# Patient Record
Sex: FEMALE | Race: WHITE | NOT HISPANIC OR LATINO | Employment: OTHER | URBAN - METROPOLITAN AREA
[De-identification: names, ages, dates, MRNs, and addresses within clinical notes are randomized per-mention and may not be internally consistent; named-entity substitution may affect disease eponyms.]

---

## 2017-01-17 ENCOUNTER — TELEPHONE (OUTPATIENT)
Dept: MEDICAL GROUP | Age: 82
End: 2017-01-17

## 2017-01-17 ENCOUNTER — OFFICE VISIT (OUTPATIENT)
Dept: CARDIOLOGY | Facility: MEDICAL CENTER | Age: 82
End: 2017-01-17
Payer: COMMERCIAL

## 2017-01-17 VITALS
SYSTOLIC BLOOD PRESSURE: 108 MMHG | WEIGHT: 105 LBS | HEIGHT: 62 IN | DIASTOLIC BLOOD PRESSURE: 54 MMHG | HEART RATE: 74 BPM | BODY MASS INDEX: 19.32 KG/M2 | OXYGEN SATURATION: 99 %

## 2017-01-17 DIAGNOSIS — F17.200 TOBACCO DEPENDENCE: ICD-10-CM

## 2017-01-17 DIAGNOSIS — G47.34 NOCTURNAL HYPOXIA: ICD-10-CM

## 2017-01-17 DIAGNOSIS — E78.00 PURE HYPERCHOLESTEROLEMIA: ICD-10-CM

## 2017-01-17 DIAGNOSIS — I47.10 PSVT (PAROXYSMAL SUPRAVENTRICULAR TACHYCARDIA): ICD-10-CM

## 2017-01-17 DIAGNOSIS — I65.29 CAROTID ARTERY PLAQUE, UNSPECIFIED LATERALITY: ICD-10-CM

## 2017-01-17 DIAGNOSIS — E78.5 DYSLIPIDEMIA: ICD-10-CM

## 2017-01-17 PROCEDURE — 99214 OFFICE O/P EST MOD 30 MIN: CPT | Performed by: NURSE PRACTITIONER

## 2017-01-17 RX ORDER — NITROFURANTOIN MACROCRYSTALS 100 MG/1
CAPSULE ORAL
COMMUNITY
Start: 2016-12-27 | End: 2017-01-19

## 2017-01-17 RX ORDER — DILTIAZEM HYDROCHLORIDE 120 MG/1
120 CAPSULE, COATED, EXTENDED RELEASE ORAL
Qty: 90 CAP | Refills: 3 | Status: SHIPPED | OUTPATIENT
Start: 2017-01-17 | End: 2018-02-14 | Stop reason: SDUPTHER

## 2017-01-17 ASSESSMENT — ENCOUNTER SYMPTOMS
PALPITATIONS: 0
PND: 0
COUGH: 1
ABDOMINAL PAIN: 0
MYALGIAS: 0
CLAUDICATION: 0
FEVER: 0
DIZZINESS: 0
SHORTNESS OF BREATH: 0
ORTHOPNEA: 0
SPUTUM PRODUCTION: 1

## 2017-01-17 NOTE — MR AVS SNAPSHOT
"        Antonia Stover   2017 1:40 PM   Office Visit   MRN: [id number]    Department:  [facility]   Dept Phone:  [phone]    Description:  Female : 1935   Provider:  JOSH Arita           Reason for Visit     Follow-Up           Allergies as of 2017     Allergen Noted Reactions    Hydrocodone 10/18/2016   Hives    Iodine 2015   Anaphylaxis    RXN=>20 years ago    Nsaids 2013   Hives, Shortness of Breath    Penicillins 2015   Anaphylaxis    RXN=>20 years ago    Codeine 2010       Erythromycin 2010       Morphine 2010       Sulfa Drugs 2010         You were diagnosed with     Carotid artery plaque, unspecified laterality   [026800]       Dyslipidemia   [976340]       Pure hypercholesterolemia   [272.0.ICD-9-CM]       PSVT (paroxysmal supraventricular tachycardia) (CMS-HCC)   [499052]       Nocturnal hypoxia   [293740]       Tobacco dependence   [681931]         Vital Signs     Blood Pressure Pulse Height Weight Body Mass Index Oxygen Saturation    108/54 mmHg 74 1.575 m (5' 2\") 47.628 kg (105 lb) 19.20 kg/m2 99%    Smoking Status                   Current Every Day Smoker           Basic Information     Date Of Birth Sex Race Ethnicity Preferred Language    1935 Female White Non- English      Your appointments     2017  2:00 PM   Established Patient with Tamir Hanson PA-C   [facility] [address] [place] [address] [phone]           You will be receiving a confirmation call a few days before your appointment from our automated call confirmation system.            2017 11:15 AM   FOLLOW UP with Diogo Bernard M.D.   [facility] (--)    [address], [address]  [place] [address] [phone]              Problem List              ICD-10-CM Priority Class Noted - Resolved    Insomnia G47.00 Low  2011 - " Present    PSVT (paroxysmal supraventricular tachycardia) (CMS-HCC) I47.1 Medium  9/29/2011 - Present    Dyslipidemia E78.5 Medium  9/29/2011 - Present    Nocturnal hypoxia G47.34 Medium  8/7/2013 - Present    Hypothyroidism E03.9 Low  8/7/2013 - Present    Carotid artery plaque I65.29 Medium  3/19/2014 - Present    Osteoporosis M81.0 Low  7/31/2014 - Present    H/O knee surgery Z98.890 Low  8/28/2014 - Present    HLD (hyperlipidemia) E78.5 Medium  9/12/2014 - Present    Tobacco dependence F17.200 Low  10/18/2016 - Present    Chronic obstructive pulmonary disease (CMS-HCC) J44.9 Low  11/3/2016 - Present    Chronic pain of left knee M25.562, G89.29 Low  12/22/2016 - Present    Dysuria R30.0 Low  12/22/2016 - Present      Health Maintenance        Date Due Completion Dates    IMM DTaP/Tdap/Td Vaccine (1 - Tdap) 8/20/1956 ---    IMM ZOSTER VACCINE 8/20/1997 ---    MAMMOGRAM 3/14/2017 3/14/2016 (Done), 7/31/2014, 12/13/2010, 12/10/2010, 9/25/2009, 9/25/2009    Override on 3/14/2016: Done (performed at outside location)    BONE DENSITY 7/31/2019 7/31/2014    COLONOSCOPY 7/15/2024 7/15/2014 (Declined)    Override on 7/15/2014: Patient Declined (had it in past 'and won't do it again.')            Current Immunizations     13-VALENT PCV PREVNAR 11/11/2015    Influenza TIV (IM) 11/16/2012    Influenza Vaccine Adult HD 11/3/2016    Influenza Vaccine Pediatric 10/27/2011    Influenza Vaccine Quad Inj (Preserved) 11/11/2015    Pneumococcal polysaccharide vaccine (PPSV-23) 2/16/2005      Below and/or attached are the medications your provider expects you to take. Review all of your home medications and newly ordered medications with your provider and/or pharmacist. Follow medication instructions as directed by your provider and/or pharmacist. Please keep your medication list with you and share with your provider. Update the information when medications are discontinued, doses are changed, or new medications (including  over-the-counter products) are added; and carry medication information at all times in the event of emergency situations     Allergies:  HYDROCODONE - Hives     IODINE - Anaphylaxis     NSAIDS - Hives,Shortness of Breath     PENICILLINS - Anaphylaxis     CODEINE - (reactions not documented)     ERYTHROMYCIN - (reactions not documented)     MORPHINE - (reactions not documented)     SULFA DRUGS - (reactions not documented)               Medications  Valid as of: January 17, 2017 -  2:20 PM    Generic Name Brand Name Tablet Size Instructions for use    Acetaminophen (Tab CR) TYLENOL 650 MG Take 650 mg by mouth every 6 hours as needed.        Albuterol Sulfate (Aero Soln) PROAIR  (90 BASE) MCG/ACT Inhale 2 Puffs by mouth every 6 hours as needed for Shortness of Breath.        DiltiaZEM HCl Coated Beads (CAPSULE SR 24 HR) CARDIZEM  MG Take 1 Cap by mouth every day.        Docusate Sodium (Cap) COLACE 100 MG Take 100 mg by mouth every day.        Estradiol (Cream) ESTRACE 0.1 MG/GM Insert 1 g in vagina every day.        Levothyroxine Sodium (Tab) SYNTHROID 75 MCG TAKE 1 TABLET BY MOUTH BY MOUTH EVERY MORNING ON AN EMPTY STOMACH        Loratadine (Tab) CLARITIN 10 MG Take 10 mg by mouth every day.        Multiple Vitamins-Minerals (Tab) OCUVITE  Take 1 Tab by mouth every evening.        Nitrofurantoin Macrocrystal (Cap) MACRODANTIN 100 MG         Nitrofurantoin Monohyd Macro (Cap) MACROBID 100 MG Take 1 Cap by mouth 2 times a day.        Pravastatin Sodium (Tab) PRAVACHOL 40 MG Take 1 Tab by mouth every evening.        Probiotic Product   Take  by mouth every day.        Wheat Dextrin   Take 1 Dose by mouth 3 times a day.        .                 Medicines prescribed today were sent to:     [id number] - [place], [place] - [address] AT [address] [facility] [address]    [address] [address]    Phone: [phone] Fax: [phone]    Open 24 Hours?: No      Medication refill  instructions:       If your prescription bottle indicates you have medication refills left, it is not necessary to call your provider’s office. Please contact your pharmacy and they will refill your medication.    If your prescription bottle indicates you do not have any refills left, you may request refills at any time through one of the following ways: The online [facility] system (except Urgent Care), by calling your provider’s office, or by asking your pharmacy to contact your provider’s office with a refill request. Medication refills are processed only during regular business hours and may not be available until the next business day. Your provider may request additional information or to have a follow-up visit with you prior to refilling your medication.   *Please Note: Medication refills are assigned a new Rx number when refilled electronically. Your pharmacy may indicate that no refills were authorized even though a new prescription for the same medication is available at the pharmacy. Please request the medicine by name with the pharmacy before contacting your provider for a refill.        Your To Do List     Future Labs/Procedures Complete By Expires    CAROTID DUPLEX  As directed 7/20/2017         [facility] System Status: Patient Declined

## 2017-01-17 NOTE — PROGRESS NOTES
Subjective:   Antonia Stover is a 81 y.o. female who presents today for yearly follow up.    She is a patient of Dr. Bernard in our office Hx of PSVT, HLD, and COPD with nocturnal oxygen.    She does have occasional episodes of palpitations with her PSVT, but she is able to manage with vagal maneuvers and they are usually very short-lived.    She does have chronic shortness of breath, sputum production, and a cough with her COPD. She continues to smoke 6 cigarettes daily.    She has had no episodes of chest pain, dizziness/lightheadedness, orthopnea, or peripheral edema.    Past Medical History   Diagnosis Date   • PSVT (paroxysmal supraventricular tachycardia) (CMS-HCC) February 2013     Echocardiogram with normal LV size, LVEF 65-70%.   • Dyslipidemia     • Nocturnal hypoxia       Uses oxygen QHS.   • COPD    • Insomnia     • Dizziness     • Tobacco use    • Hypothyroidism    • Sickle cell disease (CMS-HCC)    • Carotid artery plaque 3/19/2014   • Fatigue 7/3/2014   • HLD (hyperlipidemia) 9/12/2014   • Anemia    • Arthritis      Past Surgical History   Procedure Laterality Date   • Hysterectomy, total abdominal     • Knee replacement, total  2005     Right   • Cataract extraction with iol       Bilateral   • Cataract extraction with iol       Bilateral   • Appendectomy     • Abdominal hysterectomy total       Family History   Problem Relation Age of Onset   • Heart Attack Brother 70     History   Smoking status   • Current Every Day Smoker -- 1.00 packs/day for 55 years   • Types: Cigarettes   Smokeless tobacco   • Never Used     Allergies   Allergen Reactions   • Hydrocodone Hives   • Iodine Anaphylaxis     RXN=>20 years ago   • Nsaids Hives and Shortness of Breath   • Penicillins Anaphylaxis     RXN=>20 years ago   • Codeine    • Erythromycin    • Morphine    • Sulfa Drugs      Outpatient Encounter Prescriptions as of 1/17/2017   Medication Sig Dispense Refill   • nitrofurantoin macro crystals (MACRODANTIN) 100  MG Cap      • diltiazem CD (CARTIA XT) 120 MG CAPSULE SR 24 HR Take 1 Cap by mouth every day. 90 Cap 3   • pravastatin (PRAVACHOL) 40 MG tablet Take 1 Tab by mouth every evening. 90 Tab 3   • acetaminophen (TYLENOL 8 HOUR ARTHRITIS PAIN) 650 MG CR tablet Take 650 mg by mouth every 6 hours as needed.     • nitrofurantoin monohydr macro (MACROBID) 100 MG Cap Take 1 Cap by mouth 2 times a day. 20 Cap 0   • PROAIR  (90 BASE) MCG/ACT Aero Soln inhalation aerosol Inhale 2 Puffs by mouth every 6 hours as needed for Shortness of Breath. 1 Inhaler 3   • levothyroxine (SYNTHROID) 75 MCG Tab TAKE 1 TABLET BY MOUTH BY MOUTH EVERY MORNING ON AN EMPTY STOMACH 90 Tab 3   • Probiotic Product (ALIGN PO) Take  by mouth every day.     • docusate sodium (COLACE) 100 MG Cap Take 100 mg by mouth every day.     • Wheat Dextrin (BENEFIBER DRINK MIX PO) Take 1 Dose by mouth 3 times a day.     • estradiol (ESTRACE) 0.1 MG/GM vaginal cream Insert 1 g in vagina every day. 42.5 g 3   • Multiple Vitamins-Minerals (OCUVITE) TABS Take 1 Tab by mouth every evening.     • [DISCONTINUED] CARTIA  MG CAPSULE SR 24 HR TAKE ONE CAPSULE BY MOUTH EVERY DAY 90 Cap 0   • [DISCONTINUED] cyclobenzaprine (FLEXERIL) 10 MG Tab Take 1 Tab by mouth 2 times a day as needed. (Patient not taking: Reported on 1/17/2017) 15 Tab 0   • loratadine (CLARITIN) 10 MG Tab Take 10 mg by mouth every day.       No facility-administered encounter medications on file as of 1/17/2017.     Review of Systems   Constitutional: Negative for fever and malaise/fatigue.   Respiratory: Positive for cough and sputum production. Negative for shortness of breath.         White sputum daily   Cardiovascular: Negative for chest pain, palpitations, orthopnea, claudication, leg swelling and PND.   Gastrointestinal: Negative for abdominal pain.   Musculoskeletal: Negative for myalgias.   Neurological: Negative for dizziness.   All other systems reviewed and are negative.        "Objective:   /54 mmHg  Pulse 74  Ht 1.575 m (5' 2\")  Wt 47.628 kg (105 lb)  BMI 19.20 kg/m2  SpO2 99%    Physical Exam   Constitutional: She is oriented to person, place, and time. She appears well-developed and well-nourished. No distress.   HENT:   Head: Normocephalic and atraumatic.   Eyes: EOM are normal.   Neck: Normal range of motion. No JVD present.   Cardiovascular: Normal rate, regular rhythm, normal heart sounds and intact distal pulses.    No murmur heard.  Pulmonary/Chest: Effort normal and breath sounds normal. No respiratory distress.   Abdominal: Soft. Bowel sounds are normal.   Musculoskeletal: Normal range of motion. She exhibits no edema.   Neurological: She is alert and oriented to person, place, and time.   Skin: Skin is warm and dry.   Psychiatric: She has a normal mood and affect.   Nursing note and vitals reviewed.    Lab Results   Component Value Date/Time    CHOLESTEROL, 11/09/2016 07:52 AM    LDL 85 11/09/2016 07:52 AM    HDL 77 11/09/2016 07:52 AM    TRIGLYCERIDES 67 11/09/2016 07:52 AM       Lab Results   Component Value Date/Time    SODIUM 140 11/09/2016 07:52 AM    POTASSIUM 4.1 11/09/2016 07:52 AM    CHLORIDE 107 11/09/2016 07:52 AM    CO2 26 11/09/2016 07:52 AM    GLUCOSE 83 11/09/2016 07:52 AM    BUN 9 11/09/2016 07:52 AM    CREATININE 0.98 11/09/2016 07:52 AM     Lab Results   Component Value Date/Time    ALKALINE PHOSPHATASE 70 11/09/2016 07:52 AM    AST(SGOT) 19 11/09/2016 07:52 AM    ALT(SGPT) 9 11/09/2016 07:52 AM    TOTAL BILIRUBIN 0.6 11/09/2016 07:52 AM      2014 CAROTID DUPLEX CONCLUSIONS   Mild bilateral internal carotid artery stenosis (<50%).    Flow within both subclavian arteries appears to be within normal limits.     Assessment:     1. Carotid artery plaque, unspecified laterality  CAROTID DUPLEX   2. Dyslipidemia  CAROTID DUPLEX   3. Pure hypercholesterolemia     4. PSVT (paroxysmal supraventricular tachycardia) (CMS-HCC)  diltiazem CD (CARTIA XT) " 120 MG CAPSULE SR 24 HR   5. Nocturnal hypoxia     6. Tobacco dependence       Medical Decision Making:  Today's Assessment / Status / Plan:     1. Carotid disease, mild. FU with repeat duplex this year. Continue statin, no baby ASA.    2. HLD, managed well with pravastatin. Continue. Labs per PCP.    3. PSVT, controlled well with cartia. Re-filled for one year.    4. COPD with tobacco abuse and nocturnal hypoxia, followed by pulmonary and PCP. Not ready for smoking cessation at this point, did recommend.    FU in clinic in 6 months with FK with repeat carotid and yearly labs with PCP.    Patient verbalizes understanding and agrees with the plan of care.     Collaborating MD: Emmanuel HERNANDEZ    Spent 45 minutes in face-to-face patient contact in which greater than 50% of the visit was spent in counseling/coordination of care of medication management, symptom management, re-assurance, discussion of carotid disease and order duplex, smoking cessation discussed, and labs per PCP.

## 2017-01-17 NOTE — Clinical Note
[facility]   [address]  [place] [address]  Phone: [phone]  Fax: [phone]              Antonia Stover  1935    Encounter Date: 1/17/2017    JOSH Arita          PROGRESS NOTE:  Subjective:   Antonia Stover is a 81 y.o. female who presents today for yearly follow up.    She is a patient of Dr. Bernard in our office Hx of PSVT, HLD, and COPD with nocturnal oxygen.    She does have occasional episodes of palpitations with her PSVT, but she is able to manage with vagal maneuvers and they are usually very short-lived.    She does have chronic shortness of breath, sputum production, and a cough with her COPD. She continues to smoke 6 cigarettes daily.    She has had no episodes of chest pain, dizziness/lightheadedness, orthopnea, or peripheral edema.    Past Medical History   Diagnosis Date   • PSVT (paroxysmal supraventricular tachycardia) (CMS-[facility]) February 2013     Echocardiogram with normal LV size, LVEF 65-70%.   • Dyslipidemia     • Nocturnal hypoxia       Uses oxygen QHS.   • COPD    • Insomnia     • Dizziness     • Tobacco use    • Hypothyroidism    • Sickle cell disease (CMS-[facility])    • Carotid artery plaque 3/19/2014   • Fatigue 7/3/2014   • HLD (hyperlipidemia) 9/12/2014   • Anemia    • Arthritis      Past Surgical History   Procedure Laterality Date   • Hysterectomy, total abdominal     • Knee replacement, total  2005     Right   • Cataract extraction with iol       Bilateral   • Cataract extraction with iol       Bilateral   • Appendectomy     • Abdominal hysterectomy total       Family History   Problem Relation Age of Onset   • Heart Attack Brother 70     History   Smoking status   • Current Every Day Smoker -- 1.00 packs/day for 55 years   • Types: Cigarettes   Smokeless tobacco   • Never Used     Allergies   Allergen Reactions   • Hydrocodone Hives   • Iodine Anaphylaxis     RXN=>20 years ago   • Nsaids Hives and  Shortness of Breath   • Penicillins Anaphylaxis     RXN=>20 years ago   • Codeine    • Erythromycin    • Morphine    • Sulfa Drugs      Outpatient Encounter Prescriptions as of 1/17/2017   Medication Sig Dispense Refill   • nitrofurantoin macro crystals (MACRODANTIN) 100 MG Cap      • diltiazem CD (CARTIA XT) 120 MG CAPSULE SR 24 HR Take 1 Cap by mouth every day. 90 Cap 3   • pravastatin (PRAVACHOL) 40 MG tablet Take 1 Tab by mouth every evening. 90 Tab 3   • acetaminophen (TYLENOL 8 HOUR ARTHRITIS PAIN) 650 MG CR tablet Take 650 mg by mouth every 6 hours as needed.     • nitrofurantoin monohydr macro (MACROBID) 100 MG Cap Take 1 Cap by mouth 2 times a day. 20 Cap 0   • PROAIR  (90 BASE) MCG/ACT Aero Soln inhalation aerosol Inhale 2 Puffs by mouth every 6 hours as needed for Shortness of Breath. 1 Inhaler 3   • levothyroxine (SYNTHROID) 75 MCG Tab TAKE 1 TABLET BY MOUTH BY MOUTH EVERY MORNING ON AN EMPTY STOMACH 90 Tab 3   • Probiotic Product (ALIGN PO) Take  by mouth every day.     • docusate sodium (COLACE) 100 MG Cap Take 100 mg by mouth every day.     • Wheat Dextrin (BENEFIBER DRINK MIX PO) Take 1 Dose by mouth 3 times a day.     • estradiol (ESTRACE) 0.1 MG/GM vaginal cream Insert 1 g in vagina every day. 42.5 g 3   • Multiple Vitamins-Minerals (OCUVITE) TABS Take 1 Tab by mouth every evening.     • [DISCONTINUED] CARTIA  MG CAPSULE SR 24 HR TAKE ONE CAPSULE BY MOUTH EVERY DAY 90 Cap 0   • [DISCONTINUED] cyclobenzaprine (FLEXERIL) 10 MG Tab Take 1 Tab by mouth 2 times a day as needed. (Patient not taking: Reported on 1/17/2017) 15 Tab 0   • loratadine (CLARITIN) 10 MG Tab Take 10 mg by mouth every day.       No facility-administered encounter medications on file as of 1/17/2017.     Review of Systems   Constitutional: Negative for fever and malaise/fatigue.   Respiratory: Positive for cough and sputum production. Negative for shortness of breath.         White sputum daily   Cardiovascular:  "Negative for chest pain, palpitations, orthopnea, claudication, leg swelling and PND.   Gastrointestinal: Negative for abdominal pain.   Musculoskeletal: Negative for myalgias.   Neurological: Negative for dizziness.   All other systems reviewed and are negative.       Objective:   /54 mmHg  Pulse 74  Ht 1.575 m (5' 2\")  Wt 47.628 kg (105 lb)  BMI 19.20 kg/m2  SpO2 99%    Physical Exam   Constitutional: She is oriented to person, place, and time. She appears well-developed and well-nourished. No distress.   HENT:   Head: Normocephalic and atraumatic.   Eyes: EOM are normal.   Neck: Normal range of motion. No JVD present.   Cardiovascular: Normal rate, regular rhythm, normal heart sounds and intact distal pulses.    No murmur heard.  Pulmonary/Chest: Effort normal and breath sounds normal. No respiratory distress.   Abdominal: Soft. Bowel sounds are normal.   Musculoskeletal: Normal range of motion. She exhibits no edema.   Neurological: She is alert and oriented to person, place, and time.   Skin: Skin is warm and dry.   Psychiatric: She has a normal mood and affect.   Nursing note and vitals reviewed.    Lab Results   Component Value Date/Time    CHOLESTEROL, 11/09/2016 07:52 AM    LDL 85 11/09/2016 07:52 AM    HDL 77 11/09/2016 07:52 AM    TRIGLYCERIDES 67 11/09/2016 07:52 AM       Lab Results   Component Value Date/Time    SODIUM 140 11/09/2016 07:52 AM    POTASSIUM 4.1 11/09/2016 07:52 AM    CHLORIDE 107 11/09/2016 07:52 AM    CO2 26 11/09/2016 07:52 AM    GLUCOSE 83 11/09/2016 07:52 AM    BUN 9 11/09/2016 07:52 AM    CREATININE 0.98 11/09/2016 07:52 AM     Lab Results   Component Value Date/Time    ALKALINE PHOSPHATASE 70 11/09/2016 07:52 AM    AST(SGOT) 19 11/09/2016 07:52 AM    ALT(SGPT) 9 11/09/2016 07:52 AM    TOTAL BILIRUBIN 0.6 11/09/2016 07:52 AM      2014 CAROTID DUPLEX CONCLUSIONS   Mild bilateral internal carotid artery stenosis (<50%).    Flow within both subclavian arteries appears " to be within normal limits.     Assessment:     1. Carotid artery plaque, unspecified laterality  CAROTID DUPLEX   2. Dyslipidemia  CAROTID DUPLEX   3. Pure hypercholesterolemia     4. PSVT (paroxysmal supraventricular tachycardia) (CMS-HCC)  diltiazem CD (CARTIA XT) 120 MG CAPSULE SR 24 HR   5. Nocturnal hypoxia     6. Tobacco dependence       Medical Decision Making:  Today's Assessment / Status / Plan:     1. Carotid disease, mild. FU with repeat duplex this year. Continue statin, no baby ASA.    2. HLD, managed well with pravastatin. Continue. Labs per PCP.    3. PSVT, controlled well with cartia. Re-filled for one year.    4. COPD with tobacco abuse and nocturnal hypoxia, followed by pulmonary and PCP. Not ready for smoking cessation at this point, did recommend.    FU in clinic in 6 months with FK with repeat carotid and yearly labs with PCP.    Patient verbalizes understanding and agrees with the plan of care.     Collaborating MD: Emmanuel HERNANDEZ    Spent 45 minutes in face-to-face patient contact in which greater than 50% of the visit was spent in counseling/coordination of care of medication management, symptom management, re-assurance, discussion of carotid disease and order duplex, smoking cessation discussed, and labs per PCP.          No Recipients

## 2017-01-17 NOTE — TELEPHONE ENCOUNTER
1. Caller Name: Antonia                                         Call Back Number: [phone] (home)         Patient approves a detailed voicemail message: no    Patient states that on her previous appointment with Tamir, he agreed to be her PCP.  Does patient need to schedule a NU2U appointment or are you okay with just switching PCP in her chart? Patient requesting a phone call from Tamir only. Please advise.

## 2017-01-19 ENCOUNTER — OFFICE VISIT (OUTPATIENT)
Dept: MEDICAL GROUP | Age: 82
End: 2017-01-19
Payer: COMMERCIAL

## 2017-01-19 VITALS
BODY MASS INDEX: 19.32 KG/M2 | HEIGHT: 62 IN | WEIGHT: 105 LBS | SYSTOLIC BLOOD PRESSURE: 118 MMHG | TEMPERATURE: 98.9 F | OXYGEN SATURATION: 94 % | DIASTOLIC BLOOD PRESSURE: 80 MMHG | HEART RATE: 97 BPM

## 2017-01-19 DIAGNOSIS — N39.0 RECURRENT URINARY TRACT INFECTION: ICD-10-CM

## 2017-01-19 PROCEDURE — 99214 OFFICE O/P EST MOD 30 MIN: CPT | Performed by: PHYSICIAN ASSISTANT

## 2017-01-19 RX ORDER — NITROFURANTOIN MACROCRYSTALS 50 MG/1
50 CAPSULE ORAL DAILY
Qty: 30 CAP | Refills: 5 | Status: SHIPPED | OUTPATIENT
Start: 2017-01-19 | End: 2017-08-22

## 2017-01-19 NOTE — ASSESSMENT & PLAN NOTE
This is a 81-year-old female complains of a chronic history of urinary tract infections. She states recently after an episode of explosive diarrhea she develop another urinary tract infection. Complains of burning urination. She had a prescription for Macrobid that she began. It is now day 5 and she feels much better. She does have increased urinary frequency but she believes that is secondary to her frequent water consumption. She wants to be placed on prophylactic antibiotics. Previously she was on Macrodantin 50 mg daily. This worked well in curbing any urinary tract infections. She believes that the urine tract infections stem from having vaginal atrophy. She will use Estrace vaginal cream irregularly. She has not seen a urologist in the past. She's had 3 urinalysis with cultures this year none of which grew a specific bacteria. 2 of them had microorganisms of 50,000 or greater.

## 2017-01-19 NOTE — MR AVS SNAPSHOT
"Antonia Stover   2017 2:00 PM   Office Visit   MRN: [id number]    Department:  [address] [place] [address]   Dept Phone:  [phone]    Description:  Female : 1935   Provider:  Tamir Hanson PA-C           Reason for Visit     Follow-Up UTI      Allergies as of 2017     Allergen Noted Reactions    Hydrocodone 10/18/2016   Hives    Iodine 2015   Anaphylaxis    RXN=>20 years ago    Nsaids 2013   Hives, Shortness of Breath    Penicillins 2015   Anaphylaxis    RXN=>20 years ago    Codeine 2010       Erythromycin 2010       Morphine 2010       Sulfa Drugs 2010         You were diagnosed with     Recurrent urinary tract infection   [285404]         Vital Signs     Blood Pressure Pulse Temperature Height Weight Body Mass Index    118/80 mmHg 97 37.2 °C (98.9 °F) 1.575 m (5' 2\") 47.628 kg (105 lb) 19.20 kg/m2    Oxygen Saturation Smoking Status                94% Current Every Day Smoker          Basic Information     Date Of Birth Sex Race Ethnicity Preferred Language    1935 Female White Non- English      Your appointments     Jul 10, 2017 12:00 PM   CAROTID DUPLEX with VASCULAR LAB [facility], VASCULAR EXAM ROOM [facility]   NON-INVASIVE LAB [facility])    [address]  [place] [address]   [phone]           No prep            2017 11:15 AM   FOLLOW UP with Diogo Bernard M.D.   [facility] (--)    [address]  [place] [id number] [phone]              Problem List              ICD-10-CM Priority Class Noted - Resolved    Insomnia G47.00 Low  2011 - Present    PSVT (paroxysmal supraventricular tachycardia) (CMS-HCC) I47.1 Medium  2011 - Present    Dyslipidemia E78.5 Medium  2011 - Present    Nocturnal hypoxia G47.34 Medium  2013 - Present    Hypothyroidism E03.9 Low  2013 - Present    Carotid artery plaque I65.29 Medium  3/19/2014 - Present   " Osteoporosis M81.0 Low  7/31/2014 - Present    H/O knee surgery Z98.890 Low  8/28/2014 - Present    HLD (hyperlipidemia) E78.5 Medium  9/12/2014 - Present    Tobacco dependence F17.200 Low  10/18/2016 - Present    Chronic obstructive pulmonary disease (CMS-HCC) J44.9 Low  11/3/2016 - Present    Chronic pain of left knee M25.562, G89.29 Low  12/22/2016 - Present    Dysuria R30.0 Low  12/22/2016 - Present    Recurrent urinary tract infection N39.0   1/19/2017 - Present      Health Maintenance        Date Due Completion Dates    IMM DTaP/Tdap/Td Vaccine (1 - Tdap) 8/20/1956 ---    IMM ZOSTER VACCINE 8/20/1997 ---    MAMMOGRAM 3/14/2017 3/14/2016 (Done), 7/31/2014, 12/13/2010, 12/10/2010, 9/25/2009, 9/25/2009    Override on 3/14/2016: Done (performed at outside location)    BONE DENSITY 7/31/2019 7/31/2014    COLONOSCOPY 7/15/2024 7/15/2014 (Declined)    Override on 7/15/2014: Patient Declined (had it in past 'and won't do it again.')            Current Immunizations     13-VALENT PCV PREVNAR 11/11/2015    Influenza TIV (IM) 11/16/2012    Influenza Vaccine Adult HD 11/3/2016    Influenza Vaccine Pediatric 10/27/2011    Influenza Vaccine Quad Inj (Preserved) 11/11/2015    Pneumococcal polysaccharide vaccine (PPSV-23) 2/16/2005      Below and/or attached are the medications your provider expects you to take. Review all of your home medications and newly ordered medications with your provider and/or pharmacist. Follow medication instructions as directed by your provider and/or pharmacist. Please keep your medication list with you and share with your provider. Update the information when medications are discontinued, doses are changed, or new medications (including over-the-counter products) are added; and carry medication information at all times in the event of emergency situations     Allergies:  HYDROCODONE - Hives     IODINE - Anaphylaxis     NSAIDS - Hives,Shortness of Breath     PENICILLINS - Anaphylaxis     CODEINE  - (reactions not documented)     ERYTHROMYCIN - (reactions not documented)     MORPHINE - (reactions not documented)     SULFA DRUGS - (reactions not documented)               Medications  Valid as of: January 19, 2017 -  2:36 PM    Generic Name Brand Name Tablet Size Instructions for use    Acetaminophen (Tab CR) TYLENOL 650 MG Take 650 mg by mouth every 6 hours as needed.        Albuterol Sulfate (Aero Soln) PROAIR  (90 BASE) MCG/ACT Inhale 2 Puffs by mouth every 6 hours as needed for Shortness of Breath.        DiltiaZEM HCl Coated Beads (CAPSULE SR 24 HR) CARDIZEM  MG Take 1 Cap by mouth every day.        Docusate Sodium (Cap) COLACE 100 MG Take 100 mg by mouth every day.        Estradiol (Cream) ESTRACE 0.1 MG/GM Insert 1 g in vagina every day.        Levothyroxine Sodium (Tab) SYNTHROID 75 MCG TAKE 1 TABLET BY MOUTH BY MOUTH EVERY MORNING ON AN EMPTY STOMACH        Loratadine (Tab) CLARITIN 10 MG Take 10 mg by mouth every day.        Multiple Vitamins-Minerals (Tab) OCUVITE  Take 1 Tab by mouth every evening.        Nitrofurantoin Macrocrystal (Cap) MACRODANTIN 100 MG         Nitrofurantoin Macrocrystal (Cap) MACRODANTIN 50 MG Take 1 Cap by mouth every day.        Nitrofurantoin Monohyd Macro (Cap) MACROBID 100 MG Take 1 Cap by mouth 2 times a day.        Pravastatin Sodium (Tab) PRAVACHOL 40 MG Take 1 Tab by mouth every evening.        Probiotic Product   Take  by mouth every day.        Wheat Dextrin   Take 1 Dose by mouth 3 times a day.        .                 Medicines prescribed today were sent to:     [facility] [address] [place] [address] [place] - [address] [place] [address] AT [facility] & [address] [address]    Phone: [phone] Fax: [phone]    Open 24 Hours?: No      Medication refill instructions:       If your prescription bottle indicates you have medication refills left, it is not necessary to call your provider’s office. Please contact your pharmacy and they  will refill your medication.    If your prescription bottle indicates you do not have any refills left, you may request refills at any time through one of the following ways: The online [employer] system (except Urgent Care), by calling your provider’s office, or by asking your pharmacy to contact your provider’s office with a refill request. Medication refills are processed only during regular business hours and may not be available until the next business day. Your provider may request additional information or to have a follow-up visit with you prior to refilling your medication.   *Please Note: Medication refills are assigned a new Rx number when refilled electronically. Your pharmacy may indicate that no refills were authorized even though a new prescription for the same medication is available at the pharmacy. Please request the medicine by name with the pharmacy before contacting your provider for a refill.           SessionMhart Status: Patient Declined

## 2017-01-19 NOTE — PROGRESS NOTES
Subjective:   Antonia Stover is a 81 y.o. female here today for recurrent UTIs.    Recurrent urinary tract infection  This is a 81-year-old female complains of a chronic history of urinary tract infections. She states recently after an episode of explosive diarrhea she develop another urinary tract infection. Complains of burning urination. She had a prescription for Macrobid that she began. It is now day 5 and she feels much better. She does have increased urinary frequency but she believes that is secondary to her frequent water consumption. She wants to be placed on prophylactic antibiotics. Previously she was on Macrodantin 50 mg daily. This worked well in curbing any urinary tract infections. She believes that the urine tract infections stem from having vaginal atrophy. She will use Estrace vaginal cream irregularly. She has not seen a urologist in the past. She's had 3 urinalysis with cultures this year none of which grew a specific bacteria. 2 of them had microorganisms of 50,000 or greater.       Current medicines (including changes today)  Current Outpatient Prescriptions   Medication Sig Dispense Refill   • nitrofurantoin (MACRODANTIN) 50 MG Cap Take 1 Cap by mouth every day. 30 Cap 5   • diltiazem CD (CARTIA XT) 120 MG CAPSULE SR 24 HR Take 1 Cap by mouth every day. 90 Cap 3   • pravastatin (PRAVACHOL) 40 MG tablet Take 1 Tab by mouth every evening. 90 Tab 3   • acetaminophen (TYLENOL 8 HOUR ARTHRITIS PAIN) 650 MG CR tablet Take 650 mg by mouth every 6 hours as needed.     • PROAIR  (90 BASE) MCG/ACT Aero Soln inhalation aerosol Inhale 2 Puffs by mouth every 6 hours as needed for Shortness of Breath. 1 Inhaler 3   • levothyroxine (SYNTHROID) 75 MCG Tab TAKE 1 TABLET BY MOUTH BY MOUTH EVERY MORNING ON AN EMPTY STOMACH 90 Tab 3   • loratadine (CLARITIN) 10 MG Tab Take 10 mg by mouth every day.     • Probiotic Product (ALIGN PO) Take  by mouth every day.     • docusate sodium (COLACE) 100 MG Cap Take  "100 mg by mouth every day.     • Wheat Dextrin (BENEFIBER DRINK MIX PO) Take 1 Dose by mouth 3 times a day.     • estradiol (ESTRACE) 0.1 MG/GM vaginal cream Insert 1 g in vagina every day. 42.5 g 3   • Multiple Vitamins-Minerals (OCUVITE) TABS Take 1 Tab by mouth every evening.       No current facility-administered medications for this visit.     She  has a past medical history of PSVT (paroxysmal supraventricular tachycardia) (CMS-HCC) (February 2013); Dyslipidemia ( ); Nocturnal hypoxia ( ); COPD; Insomnia ( ); Dizziness ( ); Tobacco use; Hypothyroidism; Sickle cell disease (CMS-HCC); Carotid artery plaque (3/19/2014); Fatigue (7/3/2014); HLD (hyperlipidemia) (9/12/2014); Anemia; and Arthritis.    ROS   No chest pain, no shortness of breath, no abdominal pain and all other systems were reviewed and are negative.       Objective:     Blood pressure 118/80, pulse 97, temperature 37.2 °C (98.9 °F), height 1.575 m (5' 2\"), weight 47.628 kg (105 lb), SpO2 94 %. Body mass index is 19.2 kg/(m^2).   Physical Exam:  Constitutional: Alert, no distress.  Skin: Warm, dry, good turgor, no rashes in visible areas.  Eye: Equal, round and reactive, conjunctiva clear, lids normal.  ENMT: Lips without lesions, good dentition, oropharynx clear.  Neck: Trachea midline, no masses.   Respiratory: Unlabored respiratory effort, lungs appear clear.  Cardiovascular: Normal S1, S2, no murmur, no edema.  Abdomen: Soft, non-tender, no masses.  Psych: Alert and oriented x3, normal affect and mood.    Urinalysis was not performed because she is currently on Macrobid.    Assessment and Plan:   The following treatment plan was discussed    1. Recurrent urinary tract infection  Repeated symptomatology secondary to acute cystitis. Could be secondary to vaginal atrophy. Offered referral to urology but patient declined. Previously treated with Macrodantin prophylactically so prescribed Macrodantin 50 mg daily. Advised if she has repeat symptoms " suggesting of a urinary tract infection I will then need to refer to urology for further evaluation.  - nitrofurantoin (MACRODANTIN) 50 MG Cap; Take 1 Cap by mouth every day.  Dispense: 30 Cap; Refill: 5      Followup: Return if symptoms worsen or fail to improve.    Please note that this dictation was created using voice recognition software. I have made every reasonable attempt to correct obvious errors, but I expect that there are errors of grammar and possibly content that I did not discover before finalizing the note.

## 2017-03-16 ENCOUNTER — OFFICE VISIT (OUTPATIENT)
Dept: MEDICAL GROUP | Age: 82
End: 2017-03-16
Payer: COMMERCIAL

## 2017-03-16 VITALS
OXYGEN SATURATION: 96 % | WEIGHT: 103.5 LBS | BODY MASS INDEX: 19.05 KG/M2 | RESPIRATION RATE: 16 BRPM | TEMPERATURE: 99 F | HEART RATE: 80 BPM | SYSTOLIC BLOOD PRESSURE: 118 MMHG | HEIGHT: 62 IN | DIASTOLIC BLOOD PRESSURE: 74 MMHG

## 2017-03-16 DIAGNOSIS — J44.1 COPD EXACERBATION (HCC): ICD-10-CM

## 2017-03-16 DIAGNOSIS — J30.1 SEASONAL ALLERGIC RHINITIS DUE TO POLLEN: ICD-10-CM

## 2017-03-16 PROCEDURE — 99214 OFFICE O/P EST MOD 30 MIN: CPT | Performed by: PHYSICIAN ASSISTANT

## 2017-03-16 RX ORDER — DOXYCYCLINE HYCLATE 100 MG
100 TABLET ORAL 2 TIMES DAILY
Qty: 20 TAB | Refills: 0 | Status: SHIPPED | OUTPATIENT
Start: 2017-03-16 | End: 2017-05-08

## 2017-03-16 RX ORDER — METHYLPREDNISOLONE 4 MG/1
TABLET ORAL
Qty: 21 TAB | Refills: 0 | Status: SHIPPED | OUTPATIENT
Start: 2017-03-16 | End: 2017-05-08

## 2017-03-16 NOTE — MR AVS SNAPSHOT
"Antonia Stover   3/16/2017 11:40 AM   Office Visit   MRN: [id number]    Department:  [address] [place] [address]   Dept Phone:  [phone]    Description:  Female : 1935   Provider:  Tamir Hanson PA-C           Reason for Visit     Allergic Rhinitis since Monday      Allergies as of 3/16/2017     Allergen Noted Reactions    Hydrocodone 10/18/2016   Hives    Iodine 2015   Anaphylaxis    RXN=>20 years ago    Nsaids 2013   Hives, Shortness of Breath    Penicillins 2015   Anaphylaxis    RXN=>20 years ago    Codeine 2010       Erythromycin 2010       Morphine 2010       Sulfa Drugs 2010         You were diagnosed with     COPD exacerbation (CMS-HCC)   [921953]       Seasonal allergic rhinitis due to pollen   [5699060]         Vital Signs     Blood Pressure Pulse Temperature Respirations Height Weight    118/74 mmHg 80 37.2 °C (99 °F) 16 1.575 m (5' 2.01\") 46.947 kg (103 lb 8 oz)    Body Mass Index Oxygen Saturation Breastfeeding? Smoking Status          18.93 kg/m2 96% No Current Every Day Smoker        Basic Information     Date Of Birth Sex Race Ethnicity Preferred Language    1935 Female White Non- English      Your appointments     Jul 10, 2017 12:00 PM   CAROTID DUPLEX with VASCULAR LAB [facility], VASCULAR EXAM ROOM [facility]   NON-INVASIVE LAB [facility])    [address]  [place] [address]   [phone]           No prep            2017 11:15 AM   FOLLOW UP with Diogo Bernard M.D.   [facility] (--)    [address]  [place] [id number] [phone]              Problem List              ICD-10-CM Priority Class Noted - Resolved    Insomnia G47.00 Low  2011 - Present    PSVT (paroxysmal supraventricular tachycardia) (CMS-HCC) I47.1 Medium  2011 - Present    Dyslipidemia E78.5 Medium  2011 - Present    Nocturnal hypoxia G47.34 Medium  2013 - Present   " Hypothyroidism E03.9 Low  8/7/2013 - Present    Carotid artery plaque I65.29 Medium  3/19/2014 - Present    Osteoporosis M81.0 Low  7/31/2014 - Present    H/O knee surgery Z98.890 Low  8/28/2014 - Present    HLD (hyperlipidemia) E78.5 Medium  9/12/2014 - Present    Tobacco dependence F17.200 Low  10/18/2016 - Present    Chronic obstructive pulmonary disease (CMS-HCC) J44.9 Low  11/3/2016 - Present    Chronic pain of left knee M25.562, G89.29 Low  12/22/2016 - Present    Dysuria R30.0 Low  12/22/2016 - Present    Recurrent urinary tract infection N39.0   1/19/2017 - Present    COPD exacerbation (CMS-HCC) J44.1   3/16/2017 - Present    Seasonal allergic rhinitis due to pollen J30.1   3/16/2017 - Present      Health Maintenance        Date Due Completion Dates    IMM DTaP/Tdap/Td Vaccine (1 - Tdap) 8/20/1956 ---    IMM ZOSTER VACCINE 8/20/1997 ---    BONE DENSITY 7/31/2019 7/31/2014    COLONOSCOPY 7/15/2024 7/15/2014 (Declined)    Override on 7/15/2014: Patient Declined (had it in past 'and won't do it again.')            Current Immunizations     13-VALENT PCV PREVNAR 11/11/2015    Influenza TIV (IM) 11/16/2012    Influenza Vaccine Adult HD 11/3/2016    Influenza Vaccine Pediatric 10/27/2011    Influenza Vaccine Quad Inj (Preserved) 11/11/2015    Pneumococcal polysaccharide vaccine (PPSV-23) 2/16/2005      Below and/or attached are the medications your provider expects you to take. Review all of your home medications and newly ordered medications with your provider and/or pharmacist. Follow medication instructions as directed by your provider and/or pharmacist. Please keep your medication list with you and share with your provider. Update the information when medications are discontinued, doses are changed, or new medications (including over-the-counter products) are added; and carry medication information at all times in the event of emergency situations     Allergies:  HYDROCODONE - Hives     IODINE - Anaphylaxis      NSAIDS - Hives,Shortness of Breath     PENICILLINS - Anaphylaxis     CODEINE - (reactions not documented)     ERYTHROMYCIN - (reactions not documented)     MORPHINE - (reactions not documented)     SULFA DRUGS - (reactions not documented)               Medications  Valid as of: March 16, 2017 - 12:01 PM    Generic Name Brand Name Tablet Size Instructions for use    Acetaminophen (Tab CR) TYLENOL 650 MG Take 650 mg by mouth every 6 hours as needed.        Albuterol Sulfate (Aero Soln) PROAIR  (90 BASE) MCG/ACT Inhale 2 Puffs by mouth every 6 hours as needed for Shortness of Breath.        DiltiaZEM HCl Coated Beads (CAPSULE SR 24 HR) CARDIZEM  MG Take 1 Cap by mouth every day.        Docusate Sodium (Cap) COLACE 100 MG Take 100 mg by mouth every day.        Doxycycline Hyclate (Tab) VIBRAMYCIN 100 MG Take 1 Tab by mouth 2 times a day.        Estradiol (Cream) ESTRACE 0.1 MG/GM Insert 1 g in vagina every day.        Levothyroxine Sodium (Tab) SYNTHROID 75 MCG TAKE 1 TABLET BY MOUTH BY MOUTH EVERY MORNING ON AN EMPTY STOMACH        Loratadine (Tab) CLARITIN 10 MG Take 10 mg by mouth every day.        MethylPREDNISolone (Tablet Therapy Pack) MEDROL DOSEPAK 4 MG As directed on the packaging label.        Multiple Vitamins-Minerals (Tab) OCUVITE  Take 1 Tab by mouth every evening.        Nitrofurantoin Macrocrystal (Cap) MACRODANTIN 50 MG Take 1 Cap by mouth every day.        Pravastatin Sodium (Tab) PRAVACHOL 40 MG Take 1 Tab by mouth every evening.        Probiotic Product   Take  by mouth every day.        Wheat Dextrin   Take 1 Dose by mouth 3 times a day.        .                 Medicines prescribed today were sent to:     [id number] - [place] - [address] [place] [address] AT [facility] & [address] [address]    Phone: [phone] Fax: [phone]    Open 24 Hours?: No      Medication refill instructions:       If your prescription bottle indicates you have  medication refills left, it is not necessary to call your provider’s office. Please contact your pharmacy and they will refill your medication.    If your prescription bottle indicates you do not have any refills left, you may request refills at any time through one of the following ways: The online Smart Checkout system (except Urgent Care), by calling your provider’s office, or by asking your pharmacy to contact your provider’s office with a refill request. Medication refills are processed only during regular business hours and may not be available until the next business day. Your provider may request additional information or to have a follow-up visit with you prior to refilling your medication.   *Please Note: Medication refills are assigned a new Rx number when refilled electronically. Your pharmacy may indicate that no refills were authorized even though a new prescription for the same medication is available at the pharmacy. Please request the medicine by name with the pharmacy before contacting your provider for a refill.           MyChart Status: Patient Declined        Quit Tobacco Information     Do you want to quit using tobacco?    Quitting tobacco decreases risks of cancer, heart and lung disease, increases life expectancy, improves sense of taste and smell, and increases spending money, among other benefits.    If you are thinking about quitting, we can help.  • [facility] Quit Tobacco Program: 300.974.6226  o Program occurs weekly for four weeks and includes pharmacist consultation on products to support quitting smoking or chewing tobacco. A provider referral is needed for pharmacist consultation.  • Tobacco Users Help Hotline: 1-646-QUIT-NOW (426-0540) or https://[place].quitlogix.org/  o Free, confidential telephone and online coaching for [place] residents. Sessions are designed on a schedule that is convenient for you. Eligible clients receive free nicotine replacement therapy.  • Nationally:  www.smokefree.gov  o Information and professional assistance to support both immediate and long-term needs as you become, and remain, a non-smoker. Smokefree.gov allows you to choose the help that best fits your needs.

## 2017-03-17 NOTE — PROGRESS NOTES
Subjective:   Antonia Stover is a 81 y.o. female here today for allergy symptoms for 4 days.    Seasonal allergic rhinitis due to pollen  This is a 81-year-old female complains of symptoms developing on Monday of sneezing with associated nasal drainage and slight cough. Denies any fever or shortness of breath or chest pain. She believes her symptoms are secondary to allergies. He takes Claritin daily. States there is a fruit tree in front yard that is blossoming. She believes that her symptoms are secondary to the pollen.    COPD exacerbation (CMS-[facility])  Chronic history of COPD. Takes no medications for her symptoms. Is down to smoking 5 cigarettes a day. She denies any significant symptoms such as chest pain or shortness of breath. Has been coughing. She is concerned about her COPD getting worse because of her allergy symptoms.       Current medicines (including changes today)  Current Outpatient Prescriptions   Medication Sig Dispense Refill   • doxycycline (VIBRAMYCIN) 100 MG Tab Take 1 Tab by mouth 2 times a day. 20 Tab 0   • MethylPREDNISolone (MEDROL DOSEPAK) 4 MG Tablet Therapy Pack As directed on the packaging label. 21 Tab 0   • nitrofurantoin (MACRODANTIN) 50 MG Cap Take 1 Cap by mouth every day. 30 Cap 5   • diltiazem CD (CARTIA XT) 120 MG CAPSULE SR 24 HR Take 1 Cap by mouth every day. 90 Cap 3   • pravastatin (PRAVACHOL) 40 MG tablet Take 1 Tab by mouth every evening. 90 Tab 3   • acetaminophen (TYLENOL 8 HOUR ARTHRITIS PAIN) 650 MG CR tablet Take 650 mg by mouth every 6 hours as needed.     • PROAIR  (90 BASE) MCG/ACT Aero Soln inhalation aerosol Inhale 2 Puffs by mouth every 6 hours as needed for Shortness of Breath. 1 Inhaler 3   • levothyroxine (SYNTHROID) 75 MCG Tab TAKE 1 TABLET BY MOUTH BY MOUTH EVERY MORNING ON AN EMPTY STOMACH 90 Tab 3   • loratadine (CLARITIN) 10 MG Tab Take 10 mg by mouth every day.     • Probiotic Product (ALIGN PO) Take  by mouth every day.     • docusate sodium  "(COLACE) 100 MG Cap Take 100 mg by mouth every day.     • Wheat Dextrin (BENEFIBER DRINK MIX PO) Take 1 Dose by mouth 3 times a day.     • estradiol (ESTRACE) 0.1 MG/GM vaginal cream Insert 1 g in vagina every day. 42.5 g 3   • Multiple Vitamins-Minerals (OCUVITE) TABS Take 1 Tab by mouth every evening.       No current facility-administered medications for this visit.     She  has a past medical history of PSVT (paroxysmal supraventricular tachycardia) (CMS-HCC) (February 2013); Dyslipidemia ( ); Nocturnal hypoxia ( ); COPD; Insomnia ( ); Dizziness ( ); Tobacco use; Hypothyroidism; Sickle cell disease (CMS-HCC); Carotid artery plaque (3/19/2014); Fatigue (7/3/2014); HLD (hyperlipidemia) (9/12/2014); Anemia; and Arthritis.    ROS   No chest pain, no shortness of breath, no abdominal pain and all other systems were reviewed and are negative.       Objective:     Blood pressure 118/74, pulse 80, temperature 37.2 °C (99 °F), resp. rate 16, height 1.575 m (5' 2.01\"), weight 46.947 kg (103 lb 8 oz), SpO2 96 %, not currently breastfeeding. Body mass index is 18.93 kg/(m^2).   Physical Exam:  Constitutional: Alert, no distress.  Skin: Warm, dry, good turgor, no rashes in visible areas.  Eye: Equal, round and reactive, conjunctiva clear, lids normal.  ENMT: Lips without lesions, good dentition, oropharynx clear.  Neck: Trachea midline, no masses.   Lymph: No cervical or supraclavicular lymphadenopathy  Respiratory: Unlabored respiratory effort, lungs clear to auscultation, no wheezes, no ronchi.  Cardiovascular: Normal S1, S2, no murmur, no edema.  Abdomen: Soft, non-tender, no masses.  Psych: Alert and oriented x3, normal affect and mood.    During her physical exam she coughed up mucus that was yellow. Plus she sneezed a few times during the office visit.        Assessment and Plan:   The following treatment plan was discussed    1. Seasonal allergic rhinitis due to pollen  Chronic condition with recent exacerbation. May " continue Claritin. I do believe her symptoms may be secondary to a viral process. Advised to push fluids. She may use as well medication that she has at home such as Flonase. Advised her to wash her hands as well as her face which comes into the house. Push water.    2. COPD exacerbation (CMS-[facility])  New-onset condition. Prescribed Medrol Dosepak as well as doxycycline. Follow up at ED with any worsening symptoms such as chest pain or shortness of breath.- doxycycline (VIBRAMYCIN) 100 MG Tab; Take 1 Tab by mouth 2 times a day.  Dispense: 20 Tab; Refill: 0  - MethylPREDNISolone (MEDROL DOSEPAK) 4 MG Tablet Therapy Pack; As directed on the packaging label.  Dispense: 21 Tab; Refill: 0      Followup: Return if symptoms worsen or fail to improve.    Please note that this dictation was created using voice recognition software. I have made every reasonable attempt to correct obvious errors, but I expect that there are errors of grammar and possibly content that I did not discover before finalizing the note.

## 2017-03-17 NOTE — ASSESSMENT & PLAN NOTE
This is a 81-year-old female complains of symptoms developing on Monday of sneezing with associated nasal drainage and slight cough. Denies any fever or shortness of breath or chest pain. She believes her symptoms are secondary to allergies. He takes Claritin daily. States there is a fruit tree in front yard that is blossoming. She believes that her symptoms are secondary to the pollen.

## 2017-03-17 NOTE — ASSESSMENT & PLAN NOTE
Chronic history of COPD. Takes no medications for her symptoms. Is down to smoking 5 cigarettes a day. She denies any significant symptoms such as chest pain or shortness of breath. Has been coughing. She is concerned about her COPD getting worse because of her allergy symptoms.

## 2017-03-20 ENCOUNTER — TELEPHONE (OUTPATIENT)
Dept: MEDICAL GROUP | Facility: MEDICAL CENTER | Age: 82
End: 2017-03-20

## 2017-03-20 NOTE — TELEPHONE ENCOUNTER
1. Caller Name: Pt                                         Call Back Number: [phone] (home)       Patient approves a detailed voicemail message: no    Patient called wanting to update the doctor on a few conditions. Patient states the provider can call when he is available.

## 2017-03-20 NOTE — TELEPHONE ENCOUNTER
Spoke to Pat. She is doing much better. Started medications the other day. Felt initially great the first day after taking medication. No worsening symptoms.

## 2017-03-24 ENCOUNTER — TELEPHONE (OUTPATIENT)
Dept: MEDICAL GROUP | Facility: MEDICAL CENTER | Age: 82
End: 2017-03-24

## 2017-05-08 ENCOUNTER — OFFICE VISIT (OUTPATIENT)
Dept: MEDICAL GROUP | Facility: MEDICAL CENTER | Age: 82
End: 2017-05-08
Payer: COMMERCIAL

## 2017-05-08 VITALS
TEMPERATURE: 99 F | HEIGHT: 62 IN | SYSTOLIC BLOOD PRESSURE: 112 MMHG | DIASTOLIC BLOOD PRESSURE: 76 MMHG | RESPIRATION RATE: 16 BRPM | BODY MASS INDEX: 19.32 KG/M2 | OXYGEN SATURATION: 96 % | HEART RATE: 72 BPM | WEIGHT: 105 LBS
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
## 2017-05-08 DIAGNOSIS — J44.9 CHRONIC OBSTRUCTIVE PULMONARY DISEASE, UNSPECIFIED COPD TYPE (HCC): ICD-10-CM

## 2017-05-08 DIAGNOSIS — R07.89 CHEST PAIN, MUSCULOSKELETAL: ICD-10-CM

## 2017-05-08 DIAGNOSIS — H35.30 AMD (AGE-RELATED MACULAR DEGENERATION), BILATERAL: ICD-10-CM

## 2017-05-08 PROBLEM — J44.1 COPD EXACERBATION (HCC): Status: RESOLVED | Noted: 2017-03-16 | Resolved: 2017-05-08

## 2017-05-08 PROCEDURE — 99214 OFFICE O/P EST MOD 30 MIN: CPT | Performed by: PHYSICIAN ASSISTANT

## 2017-05-08 ASSESSMENT — PAIN SCALES - GENERAL: PAINLEVEL: 7=MODERATE-SEVERE PAIN

## 2017-05-08 NOTE — ASSESSMENT & PLAN NOTE
She complains of chest pain. Pain is around the midsternal area. Pain is worse when coughing. Currently she is been coughing up mucus but that is part of her normal routine with her COPD. She denies any swelling of her rib cage or any bruising. She's been taking Tylenol arthritic medication. Also concerned about possibly having pneumonia given the chest pain in the coughing.

## 2017-05-08 NOTE — ASSESSMENT & PLAN NOTE
This is a 81-year-old female accompanied by her significant other, Seun. 9 days ago she was at home when she tripped over her oxygen cannula and landed on a carpet. She fell face first and fell on her face and her chest. She thought initially that she might and bleeding from the head but there was no blood. She denies any loss of consciousness.

## 2017-05-08 NOTE — ASSESSMENT & PLAN NOTE
Complains of chest pain status post fall but denies any shortness of breath because of her COPD. She states it's difficult to cough because of the chest pain. She's been finding more mucus in her throat and has has had a difficult time expectorating. She denies any fevers.

## 2017-05-08 NOTE — ASSESSMENT & PLAN NOTE
She also mentioned today she needs to see her ophthalmologist because her AMD. Appears her eyesight is getting worse. She is concerned that eventually she will not be able to do her crossword puzzles.

## 2017-05-08 NOTE — PROGRESS NOTES
Subjective:   Antonia Stover is a 81 y.o. female here today for fall 9 days ago resulting in chest pain.    Fall  This is a 81-year-old female accompanied by her significant other, Seun. 9 days ago she was at home when she tripped over her oxygen cannula and landed on a carpet. She fell face first and fell on her face and her chest. She thought initially that she might and bleeding from the head but there was no blood. She denies any loss of consciousness.    Chest pain, musculoskeletal  She complains of chest pain. Pain is around the midsternal area. Pain is worse when coughing. Currently she is been coughing up mucus but that is part of her normal routine with her COPD. She denies any swelling of her rib cage or any bruising. She's been taking Tylenol arthritic medication. Also concerned about possibly having pneumonia given the chest pain in the coughing.    Chronic obstructive pulmonary disease (CMS-[facility])  Complains of chest pain status post fall but denies any shortness of breath because of her COPD. She states it's difficult to cough because of the chest pain. She's been finding more mucus in her throat and has has had a difficult time expectorating. She denies any fevers.    AMD (age-related macular degeneration), bilateral  She also mentioned today she needs to see her ophthalmologist because her AMD. Appears her eyesight is getting worse. She is concerned that eventually she will not be able to do her crossword puzzles.         Current medicines (including changes today)  Current Outpatient Prescriptions   Medication Sig Dispense Refill   • nitrofurantoin (MACRODANTIN) 50 MG Cap Take 1 Cap by mouth every day. 30 Cap 5   • diltiazem CD (CARTIA XT) 120 MG CAPSULE SR 24 HR Take 1 Cap by mouth every day. 90 Cap 3   • pravastatin (PRAVACHOL) 40 MG tablet Take 1 Tab by mouth every evening. 90 Tab 3   • acetaminophen (TYLENOL 8 HOUR ARTHRITIS PAIN) 650 MG CR tablet Take 650 mg by mouth every 6 hours as needed.    "  • PROAIR  (90 BASE) MCG/ACT Aero Soln inhalation aerosol Inhale 2 Puffs by mouth every 6 hours as needed for Shortness of Breath. 1 Inhaler 3   • levothyroxine (SYNTHROID) 75 MCG Tab TAKE 1 TABLET BY MOUTH BY MOUTH EVERY MORNING ON AN EMPTY STOMACH 90 Tab 3   • loratadine (CLARITIN) 10 MG Tab Take 10 mg by mouth every day.     • Probiotic Product (ALIGN PO) Take  by mouth every day.     • docusate sodium (COLACE) 100 MG Cap Take 100 mg by mouth every day.     • Wheat Dextrin (BENEFIBER DRINK MIX PO) Take 1 Dose by mouth 3 times a day.     • estradiol (ESTRACE) 0.1 MG/GM vaginal cream Insert 1 g in vagina every day. 42.5 g 3   • Multiple Vitamins-Minerals (OCUVITE) TABS Take 1 Tab by mouth every evening.       No current facility-administered medications for this visit.     She  has a past medical history of PSVT (paroxysmal supraventricular tachycardia) (CMS-HCC) (February 2013); Dyslipidemia ( ); Nocturnal hypoxia ( ); COPD; Insomnia ( ); Dizziness ( ); Tobacco use; Hypothyroidism; Sickle cell disease (CMS-HCC); Carotid artery plaque (3/19/2014); Fatigue (7/3/2014); HLD (hyperlipidemia) (9/12/2014); Anemia; and Arthritis.    ROS   No shortness of breath, no abdominal pain and all other systems were reviewed and are negative.       Objective:     Blood pressure 112/76, pulse 72, temperature 37.2 °C (99 °F), resp. rate 16, height 1.575 m (5' 2.01\"), weight 47.628 kg (105 lb), SpO2 96 %. Body mass index is 19.2 kg/(m^2).   Physical Exam:  Constitutional: Alert, no distress.  Skin: Warm, dry, good turgor, no rashes in visible areas.  Eye: Equal, round and reactive, conjunctiva clear, lids normal.  ENMT: Lips without lesions, good dentition, oropharynx clear.  Neck: Trachea midline, no masses.   Lymph: No cervical or supraclavicular lymphadenopathy  Respiratory: Unlabored respiratory effort, lungs clear to auscultation, no wheezes, no ronchi.  Cardiovascular: Normal S1, S2, no murmur, no " edema.  Musculoskeletal: Chest wall without any ecchymosis. There is significant tenderness to palpation of her midsternal area.  Psych: Alert and oriented x3, normal affect and mood.        Assessment and Plan:   The following treatment plan was discussed    1. Fall, initial encounter  Stable. Advised to pay extra attention to obstacles or items in the home where she may fall.    2. Chest pain, musculoskeletal  Status post fall. May continue Tylenol as directed. May apply warm and cold compresses alternating. Expect chest pain to resolve in time.    3. AMD (age-related macular degeneration), bilateral  Chronic condition. Follow-up with ophthalmology.    4. Chronic obstructive pulmonary disease, unspecified COPD type (CMS-HCC)  Chronic condition is stable. No current exacerbation. Advised push warm fluids and clear throat as needed. Continue oxygen or other medications as needed for her COPD. Follow-up if any worsening symptoms such as fevers or shortness of breath.      Followup: No Follow-up on file.    Please note that this dictation was created using voice recognition software. I have made every reasonable attempt to correct obvious errors, but I expect that there are errors of grammar and possibly content that I did not discover before finalizing the note.

## 2017-06-13 DIAGNOSIS — N95.2 ATROPHIC VULVOVAGINITIS: ICD-10-CM

## 2017-06-13 RX ORDER — ESTRADIOL 0.1 MG/G
1 CREAM VAGINAL DAILY
Qty: 42.5 G | Refills: 3 | Status: SHIPPED | OUTPATIENT
Start: 2017-06-13 | End: 2018-09-19 | Stop reason: SDUPTHER

## 2017-06-13 NOTE — TELEPHONE ENCOUNTER
Was the patient seen in the last year in this department? Yes     Does patient have an active prescription for medications requested? No     Received Request Via: Patient     Last Visit: 5/8/17    Last Labs: 11/9/16

## 2017-06-29 DIAGNOSIS — E07.9 THYROID DISORDER: ICD-10-CM

## 2017-06-29 RX ORDER — LEVOTHYROXINE SODIUM 0.07 MG/1
75 TABLET ORAL
Qty: 90 TAB | Refills: 0 | Status: SHIPPED | OUTPATIENT
Start: 2017-06-29 | End: 2017-09-25 | Stop reason: SDUPTHER

## 2017-07-10 ENCOUNTER — HOSPITAL ENCOUNTER (OUTPATIENT)
Dept: RADIOLOGY | Facility: MEDICAL CENTER | Age: 82
End: 2017-07-10
Attending: NURSE PRACTITIONER
Payer: COMMERCIAL

## 2017-07-10 DIAGNOSIS — I65.29 CAROTID ARTERY PLAQUE, UNSPECIFIED LATERALITY: ICD-10-CM

## 2017-07-10 DIAGNOSIS — E78.5 DYSLIPIDEMIA: ICD-10-CM

## 2017-07-10 PROCEDURE — 93880 EXTRACRANIAL BILAT STUDY: CPT | Mod: 26 | Performed by: INTERNAL MEDICINE

## 2017-07-10 PROCEDURE — 93880 EXTRACRANIAL BILAT STUDY: CPT

## 2017-07-13 ENCOUNTER — TELEPHONE (OUTPATIENT)
Dept: CARDIOLOGY | Facility: MEDICAL CENTER | Age: 82
End: 2017-07-13

## 2017-07-13 NOTE — TELEPHONE ENCOUNTER
----- Message from JOSH Arita sent at 7/13/2017  3:08 PM PDT -----  Mild carotid stenosis. Follow every 5 years unless symptomatic. SC

## 2017-07-18 ENCOUNTER — OFFICE VISIT (OUTPATIENT)
Dept: CARDIOLOGY | Facility: MEDICAL CENTER | Age: 82
End: 2017-07-18
Payer: COMMERCIAL

## 2017-07-18 VITALS
HEART RATE: 78 BPM | DIASTOLIC BLOOD PRESSURE: 60 MMHG | HEIGHT: 62 IN | OXYGEN SATURATION: 93 % | BODY MASS INDEX: 19.32 KG/M2 | WEIGHT: 105 LBS | SYSTOLIC BLOOD PRESSURE: 112 MMHG

## 2017-07-18 DIAGNOSIS — E78.5 DYSLIPIDEMIA: ICD-10-CM

## 2017-07-18 DIAGNOSIS — I65.23 ATHEROSCLEROSIS OF BOTH CAROTID ARTERIES: ICD-10-CM

## 2017-07-18 DIAGNOSIS — I47.10 PSVT (PAROXYSMAL SUPRAVENTRICULAR TACHYCARDIA): ICD-10-CM

## 2017-07-18 PROCEDURE — 99214 OFFICE O/P EST MOD 30 MIN: CPT | Performed by: INTERNAL MEDICINE

## 2017-07-18 NOTE — PROGRESS NOTES
Subjective:   Antonia Stover is a 81 y.o. female who presents today for followup of her PSVT, history of near syncope, hyperlipidemia and carotid plaque.     She's had no anginal type chest discomfort. She has dyspnea on exertion at about 2 blocks. She has had no PND or orthopnea but is on oxygen therapy at night. She continues to have rare episodes of palpitations lasting up to about 10 minutes. These are associated with low blood pressure. Her last episode was last week. It was associated with a heart rate of about 150. She did have associated lightheadedness.          Past Medical History   Diagnosis Date   • PSVT (paroxysmal supraventricular tachycardia) (CMS-HCC) February 2013     Echocardiogram with normal LV size, LVEF 65-70%.   • Dyslipidemia     • Nocturnal hypoxia       Uses oxygen QHS.   • COPD    • Insomnia     • Dizziness     • Tobacco use    • Hypothyroidism    • Sickle cell disease (CMS-HCC)    • Carotid artery plaque 3/19/2014   • Fatigue 7/3/2014   • HLD (hyperlipidemia) 9/12/2014   • Anemia    • Arthritis      Past Surgical History   Procedure Laterality Date   • Hysterectomy, total abdominal     • Knee replacement, total  2005     Right   • Cataract extraction with iol       Bilateral   • Cataract extraction with iol       Bilateral   • Appendectomy     • Abdominal hysterectomy total       Family History   Problem Relation Age of Onset   • Heart Attack Brother 70     History   Smoking status   • Current Every Day Smoker -- 0.25 packs/day for 55 years   • Types: Cigarettes   Smokeless tobacco   • Never Used     Allergies   Allergen Reactions   • Hydrocodone Hives   • Iodine Anaphylaxis     RXN=>20 years ago   • Nsaids Hives and Shortness of Breath   • Penicillins Anaphylaxis     RXN=>20 years ago   • Codeine    • Erythromycin    • Morphine    • Sulfa Drugs      Outpatient Encounter Prescriptions as of 7/18/2017   Medication Sig Dispense Refill   • levothyroxine (SYNTHROID) 75 MCG Tab Take 1 Tab by  "mouth Every morning on an empty stomach. 90 Tab 0   • estradiol (ESTRACE) 0.1 MG/GM vaginal cream Insert 1 g in vagina every day. 42.5 g 3   • diltiazem CD (CARTIA XT) 120 MG CAPSULE SR 24 HR Take 1 Cap by mouth every day. 90 Cap 3   • pravastatin (PRAVACHOL) 40 MG tablet Take 1 Tab by mouth every evening. 90 Tab 3   • acetaminophen (TYLENOL 8 HOUR ARTHRITIS PAIN) 650 MG CR tablet Take 650 mg by mouth every 6 hours as needed.     • PROAIR  (90 BASE) MCG/ACT Aero Soln inhalation aerosol Inhale 2 Puffs by mouth every 6 hours as needed for Shortness of Breath. 1 Inhaler 3   • Probiotic Product (ALIGN PO) Take  by mouth every day.     • docusate sodium (COLACE) 100 MG Cap Take 100 mg by mouth every day.     • Wheat Dextrin (BENEFIBER DRINK MIX PO) Take 1 Dose by mouth 3 times a day.     • Multiple Vitamins-Minerals (OCUVITE) TABS Take 1 Tab by mouth every evening.     • nitrofurantoin (MACRODANTIN) 50 MG Cap Take 1 Cap by mouth every day. (Patient not taking: Reported on 7/18/2017) 30 Cap 5   • loratadine (CLARITIN) 10 MG Tab Take 10 mg by mouth every day.       No facility-administered encounter medications on file as of 7/18/2017.     ROS       Objective:   /60 mmHg  Pulse 78  Ht 1.575 m (5' 2\")  Wt 47.628 kg (105 lb)  BMI 19.20 kg/m2  SpO2 93%    Physical Exam   Neck: No JVD present.   Cardiovascular: Normal rate and regular rhythm.  Exam reveals no gallop.    No murmur heard.  Pulmonary/Chest: Effort normal. She has no rales. She exhibits no tenderness.   Abdominal: Soft. There is no tenderness.   Musculoskeletal: She exhibits no edema.       Lab Results   Component Value Date/Time    CHOLESTEROL, 11/09/2016 07:52 AM    LDL 85 11/09/2016 07:52 AM    HDL 77 11/09/2016 07:52 AM    TRIGLYCERIDES 67 11/09/2016 07:52 AM       Lab Results   Component Value Date/Time    SODIUM 140 11/09/2016 07:52 AM    POTASSIUM 4.1 11/09/2016 07:52 AM    CHLORIDE 107 11/09/2016 07:52 AM    CO2 26 11/09/2016 07:52 " AM    GLUCOSE 83 11/09/2016 07:52 AM    BUN 9 11/09/2016 07:52 AM    CREATININE 0.98 11/09/2016 07:52 AM     Lab Results   Component Value Date/Time    ALKALINE PHOSPHATASE 70 11/09/2016 07:52 AM    AST(SGOT) 19 11/09/2016 07:52 AM    ALT(SGPT) 9 11/09/2016 07:52 AM    TOTAL BILIRUBIN 0.6 11/09/2016 07:52 AM      Lab Results   Component Value Date/Time    B NATRIURETIC PEPTIDE 143* 09/01/2015 01:05 PM          Assessment:     1. PSVT (paroxysmal supraventricular tachycardia) (CMS-[facility])     2. Dyslipidemia     3. Atherosclerosis of both carotid arteries         Medical Decision Making:  Today's Assessment / Status / Plan:     Ms. Stover is having difficulty with recurrent PSVT. Given her heart rate during the last episode, it could be atrial flutter. Though her symptoms are rare, they are associated with significant lightheadedness and hypotension. I feel she might be a candidate for an ablation or at least alternative antiarrhythmic therapy. After prolonged discussions is agreeable to see EP. She will otherwise follow-up in a few months. She's been intolerant of statins other than pravastatin.

## 2017-07-18 NOTE — MR AVS SNAPSHOT
"        Antonia Stover   2017 11:15 AM   Office Visit   MRN: [id number]    Department:  [facility]   Dept Phone:  [phone]    Description:  Female : 1935   Provider:  Diogo Bernard M.D.           Reason for Visit     Follow-Up           Allergies as of 2017     Allergen Noted Reactions    Hydrocodone 10/18/2016   Hives    Iodine 2015   Anaphylaxis    RXN=>20 years ago    Nsaids 2013   Hives, Shortness of Breath    Penicillins 2015   Anaphylaxis    RXN=>20 years ago    Codeine 2010       Erythromycin 2010       Morphine 2010       Sulfa Drugs 2010         You were diagnosed with     PSVT (paroxysmal supraventricular tachycardia) (CMS-HCC)   [229065]       Dyslipidemia   [806860]       Atherosclerosis of both carotid arteries   [568816]         Vital Signs     Blood Pressure Pulse Height Weight Body Mass Index Oxygen Saturation    112/60 mmHg 78 1.575 m (5' 2\") 47.628 kg (105 lb) 19.20 kg/m2 93%    Smoking Status                   Current Every Day Smoker           Basic Information     Date Of Birth Sex Race Ethnicity Preferred Language    1935 Female White Non- English      Problem List              ICD-10-CM Priority Class Noted - Resolved    Insomnia G47.00 Low  2011 - Present    PSVT (paroxysmal supraventricular tachycardia) (CMS-HCC) I47.1 Medium  2011 - Present    Dyslipidemia E78.5 Medium  2011 - Present    Nocturnal hypoxia G47.34 Medium  2013 - Present    Hypothyroidism E03.9 Low  2013 - Present    Atherosclerosis of both carotid arteries I65.23 Medium  3/19/2014 - Present    Osteoporosis M81.0 Low  2014 - Present    H/O knee surgery Z98.890 Low  2014 - Present    Tobacco dependence F17.200 Low  10/18/2016 - Present    Chronic obstructive pulmonary disease (CMS-HCC) J44.9 Low  11/3/2016 - Present    Chronic pain of left knee M25.562, G89.29 Low  2016 - Present    Dysuria R30.0 Low "  12/22/2016 - Present    Recurrent urinary tract infection N39.0   1/19/2017 - Present    Seasonal allergic rhinitis due to pollen J30.1   3/16/2017 - Present    Fall W19.XXXA   5/8/2017 - Present    Chest pain, musculoskeletal R07.89   5/8/2017 - Present    AMD (age-related macular degeneration), bilateral H35.30   5/8/2017 - Present      Health Maintenance        Date Due Completion Dates    IMM DTaP/Tdap/Td Vaccine (1 - Tdap) 8/20/1956 ---    IMM ZOSTER VACCINE 8/20/1997 ---    IMM INFLUENZA (1) 9/1/2017 11/3/2016, 11/11/2015, 11/16/2012, 10/27/2011    BONE DENSITY 7/31/2019 7/31/2014    COLONOSCOPY 7/15/2024 7/15/2014 (Declined)    Override on 7/15/2014: Patient Declined (had it in past 'and won't do it again.')            Current Immunizations     13-VALENT PCV PREVNAR 11/11/2015    Influenza TIV (IM) 11/16/2012    Influenza Vaccine Adult HD 11/3/2016    Influenza Vaccine Pediatric 10/27/2011    Influenza Vaccine Quad Inj (Preserved) 11/11/2015    Pneumococcal polysaccharide vaccine (PPSV-23) 2/16/2005      Below and/or attached are the medications your provider expects you to take. Review all of your home medications and newly ordered medications with your provider and/or pharmacist. Follow medication instructions as directed by your provider and/or pharmacist. Please keep your medication list with you and share with your provider. Update the information when medications are discontinued, doses are changed, or new medications (including over-the-counter products) are added; and carry medication information at all times in the event of emergency situations     Allergies:  HYDROCODONE - Hives     IODINE - Anaphylaxis     NSAIDS - Hives,Shortness of Breath     PENICILLINS - Anaphylaxis     CODEINE - (reactions not documented)     ERYTHROMYCIN - (reactions not documented)     MORPHINE - (reactions not documented)     SULFA DRUGS - (reactions not documented)               Medications  Valid as of: July 18, 2017 -  12:12 PM    Generic Name Brand Name Tablet Size Instructions for use    Acetaminophen (Tab CR) TYLENOL 650 MG Take 650 mg by mouth every 6 hours as needed.        Albuterol Sulfate (Aero Soln) PROAIR  (90 BASE) MCG/ACT Inhale 2 Puffs by mouth every 6 hours as needed for Shortness of Breath.        DilTIAZem HCl Coated Beads (CAPSULE SR 24 HR) CARDIZEM  MG Take 1 Cap by mouth every day.        Docusate Sodium (Cap) COLACE 100 MG Take 100 mg by mouth every day.        Estradiol (Cream) ESTRACE 0.1 MG/GM Insert 1 g in vagina every day.        Levothyroxine Sodium (Tab) SYNTHROID 75 MCG Take 1 Tab by mouth Every morning on an empty stomach.        Loratadine (Tab) CLARITIN 10 MG Take 10 mg by mouth every day.        Multiple Vitamins-Minerals (Tab) OCUVITE  Take 1 Tab by mouth every evening.        Nitrofurantoin Macrocrystal (Cap) MACRODANTIN 50 MG Take 1 Cap by mouth every day.        Pravastatin Sodium (Tab) PRAVACHOL 40 MG Take 1 Tab by mouth every evening.        Probiotic Product   Take  by mouth every day.        Wheat Dextrin   Take 1 Dose by mouth 3 times a day.        .                 Medicines prescribed today were sent to:     [facility] [address] [place] [address] & [address] [address]    Phone: [phone] Fax: [phone]    Open 24 Hours?: No      Medication refill instructions:       If your prescription bottle indicates you have medication refills left, it is not necessary to call your provider’s office. Please contact your pharmacy and they will refill your medication.    If your prescription bottle indicates you do not have any refills left, you may request refills at any time through one of the following ways: The online [employer] system (except Urgent Care), by calling your provider’s office, or by asking your pharmacy to contact your provider’s office with a refill request. Medication refills are processed only during  regular business hours and may not be available until the next business day. Your provider may request additional information or to have a follow-up visit with you prior to refilling your medication.   *Please Note: Medication refills are assigned a new Rx number when refilled electronically. Your pharmacy may indicate that no refills were authorized even though a new prescription for the same medication is available at the pharmacy. Please request the medicine by name with the pharmacy before contacting your provider for a refill.        Referral     A referral request has been sent to our patient care coordination department. Please allow 3-5 business days for us to process this request and contact you either by phone or mail. If you do not hear from us by the 5th business day, please call us at [phone].           MyChart Status: Patient Declined        Quit Tobacco Information     Do you want to quit using tobacco?    Quitting tobacco decreases risks of cancer, heart and lung disease, increases life expectancy, improves sense of taste and smell, and increases spending money, among other benefits.    If you are thinking about quitting, we can help.  • [facility] Quit Tobacco Program: [phone]  o Program occurs weekly for four weeks and includes pharmacist consultation on products to support quitting smoking or chewing tobacco. A provider referral is needed for pharmacist consultation.  • Tobacco Users Help Hotline: 3-733-QUIT-NOW (593-8547) or https://[place].quitlogix.org/  o Free, confidential telephone and online coaching for [place] residents. Sessions are designed on a schedule that is convenient for you. Eligible clients receive free nicotine replacement therapy.  • Nationally: www.smokefree.gov  o Information and professional assistance to support both immediate and long-term needs as you become, and remain, a non-smoker. Smokefree.gov allows you to choose the help that best fits your needs.

## 2017-07-18 NOTE — Clinical Note
[facility]   [address]  [place] [address]  Phone: [phone]  Fax: [phone]              Antonia Stover  1935    Encounter Date: 7/18/2017    Diogo Bernard M.D.          PROGRESS NOTE:  Subjective:   Antonia Stover is a 81 y.o. female who presents today for followup of her PSVT, history of near syncope, hyperlipidemia and carotid plaque.     She's had no anginal type chest discomfort. She has dyspnea on exertion at about 2 blocks. She has had no PND or orthopnea but is on oxygen therapy at night. She continues to have rare episodes of palpitations lasting up to about 10 minutes. These are associated with low blood pressure. Her last episode was last week. It was associated with a heart rate of about 150. She did have associated lightheadedness.          Past Medical History   Diagnosis Date   • PSVT (paroxysmal supraventricular tachycardia) (CMS-HCC) February 2013     Echocardiogram with normal LV size, LVEF 65-70%.   • Dyslipidemia     • Nocturnal hypoxia       Uses oxygen QHS.   • COPD    • Insomnia     • Dizziness     • Tobacco use    • Hypothyroidism    • Sickle cell disease (CMS-[facility])    • Carotid artery plaque 3/19/2014   • Fatigue 7/3/2014   • HLD (hyperlipidemia) 9/12/2014   • Anemia    • Arthritis      Past Surgical History   Procedure Laterality Date   • Hysterectomy, total abdominal     • Knee replacement, total  2005     Right   • Cataract extraction with iol       Bilateral   • Cataract extraction with iol       Bilateral   • Appendectomy     • Abdominal hysterectomy total       Family History   Problem Relation Age of Onset   • Heart Attack Brother 70     History   Smoking status   • Current Every Day Smoker -- 0.25 packs/day for 55 years   • Types: Cigarettes   Smokeless tobacco   • Never Used     Allergies   Allergen Reactions   • Hydrocodone Hives   • Iodine Anaphylaxis     RXN=>20 years ago   • Nsaids Hives and  "Shortness of Breath   • Penicillins Anaphylaxis     RXN=>20 years ago   • Codeine    • Erythromycin    • Morphine    • Sulfa Drugs      Outpatient Encounter Prescriptions as of 7/18/2017   Medication Sig Dispense Refill   • levothyroxine (SYNTHROID) 75 MCG Tab Take 1 Tab by mouth Every morning on an empty stomach. 90 Tab 0   • estradiol (ESTRACE) 0.1 MG/GM vaginal cream Insert 1 g in vagina every day. 42.5 g 3   • diltiazem CD (CARTIA XT) 120 MG CAPSULE SR 24 HR Take 1 Cap by mouth every day. 90 Cap 3   • pravastatin (PRAVACHOL) 40 MG tablet Take 1 Tab by mouth every evening. 90 Tab 3   • acetaminophen (TYLENOL 8 HOUR ARTHRITIS PAIN) 650 MG CR tablet Take 650 mg by mouth every 6 hours as needed.     • PROAIR  (90 BASE) MCG/ACT Aero Soln inhalation aerosol Inhale 2 Puffs by mouth every 6 hours as needed for Shortness of Breath. 1 Inhaler 3   • Probiotic Product (ALIGN PO) Take  by mouth every day.     • docusate sodium (COLACE) 100 MG Cap Take 100 mg by mouth every day.     • Wheat Dextrin (BENEFIBER DRINK MIX PO) Take 1 Dose by mouth 3 times a day.     • Multiple Vitamins-Minerals (OCUVITE) TABS Take 1 Tab by mouth every evening.     • nitrofurantoin (MACRODANTIN) 50 MG Cap Take 1 Cap by mouth every day. (Patient not taking: Reported on 7/18/2017) 30 Cap 5   • loratadine (CLARITIN) 10 MG Tab Take 10 mg by mouth every day.       No facility-administered encounter medications on file as of 7/18/2017.     ROS       Objective:   /60 mmHg  Pulse 78  Ht 1.575 m (5' 2\")  Wt 47.628 kg (105 lb)  BMI 19.20 kg/m2  SpO2 93%    Physical Exam   Neck: No JVD present.   Cardiovascular: Normal rate and regular rhythm.  Exam reveals no gallop.    No murmur heard.  Pulmonary/Chest: Effort normal. She has no rales. She exhibits no tenderness.   Abdominal: Soft. There is no tenderness.   Musculoskeletal: She exhibits no edema.       Lab Results   Component Value Date/Time    CHOLESTEROL, 11/09/2016 07:52 AM    " LDL 85 11/09/2016 07:52 AM    HDL 77 11/09/2016 07:52 AM    TRIGLYCERIDES 67 11/09/2016 07:52 AM       Lab Results   Component Value Date/Time    SODIUM 140 11/09/2016 07:52 AM    POTASSIUM 4.1 11/09/2016 07:52 AM    CHLORIDE 107 11/09/2016 07:52 AM    CO2 26 11/09/2016 07:52 AM    GLUCOSE 83 11/09/2016 07:52 AM    BUN 9 11/09/2016 07:52 AM    CREATININE 0.98 11/09/2016 07:52 AM     Lab Results   Component Value Date/Time    ALKALINE PHOSPHATASE 70 11/09/2016 07:52 AM    AST(SGOT) 19 11/09/2016 07:52 AM    ALT(SGPT) 9 11/09/2016 07:52 AM    TOTAL BILIRUBIN 0.6 11/09/2016 07:52 AM      Lab Results   Component Value Date/Time    B NATRIURETIC PEPTIDE 143* 09/01/2015 01:05 PM          Assessment:     1. PSVT (paroxysmal supraventricular tachycardia) (CMS-HCC)     2. Dyslipidemia     3. Atherosclerosis of both carotid arteries         Medical Decision Making:  Today's Assessment / Status / Plan:     Ms. Stover is having difficulty with recurrent PSVT. Given her heart rate during the last episode, it could be atrial flutter. Though her symptoms are rare, they are associated with significant lightheadedness and hypotension. I feel she might be a candidate for an ablation or at least alternative antiarrhythmic therapy. After prolonged discussions is agreeable to see EP. She will otherwise follow-up in a few months. She's been intolerant of statins other than pravastatin.       Tamir Hanson, PA-C  [address]  [address]  [place] [address]  VIA In Basket

## 2017-08-22 ENCOUNTER — OFFICE VISIT (OUTPATIENT)
Dept: MEDICAL GROUP | Facility: MEDICAL CENTER | Age: 82
End: 2017-08-22
Payer: COMMERCIAL

## 2017-08-22 VITALS
HEIGHT: 62 IN | BODY MASS INDEX: 19.23 KG/M2 | SYSTOLIC BLOOD PRESSURE: 110 MMHG | WEIGHT: 104.5 LBS | DIASTOLIC BLOOD PRESSURE: 62 MMHG | HEART RATE: 73 BPM | OXYGEN SATURATION: 94 % | RESPIRATION RATE: 16 BRPM | TEMPERATURE: 98.5 F

## 2017-08-22 DIAGNOSIS — I47.10 PSVT (PAROXYSMAL SUPRAVENTRICULAR TACHYCARDIA): ICD-10-CM

## 2017-08-22 DIAGNOSIS — D22.9 ATYPICAL NEVUS: ICD-10-CM

## 2017-08-22 DIAGNOSIS — R13.10 DYSPHAGIA, UNSPECIFIED TYPE: ICD-10-CM

## 2017-08-22 PROBLEM — R07.89 CHEST PAIN, MUSCULOSKELETAL: Status: RESOLVED | Noted: 2017-05-08 | Resolved: 2017-08-22

## 2017-08-22 PROCEDURE — 99214 OFFICE O/P EST MOD 30 MIN: CPT | Performed by: PHYSICIAN ASSISTANT

## 2017-08-22 ASSESSMENT — PATIENT HEALTH QUESTIONNAIRE - PHQ9: CLINICAL INTERPRETATION OF PHQ2 SCORE: 0

## 2017-08-22 ASSESSMENT — PAIN SCALES - GENERAL: PAINLEVEL: NO PAIN

## 2017-08-22 NOTE — MR AVS SNAPSHOT
"        Antonia Stover   2017 1:00 PM   Office Visit   MRN: [id number]    Department:  [place] [address]   Dept Phone:  [phone]    Description:  Female : 1935   Provider:  Tamir Hanson PA-C           Reason for Visit     Other 2 lesions on L leg x 1 month, R arm       Allergies as of 2017     Allergen Noted Reactions    Hydrocodone 10/18/2016   Hives    Iodine 2015   Anaphylaxis    RXN=>20 years ago    Nsaids 2013   Hives, Shortness of Breath    Penicillins 2015   Anaphylaxis    RXN=>20 years ago    Codeine 2010       Erythromycin 2010       Morphine 2010       Sulfa Drugs 2010         You were diagnosed with     PSVT (paroxysmal supraventricular tachycardia) (CMS-[facility])   [682186]       Atypical nevus   [231327]       Dysphagia, unspecified type   [8449771]         Vital Signs     Blood Pressure Pulse Temperature Respirations Height Weight    110/62 mmHg 73 36.9 °C (98.5 °F) 16 1.575 m (5' 2.01\") 47.4 kg (104 lb 8 oz)    Body Mass Index Oxygen Saturation Smoking Status             19.11 kg/m2 94% Current Every Day Smoker         Basic Information     Date Of Birth Sex Race Ethnicity Preferred Language    1935 Female White Non- English      Your appointments     Aug 29, 2017  1:40 PM   PREVIOUS PATIENT with Celestino Langston M.D.   [facility] ([facility])    [address], [address]  [place] [id number] [phone]            Oct 24, 2017  1:30 PM   FOLLOW UP with Dioog Bernard M.D.   [facility] (--)    [address], [address]  [place] [id number] [phone]              Problem List              ICD-10-CM Priority Class Noted - Resolved    Insomnia G47.00 Low  2011 - Present    PSVT (paroxysmal supraventricular tachycardia) (CMS-HCC) I47.1 Medium  2011 - Present    Dyslipidemia E78.5 Medium  2011 - Present    Nocturnal hypoxia G47.34 " Medium  8/7/2013 - Present    Hypothyroidism E03.9 Low  8/7/2013 - Present    Atherosclerosis of both carotid arteries I65.23 Medium  3/19/2014 - Present    Osteoporosis M81.0 Low  7/31/2014 - Present    H/O knee surgery Z98.890 Low  8/28/2014 - Present    Tobacco dependence F17.200 Low  10/18/2016 - Present    Chronic obstructive pulmonary disease (CMS-[facility]) J44.9 Low  11/3/2016 - Present    Chronic pain of left knee M25.562, G89.29 Low  12/22/2016 - Present    Dysuria R30.0 Low  12/22/2016 - Present    Recurrent urinary tract infection N39.0   1/19/2017 - Present    Seasonal allergic rhinitis due to pollen J30.1   3/16/2017 - Present    Fall W19.XXXA   5/8/2017 - Present    AMD (age-related macular degeneration), bilateral H35.30   5/8/2017 - Present    Atypical nevus D22.9   8/22/2017 - Present    Dysphagia R13.10   8/22/2017 - Present      Health Maintenance        Date Due Completion Dates    IMM DTaP/Tdap/Td Vaccine (1 - Tdap) 8/20/1956 ---    IMM ZOSTER VACCINE 8/20/1997 ---    IMM INFLUENZA (1) 9/1/2017 11/3/2016, 11/11/2015, 11/16/2012, 10/27/2011    BONE DENSITY 7/31/2019 7/31/2014    COLONOSCOPY 7/15/2024 7/15/2014 (Declined)    Override on 7/15/2014: Patient Declined (had it in past 'and won't do it again.')            Current Immunizations     13-VALENT PCV PREVNAR 11/11/2015    Influenza TIV (IM) 11/16/2012    Influenza Vaccine Adult HD 11/3/2016    Influenza Vaccine Pediatric 10/27/2011    Influenza Vaccine Quad Inj (Preserved) 11/11/2015    Pneumococcal polysaccharide vaccine (PPSV-23) 2/16/2005      Below and/or attached are the medications your provider expects you to take. Review all of your home medications and newly ordered medications with your provider and/or pharmacist. Follow medication instructions as directed by your provider and/or pharmacist. Please keep your medication list with you and share with your provider. Update the information when medications are discontinued, doses are changed,  or new medications (including over-the-counter products) are added; and carry medication information at all times in the event of emergency situations     Allergies:  HYDROCODONE - Hives     IODINE - Anaphylaxis     NSAIDS - Hives,Shortness of Breath     PENICILLINS - Anaphylaxis     CODEINE - (reactions not documented)     ERYTHROMYCIN - (reactions not documented)     MORPHINE - (reactions not documented)     SULFA DRUGS - (reactions not documented)               Medications  Valid as of: August 22, 2017 -  2:06 PM    Generic Name Brand Name Tablet Size Instructions for use    Acetaminophen (Tab CR) TYLENOL 650 MG Take 650 mg by mouth every 6 hours as needed.        Albuterol Sulfate (Aero Soln) PROAIR  (90 Base) MCG/ACT Inhale 2 Puffs by mouth every 6 hours as needed for Shortness of Breath.        DilTIAZem HCl Coated Beads (CAPSULE SR 24 HR) CARDIZEM  MG Take 1 Cap by mouth every day.        Estradiol (Cream) ESTRACE 0.1 MG/GM Insert 1 g in vagina every day.        Levothyroxine Sodium (Tab) SYNTHROID 75 MCG Take 1 Tab by mouth Every morning on an empty stomach.        Loratadine (Tab) CLARITIN 10 MG Take 10 mg by mouth every day.        Multiple Vitamins-Minerals (Tab) OCUVITE  Take 1 Tab by mouth every evening.        Pravastatin Sodium (Tab) PRAVACHOL 40 MG Take 1 Tab by mouth every evening.        Probiotic Product   Take  by mouth every day.        Wheat Dextrin   Take 1 Dose by mouth 3 times a day.        .                 Medicines prescribed today were sent to:     [facility] [address] [place], [place] [address] [facility] [address]    Phone: [phone] Fax: [phone]    Open 24 Hours?: No      Medication refill instructions:       If your prescription bottle indicates you have medication refills left, it is not necessary to call your provider’s office. Please contact your pharmacy and they will refill your medication.    If your  prescription bottle indicates you do not have any refills left, you may request refills at any time through one of the following ways: The online [employer] system (except Urgent Care), by calling your provider’s office, or by asking your pharmacy to contact your provider’s office with a refill request. Medication refills are processed only during regular business hours and may not be available until the next business day. Your provider may request additional information or to have a follow-up visit with you prior to refilling your medication.   *Please Note: Medication refills are assigned a new Rx number when refilled electronically. Your pharmacy may indicate that no refills were authorized even though a new prescription for the same medication is available at the pharmacy. Please request the medicine by name with the pharmacy before contacting your provider for a refill.           MyChart Status: Patient Declined        Quit Tobacco Information     Do you want to quit using tobacco?    Quitting tobacco decreases risks of cancer, heart and lung disease, increases life expectancy, improves sense of taste and smell, and increases spending money, among other benefits.    If you are thinking about quitting, we can help.  • [facility] Quit Tobacco Program: 939.514.4017  o Program occurs weekly for four weeks and includes pharmacist consultation on products to support quitting smoking or chewing tobacco. A provider referral is needed for pharmacist consultation.  • Tobacco Users Help Hotline: 2-448-QUIT-NOW (499-3796) or https://[place].quitlogix.org/  o Free, confidential telephone and online coaching for [place] residents. Sessions are designed on a schedule that is convenient for you. Eligible clients receive free nicotine replacement therapy.  • Nationally: www.smokefree.gov  o Information and professional assistance to support both immediate and long-term needs as you become, and remain, a non-smoker. Smokefree.gov allows  you to choose the help that best fits your needs.

## 2017-08-22 NOTE — PROGRESS NOTES
Subjective:   Antonia Stover is a 82 y.o. female here today for follow-up on cardiology appointment and to discuss 2 skin lesions.    PSVT (paroxysmal supraventricular tachycardia) (CMS-HCC)  This is an 82-year-old female who is here today to discuss 2 lesions on her body but also to discuss her history of PSVT. She recently saw her cardiologist. She has rare symptoms. Her symptoms may last up to 5 minutes. They are improving over time. She's been trying to reduce her stresses. She recently got . She states she is very happy. Her cardiologist decide to refer to Dr. Langston, an EP specialist. She is debating whether or not to see Dr. Langston because she is not certain she wants to take more medication or have an EP. She would rather trust her melissa and if the irregularities of her heart end her life then so be it. She does deny any current chest pain or shortness of breath.    Atypical nevus  Complains of a lesion on her right arm that has been there for greater than a year that looks to have changed recently. Also she complains of a lesion on her left thigh for the past month that was initially erythematous and larger and has shrunk over time and is now scabbing over. Denies any pain. No bleeding.    Dysphagia  Also complains of a chronic history of difficulty swallowing solids. Over time she has had to chew up her food better and be more conscientious about what she eats. She denies any difficulty swallowing fluids. Denies any heartburn or reflux. No abdominal pain. Denies any specific trigger foods.       Current medicines (including changes today)  Current Outpatient Prescriptions   Medication Sig Dispense Refill   • levothyroxine (SYNTHROID) 75 MCG Tab Take 1 Tab by mouth Every morning on an empty stomach. 90 Tab 0   • estradiol (ESTRACE) 0.1 MG/GM vaginal cream Insert 1 g in vagina every day. 42.5 g 3   • diltiazem CD (CARTIA XT) 120 MG CAPSULE SR 24 HR Take 1 Cap by mouth every day. 90 Cap 3   •  "pravastatin (PRAVACHOL) 40 MG tablet Take 1 Tab by mouth every evening. 90 Tab 3   • Probiotic Product (ALIGN PO) Take  by mouth every day.     • Wheat Dextrin (BENEFIBER DRINK MIX PO) Take 1 Dose by mouth 3 times a day.     • Multiple Vitamins-Minerals (OCUVITE) TABS Take 1 Tab by mouth every evening.     • acetaminophen (TYLENOL 8 HOUR ARTHRITIS PAIN) 650 MG CR tablet Take 650 mg by mouth every 6 hours as needed.     • PROAIR  (90 BASE) MCG/ACT Aero Soln inhalation aerosol Inhale 2 Puffs by mouth every 6 hours as needed for Shortness of Breath. 1 Inhaler 3   • loratadine (CLARITIN) 10 MG Tab Take 10 mg by mouth every day.       No current facility-administered medications for this visit.     She  has a past medical history of PSVT (paroxysmal supraventricular tachycardia) (CMS-HCC) (February 2013); Dyslipidemia ( ); Nocturnal hypoxia ( ); COPD; Insomnia ( ); Dizziness ( ); Tobacco use; Hypothyroidism; Sickle cell disease (CMS-HCC); Carotid artery plaque (3/19/2014); Fatigue (7/3/2014); HLD (hyperlipidemia) (9/12/2014); Anemia; and Arthritis.    ROS   No chest pain, no shortness of breath, no abdominal pain and all other systems were reviewed and are negative.       Objective:     Blood pressure 110/62, pulse 73, temperature 36.9 °C (98.5 °F), resp. rate 16, height 1.575 m (5' 2.01\"), weight 47.4 kg (104 lb 8 oz), SpO2 94 %. Body mass index is 19.11 kg/(m^2).   Physical Exam:  Constitutional: Alert, no distress.  Skin: Warm, dry, good turgor, no rashes in visible areas. She has a raised irregularly circumference brownish lesion that appears on the right arm. On her left thigh there is a raised dry scab lesion.  Eye: Equal, round and reactive, conjunctiva clear, lids normal.  ENMT: Lips without lesions, good dentition, oropharynx clear.  Neck: Trachea midline, no masses.   Lymph: No cervical or supraclavicular lymphadenopathy  Respiratory: Unlabored respiratory effort, lungs clear to auscultation, no wheezes, " no ronchi.  Cardiovascular: Normal S1, S2, no murmur, no edema.  Abdomen: Soft, non-tender, no masses.  Psych: Alert and oriented x3, normal affect and mood.        Assessment and Plan:   The following treatment plan was discussed    1. PSVT (paroxysmal supraventricular tachycardia) (CMS-HCC)  Chronic condition. Suggested following up with Dr. Langston for a second opinion.    2. Atypical nevus  Chronic lesions noted. Neither which appear suspicious.    3. Dysphagia, unspecified type  New condition which is chronic. Patient refused any further evaluation. I agreed. Continue to chew up foods well. Follow up with any worsening symptoms.      Followup: Return if symptoms worsen or fail to improve.    Please note that this dictation was created using voice recognition software. I have made every reasonable attempt to correct obvious errors, but I expect that there are errors of grammar and possibly content that I did not discover before finalizing the note.

## 2017-08-22 NOTE — ASSESSMENT & PLAN NOTE
Also complains of a chronic history of difficulty swallowing solids. Over time she has had to chew up her food better and be more conscientious about what she eats. She denies any difficulty swallowing fluids. Denies any heartburn or reflux. No abdominal pain. Denies any specific trigger foods.

## 2017-08-22 NOTE — ASSESSMENT & PLAN NOTE
Complains of a lesion on her right arm that has been there for greater than a year that looks to have changed recently. Also she complains of a lesion on her left thigh for the past month that was initially erythematous and larger and has shrunk over time and is now scabbing over. Denies any pain. No bleeding.

## 2017-08-22 NOTE — ASSESSMENT & PLAN NOTE
This is an 82-year-old female who is here today to discuss 2 lesions on her body but also to discuss her history of PSVT. She recently saw her cardiologist. She has rare symptoms. Her symptoms may last up to 5 minutes. They are improving over time. She's been trying to reduce her stresses. She recently got . She states she is very happy. Her cardiologist decide to refer to Dr. Langston, an EP specialist. She is debating whether or not to see Dr. Langston because she is not certain she wants to take more medication or have an EP. She would rather trust her melissa and if the irregularities of her heart end her life then so be it. She does deny any current chest pain or shortness of breath.

## 2017-08-29 ENCOUNTER — OFFICE VISIT (OUTPATIENT)
Dept: CARDIOLOGY | Facility: MEDICAL CENTER | Age: 82
End: 2017-08-29
Payer: COMMERCIAL

## 2017-08-29 VITALS
BODY MASS INDEX: 19.14 KG/M2 | OXYGEN SATURATION: 94 % | WEIGHT: 104 LBS | DIASTOLIC BLOOD PRESSURE: 60 MMHG | HEART RATE: 76 BPM | SYSTOLIC BLOOD PRESSURE: 110 MMHG | HEIGHT: 62 IN

## 2017-08-29 DIAGNOSIS — I47.10 PSVT (PAROXYSMAL SUPRAVENTRICULAR TACHYCARDIA): ICD-10-CM

## 2017-08-29 LAB — EKG IMPRESSION: NORMAL

## 2017-08-29 PROCEDURE — 99205 OFFICE O/P NEW HI 60 MIN: CPT | Mod: 25 | Performed by: INTERNAL MEDICINE

## 2017-08-29 PROCEDURE — 93000 ELECTROCARDIOGRAM COMPLETE: CPT | Performed by: INTERNAL MEDICINE

## 2017-08-29 ASSESSMENT — ENCOUNTER SYMPTOMS
CONSTIPATION: 1
PALPITATIONS: 1
DIARRHEA: 1

## 2017-08-29 NOTE — PROGRESS NOTES
Subjective:   Antonia Stover is a 82 y.o. female who presents today being seen in consultation today at the request of Dr. Bernard for evaluation of svt.    She has had svt for over 30 years.  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    She has been on verapamil and digoxin. Been on dilt for a long time now.  Metoprolol was not good (way back when) and she thinks it did not work and had swelling.      Now she is not having a sustained arrhythmia for over a year.  She gets a minute to 5 minutes.  These are only 3-4 over the last year     No strips in records but has had adenosine in the field in the past.      She has many other age related issues.  Lots of ibs.  Small bowel obstruction (past hysterectomy)  Osteoporosis and arthritis.      Past Medical History:   Diagnosis Date   • HLD (hyperlipidemia) 9/12/2014   • Fatigue 7/3/2014   • Carotid artery plaque 3/19/2014   • PSVT (paroxysmal supraventricular tachycardia) (CMS-HCC) February 2013    Echocardiogram with normal LV size, LVEF 65-70%.   • Anemia    • Arthritis    • COPD    • Dizziness     • Dyslipidemia     • Hypothyroidism    • Insomnia     • Nocturnal hypoxia      Uses oxygen QHS.   • Sickle cell disease (CMS-HCC)    • Tobacco use      Past Surgical History:   Procedure Laterality Date   • KNEE REPLACEMENT, TOTAL  2005    Right   • ABDOMINAL HYSTERECTOMY TOTAL     • APPENDECTOMY     • CATARACT EXTRACTION WITH IOL      Bilateral   • CATARACT EXTRACTION WITH IOL      Bilateral   • HYSTERECTOMY, TOTAL ABDOMINAL       Family History   Problem Relation Age of Onset   • Heart Attack Brother 70     History   Smoking Status   • Current Every Day Smoker   • Packs/day: 0.25   • Years: 55.00   • Types: Cigarettes   Smokeless Tobacco   • Never Used     Allergies   Allergen Reactions   • Hydrocodone Hives   • Iodine Anaphylaxis     RXN=>20 years ago   • Nsaids Hives and Shortness  "of Breath   • Penicillins Anaphylaxis     RXN=>20 years ago   • Codeine    • Erythromycin    • Morphine    • Sulfa Drugs      Outpatient Encounter Prescriptions as of 8/29/2017   Medication Sig Dispense Refill   • levothyroxine (SYNTHROID) 75 MCG Tab Take 1 Tab by mouth Every morning on an empty stomach. 90 Tab 0   • estradiol (ESTRACE) 0.1 MG/GM vaginal cream Insert 1 g in vagina every day. 42.5 g 3   • diltiazem CD (CARTIA XT) 120 MG CAPSULE SR 24 HR Take 1 Cap by mouth every day. 90 Cap 3   • pravastatin (PRAVACHOL) 40 MG tablet Take 1 Tab by mouth every evening. 90 Tab 3   • Probiotic Product (ALIGN PO) Take  by mouth every day.     • Wheat Dextrin (BENEFIBER DRINK MIX PO) Take 1 Dose by mouth 3 times a day.     • [DISCONTINUED] nitrofurantoin (MACRODANTIN) 50 MG Cap Take 1 Cap by mouth every day. (Patient not taking: Reported on 7/18/2017) 30 Cap 5   • acetaminophen (TYLENOL 8 HOUR ARTHRITIS PAIN) 650 MG CR tablet Take 650 mg by mouth every 6 hours as needed.     • PROAIR  (90 BASE) MCG/ACT Aero Soln inhalation aerosol Inhale 2 Puffs by mouth every 6 hours as needed for Shortness of Breath. 1 Inhaler 3   • loratadine (CLARITIN) 10 MG Tab Take 10 mg by mouth every day.     • [DISCONTINUED] docusate sodium (COLACE) 100 MG Cap Take 100 mg by mouth every day.     • Multiple Vitamins-Minerals (OCUVITE) TABS Take 1 Tab by mouth every evening.       No facility-administered encounter medications on file as of 8/29/2017.      Review of Systems   Cardiovascular: Positive for palpitations.   Gastrointestinal: Positive for constipation and diarrhea.   Musculoskeletal: Positive for joint pain.   Endo/Heme/Allergies: Positive for environmental allergies.   All other systems reviewed and are negative.       Objective:   /60   Pulse 76   Ht 1.575 m (5' 2.01\")   Wt 47.2 kg (104 lb)   SpO2 94%   BMI 19.02 kg/m²     Physical Exam   Constitutional: She is oriented to person, place, and time. She appears " well-developed and well-nourished. No distress.   HENT:   Head: Normocephalic and atraumatic.   Right Ear: External ear normal.   Left Ear: External ear normal.   Mouth/Throat: Oropharynx is clear and moist.   Eyes: Conjunctivae and EOM are normal. Right eye exhibits no discharge. Left eye exhibits no discharge. No scleral icterus.   Neck: Normal range of motion. Neck supple. No JVD present. No tracheal deviation present. No thyromegaly present.   Cardiovascular: Normal rate, regular rhythm, normal heart sounds and intact distal pulses.  Exam reveals no gallop and no friction rub.    No murmur heard.  Pulmonary/Chest: Effort normal and breath sounds normal. No stridor. No respiratory distress. She has no wheezes. She has no rales.   Abdominal: Soft. She exhibits no distension.   Musculoskeletal: She exhibits no edema or tenderness.   Neurological: She is alert and oriented to person, place, and time. No cranial nerve deficit. Coordination normal.   Skin: Skin is warm and dry. No rash noted. She is not diaphoretic. No erythema. No pallor.   Psychiatric: She has a normal mood and affect. Her behavior is normal. Judgment and thought content normal.   Vitals reviewed.      Assessment:     1. PSVT (paroxysmal supraventricular tachycardia) (CMS-[facility])  EKG   reviewed all ecg and holter and no recording of svt  Echo with normal ef      Medical Decision Making:  Today's Assessment / Status / Plan:   She is not highly symptomatic and feels well controlled.  If bp limits ability to control the svt could consider change to digoxin. She will try to capture 12 lead of an episodes.  Would consider ablation if future bothersome episodes.

## 2017-08-29 NOTE — PROGRESS NOTES
Subjective:   Antonia Stover is a 82 y.o. female who presents today     Past Medical History:   Diagnosis Date   • HLD (hyperlipidemia) 9/12/2014   • Fatigue 7/3/2014   • Carotid artery plaque 3/19/2014   • PSVT (paroxysmal supraventricular tachycardia) (CMS-HCC) February 2013    Echocardiogram with normal LV size, LVEF 65-70%.   • Anemia    • Arthritis    • COPD    • Dizziness     • Dyslipidemia     • Hypothyroidism    • Insomnia     • Nocturnal hypoxia      Uses oxygen QHS.   • Sickle cell disease (CMS-HCC)    • Tobacco use      Past Surgical History:   Procedure Laterality Date   • KNEE REPLACEMENT, TOTAL  2005    Right   • ABDOMINAL HYSTERECTOMY TOTAL     • APPENDECTOMY     • CATARACT EXTRACTION WITH IOL      Bilateral   • CATARACT EXTRACTION WITH IOL      Bilateral   • HYSTERECTOMY, TOTAL ABDOMINAL       Family History   Problem Relation Age of Onset   • Heart Attack Brother 70     History   Smoking Status   • Current Every Day Smoker   • Packs/day: 0.25   • Years: 55.00   • Types: Cigarettes   Smokeless Tobacco   • Never Used     Allergies   Allergen Reactions   • Hydrocodone Hives   • Iodine Anaphylaxis     RXN=>20 years ago   • Nsaids Hives and Shortness of Breath   • Penicillins Anaphylaxis     RXN=>20 years ago   • Codeine    • Erythromycin    • Morphine    • Sulfa Drugs      Outpatient Encounter Prescriptions as of 8/29/2017   Medication Sig Dispense Refill   • levothyroxine (SYNTHROID) 75 MCG Tab Take 1 Tab by mouth Every morning on an empty stomach. 90 Tab 0   • estradiol (ESTRACE) 0.1 MG/GM vaginal cream Insert 1 g in vagina every day. 42.5 g 3   • diltiazem CD (CARTIA XT) 120 MG CAPSULE SR 24 HR Take 1 Cap by mouth every day. 90 Cap 3   • pravastatin (PRAVACHOL) 40 MG tablet Take 1 Tab by mouth every evening. 90 Tab 3   • Probiotic Product (ALIGN PO) Take  by mouth every day.     • Wheat Dextrin (BENEFIBER DRINK MIX PO) Take 1 Dose by mouth 3 times a day.     • [DISCONTINUED] nitrofurantoin  "(MACRODANTIN) 50 MG Cap Take 1 Cap by mouth every day. (Patient not taking: Reported on 7/18/2017) 30 Cap 5   • acetaminophen (TYLENOL 8 HOUR ARTHRITIS PAIN) 650 MG CR tablet Take 650 mg by mouth every 6 hours as needed.     • PROAIR  (90 BASE) MCG/ACT Aero Soln inhalation aerosol Inhale 2 Puffs by mouth every 6 hours as needed for Shortness of Breath. 1 Inhaler 3   • loratadine (CLARITIN) 10 MG Tab Take 10 mg by mouth every day.     • [DISCONTINUED] docusate sodium (COLACE) 100 MG Cap Take 100 mg by mouth every day.     • Multiple Vitamins-Minerals (OCUVITE) TABS Take 1 Tab by mouth every evening.       No facility-administered encounter medications on file as of 8/29/2017.      ROS     Objective:   /60   Pulse 76   Ht 1.575 m (5' 2.01\")   Wt 47.2 kg (104 lb)   SpO2 94%   BMI 19.02 kg/m²     Physical Exam    Assessment:     1. PSVT (paroxysmal supraventricular tachycardia) (CMS-[facility])  EKG       Medical Decision Making:  Today's Assessment / Status / Plan:     "

## 2017-09-25 DIAGNOSIS — E07.9 THYROID DISORDER: ICD-10-CM

## 2017-09-25 RX ORDER — LEVOTHYROXINE SODIUM 0.07 MG/1
TABLET ORAL
Qty: 90 TAB | Refills: 1 | Status: SHIPPED | OUTPATIENT
Start: 2017-09-25 | End: 2018-03-28 | Stop reason: SDUPTHER

## 2017-09-25 NOTE — TELEPHONE ENCOUNTER
Was the patient seen in the last year in this department? Yes     Does patient have an active prescription for medications requested? No     Received Request Via: Pharmacy     Last Visit: 8/22/17    Last Labs: 11/9/16

## 2017-10-17 ENCOUNTER — APPOINTMENT (OUTPATIENT)
Dept: MEDICAL GROUP | Facility: MEDICAL CENTER | Age: 82
End: 2017-10-17
Payer: COMMERCIAL

## 2017-10-24 ENCOUNTER — OFFICE VISIT (OUTPATIENT)
Dept: CARDIOLOGY | Facility: MEDICAL CENTER | Age: 82
End: 2017-10-24
Payer: COMMERCIAL

## 2017-10-24 VITALS
HEIGHT: 62 IN | DIASTOLIC BLOOD PRESSURE: 64 MMHG | BODY MASS INDEX: 18.95 KG/M2 | HEART RATE: 82 BPM | WEIGHT: 103 LBS | OXYGEN SATURATION: 95 % | SYSTOLIC BLOOD PRESSURE: 118 MMHG

## 2017-10-24 DIAGNOSIS — I65.23 ATHEROSCLEROSIS OF BOTH CAROTID ARTERIES: ICD-10-CM

## 2017-10-24 DIAGNOSIS — E78.5 DYSLIPIDEMIA: ICD-10-CM

## 2017-10-24 DIAGNOSIS — I47.10 PSVT (PAROXYSMAL SUPRAVENTRICULAR TACHYCARDIA): ICD-10-CM

## 2017-10-24 PROCEDURE — 99214 OFFICE O/P EST MOD 30 MIN: CPT | Performed by: INTERNAL MEDICINE

## 2017-10-24 NOTE — LETTER
[facility]   [address], [address]  [place] [address]  Phone: [phone]  Fax: [phone]              Antonia Stover  1935    Encounter Date: 10/24/2017    Diogo Bernard M.D.          PROGRESS NOTE:  Subjective:   Antonia Stoevr is a 82 y.o. female who presents today for followup of her PSVT, history of near syncope, hyperlipidemia and carotid plaque.     She did see EP because of her PSVT. However, since is fairly rare and not overly symptomatic, it was felt that medical therapy was indicated as opposed to ablation. She does have episodes every 2-3 months or so. They last up to about 10 minutes. On occasion, she is lightheaded with these episodes.    She has dyspnea on exertion at about 3 blocks. She denies any PND or orthopnea, but, is on oxygen therapy at night. She's had no edema or chest discomfort.        Past Medical History:   Diagnosis Date   • Anemia    • Arthritis    • Carotid artery plaque 3/19/2014   • COPD    • Dizziness     • Dyslipidemia     • Fatigue 7/3/2014   • HLD (hyperlipidemia) 9/12/2014   • Hypothyroidism    • Insomnia     • Nocturnal hypoxia      Uses oxygen QHS.   • PSVT (paroxysmal supraventricular tachycardia) (CMS-[facility]) February 2013    Echocardiogram with normal LV size, LVEF 65-70%.   • Sickle cell disease (CMS-[facility])    • Tobacco use      Past Surgical History:   Procedure Laterality Date   • ABDOMINAL HYSTERECTOMY TOTAL     • APPENDECTOMY     • CATARACT EXTRACTION WITH IOL      Bilateral   • CATARACT EXTRACTION WITH IOL      Bilateral   • HYSTERECTOMY, TOTAL ABDOMINAL     • KNEE REPLACEMENT, TOTAL  2005    Right     Family History   Problem Relation Age of Onset   • Heart Attack Brother 70     History   Smoking Status   • Current Every Day Smoker   • Packs/day: 0.25   • Years: 55.00   • Types: Cigarettes   Smokeless Tobacco   • Never Used     Allergies   Allergen Reactions   • Hydrocodone Hives   • Iodine Anaphylaxis  "    RXN=>20 years ago   • Nsaids Hives and Shortness of Breath   • Penicillins Anaphylaxis     RXN=>20 years ago   • Codeine    • Erythromycin    • Morphine    • Sulfa Drugs      Outpatient Encounter Prescriptions as of 10/24/2017   Medication Sig Dispense Refill   • levothyroxine (SYNTHROID) 75 MCG Tab TAKE 1 TABLET BY MOUTH EVERY MORNING ON AN EMPTY STOMACH 90 Tab 1   • estradiol (ESTRACE) 0.1 MG/GM vaginal cream Insert 1 g in vagina every day. 42.5 g 3   • diltiazem CD (CARTIA XT) 120 MG CAPSULE SR 24 HR Take 1 Cap by mouth every day. 90 Cap 3   • pravastatin (PRAVACHOL) 40 MG tablet Take 1 Tab by mouth every evening. 90 Tab 3   • acetaminophen (TYLENOL 8 HOUR ARTHRITIS PAIN) 650 MG CR tablet Take 650 mg by mouth every 6 hours as needed.     • PROAIR  (90 BASE) MCG/ACT Aero Soln inhalation aerosol Inhale 2 Puffs by mouth every 6 hours as needed for Shortness of Breath. 1 Inhaler 3   • Probiotic Product (ALIGN PO) Take  by mouth every day.     • Wheat Dextrin (BENEFIBER DRINK MIX PO) Take 1 Dose by mouth 3 times a day.     • Multiple Vitamins-Minerals (OCUVITE) TABS Take 1 Tab by mouth every evening.     • loratadine (CLARITIN) 10 MG Tab Take 10 mg by mouth every day.       No facility-administered encounter medications on file as of 10/24/2017.      ROS       Objective:   /64   Pulse 82   Ht 1.575 m (5' 2\")   Wt 46.7 kg (103 lb)   SpO2 95%   BMI 18.84 kg/m²      Physical Exam   Neck: No JVD present.   Cardiovascular: Normal rate and regular rhythm.  Exam reveals no gallop.    No murmur heard.  Pulmonary/Chest: Effort normal. She has no rales. She exhibits no tenderness.   Abdominal: Soft. There is no tenderness.   Musculoskeletal: She exhibits no edema.       Lab Results   Component Value Date/Time    CHOLSTRLTOT 175 11/09/2016 07:52 AM    LDL 85 11/09/2016 07:52 AM    HDL 77 11/09/2016 07:52 AM    TRIGLYCERIDE 67 11/09/2016 07:52 AM       Lab Results   Component Value Date/Time    SODIUM 140 " 11/09/2016 07:52 AM    POTASSIUM 4.1 11/09/2016 07:52 AM    CHLORIDE 107 11/09/2016 07:52 AM    CO2 26 11/09/2016 07:52 AM    GLUCOSE 83 11/09/2016 07:52 AM    BUN 9 11/09/2016 07:52 AM    CREATININE 0.98 11/09/2016 07:52 AM    CREATININE 1.3 01/19/2009 09:40 AM     Lab Results   Component Value Date/Time    ALKPHOSPHAT 70 11/09/2016 07:52 AM    ASTSGOT 19 11/09/2016 07:52 AM    ALTSGPT 9 11/09/2016 07:52 AM    TBILIRUBIN 0.6 11/09/2016 07:52 AM      Lab Results   Component Value Date/Time    BNPBTYPENAT 143 (H) 09/01/2015 01:05 PM          Assessment:     1. Atherosclerosis of both carotid arteries     2. Dyslipidemia     3. PSVT (paroxysmal supraventricular tachycardia) (CMS-[facility])         Medical Decision Making:  Today's Assessment / Status / Plan:     Ms. Stover is reasonably stable. She does have episodes of PSVT but these are not very frequent or sustained. She is well versed in Valsalva maneuver and carotid sinus massage. Her lipid status is under fair though not optimal control. She's been intolerant of statins except for pravastatin. She will follow-up in about 6 months with repeat lab work.      Tamir Hanson, P.A.-C.  [address]  [address]  [place] [address]  VIA In Basket

## 2017-10-24 NOTE — PROGRESS NOTES
Subjective:   Antonia Stover is a 82 y.o. female who presents today for followup of her PSVT, history of near syncope, hyperlipidemia and carotid plaque.     She did see EP because of her PSVT. However, since is fairly rare and not overly symptomatic, it was felt that medical therapy was indicated as opposed to ablation. She does have episodes every 2-3 months or so. They last up to about 10 minutes. On occasion, she is lightheaded with these episodes.    She has dyspnea on exertion at about 3 blocks. She denies any PND or orthopnea, but, is on oxygen therapy at night. She's had no edema or chest discomfort.        Past Medical History:   Diagnosis Date   • Anemia    • Arthritis    • Carotid artery plaque 3/19/2014   • COPD    • Dizziness     • Dyslipidemia     • Fatigue 7/3/2014   • HLD (hyperlipidemia) 9/12/2014   • Hypothyroidism    • Insomnia     • Nocturnal hypoxia      Uses oxygen QHS.   • PSVT (paroxysmal supraventricular tachycardia) (CMS-HCC) February 2013    Echocardiogram with normal LV size, LVEF 65-70%.   • Sickle cell disease (CMS-HCC)    • Tobacco use      Past Surgical History:   Procedure Laterality Date   • ABDOMINAL HYSTERECTOMY TOTAL     • APPENDECTOMY     • CATARACT EXTRACTION WITH IOL      Bilateral   • CATARACT EXTRACTION WITH IOL      Bilateral   • HYSTERECTOMY, TOTAL ABDOMINAL     • KNEE REPLACEMENT, TOTAL  2005    Right     Family History   Problem Relation Age of Onset   • Heart Attack Brother 70     History   Smoking Status   • Current Every Day Smoker   • Packs/day: 0.25   • Years: 55.00   • Types: Cigarettes   Smokeless Tobacco   • Never Used     Allergies   Allergen Reactions   • Hydrocodone Hives   • Iodine Anaphylaxis     RXN=>20 years ago   • Nsaids Hives and Shortness of Breath   • Penicillins Anaphylaxis     RXN=>20 years ago   • Codeine    • Erythromycin    • Morphine    • Sulfa Drugs      Outpatient Encounter Prescriptions as of 10/24/2017   Medication Sig Dispense Refill   •  "levothyroxine (SYNTHROID) 75 MCG Tab TAKE 1 TABLET BY MOUTH EVERY MORNING ON AN EMPTY STOMACH 90 Tab 1   • estradiol (ESTRACE) 0.1 MG/GM vaginal cream Insert 1 g in vagina every day. 42.5 g 3   • diltiazem CD (CARTIA XT) 120 MG CAPSULE SR 24 HR Take 1 Cap by mouth every day. 90 Cap 3   • pravastatin (PRAVACHOL) 40 MG tablet Take 1 Tab by mouth every evening. 90 Tab 3   • acetaminophen (TYLENOL 8 HOUR ARTHRITIS PAIN) 650 MG CR tablet Take 650 mg by mouth every 6 hours as needed.     • PROAIR  (90 BASE) MCG/ACT Aero Soln inhalation aerosol Inhale 2 Puffs by mouth every 6 hours as needed for Shortness of Breath. 1 Inhaler 3   • Probiotic Product (ALIGN PO) Take  by mouth every day.     • Wheat Dextrin (BENEFIBER DRINK MIX PO) Take 1 Dose by mouth 3 times a day.     • Multiple Vitamins-Minerals (OCUVITE) TABS Take 1 Tab by mouth every evening.     • loratadine (CLARITIN) 10 MG Tab Take 10 mg by mouth every day.       No facility-administered encounter medications on file as of 10/24/2017.      ROS       Objective:   /64   Pulse 82   Ht 1.575 m (5' 2\")   Wt 46.7 kg (103 lb)   SpO2 95%   BMI 18.84 kg/m²     Physical Exam   Neck: No JVD present.   Cardiovascular: Normal rate and regular rhythm.  Exam reveals no gallop.    No murmur heard.  Pulmonary/Chest: Effort normal. She has no rales. She exhibits no tenderness.   Abdominal: Soft. There is no tenderness.   Musculoskeletal: She exhibits no edema.       Lab Results   Component Value Date/Time    CHOLSTRLTOT 175 11/09/2016 07:52 AM    LDL 85 11/09/2016 07:52 AM    HDL 77 11/09/2016 07:52 AM    TRIGLYCERIDE 67 11/09/2016 07:52 AM       Lab Results   Component Value Date/Time    SODIUM 140 11/09/2016 07:52 AM    POTASSIUM 4.1 11/09/2016 07:52 AM    CHLORIDE 107 11/09/2016 07:52 AM    CO2 26 11/09/2016 07:52 AM    GLUCOSE 83 11/09/2016 07:52 AM    BUN 9 11/09/2016 07:52 AM    CREATININE 0.98 11/09/2016 07:52 AM    CREATININE 1.3 01/19/2009 09:40 AM     Lab " Results   Component Value Date/Time    ALKPHOSPHAT 70 11/09/2016 07:52 AM    ASTSGOT 19 11/09/2016 07:52 AM    ALTSGPT 9 11/09/2016 07:52 AM    TBILIRUBIN 0.6 11/09/2016 07:52 AM      Lab Results   Component Value Date/Time    BNPBTYPENAT 143 (H) 09/01/2015 01:05 PM          Assessment:     1. Atherosclerosis of both carotid arteries     2. Dyslipidemia     3. PSVT (paroxysmal supraventricular tachycardia) (CMS-[facility])         Medical Decision Making:  Today's Assessment / Status / Plan:     Ms. Stover is reasonably stable. She does have episodes of PSVT but these are not very frequent or sustained. She is well versed in Valsalva maneuver and carotid sinus massage. Her lipid status is under fair though not optimal control. She's been intolerant of statins except for pravastatin. She will follow-up in about 6 months with repeat lab work.

## 2017-10-26 ENCOUNTER — OFFICE VISIT (OUTPATIENT)
Dept: MEDICAL GROUP | Facility: MEDICAL CENTER | Age: 82
End: 2017-10-26
Payer: COMMERCIAL

## 2017-10-26 VITALS
DIASTOLIC BLOOD PRESSURE: 68 MMHG | HEART RATE: 76 BPM | SYSTOLIC BLOOD PRESSURE: 112 MMHG | BODY MASS INDEX: 19.25 KG/M2 | HEIGHT: 62 IN | TEMPERATURE: 97.8 F | RESPIRATION RATE: 16 BRPM | WEIGHT: 104.6 LBS | OXYGEN SATURATION: 95 %

## 2017-10-26 DIAGNOSIS — F43.9 STRESS: ICD-10-CM

## 2017-10-26 DIAGNOSIS — Z23 NEED FOR VACCINATION: ICD-10-CM

## 2017-10-26 PROCEDURE — 99214 OFFICE O/P EST MOD 30 MIN: CPT | Mod: 25 | Performed by: PHYSICIAN ASSISTANT

## 2017-10-26 PROCEDURE — 90471 IMMUNIZATION ADMIN: CPT | Performed by: PHYSICIAN ASSISTANT

## 2017-10-26 PROCEDURE — 90662 IIV NO PRSV INCREASED AG IM: CPT | Performed by: PHYSICIAN ASSISTANT

## 2017-10-26 NOTE — PROGRESS NOTES
Subjective:   Antonia Stover is a 82 y.o. female here today for recent stressors.    Stress  This is an 82-year-old female who is here today to discuss recent stresses. She has a history of her daughter lying to her. Recently she found out that she was  after being with the same man for 2 years. This man gave her HPV. That also has 2 daughters in the past. Daughter states that she is making the decision to  him because it is making her more financially stable. She states that nighttime she will lay there with her mind racing. She has tried to do things to alleviate distress. She denies any homicidal or suicidal ideations. Denies any significant depression. Has had a couple panic attacks over the past year. Is not interested in seeing any therapist.       Current medicines (including changes today)  Current Outpatient Prescriptions   Medication Sig Dispense Refill   • levothyroxine (SYNTHROID) 75 MCG Tab TAKE 1 TABLET BY MOUTH EVERY MORNING ON AN EMPTY STOMACH 90 Tab 1   • estradiol (ESTRACE) 0.1 MG/GM vaginal cream Insert 1 g in vagina every day. 42.5 g 3   • diltiazem CD (CARTIA XT) 120 MG CAPSULE SR 24 HR Take 1 Cap by mouth every day. 90 Cap 3   • pravastatin (PRAVACHOL) 40 MG tablet Take 1 Tab by mouth every evening. 90 Tab 3   • acetaminophen (TYLENOL 8 HOUR ARTHRITIS PAIN) 650 MG CR tablet Take 650 mg by mouth every 6 hours as needed.     • PROAIR  (90 BASE) MCG/ACT Aero Soln inhalation aerosol Inhale 2 Puffs by mouth every 6 hours as needed for Shortness of Breath. 1 Inhaler 3   • Probiotic Product (ALIGN PO) Take  by mouth every day.     • Wheat Dextrin (BENEFIBER DRINK MIX PO) Take 1 Dose by mouth 3 times a day.     • Multiple Vitamins-Minerals (OCUVITE) TABS Take 1 Tab by mouth every evening.       No current facility-administered medications for this visit.      She  has a past medical history of Anemia; Arthritis; Carotid artery plaque (3/19/2014); COPD; Dizziness ( );  "Dyslipidemia ( ); Fatigue (7/3/2014); HLD (hyperlipidemia) (9/12/2014); Hypothyroidism; Insomnia ( ); Nocturnal hypoxia ( ); PSVT (paroxysmal supraventricular tachycardia) (CMS-HCC) (February 2013); Sickle cell disease (CMS-HCC); and Tobacco use.    ROS   No chest pain, no shortness of breath, no abdominal pain and all other systems were reviewed and are negative.       Objective:     Blood pressure 112/68, pulse 76, temperature 36.6 °C (97.8 °F), resp. rate 16, height 1.575 m (5' 2\"), weight 47.4 kg (104 lb 9.6 oz), SpO2 95 %. Body mass index is 19.13 kg/m².   Physical Exam:  Constitutional: Alert, no distress.  Skin: Warm, dry, good turgor, no rashes in visible areas.  Eye: Equal, round and reactive, conjunctiva clear, lids normal.  ENMT: Lips without lesions, good dentition, oropharynx clear.  Neck: Trachea midline, no masses.   Lymph: No cervical or supraclavicular lymphadenopathy  Respiratory: Unlabored respiratory effort, lungs appear clear, no wheezes.  Cardiovascular: Regular rate and rhythm.   Psych: Alert and oriented x3, normal affect and mood.        Assessment and Plan:   The following treatment plan was discussed    1. Stress  Acute, new onset condition. Discuss in length about her issues. Offered referral to behavioral health but she declined. Advised to contact me with any concerns or questions. Discussed with meditation techniques prior to bedtime. Follow up as needed.    2. Need for vaccination  Administered without complaints.  - INFLUENZA VACCINE, HIGH DOSE (65+ ONLY)    Total 25 minutes face-to-face time spent with patient, with greater than 50% of the total time discussing patient's issues and symptoms as listed above in assessment and plan, as well as managing coordination of care for future evaluation and treatment.    Followup: Return if symptoms worsen or fail to improve.    Please note that this dictation was created using voice recognition software. I have made every reasonable attempt to " correct obvious errors, but I expect that there are errors of grammar and possibly content that I did not discover before finalizing the note.

## 2017-11-17 ENCOUNTER — HOSPITAL ENCOUNTER (EMERGENCY)
Facility: MEDICAL CENTER | Age: 82
End: 2017-11-17
Attending: EMERGENCY MEDICINE
Payer: COMMERCIAL

## 2017-11-17 VITALS
DIASTOLIC BLOOD PRESSURE: 56 MMHG | WEIGHT: 104 LBS | RESPIRATION RATE: 14 BRPM | HEART RATE: 67 BPM | SYSTOLIC BLOOD PRESSURE: 148 MMHG | TEMPERATURE: 98.2 F | HEIGHT: 62 IN | OXYGEN SATURATION: 93 % | BODY MASS INDEX: 19.14 KG/M2

## 2017-11-17 DIAGNOSIS — R42 DIZZINESS: ICD-10-CM

## 2017-11-17 LAB
ALBUMIN SERPL BCP-MCNC: 4.1 G/DL (ref 3.2–4.9)
ALBUMIN/GLOB SERPL: 1.5 G/DL
ALP SERPL-CCNC: 74 U/L (ref 30–99)
ALT SERPL-CCNC: 7 U/L (ref 2–50)
ANION GAP SERPL CALC-SCNC: 6 MMOL/L (ref 0–11.9)
AST SERPL-CCNC: 17 U/L (ref 12–45)
BASOPHILS # BLD AUTO: 0.3 % (ref 0–1.8)
BASOPHILS # BLD: 0.03 K/UL (ref 0–0.12)
BILIRUB SERPL-MCNC: 0.5 MG/DL (ref 0.1–1.5)
BUN SERPL-MCNC: 11 MG/DL (ref 8–22)
CALCIUM SERPL-MCNC: 9.5 MG/DL (ref 8.5–10.5)
CHLORIDE SERPL-SCNC: 107 MMOL/L (ref 96–112)
CO2 SERPL-SCNC: 24 MMOL/L (ref 20–33)
CREAT SERPL-MCNC: 1.05 MG/DL (ref 0.5–1.4)
EOSINOPHIL # BLD AUTO: 0.09 K/UL (ref 0–0.51)
EOSINOPHIL NFR BLD: 0.9 % (ref 0–6.9)
ERYTHROCYTE [DISTWIDTH] IN BLOOD BY AUTOMATED COUNT: 45.2 FL (ref 35.9–50)
GFR SERPL CREATININE-BSD FRML MDRD: 50 ML/MIN/1.73 M 2
GLOBULIN SER CALC-MCNC: 2.8 G/DL (ref 1.9–3.5)
GLUCOSE SERPL-MCNC: 95 MG/DL (ref 65–99)
HCT VFR BLD AUTO: 35.6 % (ref 37–47)
HGB BLD-MCNC: 12.1 G/DL (ref 12–16)
IMM GRANULOCYTES # BLD AUTO: 0.03 K/UL (ref 0–0.11)
IMM GRANULOCYTES NFR BLD AUTO: 0.3 % (ref 0–0.9)
LYMPHOCYTES # BLD AUTO: 3.9 K/UL (ref 1–4.8)
LYMPHOCYTES NFR BLD: 40.2 % (ref 22–41)
MAGNESIUM SERPL-MCNC: 1.9 MG/DL (ref 1.5–2.5)
MCH RBC QN AUTO: 31.9 PG (ref 27–33)
MCHC RBC AUTO-ENTMCNC: 34 G/DL (ref 33.6–35)
MCV RBC AUTO: 93.9 FL (ref 81.4–97.8)
MONOCYTES # BLD AUTO: 0.86 K/UL (ref 0–0.85)
MONOCYTES NFR BLD AUTO: 8.9 % (ref 0–13.4)
NEUTROPHILS # BLD AUTO: 4.79 K/UL (ref 2–7.15)
NEUTROPHILS NFR BLD: 49.4 % (ref 44–72)
NRBC # BLD AUTO: 0 K/UL
NRBC BLD AUTO-RTO: 0 /100 WBC
PLATELET # BLD AUTO: 188 K/UL (ref 164–446)
PMV BLD AUTO: 11.2 FL (ref 9–12.9)
POTASSIUM SERPL-SCNC: 3.7 MMOL/L (ref 3.6–5.5)
PROT SERPL-MCNC: 6.9 G/DL (ref 6–8.2)
RBC # BLD AUTO: 3.79 M/UL (ref 4.2–5.4)
SODIUM SERPL-SCNC: 137 MMOL/L (ref 135–145)
TROPONIN I SERPL-MCNC: <0.01 NG/ML (ref 0–0.04)
WBC # BLD AUTO: 9.7 K/UL (ref 4.8–10.8)

## 2017-11-17 PROCEDURE — 85025 COMPLETE CBC W/AUTO DIFF WBC: CPT

## 2017-11-17 PROCEDURE — 80053 COMPREHEN METABOLIC PANEL: CPT

## 2017-11-17 PROCEDURE — 99284 EMERGENCY DEPT VISIT MOD MDM: CPT

## 2017-11-17 PROCEDURE — 93005 ELECTROCARDIOGRAM TRACING: CPT | Performed by: EMERGENCY MEDICINE

## 2017-11-17 PROCEDURE — 36415 COLL VENOUS BLD VENIPUNCTURE: CPT

## 2017-11-17 PROCEDURE — 84484 ASSAY OF TROPONIN QUANT: CPT

## 2017-11-17 PROCEDURE — 83735 ASSAY OF MAGNESIUM: CPT

## 2017-11-17 ASSESSMENT — LIFESTYLE VARIABLES: DO YOU DRINK ALCOHOL: NO

## 2017-11-18 ENCOUNTER — PATIENT OUTREACH (OUTPATIENT)
Dept: HEALTH INFORMATION MANAGEMENT | Facility: OTHER | Age: 82
End: 2017-11-18

## 2017-11-18 NOTE — ED NOTES
Discharge instructions given to patient, a verbal understanding of all instructions was stated. IV removed, cathlon intact, site without s/s of infection. Pt preferred to be wheelchaired out by . VSS, all belongings accounted for.

## 2017-11-18 NOTE — ED NOTES
"Antonia Stover  82 y.o.  Chief Complaint   Patient presents with   • Dizziness     Pt. had a sudden onset of dizziness and weakness tonight. Pt. is negative for stroke symptoms. Pt. has a 20G IV in her right forearm.     /56   Pulse 88   Temp 36.8 °C (98.2 °F)   Resp 16   Ht 1.575 m (5' 2\")   Wt 47.2 kg (104 lb)   SpO2 94%   BMI 19.02 kg/m²     "

## 2017-11-18 NOTE — ED NOTES
Orthostatic blood pressures completed. Pt. Provided water for comfort. Pt. Ambulated to the bathroom with steady gait.

## 2017-11-18 NOTE — ED PROVIDER NOTES
"ED Provider Note    Scribed for Guille Gutierrez M.D. by Vinh Howe. 11/17/2017, 10:01 PM.    Primary care provider: Tamir Hanson P.A.-C.  Means of arrival: ambulance  History obtained from: Patient  History limited by: none    CHIEF COMPLAINT  Chief Complaint   Patient presents with   • Dizziness     Pt. had a sudden onset of dizziness and weakness tonight. Pt. is negative for stroke symptoms. Pt. has a 20G IV in her right forearm.       HPI  Antonia Stover is a 82 y.o. Female with a history of orthostatic hypotension who presents to the Emergency Department for evaluation of sudden onset of dizziness/lightheadedness beginning at 8:00PM that has been intermittent since onset without any associated symptoms. Per patient, [place] of [place]. She states she felt normal all day today. After returning home for dinner, patient went to sit on a couch and watch TV. While watching TV, she had a sudden onset of dizziness. The patient states she then tried to walk to the bathroom, during which she \"felt like I was going to pass out\". Patient adds she almost feel off the toilet while at the bathroom. The patient reports she checked her blood pressure prior to going to bed, and measured a systolic pressure of 140, which was high compared to her normal 105 systolic. Patient reports after laying in bed for a while, her dizziness did not resolve and worsened to the point where she could not raise her head from her bed. She does not note any exacerbating or alleviating factors. The patient states she is not experiencing pain beyond her chronic neck pain and arthritic pain. She denies chest pain, shortness of breath, bloody stools, fevers, nausea, vomiting, diarrhea.    REVIEW OF SYSTEMS  See HPI,  Negative for chest pain, shortness of breath, bloody stools, fevers, nausea, vomiting, diarrhea. Remainder of ROS negative.     C.      PAST MEDICAL HISTORY   has a past medical history of Anemia; " "Arthritis; Carotid artery plaque (3/19/2014); COPD; Dizziness ( ); Dyslipidemia ( ); Fatigue (7/3/2014); HLD (hyperlipidemia) (9/12/2014); Hypothyroidism; Insomnia ( ); Nocturnal hypoxia ( ); PSVT (paroxysmal supraventricular tachycardia) (CMS-HCC) (February 2013); Sickle cell disease (CMS-HCC); and Tobacco use.    SURGICAL HISTORY   has a past surgical history that includes hysterectomy, total abdominal; knee replacement, total (2005); cataract extraction with iol; cataract extraction with iol; appendectomy; and abdominal hysterectomy total.    SOCIAL HISTORY  Social History   Substance Use Topics   • Smoking status: Current Every Day Smoker     Packs/day: 0.25     Years: 55.00     Types: Cigarettes   • Smokeless tobacco: Never Used   • Alcohol use No      History   Drug Use No       FAMILY HISTORY  Family History   Problem Relation Age of Onset   • Heart Attack Brother 70       CURRENT MEDICATIONS  Reviewed.  See Encounter Summary.     ALLERGIES  Allergies   Allergen Reactions   • Hydrocodone Hives   • Iodine Anaphylaxis     RXN=>20 years ago   • Nsaids Hives and Shortness of Breath   • Penicillins Anaphylaxis     RXN=>20 years ago   • Codeine    • Erythromycin    • Morphine    • Sulfa Drugs        PHYSICAL EXAM  VITAL SIGNS: /56   Pulse 88   Temp 36.8 °C (98.2 °F)   Resp 16   Ht 1.575 m (5' 2\")   Wt 47.2 kg (104 lb)   SpO2 94%   BMI 19.02 kg/m²   Constitutional: Awake, alert, sitting up in bed. Well appearing. In no apparent distress.  HENT: Normocephalic, Bilateral external ears normal. Nose normal.   Eyes: Conjunctiva normal, non-icteric, EOMI.    Thorax & Lungs: Easy unlabored respirations, Clear to ascultation bilaterally.  Cardiovascular: Regular rate, Regular rhythm, No murmurs, rubs or gallops.  Abdomen:  Soft, nontender, no masses   Skin: Visualized skin is  Dry, No erythema, No rash.   Extremities:   No cyanosis, clubbing or edema.  Neurologic: Alert, Grossly non-focal. CN II-XII intact. " Finger to nose test normal. Heel to shin and rapid alternating movents executed normally. Normal speech.   Psychiatric: Normal affect, Normal mood      DIAGNOSTIC STUDIES / PROCEDURES     EKG  Interpreted by me    Rhythm:  Normal sinus rhythm   Rate: 70  Axis: normal  Ectopy: none  Conduction: normal  ST Segments: no acute change  T Waves: no acute change  Q Waves: none  Clinical Impression: Normal EKG without acute changes from prior      COURSE & MEDICAL DECISION MAKING  Pertinent Labs & Imaging studies reviewed. (See chart for details)    Differential diagnoses include but are not limited to: anemia, arrhythmia, orthostatic hypotension    10:00 PM - Independently reviewed patient's medical records.      10:02 PM - Patient seen and examined at bedside. Ordered CBC with differential, CMP, Troponin, Magnesium, EKG to evaluate her symptoms. I discussed the treatment plan as above with the patient. She understood and verbalized agreement.     11:06 PM - I reevaluated the patient at bedside. She is resting comfortably. I updated the patient on her lab and EKG results. Patient is comfortable with discharge at this time. The patient was given return precautions and welcomed to return to the ED with new or worsening symptoms. She understood and verbalized agreement.    Decision Making:  This is a 82 y.o. year old female who presents with a prolonged episode of dizziness, not vertigo that resolved prior to arrival. The patient did take her blood pressure during the event and found it to be normotensive.  She is neurologically intact, she does not describe being off balance or vertigo, I am not concerned about a cerebellar CVA. EKG is unremarkable, she does not have any QT prolongation, there are no dropped beats. She did not have any chest pain or shortness of breath. It does not appear to be a cardiac event. Troponin is undetectable. I do not feel that this represents acute coronary syndrome, therefore did not feel that  she requires a repeat troponin.    Remainder of laboratory evaluation is unremarkable, the patient does not have acute kidney injury, she does not have worsening anemia, she does not have any findings concerning for sepsis.    Overall I do not have an explanation for the patient's lightheadedness. She feels well and is back to baseline. She is requesting to be discharged. I think that is reasonable. She is informed to return for any chest pain, shortness of breath, near syncope or any other concern.      DISPOSITION:  Patient will be discharged home in stable condition.    FOLLOW UP:  VALERIO Moura-ERIK.  [address]  [address]  [facility] [address]  [phone]              FINAL IMPRESSION  1. Dizziness          Vinh TOLLIVER (Scribe), am scribing for, and in the presence of, Guille Gutierrez M.D..    Electronically signed by: Vinh Howe (Scribe), 11/17/2017    Guille TOLLIVER M.D. personally performed the services described in this documentation, as scribed by Vinh Howe in my presence, and it is both accurate and complete.    The note accurately reflects work and decisions made by me.  Guille Gutierrez  11/18/2017  12:21 AM

## 2017-11-18 NOTE — ED NOTES
Pt. Updated on the POC for labs to be resulted. Pt. States no increasing dizziness at this time. Call light within reach. Will continue to monitor.

## 2017-11-18 NOTE — DISCHARGE INSTRUCTIONS
Dizziness  Dizziness is a common problem. It is a feeling of unsteadiness or light-headedness. You may feel like you are about to faint. Dizziness can lead to injury if you stumble or fall. Anyone can become dizzy, but dizziness is more common in older adults. This condition can be caused by a number of things, including medicines, dehydration, or illness.  HOME CARE INSTRUCTIONS  Taking these steps may help with your condition:  Eating and Drinking  · Drink enough fluid to keep your urine clear or pale yellow. This helps to keep you from becoming dehydrated. Try to drink more clear fluids, such as water.  · Do not drink alcohol.  · Limit your caffeine intake if directed by your health care provider.  · Limit your salt intake if directed by your health care provider.  Activity  · Avoid making quick movements.  ¨ Rise slowly from chairs and steady yourself until you feel okay.  ¨ In the morning, first sit up on the side of the bed. When you feel okay, stand slowly while you hold onto something until you know that your balance is fine.  · Move your legs often if you need to  one place for a long time. Tighten and relax your muscles in your legs while you are standing.  · Do not drive or operate heavy machinery if you feel dizzy.  · Avoid bending down if you feel dizzy. Place items in your home so that they are easy for you to reach without leaning over.  Lifestyle  · Do not use any tobacco products, including cigarettes, chewing tobacco, or electronic cigarettes. If you need help quitting, ask your health care provider.  · Try to reduce your stress level, such as with yoga or meditation. Talk with your health care provider if you need help.  General Instructions  · Watch your dizziness for any changes.  · Take medicines only as directed by your health care provider. Talk with your health care provider if you think that your dizziness is caused by a medicine that you are taking.  · Tell a friend or a family  member that you are feeling dizzy. If he or she notices any changes in your behavior, have this person call your health care provider.  · Keep all follow-up visits as directed by your health care provider. This is important.  SEEK MEDICAL CARE IF:  · Your dizziness does not go away.  · Your dizziness or light-headedness gets worse.  · You feel nauseous.  · You have reduced hearing.  · You have new symptoms.  · You are unsteady on your feet or you feel like the room is spinning.  SEEK IMMEDIATE MEDICAL CARE IF:  · You vomit or have diarrhea and are unable to eat or drink anything.  · You have problems talking, walking, swallowing, or using your arms, hands, or legs.  · You feel generally weak.  · You are not thinking clearly or you have trouble forming sentences. It may take a friend or family member to notice this.  · You have chest pain, abdominal pain, shortness of breath, or sweating.  · Your vision changes.  · You notice any bleeding.  · You have a headache.  · You have neck pain or a stiff neck.  · You have a fever.     This information is not intended to replace advice given to you by your health care provider. Make sure you discuss any questions you have with your health care provider.     Document Released: 06/13/2002 Document Revised: 05/03/2016 Document Reviewed: 12/14/2015  [employer] Interactive Patient Education ©2016 [employer]

## 2017-11-27 ENCOUNTER — APPOINTMENT (OUTPATIENT)
Dept: MEDICAL GROUP | Facility: MEDICAL CENTER | Age: 82
End: 2017-11-27
Payer: COMMERCIAL

## 2017-11-30 ENCOUNTER — OFFICE VISIT (OUTPATIENT)
Dept: MEDICAL GROUP | Facility: MEDICAL CENTER | Age: 82
End: 2017-11-30
Payer: COMMERCIAL

## 2017-11-30 VITALS
OXYGEN SATURATION: 93 % | HEART RATE: 83 BPM | SYSTOLIC BLOOD PRESSURE: 112 MMHG | BODY MASS INDEX: 18.77 KG/M2 | RESPIRATION RATE: 16 BRPM | HEIGHT: 62 IN | TEMPERATURE: 98.3 F | DIASTOLIC BLOOD PRESSURE: 62 MMHG | WEIGHT: 102 LBS

## 2017-11-30 DIAGNOSIS — E03.9 HYPOTHYROIDISM, UNSPECIFIED TYPE: ICD-10-CM

## 2017-11-30 DIAGNOSIS — H61.21 IMPACTED CERUMEN OF RIGHT EAR: ICD-10-CM

## 2017-11-30 DIAGNOSIS — I47.10 PSVT (PAROXYSMAL SUPRAVENTRICULAR TACHYCARDIA): ICD-10-CM

## 2017-11-30 DIAGNOSIS — A08.4 VIRAL GASTROENTERITIS: ICD-10-CM

## 2017-11-30 DIAGNOSIS — T50.Z95A ADVERSE EFFECT OF VACCINE, INITIAL ENCOUNTER: ICD-10-CM

## 2017-11-30 DIAGNOSIS — I10 ESSENTIAL HYPERTENSION: ICD-10-CM
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
## 2017-11-30 PROCEDURE — 99214 OFFICE O/P EST MOD 30 MIN: CPT | Performed by: PHYSICIAN ASSISTANT

## 2017-11-30 NOTE — ASSESSMENT & PLAN NOTE
Also recently complains of some pain surrounding the right ear on the lower aspect. Also radiates around the jaw. She believes she has a infected salivary gland. Denies any fevers. 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

## 2017-11-30 NOTE — ASSESSMENT & PLAN NOTE
Then on 17 November she had a spell of dizziness. EMS was called. They advised her to be seen at the ED. During her ED visit she had labs drawn that showed the following:       Ref. Range 11/17/2017 22:06   WBC Latest Ref Range: 4.8 - 10.8 K/uL 9.7   RBC Latest Ref Range: 4.20 - 5.40 M/uL 3.79 (L)   Hemoglobin Latest Ref Range: 12.0 - 16.0 g/dL 12.1   Hematocrit Latest Ref Range: 37.0 - 47.0 % 35.6 (L)   MCV Latest Ref Range: 81.4 - 97.8 fL 93.9   MCH Latest Ref Range: 27.0 - 33.0 pg 31.9   MCHC Latest Ref Range: 33.6 - 35.0 g/dL 34.0   RDW Latest Ref Range: 35.9 - 50.0 fL 45.2   Platelet Count Latest Ref Range: 164 - 446 K/uL 188   MPV Latest Ref Range: 9.0 - 12.9 fL 11.2   Neutrophils-Polys Latest Ref Range: 44.00 - 72.00 % 49.40   Neutrophils (Absolute) Latest Ref Range: 2.00 - 7.15 K/uL 4.79   Lymphocytes Latest Ref Range: 22.00 - 41.00 % 40.20   Lymphs (Absolute) Latest Ref Range: 1.00 - 4.80 K/uL 3.90   Monocytes Latest Ref Range: 0.00 - 13.40 % 8.90   Monos (Absolute) Latest Ref Range: 0.00 - 0.85 K/uL 0.86 (H)   Eosinophils Latest Ref Range: 0.00 - 6.90 % 0.90   Eos (Absolute) Latest Ref Range: 0.00 - 0.51 K/uL 0.09   Basophils Latest Ref Range: 0.00 - 1.80 % 0.30   Baso (Absolute) Latest Ref Range: 0.00 - 0.12 K/uL 0.03   Immature Granulocytes Latest Ref Range: 0.00 - 0.90 % 0.30   Immature Granulocytes (abs) Latest Ref Range: 0.00 - 0.11 K/uL 0.03   Nucleated RBC Latest Units: /100 WBC 0.00   NRBC (Absolute) Latest Units: K/uL 0.00   Sodium Latest Ref Range: 135 - 145 mmol/L 137   Potassium Latest Ref Range: 3.6 - 5.5 mmol/L 3.7   Chloride Latest Ref Range: 96 - 112 mmol/L 107   Co2 Latest Ref Range: 20 - 33 mmol/L 24   Anion Gap Latest Ref Range: 0.0 - 11.9  6.0   Glucose Latest Ref Range: 65 - 99 mg/dL 95   Bun Latest Ref Range: 8 - 22 mg/dL 11   Creatinine Latest Ref Range: 0.50 - 1.40 mg/dL 1.05   GFR If  Latest Ref Range: >60 mL/min/1.73 m 2 >60   GFR If Non  Latest Ref  Range: >60 mL/min/1.73 m 2 50 (A)   Calcium Latest Ref Range: 8.5 - 10.5 mg/dL 9.5   AST(SGOT) Latest Ref Range: 12 - 45 U/L 17   ALT(SGPT) Latest Ref Range: 2 - 50 U/L 7   Alkaline Phosphatase Latest Ref Range: 30 - 99 U/L 74   Total Bilirubin Latest Ref Range: 0.1 - 1.5 mg/dL 0.5   Albumin Latest Ref Range: 3.2 - 4.9 g/dL 4.1   Total Protein Latest Ref Range: 6.0 - 8.2 g/dL 6.9   Globulin Latest Ref Range: 1.9 - 3.5 g/dL 2.8   A-G Ratio Latest Units: g/dL 1.5   Magnesium Latest Ref Range: 1.5 - 2.5 mg/dL 1.9   Troponin I Latest Ref Range: 0.00 - 0.04 ng/mL <0.01       She states that the time of blood pressure was elevated. During her visit at the ED symptoms normalized. She is currently taking diltiazem daily. Denies any chest pain or shortness of breath.

## 2017-11-30 NOTE — ASSESSMENT & PLAN NOTE
This is an 82-year-old female who states for the past month it has not been too well for her physically. She had a vaccine or influenza high dose on 26 October. Afterwards she developed some redness and swelling of the left arm. Associated itching. The symptoms lasted for about a week. All has resolved.

## 2017-11-30 NOTE — ASSESSMENT & PLAN NOTE
She also states that 4 days ago she had 3 hours of PSVT. She did not go back to the ED because she had just been there and it developed some ecchymosis of her arm from the blood draw. She states eventually she cardioverted when she got up from bed to go use the restroom.

## 2017-11-30 NOTE — PROGRESS NOTES
Subjective:   Antonia Stover is a 82 y.o. female here today for follow-up on ED visit for dizziness and elevated blood pressure on 17 November.    Essential hypertension  Then on 17 November she had a spell of dizziness. EMS was called. They advised her to be seen at the ED. During her ED visit she had labs drawn that showed the following:       Ref. Range 11/17/2017 22:06   WBC Latest Ref Range: 4.8 - 10.8 K/uL 9.7   RBC Latest Ref Range: 4.20 - 5.40 M/uL 3.79 (L)   Hemoglobin Latest Ref Range: 12.0 - 16.0 g/dL 12.1   Hematocrit Latest Ref Range: 37.0 - 47.0 % 35.6 (L)   MCV Latest Ref Range: 81.4 - 97.8 fL 93.9   MCH Latest Ref Range: 27.0 - 33.0 pg 31.9   MCHC Latest Ref Range: 33.6 - 35.0 g/dL 34.0   RDW Latest Ref Range: 35.9 - 50.0 fL 45.2   Platelet Count Latest Ref Range: 164 - 446 K/uL 188   MPV Latest Ref Range: 9.0 - 12.9 fL 11.2   Neutrophils-Polys Latest Ref Range: 44.00 - 72.00 % 49.40   Neutrophils (Absolute) Latest Ref Range: 2.00 - 7.15 K/uL 4.79   Lymphocytes Latest Ref Range: 22.00 - 41.00 % 40.20   Lymphs (Absolute) Latest Ref Range: 1.00 - 4.80 K/uL 3.90   Monocytes Latest Ref Range: 0.00 - 13.40 % 8.90   Monos (Absolute) Latest Ref Range: 0.00 - 0.85 K/uL 0.86 (H)   Eosinophils Latest Ref Range: 0.00 - 6.90 % 0.90   Eos (Absolute) Latest Ref Range: 0.00 - 0.51 K/uL 0.09   Basophils Latest Ref Range: 0.00 - 1.80 % 0.30   Baso (Absolute) Latest Ref Range: 0.00 - 0.12 K/uL 0.03   Immature Granulocytes Latest Ref Range: 0.00 - 0.90 % 0.30   Immature Granulocytes (abs) Latest Ref Range: 0.00 - 0.11 K/uL 0.03   Nucleated RBC Latest Units: /100 WBC 0.00   NRBC (Absolute) Latest Units: K/uL 0.00   Sodium Latest Ref Range: 135 - 145 mmol/L 137   Potassium Latest Ref Range: 3.6 - 5.5 mmol/L 3.7   Chloride Latest Ref Range: 96 - 112 mmol/L 107   Co2 Latest Ref Range: 20 - 33 mmol/L 24   Anion Gap Latest Ref Range: 0.0 - 11.9  6.0   Glucose Latest Ref Range: 65 - 99 mg/dL 95   Bun Latest Ref Range:  8 - 22 mg/dL 11   Creatinine Latest Ref Range: 0.50 - 1.40 mg/dL 1.05   GFR If  Latest Ref Range: >60 mL/min/1.73 m 2 >60   GFR If Non  Latest Ref Range: >60 mL/min/1.73 m 2 50 (A)   Calcium Latest Ref Range: 8.5 - 10.5 mg/dL 9.5   AST(SGOT) Latest Ref Range: 12 - 45 U/L 17   ALT(SGPT) Latest Ref Range: 2 - 50 U/L 7   Alkaline Phosphatase Latest Ref Range: 30 - 99 U/L 74   Total Bilirubin Latest Ref Range: 0.1 - 1.5 mg/dL 0.5   Albumin Latest Ref Range: 3.2 - 4.9 g/dL 4.1   Total Protein Latest Ref Range: 6.0 - 8.2 g/dL 6.9   Globulin Latest Ref Range: 1.9 - 3.5 g/dL 2.8   A-G Ratio Latest Units: g/dL 1.5   Magnesium Latest Ref Range: 1.5 - 2.5 mg/dL 1.9   Troponin I Latest Ref Range: 0.00 - 0.04 ng/mL <0.01       She states that the time of blood pressure was elevated. During her visit at the ED symptoms normalized. She is currently taking diltiazem daily. Denies any chest pain or shortness of breath.    Vaccination reaction  This is an 82-year-old female who states for the past month it has not been too well for her physically. She had a vaccine or influenza high dose on 26 October. Afterwards she developed some redness and swelling of the left arm. Associated itching. The symptoms lasted for about a week. All has resolved.    Viral gastroenteritis  Then after eating some [place] food she developed nausea vomiting diarrhea. Those symptoms lasted for a week. Those have resolved as well. She denies any fevers. No abdominal pain. No continued diarrhea.    Impacted cerumen of right ear  Also recently complains of some pain surrounding the right ear on the lower aspect. Also radiates around the jaw. She believes she has a infected salivary gland. Denies any fevers. 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    PSVT (paroxysmal supraventricular tachycardia) (CMS-HCC)  She also states that 4 days ago she had 3 hours of PSVT. She did not go back to the ED because she had just been  "there and it developed some ecchymosis of her arm from the blood draw. She states eventually she cardioverted when she got up from bed to go use the restroom.       Current medicines (including changes today)  Current Outpatient Prescriptions   Medication Sig Dispense Refill   • levothyroxine (SYNTHROID) 75 MCG Tab TAKE 1 TABLET BY MOUTH EVERY MORNING ON AN EMPTY STOMACH 90 Tab 1   • estradiol (ESTRACE) 0.1 MG/GM vaginal cream Insert 1 g in vagina every day. 42.5 g 3   • diltiazem CD (CARTIA XT) 120 MG CAPSULE SR 24 HR Take 1 Cap by mouth every day. 90 Cap 3   • pravastatin (PRAVACHOL) 40 MG tablet Take 1 Tab by mouth every evening. 90 Tab 3   • acetaminophen (TYLENOL 8 HOUR ARTHRITIS PAIN) 650 MG CR tablet Take 650 mg by mouth every 6 hours as needed.     • PROAIR  (90 BASE) MCG/ACT Aero Soln inhalation aerosol Inhale 2 Puffs by mouth every 6 hours as needed for Shortness of Breath. 1 Inhaler 3   • Probiotic Product (ALIGN PO) Take  by mouth every day.     • Wheat Dextrin (BENEFIBER DRINK MIX PO) Take 1 Dose by mouth 3 times a day.     • Multiple Vitamins-Minerals (OCUVITE) TABS Take 1 Tab by mouth every evening.       No current facility-administered medications for this visit.      She  has a past medical history of Anemia; Arthritis; Carotid artery plaque (3/19/2014); COPD; Dizziness ( ); Dyslipidemia ( ); Fatigue (7/3/2014); HLD (hyperlipidemia) (9/12/2014); Hypothyroidism; Insomnia ( ); Nocturnal hypoxia ( ); PSVT (paroxysmal supraventricular tachycardia) (CMS-[facility]) (February 2013); Sickle cell disease (CMS-HCC); and Tobacco use.    ROS   No chest pain, no shortness of breath, no abdominal pain and all other systems were reviewed and are negative.       Objective:     Blood pressure 112/62, pulse 83, temperature 36.8 °C (98.3 °F), resp. rate 16, height 1.575 m (5' 2\"), weight 46.3 kg (102 lb), SpO2 93 %. Body mass index is 18.66 kg/m².   Physical Exam:  Constitutional: Alert, no distress.  Skin: Warm, " dry, good turgor, no rashes in visible areas.  Eye: Equal, round and reactive, conjunctiva clear, lids normal.  ENMT: Lips without lesions, good dentition, oropharynx clear.  Neck: Trachea midline, no masses.   Lymph: No cervical or supraclavicular lymphadenopathy  Respiratory: Unlabored respiratory effort, lungs clear to auscultation, no wheezes, no ronchi.  Cardiovascular: Normal S1, S2, no murmur, no edema.  Abdomen: Soft, non-tender, no masses.  Psych: Alert and oriented x3, normal affect and mood.        Assessment and Plan:   The following treatment plan was discussed    1. Adverse effect of vaccine, initial encounter  Status post influenza vaccination. Resolved.    2. Viral gastroenteritis  Status post [place] food ingestion. Resolved.    3. Essential hypertension  New-onset condition. Currently blood pressure stable.    4. PSVT (paroxysmal supraventricular tachycardia) (CMS-HCC)  Chronic condition. Stable. Advised follow-up with cardiology if PSVT continues. Continue diltiazem daily.    5. Impacted cerumen of right ear  Acute, new-onset condition. Likely cause for the pain around the jaw. Suggested using Debrox and then follow-up for lavage.    6. Hypothyroidism, unspecified type  Chronic condition. Status unknown. Ordered thyroid panel.  - TSH+FREE T4      Followup: Return if symptoms worsen or fail to improve.    Please note that this dictation was created using voice recognition software. I have made every reasonable attempt to correct obvious errors, but I expect that there are errors of grammar and possibly content that I did not discover before finalizing the note.

## 2017-11-30 NOTE — ASSESSMENT & PLAN NOTE
Then after eating some [place] food she developed nausea vomiting diarrhea. Those symptoms lasted for a week. Those have resolved as well. She denies any fevers. No abdominal pain. No continued diarrhea.

## 2017-11-30 NOTE — ASSESSMENT & PLAN NOTE
Carotid    FINDINGS   Right carotid-    Very mild plaque of the carotid bifurcation. Doppler velocities are normal.    Left carotid-   Plaque of the bifurcation extending into the internal carotid. Velocities    are consistent with < 50% stenosis of the internal carotid artery.

## 2017-12-01 ENCOUNTER — HOSPITAL ENCOUNTER (OUTPATIENT)
Dept: LAB | Facility: MEDICAL CENTER | Age: 82
End: 2017-12-01
Attending: PHYSICIAN ASSISTANT
Payer: COMMERCIAL

## 2017-12-01 LAB
T4 FREE SERPL-MCNC: 1.46 NG/DL (ref 0.53–1.43)
TSH SERPL DL<=0.005 MIU/L-ACNC: 0.41 UIU/ML (ref 0.3–3.7)

## 2017-12-01 PROCEDURE — 36415 COLL VENOUS BLD VENIPUNCTURE: CPT

## 2017-12-01 PROCEDURE — 84439 ASSAY OF FREE THYROXINE: CPT

## 2017-12-01 PROCEDURE — 84443 ASSAY THYROID STIM HORMONE: CPT

## 2017-12-04 ENCOUNTER — TELEPHONE (OUTPATIENT)
Dept: MEDICAL GROUP | Facility: MEDICAL CENTER | Age: 82
End: 2017-12-04

## 2017-12-04 NOTE — TELEPHONE ENCOUNTER
----- Message from Tamir Hanson P.A.-C. sent at 12/3/2017  3:44 PM PST -----  Please contact Pat.  Thyroid test were good.  Free T-4 which shows levels of levothyroxine in the body was 3 tenths elevated.  Otherwise I can't see how taking the medication would cause any heart issues at such normal ranges.  May need to have to repeat these if heart and dizziness issues continue in 3-4 weeks.  Thank you.    Tamir

## 2017-12-04 NOTE — TELEPHONE ENCOUNTER
Phone Number Called: [phone] (home)     Message: Notified patient of normal results and patient stated understanding.    Left Message for patient to call back: no

## 2017-12-05 LAB — EKG IMPRESSION: NORMAL

## 2017-12-14 ENCOUNTER — OFFICE VISIT (OUTPATIENT)
Dept: MEDICAL GROUP | Facility: MEDICAL CENTER | Age: 82
End: 2017-12-14
Payer: COMMERCIAL

## 2017-12-14 VITALS
BODY MASS INDEX: 18.99 KG/M2 | TEMPERATURE: 98.5 F | HEIGHT: 62 IN | HEART RATE: 95 BPM | WEIGHT: 103.2 LBS | RESPIRATION RATE: 16 BRPM | DIASTOLIC BLOOD PRESSURE: 72 MMHG | SYSTOLIC BLOOD PRESSURE: 112 MMHG | OXYGEN SATURATION: 94 %

## 2017-12-14 DIAGNOSIS — H61.21 IMPACTED CERUMEN OF RIGHT EAR: ICD-10-CM

## 2017-12-14 PROCEDURE — 99213 OFFICE O/P EST LOW 20 MIN: CPT | Performed by: PHYSICIAN ASSISTANT

## 2017-12-14 NOTE — ASSESSMENT & PLAN NOTE
This is a 82-year-old female coming in by her . She has had issues with decreased hearing and feeling like she is in the echo chamber. Symptoms have been there for over a week. She's been using Debrox recently and is expecting a lavage today. Symptoms are located in the right ear. She's never had a lavage previously. Does use Q-tips at times. Denies any significant pain.

## 2017-12-14 NOTE — PROGRESS NOTES
Subjective:   Antonia Stover is a 82 y.o. female here today for ear lavage on the right side.    Impacted cerumen of right ear  This is a 82-year-old female coming in by her . She has had issues with decreased hearing and feeling like she is in the echo chamber. Symptoms have been there for over a week. She's been using Debrox recently and is expecting a lavage today. Symptoms are located in the right ear. She's never had a lavage previously. Does use Q-tips at times. Denies any significant pain.       Current medicines (including changes today)  Current Outpatient Prescriptions   Medication Sig Dispense Refill   • levothyroxine (SYNTHROID) 75 MCG Tab TAKE 1 TABLET BY MOUTH EVERY MORNING ON AN EMPTY STOMACH 90 Tab 1   • estradiol (ESTRACE) 0.1 MG/GM vaginal cream Insert 1 g in vagina every day. 42.5 g 3   • diltiazem CD (CARTIA XT) 120 MG CAPSULE SR 24 HR Take 1 Cap by mouth every day. 90 Cap 3   • pravastatin (PRAVACHOL) 40 MG tablet Take 1 Tab by mouth every evening. 90 Tab 3   • acetaminophen (TYLENOL 8 HOUR ARTHRITIS PAIN) 650 MG CR tablet Take 650 mg by mouth every 6 hours as needed.     • PROAIR  (90 BASE) MCG/ACT Aero Soln inhalation aerosol Inhale 2 Puffs by mouth every 6 hours as needed for Shortness of Breath. 1 Inhaler 3   • Probiotic Product (ALIGN PO) Take  by mouth every day.     • Wheat Dextrin (BENEFIBER DRINK MIX PO) Take 1 Dose by mouth 3 times a day.     • Multiple Vitamins-Minerals (OCUVITE) TABS Take 1 Tab by mouth every evening.       No current facility-administered medications for this visit.      She  has a past medical history of Anemia; Arthritis; Carotid artery plaque (3/19/2014); COPD; Dizziness ( ); Dyslipidemia ( ); Fatigue (7/3/2014); HLD (hyperlipidemia) (9/12/2014); Hypothyroidism; Insomnia ( ); Nocturnal hypoxia ( ); PSVT (paroxysmal supraventricular tachycardia) (CMS-HCC) (February 2013); Sickle cell disease (CMS-HCC); and Tobacco use.    ROS   No chest pain,  "no shortness of breath, no abdominal pain and all other systems were reviewed and are negative.       Objective:     Blood pressure 112/72, pulse 95, temperature 36.9 °C (98.5 °F), resp. rate 16, height 1.575 m (5' 2\"), weight 46.8 kg (103 lb 3.2 oz), SpO2 94 %. Body mass index is 18.88 kg/m².   Physical Exam:  Constitutional: Alert, no distress.  Skin: Warm, dry, good turgor, no rashes in visible areas.  Eye: Equal, round and reactive, conjunctiva clear, lids normal.  ENMT: Lips without lesions, good dentition, oropharynx clear.Left TM with cerumen impaction that was removed with a lavage. I did have to use a curette to remove the wax. Tympanic membrane looked clear. There is some wax on the right but she did not Debrox so it was not performed. She's asymptomatic on that side.  Neck: Trachea midline, no masses.   Lymph: No cervical or supraclavicular lymphadenopathy  Respiratory: Unlabored respiratory effort, lungs appear clear, no wheezes.  Cardiovascular: Normal S1, S2, no murmur, no edema.  Psych: Alert and oriented x3, normal affect and mood.        Assessment and Plan:   The following treatment plan was discussed    1. Impacted cerumen of right ear  Acute, new onset condition. Resolved with lavage. Discussed with using no Q-tips. Follow-up as needed.      Followup: No Follow-up on file.    Please note that this dictation was created using voice recognition software. I have made every reasonable attempt to correct obvious errors, but I expect that there are errors of grammar and possibly content that I did not discover before finalizing the note.           "

## 2017-12-27 DIAGNOSIS — E78.5 DYSLIPIDEMIA: ICD-10-CM

## 2017-12-27 RX ORDER — PRAVASTATIN SODIUM 40 MG
40 TABLET ORAL EVERY EVENING
Qty: 90 TAB | Refills: 3 | OUTPATIENT
Start: 2017-12-27 | End: 2018-12-17 | Stop reason: SDUPTHER

## 2018-01-05 ENCOUNTER — OFFICE VISIT (OUTPATIENT)
Dept: MEDICAL GROUP | Facility: MEDICAL CENTER | Age: 83
End: 2018-01-05
Payer: COMMERCIAL

## 2018-01-05 VITALS
DIASTOLIC BLOOD PRESSURE: 70 MMHG | OXYGEN SATURATION: 94 % | HEIGHT: 62 IN | HEART RATE: 90 BPM | WEIGHT: 102.2 LBS | SYSTOLIC BLOOD PRESSURE: 111 MMHG | BODY MASS INDEX: 18.81 KG/M2 | TEMPERATURE: 97.8 F | RESPIRATION RATE: 16 BRPM

## 2018-01-05 DIAGNOSIS — M54.2 ACUTE NECK PAIN: ICD-10-CM

## 2018-01-05 PROBLEM — A08.4 VIRAL GASTROENTERITIS: Status: RESOLVED | Noted: 2017-11-30 | Resolved: 2018-01-05

## 2018-01-05 PROCEDURE — 99214 OFFICE O/P EST MOD 30 MIN: CPT | Performed by: PHYSICIAN ASSISTANT

## 2018-01-05 RX ORDER — CYCLOBENZAPRINE HCL 10 MG
10 TABLET ORAL 2 TIMES DAILY PRN
Qty: 40 TAB | Refills: 0 | Status: SHIPPED | OUTPATIENT
Start: 2018-01-05 | End: 2019-01-24

## 2018-01-05 NOTE — ASSESSMENT & PLAN NOTE
This is an 82-year-old female accompanied by her . She complains of a 3 day history of neck pain. Woke up on Tuesday morning with neck pain. The night prior she was using multiple pillows to help with her indigestion. She woke up several times in the last time she woke up in the morning she had developed the pain. She claims of pain when moving her neck. Feels like jolts of pain at times. Left side is worse than the right. Pain also radiates up into the head. She did take a cyclobenzaprine tablet but it had . She is unable to take nonsteroidals but did take Tylenol. Symptoms are really not improving with the medications. She also has a warm compress and she is been using. She denies any trauma. Denies any nausea or vomiting. No chest pain.

## 2018-01-05 NOTE — PROGRESS NOTES
Subjective:   Antonia Stover is a 82 y.o. female here today for neck pain for 3 days.    Acute neck pain  This is an 82-year-old female accompanied by her . She complains of a 3 day history of neck pain. Woke up on Tuesday morning with neck pain. The night prior she was using multiple pillows to help with her indigestion. She woke up several times in the last time she woke up in the morning she had developed the pain. She claims of pain when moving her neck. Feels like jolts of pain at times. Left side is worse than the right. Pain also radiates up into the head. She did take a cyclobenzaprine tablet but it had . She is unable to take nonsteroidals but did take Tylenol. Symptoms are really not improving with the medications. She also has a warm compress and she is been using. She denies any trauma. Denies any nausea or vomiting. No chest pain.       Current medicines (including changes today)  Current Outpatient Prescriptions   Medication Sig Dispense Refill   • cyclobenzaprine (FLEXERIL) 10 MG Tab Take 1 Tab by mouth 2 times a day as needed. 40 Tab 0   • pravastatin (PRAVACHOL) 40 MG tablet Take 1 Tab by mouth every evening. 90 Tab 3   • levothyroxine (SYNTHROID) 75 MCG Tab TAKE 1 TABLET BY MOUTH EVERY MORNING ON AN EMPTY STOMACH 90 Tab 1   • estradiol (ESTRACE) 0.1 MG/GM vaginal cream Insert 1 g in vagina every day. 42.5 g 3   • diltiazem CD (CARTIA XT) 120 MG CAPSULE SR 24 HR Take 1 Cap by mouth every day. 90 Cap 3   • acetaminophen (TYLENOL 8 HOUR ARTHRITIS PAIN) 650 MG CR tablet Take 650 mg by mouth every 6 hours as needed.     • PROAIR  (90 BASE) MCG/ACT Aero Soln inhalation aerosol Inhale 2 Puffs by mouth every 6 hours as needed for Shortness of Breath. 1 Inhaler 3   • Probiotic Product (ALIGN PO) Take  by mouth every day.     • Wheat Dextrin (BENEFIBER DRINK MIX PO) Take 1 Dose by mouth 3 times a day.     • Multiple Vitamins-Minerals (OCUVITE) TABS Take 1 Tab by mouth every  "evening.       No current facility-administered medications for this visit.      She  has a past medical history of Anemia; Arthritis; Carotid artery plaque (3/19/2014); COPD; Dizziness ( ); Dyslipidemia ( ); Fatigue (7/3/2014); HLD (hyperlipidemia) (9/12/2014); Hypothyroidism; Insomnia ( ); Nocturnal hypoxia ( ); PSVT (paroxysmal supraventricular tachycardia) (CMS-HCC) (February 2013); Sickle cell disease (CMS-HCC); and Tobacco use.    ROS   No chest pain, no shortness of breath, no abdominal pain and all other systems were reviewed and are negative.    Past medical history, social history and family history were updated and reviewed.     Objective:     Blood pressure 111/70, pulse 90, temperature 36.6 °C (97.8 °F), resp. rate 16, height 1.575 m (5' 2\"), weight 46.4 kg (102 lb 3.2 oz), SpO2 94 %. Body mass index is 18.69 kg/m².   Physical Exam:  Constitutional: Alert, no distress.  Skin: Warm, dry, good turgor, no rashes in visible areas.  Eye: Equal, round and reactive, conjunctiva clear, lids normal.  ENMT: Lips without lesions, good dentition, oropharynx clear.  Neck: Trachea midline, no masses.   Lymph: No cervical or supraclavicular lymphadenopathy  Respiratory: Unlabored respiratory effort, lungs clear to auscultation, no wheezes, no ronchi.  Cardiovascular:Regular rate and rhythm.  Musculoskeletal: Neck range of motion is significantly diminished. She has significant enlargement of the superior region of the trapezius muscle on the left. There is generalized tenderness of both sides of the trapezius. Upper extremity muscle strength is 5 out of 5.  Neuro: CN II through XII intact.   Psych: Alert and oriented x3, normal affect and mood.        Assessment and Plan:   The following treatment plan was discussed    1. Acute neck pain  Acute, new onset condition. Appears to have a stiff neck. Suggested to keep active and try to stretch the neck muscles. May try a light tissue massage. Advised may continue Tylenol " and also provide updated prescription of cyclobenzaprine. Advised take half of the 10 mg tablet as needed. As drowsiness. If symptoms are not improving in 2 weeks she is to contact me for referral to see physical therapy. She did decline today. Expect symptoms to take time. Inactivity will not improve her status.  - cyclobenzaprine (FLEXERIL) 10 MG Tab; Take 1 Tab by mouth 2 times a day as needed.  Dispense: 40 Tab; Refill: 0      Followup: Return if symptoms worsen or fail to improve.    Please note that this dictation was created using voice recognition software. I have made every reasonable attempt to correct obvious errors, but I expect that there are errors of grammar and possibly content that I did not discover before finalizing the note.

## 2018-01-25 ENCOUNTER — TELEPHONE (OUTPATIENT)
Dept: CARDIOLOGY | Facility: MEDICAL CENTER | Age: 83
End: 2018-01-25

## 2018-01-25 NOTE — TELEPHONE ENCOUNTER
patient needs current prescription for oxygen   Received: Yesterday   Message Contents   BRIDGETTE Wen/Autumn       Patient is switching from [facility] to [facility], and they are requesting a new prescription for her oxygen concentrator. [facility] fax#[phone]. She can be reached at [phone].      Returned patient call. After a lengthy descriptive discussion attempting to find out how this change occurred. Pt states she is electing to change secondary to billing issues with [facility]. Pt states she has confirmed insurance coverage for [facility] and wants order sent there. Pt reassured we would send that order referral for her. Pt appreciative.    [facility]erred referral form and supportive documentation faxed with confirmation receipt received.

## 2018-02-14 DIAGNOSIS — I47.10 PSVT (PAROXYSMAL SUPRAVENTRICULAR TACHYCARDIA): ICD-10-CM

## 2018-02-15 RX ORDER — DILTIAZEM HYDROCHLORIDE 120 MG/1
CAPSULE, EXTENDED RELEASE ORAL
Qty: 90 CAP | Refills: 3 | Status: SHIPPED | OUTPATIENT
Start: 2018-02-15 | End: 2019-02-11 | Stop reason: SDUPTHER

## 2018-02-28 ENCOUNTER — HOSPITAL ENCOUNTER (OUTPATIENT)
Dept: LAB | Facility: MEDICAL CENTER | Age: 83
End: 2018-02-28
Attending: INTERNAL MEDICINE
Payer: COMMERCIAL

## 2018-02-28 DIAGNOSIS — I65.23 ATHEROSCLEROSIS OF BOTH CAROTID ARTERIES: ICD-10-CM

## 2018-02-28 DIAGNOSIS — E78.5 DYSLIPIDEMIA: ICD-10-CM

## 2018-02-28 DIAGNOSIS — I47.10 PSVT (PAROXYSMAL SUPRAVENTRICULAR TACHYCARDIA): ICD-10-CM

## 2018-02-28 LAB
ALBUMIN SERPL BCP-MCNC: 4.1 G/DL (ref 3.2–4.9)
ALBUMIN/GLOB SERPL: 1.5 G/DL
ALP SERPL-CCNC: 67 U/L (ref 30–99)
ALT SERPL-CCNC: 9 U/L (ref 2–50)
ANION GAP SERPL CALC-SCNC: 7 MMOL/L (ref 0–11.9)
AST SERPL-CCNC: 17 U/L (ref 12–45)
BILIRUB SERPL-MCNC: 0.8 MG/DL (ref 0.1–1.5)
BUN SERPL-MCNC: 11 MG/DL (ref 8–22)
CALCIUM SERPL-MCNC: 9.3 MG/DL (ref 8.5–10.5)
CHLORIDE SERPL-SCNC: 105 MMOL/L (ref 96–112)
CHOLEST SERPL-MCNC: 163 MG/DL (ref 100–199)
CO2 SERPL-SCNC: 26 MMOL/L (ref 20–33)
CREAT SERPL-MCNC: 1.03 MG/DL (ref 0.5–1.4)
GLOBULIN SER CALC-MCNC: 2.7 G/DL (ref 1.9–3.5)
GLUCOSE SERPL-MCNC: 88 MG/DL (ref 65–99)
HDLC SERPL-MCNC: 73 MG/DL
LDLC SERPL CALC-MCNC: 73 MG/DL
POTASSIUM SERPL-SCNC: 4 MMOL/L (ref 3.6–5.5)
PROT SERPL-MCNC: 6.8 G/DL (ref 6–8.2)
SODIUM SERPL-SCNC: 138 MMOL/L (ref 135–145)
TRIGL SERPL-MCNC: 84 MG/DL (ref 0–149)

## 2018-02-28 PROCEDURE — 36415 COLL VENOUS BLD VENIPUNCTURE: CPT

## 2018-02-28 PROCEDURE — 80061 LIPID PANEL: CPT

## 2018-02-28 PROCEDURE — 80053 COMPREHEN METABOLIC PANEL: CPT

## 2018-03-09 ENCOUNTER — OFFICE VISIT (OUTPATIENT)
Dept: MEDICAL GROUP | Facility: MEDICAL CENTER | Age: 83
End: 2018-03-09
Payer: COMMERCIAL

## 2018-03-09 VITALS
TEMPERATURE: 97.8 F | HEIGHT: 62 IN | SYSTOLIC BLOOD PRESSURE: 108 MMHG | RESPIRATION RATE: 16 BRPM | BODY MASS INDEX: 18.81 KG/M2 | WEIGHT: 102.2 LBS | OXYGEN SATURATION: 96 % | HEART RATE: 80 BPM | DIASTOLIC BLOOD PRESSURE: 64 MMHG

## 2018-03-09 DIAGNOSIS — J06.9 ACUTE URI: ICD-10-CM

## 2018-03-09 PROCEDURE — 99214 OFFICE O/P EST MOD 30 MIN: CPT | Performed by: PHYSICIAN ASSISTANT

## 2018-03-09 NOTE — PROGRESS NOTES
Subjective:   Antonia Stover is a 82 y.o. female here today for sore throat intermittently for 2 weeks.    Acute URI  This is an 82-year-old female accompanied by her , Seun. She is here today to discuss approximate 2 weeks of a choking sensation and dryness with sandpaper sensation in her throat. She denies any known sick contacts. States that she feels sick. She doesn't know why she feels this way. In the past she usually doesn't get colds. Denies any fevers. No shortness of breath or chest pain. Is not taking any medications for her symptoms.       Current medicines (including changes today)  Current Outpatient Prescriptions   Medication Sig Dispense Refill   • CARTIA  MG CAPSULE SR 24 HR TAKE 1 CAPSULE BY MOUTH EVERY DAY 90 Cap 3   • cyclobenzaprine (FLEXERIL) 10 MG Tab Take 1 Tab by mouth 2 times a day as needed. 40 Tab 0   • pravastatin (PRAVACHOL) 40 MG tablet Take 1 Tab by mouth every evening. 90 Tab 3   • levothyroxine (SYNTHROID) 75 MCG Tab TAKE 1 TABLET BY MOUTH EVERY MORNING ON AN EMPTY STOMACH 90 Tab 1   • estradiol (ESTRACE) 0.1 MG/GM vaginal cream Insert 1 g in vagina every day. 42.5 g 3   • acetaminophen (TYLENOL 8 HOUR ARTHRITIS PAIN) 650 MG CR tablet Take 650 mg by mouth every 6 hours as needed.     • PROAIR  (90 BASE) MCG/ACT Aero Soln inhalation aerosol Inhale 2 Puffs by mouth every 6 hours as needed for Shortness of Breath. 1 Inhaler 3   • Probiotic Product (ALIGN PO) Take  by mouth every day.     • Wheat Dextrin (BENEFIBER DRINK MIX PO) Take 1 Dose by mouth 3 times a day.     • Multiple Vitamins-Minerals (OCUVITE) TABS Take 1 Tab by mouth every evening.       No current facility-administered medications for this visit.      She  has a past medical history of Anemia; Arthritis; Carotid artery plaque (3/19/2014); COPD; Dizziness ( ); Dyslipidemia ( ); Fatigue (7/3/2014); HLD (hyperlipidemia) (9/12/2014); Hypothyroidism; Insomnia ( ); Nocturnal hypoxia ( ); PSVT  "(paroxysmal supraventricular tachycardia) (CMS-HCC) (February 2013); Sickle cell disease (CMS-HCC); and Tobacco use.    Social History and Family History were reviewed and updated.    ROS   No chest pain, no shortness of breath, no abdominal pain and all other systems were reviewed and are negative.       Objective:     Blood pressure 108/64, pulse 80, temperature 36.6 °C (97.8 °F), resp. rate 16, height 1.575 m (5' 2\"), weight 46.4 kg (102 lb 3.2 oz), SpO2 96 %. Body mass index is 18.69 kg/m².   Physical Exam:  Constitutional: Alert, no distress.  Skin: Warm, dry, good turgor, no rashes in visible areas.  Eye: Equal, round and reactive, conjunctiva clear, lids normal.  ENMT: Lips without lesions, good dentition, oropharynx clear.  Neck: Trachea midline, no masses.   Lymph: No cervical or supraclavicular lymphadenopathy  Respiratory: Unlabored respiratory effort, lungs clear to auscultation, no wheezes, no ronchi.  Cardiovascular: Normal S1, S2, no murmur, no edema.  Abdomen: Soft, non-tender, no masses.  Psych: Alert and oriented x3, normal affect and mood.        Assessment and Plan:   The following treatment plan was discussed    1. Acute URI  Acute, new onset condition. Discussed with physical exam of her oropharynx looking good. Advised that today she is feeling better so we do expect her to feel better in the days to come. Watch for fevers. Advised no medication such as cold medications or other products as she appears to be doing well today. Discussed in length viruses that can affect the throat. Advised to contact me with any worsening concerns or may return to see me.    Total 25 minutes face-to-face time spent with patient, with greater than 50% of the total time discussing patient's issues and symptoms as listed above in assessment and plan, as well as managing coordination of care for future evaluation and treatment.      Followup: Return if symptoms worsen or fail to improve.    Please note that this " dictation was created using voice recognition software. I have made every reasonable attempt to correct obvious errors, but I expect that there are errors of grammar and possibly content that I did not discover before finalizing the note.

## 2018-03-09 NOTE — ASSESSMENT & PLAN NOTE
This is an 82-year-old female accompanied by her , Seun. She is here today to discuss approximate 2 weeks of a choking sensation and dryness with sandpaper sensation in her throat. She denies any known sick contacts. States that she feels sick. She doesn't know why she feels this way. In the past she usually doesn't get colds. Denies any fevers. No shortness of breath or chest pain. Is not taking any medications for her symptoms.

## 2018-03-28 DIAGNOSIS — E07.9 THYROID DISORDER: ICD-10-CM

## 2018-03-28 RX ORDER — LEVOTHYROXINE SODIUM 0.07 MG/1
TABLET ORAL
Qty: 90 TAB | Refills: 0 | Status: SHIPPED | OUTPATIENT
Start: 2018-03-28 | End: 2018-06-25 | Stop reason: SDUPTHER

## 2018-04-09 ENCOUNTER — TELEPHONE (OUTPATIENT)
Dept: CARDIOLOGY | Facility: MEDICAL CENTER | Age: 83
End: 2018-04-09

## 2018-04-09 NOTE — TELEPHONE ENCOUNTER
patient calling to explain why she keeps cancelling appts   Received: Today   Message Contents   BRIDGETTE Wen/Autumn       Patient said she's cancelled the last few appts, and she feels bad. She said she's old, and has a hard time getting to her appts because she has a lot of bad days when she wakes up. She doesn't want  To to think she is ignoring him. She can be reached at [phone]      MD notified

## 2018-04-10 ENCOUNTER — TELEPHONE (OUTPATIENT)
Dept: CARDIOLOGY | Facility: MEDICAL CENTER | Age: 83
End: 2018-04-10

## 2018-04-10 NOTE — TELEPHONE ENCOUNTER
patient calling back to speak to you about missed appts   Received: Today   Message Contents   BRIDGETTE Wen/Autumn       Patient called back asking to speak to you about her missed appts. She wants to know what her options are for future appt. She would like a call back at [phone].      Returned call. Pt spouse answers and states she had an appt and he just returned from dropping her off and he will notify her that I called.

## 2018-05-29 ENCOUNTER — OFFICE VISIT (OUTPATIENT)
Dept: CARDIOLOGY | Facility: MEDICAL CENTER | Age: 83
End: 2018-05-29
Payer: COMMERCIAL

## 2018-05-29 VITALS
OXYGEN SATURATION: 94 % | WEIGHT: 103 LBS | SYSTOLIC BLOOD PRESSURE: 116 MMHG | HEART RATE: 86 BPM | HEIGHT: 62 IN | BODY MASS INDEX: 18.95 KG/M2 | DIASTOLIC BLOOD PRESSURE: 58 MMHG

## 2018-05-29 DIAGNOSIS — E78.5 DYSLIPIDEMIA: ICD-10-CM

## 2018-05-29 DIAGNOSIS — I10 ESSENTIAL HYPERTENSION: ICD-10-CM

## 2018-05-29 DIAGNOSIS — I65.23 ATHEROSCLEROSIS OF BOTH CAROTID ARTERIES: ICD-10-CM

## 2018-05-29 DIAGNOSIS — I47.10 PSVT (PAROXYSMAL SUPRAVENTRICULAR TACHYCARDIA): ICD-10-CM

## 2018-05-29 PROCEDURE — 99214 OFFICE O/P EST MOD 30 MIN: CPT | Performed by: INTERNAL MEDICINE

## 2018-05-29 RX ORDER — CLOPIDOGREL BISULFATE 75 MG/1
75 TABLET ORAL DAILY
Qty: 30 TAB | Refills: 11 | Status: SHIPPED | OUTPATIENT
Start: 2018-05-29 | End: 2019-05-11 | Stop reason: SDUPTHER

## 2018-05-29 NOTE — PROGRESS NOTES
"Chief Complaint   Patient presents with   • Paroxysmal Supraventricular Tachycardia (PSVT)       Subjective:   Antonia Stover is a 82 y.o. female who presents today for followup of her PSVT, history of near syncope, hyperlipidemia and carotid plaque.    Since her last visit she is only had one brief episode of PSVT.  She has had no anginal type chest discomfort.  She has dyspnea on exertion at somewhere between half a block and a block.  She does continue to smoke.  She has had no PND, orthopnea or edema.  She has had no actual lightheadedness.  However, last week she had an episode where her head was \"jumbled\".  She had some expressive aphasia for about 15 minutes.  She also felt coldness in her throat when she took a deep breath.  She took her blood pressure and it was 108/44.  She had no focal weakness.    Past Medical History:   Diagnosis Date   • Anemia    • Arthritis    • Carotid artery plaque 3/19/2014   • COPD    • Dizziness     • Dyslipidemia     • Fatigue 7/3/2014   • HLD (hyperlipidemia) 9/12/2014   • Hypothyroidism    • Insomnia     • Nocturnal hypoxia      Uses oxygen QHS.   • PSVT (paroxysmal supraventricular tachycardia) (HCC) February 2013    Echocardiogram with normal LV size, LVEF 65-70%.   • Sickle cell disease (HCC)    • Tobacco use      Past Surgical History:   Procedure Laterality Date   • KNEE REPLACEMENT, TOTAL  2005    Right   • ABDOMINAL HYSTERECTOMY TOTAL     • APPENDECTOMY     • CATARACT EXTRACTION WITH IOL      Bilateral   • CATARACT EXTRACTION WITH IOL      Bilateral   • HYSTERECTOMY, TOTAL ABDOMINAL       Family History   Problem Relation Age of Onset   • Heart Attack Brother 70     Social History     Social History   • Marital status:      Spouse name: N/A   • Number of children: N/A   • Years of education: N/A     Occupational History   • Not on file.     Social History Main Topics   • Smoking status: Current Every Day Smoker     Packs/day: 0.25     Years: 55.00     " "Types: Cigarettes   • Smokeless tobacco: Never Used   • Alcohol use No   • Drug use: No   • Sexual activity: Not Currently     Other Topics Concern   • Not on file     Social History Narrative   • No narrative on file     Allergies   Allergen Reactions   • Hydrocodone Hives   • Iodine Anaphylaxis     RXN=>20 years ago   • Nsaids Hives and Shortness of Breath   • Penicillins Anaphylaxis     RXN=>20 years ago   • Codeine    • Erythromycin    • Morphine    • Sulfa Drugs      Outpatient Encounter Prescriptions as of 5/29/2018   Medication Sig Dispense Refill   • levothyroxine (SYNTHROID) 75 MCG Tab TAKE 1 TABLET BY MOUTH EVERY MORNING ON AN EMPTY STOMACH 90 Tab 0   • CARTIA  MG CAPSULE SR 24 HR TAKE 1 CAPSULE BY MOUTH EVERY DAY 90 Cap 3   • cyclobenzaprine (FLEXERIL) 10 MG Tab Take 1 Tab by mouth 2 times a day as needed. 40 Tab 0   • pravastatin (PRAVACHOL) 40 MG tablet Take 1 Tab by mouth every evening. 90 Tab 3   • estradiol (ESTRACE) 0.1 MG/GM vaginal cream Insert 1 g in vagina every day. 42.5 g 3   • acetaminophen (TYLENOL 8 HOUR ARTHRITIS PAIN) 650 MG CR tablet Take 650 mg by mouth every 6 hours as needed.     • Probiotic Product (ALIGN PO) Take  by mouth every day.     • Wheat Dextrin (BENEFIBER DRINK MIX PO) Take 1 Dose by mouth 3 times a day.     • Multiple Vitamins-Minerals (OCUVITE) TABS Take 1 Tab by mouth every evening.     • PROAIR  (90 BASE) MCG/ACT Aero Soln inhalation aerosol Inhale 2 Puffs by mouth every 6 hours as needed for Shortness of Breath. 1 Inhaler 3     No facility-administered encounter medications on file as of 5/29/2018.      ROS     Objective:   /58   Pulse 86   Ht 1.575 m (5' 2\")   Wt 46.7 kg (103 lb)   SpO2 94%   BMI 18.84 kg/m²     Physical Exam   Constitutional: She appears well-developed and well-nourished.   Neck: No JVD present.   Cardiovascular: Normal rate and regular rhythm.    No murmur heard.  Pulmonary/Chest: Effort normal and breath sounds normal. She " has no rales.   Abdominal: Soft. There is no tenderness.   Musculoskeletal: She exhibits no edema.         Lab Results   Component Value Date/Time    CHOLSTRLTOT 163 02/28/2018 07:23 AM    LDL 73 02/28/2018 07:23 AM    HDL 73 02/28/2018 07:23 AM    TRIGLYCERIDE 84 02/28/2018 07:23 AM       Lab Results   Component Value Date/Time    SODIUM 138 02/28/2018 07:23 AM    POTASSIUM 4.0 02/28/2018 07:23 AM    CHLORIDE 105 02/28/2018 07:23 AM    CO2 26 02/28/2018 07:23 AM    GLUCOSE 88 02/28/2018 07:23 AM    BUN 11 02/28/2018 07:23 AM    CREATININE 1.03 02/28/2018 07:23 AM    CREATININE 1.3 01/19/2009 09:40 AM     Lab Results   Component Value Date/Time    ALKPHOSPHAT 67 02/28/2018 07:23 AM    ASTSGOT 17 02/28/2018 07:23 AM    ALTSGPT 9 02/28/2018 07:23 AM    TBILIRUBIN 0.8 02/28/2018 07:23 AM      Lab Results   Component Value Date/Time    BNPBTYPENAT 143 (H) 09/01/2015 01:05 PM              Assessment:     1. Atherosclerosis of both carotid arteries     2. Dyslipidemia     3. PSVT (paroxysmal supraventricular tachycardia) (HCC)     4. Essential hypertension         Medical Decision Making:  Today's Assessment / Status / Plan:     Ms. Stover may have had a TIA.  At this time, we will place her on clopidogrel 75 mg daily.  We will repeat a carotid ultrasound.  She has had difficulty with statin therapy and ezetimibe.  Going up on the pravastatin will probably not reduce her LDL significantly.  At this time, we will continue her on her current statin dosage.  Pravastatin was really the only statin that she tolerated.  She will follow-up in a couple of months.

## 2018-05-29 NOTE — LETTER
"     [facility]   [address]  [place] [address]  Phone: [phone]  Fax: [phone]              Antonia Stover  1935    Encounter Date: 5/29/2018    Diogo Bernard M.D.          PROGRESS NOTE:  Chief Complaint   Patient presents with   • Paroxysmal Supraventricular Tachycardia (PSVT)       Subjective:   Antonia Stover is a 82 y.o. female who presents today for followup of her PSVT, history of near syncope, hyperlipidemia and carotid plaque.    Since her last visit she is only had one brief episode of PSVT.  She has had no anginal type chest discomfort.  She has dyspnea on exertion at somewhere between half a block and a block.  She does continue to smoke.  She has had no PND, orthopnea or edema.  She has had no actual lightheadedness.  However, last week she had an episode where her head was \"jumbled\".  She had some expressive aphasia for about 15 minutes.  She also felt coldness in her throat when she took a deep breath.  She took her blood pressure and it was 108/44.  She had no focal weakness.    Past Medical History:   Diagnosis Date   • Anemia    • Arthritis    • Carotid artery plaque 3/19/2014   • COPD    • Dizziness     • Dyslipidemia     • Fatigue 7/3/2014   • HLD (hyperlipidemia) 9/12/2014   • Hypothyroidism    • Insomnia     • Nocturnal hypoxia      Uses oxygen QHS.   • PSVT (paroxysmal supraventricular tachycardia) ([facility]) February 2013    Echocardiogram with normal LV size, LVEF 65-70%.   • Sickle cell disease (HCC)    • Tobacco use      Past Surgical History:   Procedure Laterality Date   • KNEE REPLACEMENT, TOTAL  2005    Right   • ABDOMINAL HYSTERECTOMY TOTAL     • APPENDECTOMY     • CATARACT EXTRACTION WITH IOL      Bilateral   • CATARACT EXTRACTION WITH IOL      Bilateral   • HYSTERECTOMY, TOTAL ABDOMINAL       Family History   Problem Relation Age of Onset   • Heart Attack Brother 70     Social History     Social " "History   • Marital status:      Spouse name: N/A   • Number of children: N/A   • Years of education: N/A     Occupational History   • Not on file.     Social History Main Topics   • Smoking status: Current Every Day Smoker     Packs/day: 0.25     Years: 55.00     Types: Cigarettes   • Smokeless tobacco: Never Used   • Alcohol use No   • Drug use: No   • Sexual activity: Not Currently     Other Topics Concern   • Not on file     Social History Narrative   • No narrative on file     Allergies   Allergen Reactions   • Hydrocodone Hives   • Iodine Anaphylaxis     RXN=>20 years ago   • Nsaids Hives and Shortness of Breath   • Penicillins Anaphylaxis     RXN=>20 years ago   • Codeine    • Erythromycin    • Morphine    • Sulfa Drugs      Outpatient Encounter Prescriptions as of 5/29/2018   Medication Sig Dispense Refill   • levothyroxine (SYNTHROID) 75 MCG Tab TAKE 1 TABLET BY MOUTH EVERY MORNING ON AN EMPTY STOMACH 90 Tab 0   • CARTIA  MG CAPSULE SR 24 HR TAKE 1 CAPSULE BY MOUTH EVERY DAY 90 Cap 3   • cyclobenzaprine (FLEXERIL) 10 MG Tab Take 1 Tab by mouth 2 times a day as needed. 40 Tab 0   • pravastatin (PRAVACHOL) 40 MG tablet Take 1 Tab by mouth every evening. 90 Tab 3   • estradiol (ESTRACE) 0.1 MG/GM vaginal cream Insert 1 g in vagina every day. 42.5 g 3   • acetaminophen (TYLENOL 8 HOUR ARTHRITIS PAIN) 650 MG CR tablet Take 650 mg by mouth every 6 hours as needed.     • Probiotic Product (ALIGN PO) Take  by mouth every day.     • Wheat Dextrin (BENEFIBER DRINK MIX PO) Take 1 Dose by mouth 3 times a day.     • Multiple Vitamins-Minerals (OCUVITE) TABS Take 1 Tab by mouth every evening.     • PROAIR  (90 BASE) MCG/ACT Aero Soln inhalation aerosol Inhale 2 Puffs by mouth every 6 hours as needed for Shortness of Breath. 1 Inhaler 3     No facility-administered encounter medications on file as of 5/29/2018.      ROS     Objective:   /58   Pulse 86   Ht 1.575 m (5' 2\")   Wt 46.7 kg (103 lb) "   SpO2 94%   BMI 18.84 kg/m²      Physical Exam   Constitutional: She appears well-developed and well-nourished.   Neck: No JVD present.   Cardiovascular: Normal rate and regular rhythm.    No murmur heard.  Pulmonary/Chest: Effort normal and breath sounds normal. She has no rales.   Abdominal: Soft. There is no tenderness.   Musculoskeletal: She exhibits no edema.         Lab Results   Component Value Date/Time    CHOLSTRLTOT 163 02/28/2018 07:23 AM    LDL 73 02/28/2018 07:23 AM    HDL 73 02/28/2018 07:23 AM    TRIGLYCERIDE 84 02/28/2018 07:23 AM       Lab Results   Component Value Date/Time    SODIUM 138 02/28/2018 07:23 AM    POTASSIUM 4.0 02/28/2018 07:23 AM    CHLORIDE 105 02/28/2018 07:23 AM    CO2 26 02/28/2018 07:23 AM    GLUCOSE 88 02/28/2018 07:23 AM    BUN 11 02/28/2018 07:23 AM    CREATININE 1.03 02/28/2018 07:23 AM    CREATININE 1.3 01/19/2009 09:40 AM     Lab Results   Component Value Date/Time    ALKPHOSPHAT 67 02/28/2018 07:23 AM    ASTSGOT 17 02/28/2018 07:23 AM    ALTSGPT 9 02/28/2018 07:23 AM    TBILIRUBIN 0.8 02/28/2018 07:23 AM      Lab Results   Component Value Date/Time    BNPBTYPENAT 143 (H) 09/01/2015 01:05 PM              Assessment:     1. Atherosclerosis of both carotid arteries     2. Dyslipidemia     3. PSVT (paroxysmal supraventricular tachycardia) (HCC)     4. Essential hypertension         Medical Decision Making:  Today's Assessment / Status / Plan:     Ms. Stover may have had a TIA.  At this time, we will place her on clopidogrel 75 mg daily.  We will repeat a carotid ultrasound.  She has had difficulty with statin therapy and ezetimibe.  Going up on the pravastatin will probably not reduce her LDL significantly.  At this time, we will continue her on her current statin dosage.  Pravastatin was really the only statin that she tolerated.  She will follow-up in a couple of months.      Tamir Hanson, P.A.-C.  [address]  [address]  [place] [address]  VIA In Basket

## 2018-05-30 ENCOUNTER — TELEPHONE (OUTPATIENT)
Dept: CARDIOLOGY | Facility: MEDICAL CENTER | Age: 83
End: 2018-05-30

## 2018-05-30 NOTE — TELEPHONE ENCOUNTER
clarification on blood pressure instructions   Received: Today   Message Contents   BRIDGETTE Wen/Autumn       Patient is calling to go over blood pressure instructions Dr. Bernard gave her yesterday at her appt. She doesn't remember when/which days of the week to take it. She can be reached at [phone].      Returned patient call. Patient states she was told to take her BP and record it but she couldn't remember the how often or when. Pt advised to take it 3 x per week in the AM after drinking a full glass of water with her AM meds and sitting quietly for 5 mins. Pt states understanding. Pt understands importance of maintaining log so that MD can see trend, which is most important. Pt appreciates call back. Her other phone rings and call ends quickly.

## 2018-06-01 ENCOUNTER — TELEPHONE (OUTPATIENT)
Dept: MEDICAL GROUP | Facility: MEDICAL CENTER | Age: 83
End: 2018-06-01

## 2018-06-01 NOTE — TELEPHONE ENCOUNTER
1. Caller Name: Antonia Stover                                         Call Back Number: [phone]      Patient approves a detailed voicemail message: yes    Pt called and LM reporting she was seen by Cardiology and wanted to give an update. Returned Pt's call and left message with Spouse that I was returning her call as well as will send message to Tamir that Pt was seen with Cardiology.

## 2018-06-25 DIAGNOSIS — E07.9 THYROID DISORDER: ICD-10-CM

## 2018-06-25 RX ORDER — LEVOTHYROXINE SODIUM 0.07 MG/1
TABLET ORAL
Qty: 90 TAB | Refills: 0 | Status: SHIPPED | OUTPATIENT
Start: 2018-06-25 | End: 2018-09-24 | Stop reason: SDUPTHER

## 2018-07-03 ENCOUNTER — TELEPHONE (OUTPATIENT)
Dept: MEDICAL GROUP | Facility: MEDICAL CENTER | Age: 83
End: 2018-07-03

## 2018-07-03 DIAGNOSIS — J44.9 CHRONIC OBSTRUCTIVE PULMONARY DISEASE, UNSPECIFIED COPD TYPE (HCC): ICD-10-CM

## 2018-07-03 NOTE — TELEPHONE ENCOUNTER
Was the patient seen in the last year in this department? Yes     Does patient have an active prescription for medications requested? No     Received Request Via: Patient     PROAIR  (90 BASE) MCG/ACT Aero Soln inhalation aerosol

## 2018-07-03 NOTE — TELEPHONE ENCOUNTER
1. Caller Name: Antonia Stover                                           Call Back Number: [phone] (home)         Patient approves a detailed voicemail message: N\A    Left message for pt that her rX refill has been processed.

## 2018-07-16 ENCOUNTER — OFFICE VISIT (OUTPATIENT)
Dept: MEDICAL GROUP | Facility: MEDICAL CENTER | Age: 83
End: 2018-07-16
Payer: COMMERCIAL

## 2018-07-16 VITALS
DIASTOLIC BLOOD PRESSURE: 50 MMHG | HEIGHT: 62 IN | WEIGHT: 102 LBS | OXYGEN SATURATION: 96 % | TEMPERATURE: 98.7 F | BODY MASS INDEX: 18.77 KG/M2 | SYSTOLIC BLOOD PRESSURE: 90 MMHG | HEART RATE: 85 BPM

## 2018-07-16 DIAGNOSIS — R94.4 DECREASED GFR: ICD-10-CM

## 2018-07-16 DIAGNOSIS — I47.10 PSVT (PAROXYSMAL SUPRAVENTRICULAR TACHYCARDIA): ICD-10-CM

## 2018-07-16 DIAGNOSIS — F43.9 STRESS: ICD-10-CM

## 2018-07-16 DIAGNOSIS — R53.83 LOW ENERGY: ICD-10-CM

## 2018-07-16 DIAGNOSIS — G89.29 CHRONIC NECK PAIN: ICD-10-CM

## 2018-07-16 DIAGNOSIS — M54.2 CHRONIC NECK PAIN: ICD-10-CM

## 2018-07-16 DIAGNOSIS — E03.9 HYPOTHYROIDISM, UNSPECIFIED TYPE: ICD-10-CM

## 2018-07-16 DIAGNOSIS — G47.34 NOCTURNAL HYPOXIA: ICD-10-CM

## 2018-07-16 DIAGNOSIS — R53.83 FATIGUE, UNSPECIFIED TYPE: ICD-10-CM

## 2018-07-16 DIAGNOSIS — I95.9 HYPOTENSION, UNSPECIFIED HYPOTENSION TYPE: ICD-10-CM

## 2018-07-16 PROBLEM — J06.9 ACUTE URI: Status: RESOLVED | Noted: 2018-03-09 | Resolved: 2018-07-16

## 2018-07-16 PROCEDURE — 99214 OFFICE O/P EST MOD 30 MIN: CPT | Performed by: PHYSICIAN ASSISTANT

## 2018-07-16 NOTE — PROGRESS NOTES
Subjective:   Antonia Stover is a 82 y.o. female here today for low energy possible fatigue as well as stress.    Low energy  This is an 82-year-old female accompanied by her , Seun. She is here today to discuss having some issues with fatigue and dizziness. She also has joint pain. She denies any syncopal episodes. Blood pressure has been well controlled. She states that she is looking into purchasing a scooter. States she knows is not covered by her insurance. Her osteoporosis makes it difficult for her to function. She has to sit now to full cholelithiasis as well as to cook. She is unable to shop. She does not wish to be placed on medications such as narcotics.    Stress  She still has a significant amount of stress related to her daughter. She does not want to be placed on medication. In the past we talked about CBT. Today when questioning her  he agrees it might be beneficial for her to see a counselor. She denies any homicidal or suicidal ideations.    PSVT (paroxysmal supraventricular tachycardia) ([facility])  She was recently seen by cardiology. This placed on Plavix for possible TIA. Ultrasound of her carotids is to be repeated in the next coming months. She is concerned because Plavix is causing bruising.    Hypothyroidism  Last time labs were checked for thyroid value her free T4 was elevated. TSH within normal limits. She's taking Synthroid 75 µg daily.    Chronic neck pain  Continues to complain of neck pain. Chronic condition. Has severe spondylolysis.    Decreased GFR  GFR recently a 51. Creatinine value within normal limits.    Hypotension  Blood pressure today is low. Typically her blood pressure is in the 110s over 70s. She is taking Cartia 120 mg in the evening.       Current medicines (including changes today)  Current Outpatient Prescriptions   Medication Sig Dispense Refill   • PROAIR  (90 Base) MCG/ACT Aero Soln inhalation aerosol Inhale 2 Puffs by mouth every 6  "hours as needed for Shortness of Breath. 1 Inhaler 3   • levothyroxine (SYNTHROID) 75 MCG Tab TAKE 1 TABLET BY MOUTH EVERY MORNING ON AN EMPTY STOMACH 90 Tab 0   • clopidogrel (PLAVIX) 75 MG Tab Take 1 Tab by mouth every day. 30 Tab 11   • CARTIA  MG CAPSULE SR 24 HR TAKE 1 CAPSULE BY MOUTH EVERY DAY 90 Cap 3   • cyclobenzaprine (FLEXERIL) 10 MG Tab Take 1 Tab by mouth 2 times a day as needed. 40 Tab 0   • pravastatin (PRAVACHOL) 40 MG tablet Take 1 Tab by mouth every evening. 90 Tab 3   • estradiol (ESTRACE) 0.1 MG/GM vaginal cream Insert 1 g in vagina every day. 42.5 g 3   • acetaminophen (TYLENOL 8 HOUR ARTHRITIS PAIN) 650 MG CR tablet Take 650 mg by mouth every 6 hours as needed.     • Probiotic Product (ALIGN PO) Take  by mouth every day.     • Wheat Dextrin (BENEFIBER DRINK MIX PO) Take 1 Dose by mouth 3 times a day.     • Multiple Vitamins-Minerals (OCUVITE) TABS Take 1 Tab by mouth every evening.       No current facility-administered medications for this visit.      She  has a past medical history of Anemia; Arthritis; Carotid artery plaque (3/19/2014); COPD; Dizziness ( ); Dyslipidemia ( ); Fatigue (7/3/2014); HLD (hyperlipidemia) (9/12/2014); Hypothyroidism; Insomnia ( ); Nocturnal hypoxia ( ); PSVT (paroxysmal supraventricular tachycardia) (HCC) (February 2013); Sickle cell disease (HCC); and Tobacco use.    Social History and Family History were reviewed and updated.    ROS   No chest pain, no shortness of breath, no abdominal pain and all other systems were reviewed and are negative.       Objective:     Blood pressure (!) 90/50, pulse 85, temperature 37.1 °C (98.7 °F), height 1.575 m (5' 2\"), weight 46.3 kg (102 lb), SpO2 96 %. Body mass index is 18.66 kg/m².   Physical Exam:  Constitutional: Alert, no distress.  Skin: Warm, dry, good turgor, no rashes in visible areas.  Eye: Equal, round and reactive, conjunctiva clear, lids normal.  ENMT: Lips without lesions, good dentition, oropharynx " clear.  Neck: Trachea midline, no masses.   Lymph: No cervical or supraclavicular lymphadenopathy  Respiratory: Unlabored respiratory effort, lungs clear to auscultation, no wheezes, no ronchi.  Cardiovascular: Normal S1, S2, no murmur, no edema.  Abdomen: Soft, non-tender, no masses.  Psych: Alert and oriented x3, normal affect and mood.        Assessment and Plan:   The following treatment plan was discussed    1. Low energy  New-onset condition. Check TSH value as well as lesser sedimentation rate and CRP. Referred to behavioral health.  - TSH WITH REFLEX TO FT4; Future  - WESTERGREN SED RATE; Future  - CRP QUANTITIVE (NON-CARDIAC); Future  - REFERRAL TO BEHAVIORAL HEALTH    2. Decreased GFR  Acute, new-onset condition. Repeat CMP.  - COMP METABOLIC PANEL; Future  - TSH WITH REFLEX TO FT4; Future    3. Fatigue, unspecified type  New-onset condition. Check labs. Referred to behavioral health given her chronic history of stress.  - TSH WITH REFLEX TO FT4; Future  - REFERRAL TO BEHAVIORAL HEALTH    4. Stress  Chronic condition. Referred for CBT.  - REFERRAL TO BEHAVIORAL HEALTH    5. Nocturnal hypoxia  Chronic condition. Continue oxygen use.    6. Hypotension, unspecified hypotension type  Acute, new-onset condition. Follow up in one week to recheck her BP. If still low will contact her cardiologist. If she develops any syncope must go to the emergency room.    7. PSVT (paroxysmal supraventricular tachycardia) (HCC)  Chronic condition. Stable. Continue Cartia as directed. Follow with cardiology.    8. Hypothyroidism, unspecified type  Chronic condition. Status unknown. Labs ordered. Continue medication.    9. Chronic neck pain  Chronic conditions. Secondary to spondylitis. Stable.    Patient was seen for 25 minutes face to face of which > 50% of appointment time was spent on counseling and coordination of care regarding the above.    Followup: Return if symptoms worsen or fail to improve, for one week for MA  visit.    Please note that this dictation was created using voice recognition software. I have made every reasonable attempt to correct obvious errors, but I expect that there are errors of grammar and possibly content that I did not discover before finalizing the note.

## 2018-07-16 NOTE — ASSESSMENT & PLAN NOTE
She was recently seen by cardiology. This placed on Plavix for possible TIA. Ultrasound of her carotids is to be repeated in the next coming months. She is concerned because Plavix is causing bruising.

## 2018-07-16 NOTE — ASSESSMENT & PLAN NOTE
Last time labs were checked for thyroid value her free T4 was elevated. TSH within normal limits. She's taking Synthroid 75 µg daily.

## 2018-07-16 NOTE — ASSESSMENT & PLAN NOTE
Blood pressure today is low. Typically her blood pressure is in the 110s over 70s. She is taking Cartia 120 mg in the evening.

## 2018-07-16 NOTE — ASSESSMENT & PLAN NOTE
She still has a significant amount of stress related to her daughter. She does not want to be placed on medication. In the past we talked about CBT. Today when questioning her  he agrees it might be beneficial for her to see a counselor. She denies any homicidal or suicidal ideations.

## 2018-07-19 ENCOUNTER — HOSPITAL ENCOUNTER (OUTPATIENT)
Dept: LAB | Facility: MEDICAL CENTER | Age: 83
End: 2018-07-19
Attending: PHYSICIAN ASSISTANT
Payer: COMMERCIAL

## 2018-07-19 DIAGNOSIS — R53.83 FATIGUE, UNSPECIFIED TYPE: ICD-10-CM

## 2018-07-19 DIAGNOSIS — R53.83 LOW ENERGY: ICD-10-CM

## 2018-07-19 DIAGNOSIS — R94.4 DECREASED GFR: ICD-10-CM

## 2018-07-19 LAB
ALBUMIN SERPL BCP-MCNC: 4.2 G/DL (ref 3.2–4.9)
ALBUMIN/GLOB SERPL: 1.5 G/DL
ALP SERPL-CCNC: 73 U/L (ref 30–99)
ALT SERPL-CCNC: 11 U/L (ref 2–50)
ANION GAP SERPL CALC-SCNC: 9 MMOL/L (ref 0–11.9)
AST SERPL-CCNC: 19 U/L (ref 12–45)
BILIRUB SERPL-MCNC: 0.5 MG/DL (ref 0.1–1.5)
BUN SERPL-MCNC: 9 MG/DL (ref 8–22)
CALCIUM SERPL-MCNC: 10 MG/DL (ref 8.5–10.5)
CHLORIDE SERPL-SCNC: 100 MMOL/L (ref 96–112)
CO2 SERPL-SCNC: 28 MMOL/L (ref 20–33)
CREAT SERPL-MCNC: 0.98 MG/DL (ref 0.5–1.4)
CRP SERPL HS-MCNC: 0.11 MG/DL (ref 0–0.75)
ERYTHROCYTE [SEDIMENTATION RATE] IN BLOOD BY WESTERGREN METHOD: 15 MM/HOUR (ref 0–30)
GLOBULIN SER CALC-MCNC: 2.8 G/DL (ref 1.9–3.5)
GLUCOSE SERPL-MCNC: 69 MG/DL (ref 65–99)
POTASSIUM SERPL-SCNC: 4.1 MMOL/L (ref 3.6–5.5)
PROT SERPL-MCNC: 7 G/DL (ref 6–8.2)
SODIUM SERPL-SCNC: 137 MMOL/L (ref 135–145)
TSH SERPL DL<=0.005 MIU/L-ACNC: 0.44 UIU/ML (ref 0.38–5.33)

## 2018-07-19 PROCEDURE — 86140 C-REACTIVE PROTEIN: CPT

## 2018-07-19 PROCEDURE — 36415 COLL VENOUS BLD VENIPUNCTURE: CPT

## 2018-07-19 PROCEDURE — 80053 COMPREHEN METABOLIC PANEL: CPT

## 2018-07-19 PROCEDURE — 85652 RBC SED RATE AUTOMATED: CPT

## 2018-07-19 PROCEDURE — 84443 ASSAY THYROID STIM HORMONE: CPT

## 2018-07-24 ENCOUNTER — TELEPHONE (OUTPATIENT)
Dept: MEDICAL GROUP | Facility: MEDICAL CENTER | Age: 83
End: 2018-07-24

## 2018-07-24 NOTE — TELEPHONE ENCOUNTER
Phone Number Called: [phone] (home)     Message: Called pt to notify her of lab results. Pt will call back in a couple mins.    Left Message for patient to call back: yes

## 2018-07-24 NOTE — TELEPHONE ENCOUNTER
----- Message from Tamir Hanson P.A.-C. sent at 7/23/2018  5:58 PM PDT -----  Please contact Pat.  Labs are good.  All is stable.  Kidney function is improved.  Thank you.    Tamir

## 2018-08-06 ENCOUNTER — HOSPITAL ENCOUNTER (OUTPATIENT)
Dept: RADIOLOGY | Facility: MEDICAL CENTER | Age: 83
End: 2018-08-06
Attending: INTERNAL MEDICINE
Payer: COMMERCIAL

## 2018-08-06 DIAGNOSIS — I65.23 ATHEROSCLEROSIS OF BOTH CAROTID ARTERIES: ICD-10-CM

## 2018-08-06 PROCEDURE — 93880 EXTRACRANIAL BILAT STUDY: CPT | Mod: 26 | Performed by: SURGERY

## 2018-08-06 PROCEDURE — 93880 EXTRACRANIAL BILAT STUDY: CPT

## 2018-08-07 ENCOUNTER — OFFICE VISIT (OUTPATIENT)
Dept: BEHAVIORAL HEALTH | Facility: PHYSICIAN GROUP | Age: 83
End: 2018-08-07
Payer: COMMERCIAL

## 2018-08-07 DIAGNOSIS — F43.23 SITUATIONAL MIXED ANXIETY AND DEPRESSIVE DISORDER: ICD-10-CM

## 2018-08-07 DIAGNOSIS — Z63.9 RELATIONSHIP PROBLEMS: ICD-10-CM

## 2018-08-07 PROCEDURE — 90791 PSYCH DIAGNOSTIC EVALUATION: CPT | Performed by: PSYCHOLOGIST

## 2018-08-07 SDOH — SOCIAL STABILITY - SOCIAL INSECURITY: PROBLEM RELATED TO PRIMARY SUPPORT GROUP, UNSPECIFIED: Z63.9
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
## 2018-08-15 ENCOUNTER — OFFICE VISIT (OUTPATIENT)
Dept: BEHAVIORAL HEALTH | Facility: PHYSICIAN GROUP | Age: 83
End: 2018-08-15
Payer: COMMERCIAL

## 2018-08-15 DIAGNOSIS — Z63.9 RELATIONSHIP PROBLEMS: ICD-10-CM

## 2018-08-15 PROCEDURE — 90834 PSYTX W PT 45 MINUTES: CPT | Performed by: PSYCHOLOGIST

## 2018-08-15 SDOH — SOCIAL STABILITY - SOCIAL INSECURITY: PROBLEM RELATED TO PRIMARY SUPPORT GROUP, UNSPECIFIED: Z63.9

## 2018-08-15 NOTE — BH THERAPY
[facility]  Therapy Progress Note    Patient Name: Antonia Stover  Patient MRN: [id number]  Today's Date: 8/15/2018     Type of session:Individual psychotherapy  Length of session: 45 minutes  Persons in attendance:Patient    Subjective/New Info: Subjective/New Info: Client chit chatted for the majority of the session with client talking about health issues, relationship history and appreciation for psychological stimulation.  In the final ten minutes of the session, she brought up her daughter and who she has a very problematic relationship with and is very unaccepting of her daughter and they say/do hurtful things to each other.  Reflected same to client and asked client what type of a relationshps she wanted with her daguther....she is unsure.  Discussed client spending some time thinking about this issue until we meet again and then figure out a way for client's behaviors to be congruent with her wants/goals.      Objective/Observations:   Participation: Active verbal participation   Grooming: Casual   Cognition: Alert   Eye contact: Good   Mood: Euthymic   Affect: Flexible   Thought process: Logical   Speech: Rate within normal limits   Other:     Psychometric Test Results:       BHM-20  Global Mental Health  Within Normal Limits  Well Being Scale  Within Normal Limits  Symptoms Scale  Mild Distress  Anxiety Subscale  Within Normal Limits  Depression Subscale  Mild Distress  Alcohol/Drug Subscale Within Normal Limits  Bipolar Subscale  Within Normal Limits  Eating Disorder Subscale Within Normal Limits  Harm to other Subscale Within Normal Limits  Suicide Monitoring Scale No Indication of Risk  Life Functioning Scale   Mild Distress      Diagnoses:   1. Relationship problems         Current risk:   SUICIDE: Not applicable   Homicide: Not applicable   Self-harm: Not applicable   Relapse: Not applicable   Other:    Safety Plan reviewed? No   If evidence of imminent risk is present,  intervention/plan:     Therapeutic Intervention(s): Cognitive modification, Conflict clarification, Conflict resolution skills, Goal-setting, Limit-setting and Problem-solving    Treatment Goal(s)/Objective(s) addressed: ability to id and implement limits and boundaries (intrapersonal and interpersonal) and increase congruence between hopes/goals and behaviors.     Progress toward Treatment Goals: Mild improvement    Plan:  - Next appointment scheduled:  8/29/2018    Pippa Shirley, Ph.D.  8/15/2018

## 2018-08-20 ENCOUNTER — OFFICE VISIT (OUTPATIENT)
Dept: CARDIOLOGY | Facility: MEDICAL CENTER | Age: 83
End: 2018-08-20
Payer: COMMERCIAL

## 2018-08-20 VITALS
BODY MASS INDEX: 18.77 KG/M2 | WEIGHT: 102 LBS | HEIGHT: 62 IN | SYSTOLIC BLOOD PRESSURE: 102 MMHG | OXYGEN SATURATION: 92 % | DIASTOLIC BLOOD PRESSURE: 58 MMHG | HEART RATE: 76 BPM

## 2018-08-20 DIAGNOSIS — I10 ESSENTIAL HYPERTENSION: ICD-10-CM

## 2018-08-20 DIAGNOSIS — I65.23 ATHEROSCLEROSIS OF BOTH CAROTID ARTERIES: ICD-10-CM

## 2018-08-20 DIAGNOSIS — E78.5 DYSLIPIDEMIA: ICD-10-CM

## 2018-08-20 DIAGNOSIS — I47.10 PSVT (PAROXYSMAL SUPRAVENTRICULAR TACHYCARDIA): ICD-10-CM

## 2018-08-20 PROCEDURE — 99214 OFFICE O/P EST MOD 30 MIN: CPT | Performed by: INTERNAL MEDICINE
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
## 2018-08-29 ENCOUNTER — APPOINTMENT (OUTPATIENT)
Dept: BEHAVIORAL HEALTH | Facility: PHYSICIAN GROUP | Age: 83
End: 2018-08-29
Payer: COMMERCIAL

## 2018-08-31 ENCOUNTER — OFFICE VISIT (OUTPATIENT)
Dept: BEHAVIORAL HEALTH | Facility: PHYSICIAN GROUP | Age: 83
End: 2018-08-31
Payer: COMMERCIAL

## 2018-08-31 DIAGNOSIS — Z63.9 RELATIONSHIP PROBLEMS: ICD-10-CM

## 2018-08-31 DIAGNOSIS — F43.9 STRESS: ICD-10-CM

## 2018-08-31 PROCEDURE — 90834 PSYTX W PT 45 MINUTES: CPT | Performed by: PSYCHOLOGIST

## 2018-08-31 SDOH — SOCIAL STABILITY - SOCIAL INSECURITY: PROBLEM RELATED TO PRIMARY SUPPORT GROUP, UNSPECIFIED: Z63.9
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
## 2018-09-17 ENCOUNTER — APPOINTMENT (OUTPATIENT)
Dept: BEHAVIORAL HEALTH | Facility: PHYSICIAN GROUP | Age: 83
End: 2018-09-17
Payer: COMMERCIAL

## 2018-09-19 DIAGNOSIS — N95.2 ATROPHIC VULVOVAGINITIS: ICD-10-CM

## 2018-09-19 RX ORDER — ESTRADIOL 0.1 MG/G
CREAM VAGINAL
Qty: 42.5 G | Refills: 0 | Status: SHIPPED | OUTPATIENT
Start: 2018-09-19 | End: 2019-02-15 | Stop reason: SDUPTHER

## 2018-09-24 DIAGNOSIS — E07.9 THYROID DISORDER: ICD-10-CM

## 2018-09-24 RX ORDER — LEVOTHYROXINE SODIUM 0.07 MG/1
TABLET ORAL
Qty: 90 TAB | Refills: 3 | Status: SHIPPED | OUTPATIENT
Start: 2018-09-24 | End: 2019-04-04 | Stop reason: SDUPTHER

## 2018-10-04 ENCOUNTER — APPOINTMENT (OUTPATIENT)
Dept: MEDICAL GROUP | Facility: MEDICAL CENTER | Age: 83
End: 2018-10-04
Payer: COMMERCIAL

## 2018-11-08 ENCOUNTER — OFFICE VISIT (OUTPATIENT)
Dept: MEDICAL GROUP | Facility: MEDICAL CENTER | Age: 83
End: 2018-11-08
Payer: COMMERCIAL

## 2018-11-08 VITALS
WEIGHT: 104 LBS | HEART RATE: 75 BPM | OXYGEN SATURATION: 98 % | TEMPERATURE: 98.1 F | DIASTOLIC BLOOD PRESSURE: 60 MMHG | BODY MASS INDEX: 19.14 KG/M2 | SYSTOLIC BLOOD PRESSURE: 104 MMHG | HEIGHT: 62 IN

## 2018-11-08 DIAGNOSIS — F51.01 PRIMARY INSOMNIA: ICD-10-CM

## 2018-11-08 DIAGNOSIS — R09.81 CHRONIC NASAL CONGESTION: ICD-10-CM

## 2018-11-08 DIAGNOSIS — Z23 INFLUENZA VACCINE NEEDED: ICD-10-CM

## 2018-11-08 DIAGNOSIS — I47.10 PSVT (PAROXYSMAL SUPRAVENTRICULAR TACHYCARDIA): ICD-10-CM

## 2018-11-08 DIAGNOSIS — Z88.7 HISTORY OF INFLUENZA VACCINE ALLERGY: ICD-10-CM

## 2018-11-08 DIAGNOSIS — Z63.9 RELATIONSHIP PROBLEMS: ICD-10-CM

## 2018-11-08 PROBLEM — T50.Z95A VACCINATION REACTION: Status: RESOLVED | Noted: 2017-11-30 | Resolved: 2018-11-08

## 2018-11-08 PROBLEM — R53.83 LOW ENERGY: Status: RESOLVED | Noted: 2018-07-16 | Resolved: 2018-11-08

## 2018-11-08 PROBLEM — F43.9 STRESS: Status: RESOLVED | Noted: 2017-10-26 | Resolved: 2018-11-08

## 2018-11-08 PROCEDURE — 99214 OFFICE O/P EST MOD 30 MIN: CPT | Mod: 25 | Performed by: PHYSICIAN ASSISTANT

## 2018-11-08 PROCEDURE — 90471 IMMUNIZATION ADMIN: CPT | Performed by: PHYSICIAN ASSISTANT

## 2018-11-08 PROCEDURE — 90662 IIV NO PRSV INCREASED AG IM: CPT | Performed by: PHYSICIAN ASSISTANT

## 2018-11-08 SDOH — SOCIAL STABILITY - SOCIAL INSECURITY: PROBLEM RELATED TO PRIMARY SUPPORT GROUP, UNSPECIFIED: Z63.9

## 2018-11-08 NOTE — ASSESSMENT & PLAN NOTE
This is an 83-year-old female accompanied by her , Nikhil.  Last year she had a side effect with a swollen left arm after her influenza vaccination.  Symptoms dissipate over week.  She is concerned about having a reaction to the current vaccine.  Previously she has had no side effects.  She denies any current fevers.  No current cold symptoms.  Is wondering if the mercury preservative is in the Fluzone vaccine.

## 2018-11-08 NOTE — ASSESSMENT & PLAN NOTE
2 months ago she and her daughter finally added out and her daughter no longer wants to communicate with her.  Currently she is doing better without the relationship issues.  She denies any depression or anxiety.  She did follow through and see psychology for about 4 visits.

## 2018-11-08 NOTE — PROGRESS NOTES
Subjective:   Antonia Stover is a 83 y.o. female here today for discussion of vaccination of influenza, PSVT, relationship issues, chronic sinus congestion and insomnia.    Influenza vaccine needed  This is an 83-year-old female accompanied by her , Nikhil.  Last year she had a side effect with a swollen left arm after her influenza vaccination.  Symptoms dissipate over week.  She is concerned about having a reaction to the current vaccine.  Previously she has had no side effects.  She denies any current fevers.  No current cold symptoms.  Is wondering if the mercury preservative is in the Fluzone vaccine.    PSVT (paroxysmal supraventricular tachycardia) (CMS-[facility])  Has not had any PSVT's recently.  Doing well.  Denies any chest pain or shortness of breath.  Chronic concern.    Relationship problems  2 months ago she and her daughter finally added out and her daughter no longer wants to communicate with her.  Currently she is doing better without the relationship issues.  She denies any depression or anxiety.  She did follow through and see psychology for about 4 visits.    Chronic nasal congestion  Complains of waking up with significant sinus mucus and drainage.  Will take a Tylenol sinus medication.  Also may take Benadryl when needed.  Both medications are helpful.  She denies any current fevers.    Insomnia  Has chronic insomnia at times.  In the past was on Lunesta and Ambien.  Medications cause side effects.  Currently she will take 1 Benadryl as needed.  Medication is at times effective.       Current medicines (including changes today)  Current Outpatient Prescriptions   Medication Sig Dispense Refill   • levothyroxine (SYNTHROID) 75 MCG Tab TAKE 1 TABLET BY MOUTH EVERY MORNING ON AN EMPTY STOMACH 90 Tab 3   • estradiol (ESTRACE) 0.1 MG/GM vaginal cream INSERT 1 GRAM VAGINALLY EVERY DAY 42.5 g 0   • clopidogrel (PLAVIX) 75 MG Tab Take 1 Tab by mouth every day. 30 Tab 11   • CARTIA  MG  "CAPSULE SR 24 HR TAKE 1 CAPSULE BY MOUTH EVERY DAY 90 Cap 3   • pravastatin (PRAVACHOL) 40 MG tablet Take 1 Tab by mouth every evening. 90 Tab 3   • acetaminophen (TYLENOL 8 HOUR ARTHRITIS PAIN) 650 MG CR tablet Take 650 mg by mouth every 6 hours as needed.     • Probiotic Product (ALIGN PO) Take  by mouth every day.     • Wheat Dextrin (BENEFIBER DRINK MIX PO) Take 1 Dose by mouth 3 times a day.     • Multiple Vitamins-Minerals (OCUVITE) TABS Take 1 Tab by mouth every evening.     • PROAIR  (90 Base) MCG/ACT Aero Soln inhalation aerosol Inhale 2 Puffs by mouth every 6 hours as needed for Shortness of Breath. 1 Inhaler 3   • cyclobenzaprine (FLEXERIL) 10 MG Tab Take 1 Tab by mouth 2 times a day as needed. 40 Tab 0     No current facility-administered medications for this visit.      She  has a past medical history of Anemia; Arthritis; Carotid artery plaque (3/19/2014); COPD; Dizziness ( ); Dyslipidemia ( ); Fatigue (7/3/2014); HLD (hyperlipidemia) (9/12/2014); Hypothyroidism; Insomnia ( ); Nocturnal hypoxia ( ); PSVT (paroxysmal supraventricular tachycardia) (HCC) (February 2013); Sickle cell disease (HCC); and Tobacco use.    Social History and Family History were reviewed and updated.    ROS   No chest pain, no shortness of breath, no abdominal pain and all other systems were reviewed and are negative.       Objective:     Blood pressure 104/60, pulse 75, temperature 36.7 °C (98.1 °F), height 1.575 m (5' 2\"), weight 47.2 kg (104 lb), SpO2 98 %, not currently breastfeeding. Body mass index is 19.02 kg/m².   Physical Exam:  Constitutional: Alert, no distress.  Skin: Warm, dry, good turgor, no rashes in visible areas.  Eye: Equal, round and reactive, conjunctiva clear, lids normal.  ENMT: Lips without lesions, good dentition, oropharynx clear.  Neck: Trachea midline, no masses.   Lymph: No cervical or supraclavicular lymphadenopathy  Respiratory: Unlabored respiratory effort, lungs clear to auscultation, no " wheezes, no ronchi.  Cardiovascular: Normal S1, S2, no murmur, no edema.  Abdomen: Soft, non-tender, no masses.  Psych: Alert and oriented x3, normal affect and mood.        Assessment and Plan:   The following treatment plan was discussed    1. Influenza vaccine needed  Administered today without complaints.  Discussed side effects.  She was in agreement to get it.  - INFLUENZA VACCINE, HIGH DOSE (65+ ONLY)    2. History of influenza vaccine allergy  Administered.  Advised to contact me with any side effects.  - INFLUENZA VACCINE, HIGH DOSE (65+ ONLY)    3. PSVT (paroxysmal supraventricular tachycardia) (HCC)  Chronic condition.  Stable.  No recent exacerbation.  Follow with cardiology.    4. Primary insomnia  Chronic condition.  May continue Benadryl as directed.  May try 50 mg but it will cause side effects such as waking up feeling fatigued.    5. Chronic nasal congestion  Chronic condition.  Stable.  Continue over-the-counter medications.    6. Relationship problems  Chronic condition.  Stable.  We will continue to monitor her progress.      Followup: Return if symptoms worsen or fail to improve.    Please note that this dictation was created using voice recognition software. I have made every reasonable attempt to correct obvious errors, but I expect that there are errors of grammar and possibly content that I did not discover before finalizing the note.

## 2018-11-08 NOTE — ASSESSMENT & PLAN NOTE
Has chronic insomnia at times.  In the past was on Lunesta and Ambien.  Medications cause side effects.  Currently she will take 1 Benadryl as needed.  Medication is at times effective.

## 2018-11-08 NOTE — ASSESSMENT & PLAN NOTE
Complains of waking up with significant sinus mucus and drainage.  Will take a Tylenol sinus medication.  Also may take Benadryl when needed.  Both medications are helpful.  She denies any current fevers.

## 2018-11-08 NOTE — ASSESSMENT & PLAN NOTE
Has not had any PSVT's recently.  Doing well.  Denies any chest pain or shortness of breath.  Chronic concern.

## 2018-12-17 DIAGNOSIS — E78.5 DYSLIPIDEMIA: ICD-10-CM

## 2018-12-18 RX ORDER — PRAVASTATIN SODIUM 40 MG
40 TABLET ORAL EVERY EVENING
Qty: 90 TAB | Refills: 2 | Status: SHIPPED | OUTPATIENT
Start: 2018-12-18 | End: 2019-09-10 | Stop reason: SDUPTHER

## 2019-01-24 ENCOUNTER — OFFICE VISIT (OUTPATIENT)
Dept: MEDICAL GROUP | Age: 84
End: 2019-01-24
Payer: COMMERCIAL

## 2019-01-24 VITALS
HEIGHT: 62 IN | WEIGHT: 101 LBS | BODY MASS INDEX: 18.58 KG/M2 | TEMPERATURE: 98.4 F | OXYGEN SATURATION: 93 % | DIASTOLIC BLOOD PRESSURE: 56 MMHG | SYSTOLIC BLOOD PRESSURE: 102 MMHG | HEART RATE: 86 BPM

## 2019-01-24 DIAGNOSIS — J30.1 SEASONAL ALLERGIC RHINITIS DUE TO POLLEN: ICD-10-CM

## 2019-01-24 DIAGNOSIS — I47.10 PSVT (PAROXYSMAL SUPRAVENTRICULAR TACHYCARDIA): ICD-10-CM

## 2019-01-24 DIAGNOSIS — I10 ESSENTIAL HYPERTENSION: ICD-10-CM

## 2019-01-24 DIAGNOSIS — E78.5 DYSLIPIDEMIA: ICD-10-CM

## 2019-01-24 DIAGNOSIS — H35.30 AMD (AGE-RELATED MACULAR DEGENERATION), BILATERAL: ICD-10-CM

## 2019-01-24 PROCEDURE — 99214 OFFICE O/P EST MOD 30 MIN: CPT | Performed by: FAMILY MEDICINE

## 2019-01-24 RX ORDER — DIPHENHYDRAMINE HCL 25 MG
25 TABLET ORAL EVERY 6 HOURS PRN
COMMUNITY
End: 2020-07-28

## 2019-01-24 ASSESSMENT — PATIENT HEALTH QUESTIONNAIRE - PHQ9: CLINICAL INTERPRETATION OF PHQ2 SCORE: 0

## 2019-01-24 NOTE — PROGRESS NOTES
This medical record contains text that has been entered with the assistance of computer voice recognition and dictation software.  Therefore, it may contain unintended errors in text, spelling, punctuation, or grammar    Chief Complaint   Patient presents with   • Establish Care         Antonia Nelson is a 83 y.o. female here evaluation and management of: establish care      HPI:     PSVT (paroxysmal supraventricular tachycardia) (CMS-[facility])  Rate controlled with diltiazem 20 mg daily  Anticoagulation with Plavix 75 mg p.o. Daily    She states that she had a recent event of hr of 143 x5 minutes, and spontaneously resolved.  She states that this happened a few weeks ago.  She does know vagal maneuvers and usually massages her carotid sinus.  She is currently on Plavix and 5 mg p.o. Daily  As well as Cartia  mg p.o. daily    Now on home O2 at night, bec.of nocturnal hypoxia     AMD (age-related macular degeneration), bilateral  The patient is an 83-year-old female who has bilateral age-related macular degeneration right greater than left.  She does have an ophthalmologist and she sees him every 6 months.  She no longer drives a car and relies on her  to travel.    Seasonal allergic rhinitis due to pollen  Patient complains of waking up feeling congested sneezing nasal sinus stuffiness.  She is never tried a nasal saline rinsing and does not want to add any other medications.  She states that she often feels relief when she is taking a hot shower which is producing plenty of steam.  She has taken Claritin in the past.    Dyslipidemia  Pravastatin 40 mg p.o. Nightly    Patient has been taking pravastatin for years issues.  She denies any muscle aches no abdominal pain no history of elevated liver enzymes.      Results for ANTONIA NELSON (MRN [id number]) as of 1/24/2019 14:08   Ref. Range 2/28/2018 07:23   Cholesterol,Tot Latest Ref Range: 100 - 199 mg/dL 163   Triglycerides Latest Ref Range: 0 -  149 mg/dL 84   HDL Latest Ref Range: >=40 mg/dL 73   LDL Latest Ref Range: <100 mg/dL 73     Current medicines (including changes today)  Current Outpatient Prescriptions   Medication Sig Dispense Refill   • diphenhydrAMINE (BENADRYL) 25 MG Tab Take 25 mg by mouth every 6 hours as needed for Sleep.     • Docusate Sodium (COLACE PO) Take  by mouth.     • pravastatin (PRAVACHOL) 40 MG tablet Take 1 Tab by mouth every evening. 90 Tab 2   • levothyroxine (SYNTHROID) 75 MCG Tab TAKE 1 TABLET BY MOUTH EVERY MORNING ON AN EMPTY STOMACH 90 Tab 3   • estradiol (ESTRACE) 0.1 MG/GM vaginal cream INSERT 1 GRAM VAGINALLY EVERY DAY 42.5 g 0   • PROAIR  (90 Base) MCG/ACT Aero Soln inhalation aerosol Inhale 2 Puffs by mouth every 6 hours as needed for Shortness of Breath. 1 Inhaler 3   • clopidogrel (PLAVIX) 75 MG Tab Take 1 Tab by mouth every day. 30 Tab 11   • CARTIA  MG CAPSULE SR 24 HR TAKE 1 CAPSULE BY MOUTH EVERY DAY 90 Cap 3   • Probiotic Product (ALIGN PO) Take  by mouth every day.     • Wheat Dextrin (BENEFIBER DRINK MIX PO) Take 1 Dose by mouth 3 times a day.     • acetaminophen (TYLENOL 8 HOUR ARTHRITIS PAIN) 650 MG CR tablet Take 650 mg by mouth every 6 hours as needed.       No current facility-administered medications for this visit.      She  has a past medical history of Anemia; Arthritis; Carotid artery plaque (3/19/2014); COPD; Dizziness ( ); Dyslipidemia ( ); Fatigue (7/3/2014); HLD (hyperlipidemia) (9/12/2014); Hypothyroidism; Insomnia ( ); Nocturnal hypoxia ( ); PSVT (paroxysmal supraventricular tachycardia) (HCC) (February 2013); Sickle cell disease (HCC); and Tobacco use.  She  has a past surgical history that includes hysterectomy, total abdominal; knee replacement, total (2005); cataract extraction with iol; cataract extraction with iol; appendectomy; and abdominal hysterectomy total.  Social History   Substance Use Topics   • Smoking status: Current Every Day Smoker     Packs/day: 0.25      "Years: 55.00     Types: Cigarettes, Cigars   • Smokeless tobacco: Never Used      Comment: does not inhale, small filtered cigars    • Alcohol use No     Social History     Social History Narrative   • No narrative on file     Family History   Problem Relation Age of Onset   • Heart Attack Brother 70     Family Status   Relation Status   • Mo    • Fa    • Bro (Not Specified)         ROS    Please see hpi     All other systems reviewed and are negative     Objective:     Blood pressure 102/56, pulse 86, temperature 36.9 °C (98.4 °F), temperature source Temporal, height 1.575 m (5' 2\"), weight 45.8 kg (101 lb), SpO2 93 %, not currently breastfeeding. Body mass index is 18.47 kg/m².  Physical Exam:    Constitutional: Alert, no distress.  Skin: Warm, dry, good turgor, no rashes in visible areas.  Eye: Equal, round and reactive, conjunctiva clear, lids normal.  ENMT: Lips without lesions, good dentition, oropharynx clear.  Neck: Trachea midline, no masses, no thyromegaly. No cervical or supraclavicular lymphadenopathy.  Respiratory: Unlabored respiratory effort, lungs clear to auscultation, no wheezes, no ronchi.  Cardiovascular: Normal S1, S2, no murmur, no edema.  Abdomen: Soft, non-tender, no masses, no hepatosplenomegaly.  Psych: Alert and oriented x3, normal affect and mood.          Assessment and Plan:   The following treatment plan was discussed      1. Essential hypertension  Patient has been stable with current management  We will make no changes for now      2. PSVT (paroxysmal supraventricular tachycardia) (HCC)  We reviewed vagal maneuvers  Avoid triggers if known (aka etoh, nicotine)  Continue rate control with diltiazem 120 mg p.o. daily  Continue anticoagulation with Plavix      3. AMD (age-related macular degeneration), bilateral  Continue to follow with ophthalmology    4. Seasonal allergic rhinitis due to pollen  Refuses nasal saline rinsing    5. Dyslipidemia  Patient has been stable " with current management  We will make no changes for now          Instructed to Follow up in clinic or ER for worsening symptoms, difficulty breathing, lack of expected recovery, or should new symptoms or problems arise.    Followup: Return in about 6 months (around 7/24/2019) for Reevaluation, labs.       Once again this medical record contains text that has been entered with the assistance of computer voice recognition and dictation software.  Therefore, it may contain unintended errors in text, spelling, punctuation, or grammar

## 2019-01-24 NOTE — ASSESSMENT & PLAN NOTE
The patient is an 83-year-old female who has bilateral age-related macular degeneration right greater than left.  She does have an ophthalmologist and she sees him every 6 months.  She no longer drives a car and relies on her  to travel.

## 2019-01-24 NOTE — ASSESSMENT & PLAN NOTE
Rate controlled with diltiazem 20 mg daily  Anticoagulation with Plavix 75 mg p.o. Daily    She states that she had a recent event of hr of 143 x5 minutes, and spontaneously resolved.  She states that this happened a few weeks ago.  She does know vagal maneuvers and usually massages her carotid sinus.  She is currently on Plavix and 5 mg p.o. Daily  As well as Cartia  mg p.o. daily    Now on home O2 at night, bec.of nocturnal hypoxia

## 2019-01-24 NOTE — ASSESSMENT & PLAN NOTE
Pravastatin 40 mg p.o. Nightly    Patient has been taking pravastatin for years issues.  She denies any muscle aches no abdominal pain no history of elevated liver enzymes.      Results for KYRIE NELSON (MRN [id number]) as of 1/24/2019 14:08   Ref. Range 2/28/2018 07:23   Cholesterol,Tot Latest Ref Range: 100 - 199 mg/dL 163   Triglycerides Latest Ref Range: 0 - 149 mg/dL 84   HDL Latest Ref Range: >=40 mg/dL 73   LDL Latest Ref Range: <100 mg/dL 73

## 2019-01-24 NOTE — ASSESSMENT & PLAN NOTE
Patient complains of waking up feeling congested sneezing nasal sinus stuffiness.  She is never tried a nasal saline rinsing and does not want to add any other medications.  She states that she often feels relief when she is taking a hot shower which is producing plenty of steam.  She has taken Claritin in the past.

## 2019-02-07 ENCOUNTER — HOSPITAL ENCOUNTER (OUTPATIENT)
Dept: LAB | Facility: MEDICAL CENTER | Age: 84
End: 2019-02-07
Attending: INTERNAL MEDICINE
Payer: COMMERCIAL

## 2019-02-07 DIAGNOSIS — I10 ESSENTIAL HYPERTENSION: ICD-10-CM

## 2019-02-07 DIAGNOSIS — E78.5 DYSLIPIDEMIA: ICD-10-CM

## 2019-02-07 DIAGNOSIS — I65.23 ATHEROSCLEROSIS OF BOTH CAROTID ARTERIES: ICD-10-CM

## 2019-02-07 LAB
ALBUMIN SERPL BCP-MCNC: 4.2 G/DL (ref 3.2–4.9)
ALBUMIN/GLOB SERPL: 1.4 G/DL
ALP SERPL-CCNC: 71 U/L (ref 30–99)
ALT SERPL-CCNC: 10 U/L (ref 2–50)
ANION GAP SERPL CALC-SCNC: 5 MMOL/L (ref 0–11.9)
AST SERPL-CCNC: 19 U/L (ref 12–45)
BILIRUB SERPL-MCNC: 0.6 MG/DL (ref 0.1–1.5)
BUN SERPL-MCNC: 8 MG/DL (ref 8–22)
CALCIUM SERPL-MCNC: 9.2 MG/DL (ref 8.5–10.5)
CHLORIDE SERPL-SCNC: 105 MMOL/L (ref 96–112)
CHOLEST SERPL-MCNC: 166 MG/DL (ref 100–199)
CO2 SERPL-SCNC: 27 MMOL/L (ref 20–33)
CREAT SERPL-MCNC: 1.02 MG/DL (ref 0.5–1.4)
FASTING STATUS PATIENT QL REPORTED: NORMAL
GLOBULIN SER CALC-MCNC: 2.9 G/DL (ref 1.9–3.5)
GLUCOSE SERPL-MCNC: 88 MG/DL (ref 65–99)
HDLC SERPL-MCNC: 71 MG/DL
LDLC SERPL CALC-MCNC: 74 MG/DL
POTASSIUM SERPL-SCNC: 4 MMOL/L (ref 3.6–5.5)
PROT SERPL-MCNC: 7.1 G/DL (ref 6–8.2)
SODIUM SERPL-SCNC: 137 MMOL/L (ref 135–145)
TRIGL SERPL-MCNC: 105 MG/DL (ref 0–149)

## 2019-02-07 PROCEDURE — 80053 COMPREHEN METABOLIC PANEL: CPT

## 2019-02-07 PROCEDURE — 80061 LIPID PANEL: CPT

## 2019-02-07 PROCEDURE — 36415 COLL VENOUS BLD VENIPUNCTURE: CPT

## 2019-02-11 DIAGNOSIS — I47.10 PSVT (PAROXYSMAL SUPRAVENTRICULAR TACHYCARDIA): ICD-10-CM

## 2019-02-11 RX ORDER — DILTIAZEM HYDROCHLORIDE 120 MG/1
120 CAPSULE, COATED, EXTENDED RELEASE ORAL
Qty: 90 CAP | Refills: 0 | Status: SHIPPED | OUTPATIENT
Start: 2019-02-11 | End: 2019-05-14 | Stop reason: SDUPTHER

## 2019-02-15 DIAGNOSIS — N95.2 ATROPHIC VULVOVAGINITIS: Primary | ICD-10-CM

## 2019-02-15 RX ORDER — ESTRADIOL 0.1 MG/G
CREAM VAGINAL
Qty: 42.5 G | Refills: 0 | Status: SHIPPED | OUTPATIENT
Start: 2019-02-15 | End: 2019-08-27 | Stop reason: SDUPTHER

## 2019-02-19 ENCOUNTER — OFFICE VISIT (OUTPATIENT)
Dept: CARDIOLOGY | Facility: MEDICAL CENTER | Age: 84
End: 2019-02-19
Payer: COMMERCIAL

## 2019-02-19 VITALS
HEART RATE: 82 BPM | BODY MASS INDEX: 18.82 KG/M2 | OXYGEN SATURATION: 94 % | HEIGHT: 62 IN | SYSTOLIC BLOOD PRESSURE: 128 MMHG | DIASTOLIC BLOOD PRESSURE: 62 MMHG | WEIGHT: 102.29 LBS

## 2019-02-19 DIAGNOSIS — F17.200 CURRENT SMOKER: ICD-10-CM

## 2019-02-19 DIAGNOSIS — R53.83 FATIGUE, UNSPECIFIED TYPE: ICD-10-CM

## 2019-02-19 DIAGNOSIS — G47.34 NOCTURNAL HYPOXIA: ICD-10-CM

## 2019-02-19 DIAGNOSIS — I10 ESSENTIAL HYPERTENSION: ICD-10-CM

## 2019-02-19 DIAGNOSIS — I47.10 PSVT (PAROXYSMAL SUPRAVENTRICULAR TACHYCARDIA): ICD-10-CM

## 2019-02-19 DIAGNOSIS — I65.23 ATHEROSCLEROSIS OF BOTH CAROTID ARTERIES: ICD-10-CM

## 2019-02-19 DIAGNOSIS — E78.5 DYSLIPIDEMIA: ICD-10-CM

## 2019-02-19 PROBLEM — I95.9 HYPOTENSION: Status: RESOLVED | Noted: 2018-07-16 | Resolved: 2019-02-19

## 2019-02-19 PROCEDURE — 99214 OFFICE O/P EST MOD 30 MIN: CPT | Performed by: NURSE PRACTITIONER

## 2019-02-19 ASSESSMENT — ENCOUNTER SYMPTOMS
CLAUDICATION: 0
ORTHOPNEA: 0
ABDOMINAL PAIN: 0
SHORTNESS OF BREATH: 0
DIZZINESS: 0
MYALGIAS: 0
PND: 0
SPUTUM PRODUCTION: 1
COUGH: 1
PALPITATIONS: 0
FEVER: 0

## 2019-02-19 NOTE — LETTER
[facility]   [address], [facility] [[email or web address]] [address]  [place] [address]  Phone: [phone]  Fax: [phone]              Antonia Stover  1935    Encounter Date: 2/19/2019    JOSH Arita          PROGRESS NOTE:  Subjective:   Antonia Stover is a 81 y.o. female who presents today for follow up.    She is a patient of Dr. Bernard in our office     Hx of PSVT, HLD, mild carotid disease with TIA symptoms, and COPD with nocturnal oxygen.    She does have occasional episodes of palpitations with her PSVT, but she is able to manage with vagal maneuvers and they are usually very short-lived. She had one that lasted 3 minutes the other day.    She does have chronic shortness of breath, sputum production, and a cough with her COPD. She continues to smoke 6 cigarettes daily. She does admit to generalized fatigue.    She has had no episodes of chest pain, dizziness/lightheadedness, orthopnea, or peripheral edema.    Past Medical History:   Diagnosis Date   • Anemia    • Arthritis    • Carotid artery plaque 3/19/2014   • COPD    • Dizziness     • Dyslipidemia     • Fatigue 7/3/2014   • HLD (hyperlipidemia) 9/12/2014   • Hypothyroidism    • Insomnia     • Nocturnal hypoxia      Uses oxygen QHS.   • PSVT (paroxysmal supraventricular tachycardia) (HCC) February 2013    Echocardiogram with normal LV size, LVEF 65-70%.   • Sickle cell disease (HCC)    • Tobacco use      Past Surgical History:   Procedure Laterality Date   • KNEE REPLACEMENT, TOTAL  2005    Right   • ABDOMINAL HYSTERECTOMY TOTAL     • APPENDECTOMY     • CATARACT EXTRACTION WITH IOL      Bilateral   • CATARACT EXTRACTION WITH IOL      Bilateral   • HYSTERECTOMY, TOTAL ABDOMINAL       Family History   Problem Relation Age of Onset   • Heart Attack Brother 70     History   Smoking Status   • Current Every Day Smoker   • Packs/day: 0.25   • Years: 55.00   • Types: Cigarettes, Cigars   Smokeless Tobacco   •  Never Used     Comment: does not inhale, small filtered cigars      Allergies   Allergen Reactions   • Hydrocodone Hives   • Iodine Anaphylaxis     RXN=>20 years ago  Heavy metal dyes and preservatives   • Nsaids Hives and Shortness of Breath   • Penicillins Anaphylaxis     RXN=>20 years ago   • Asa [Aspirin] Hives     Hives   • Codeine    • Erythromycin    • Morphine    • Sulfa Drugs      Outpatient Encounter Prescriptions as of 2/19/2019   Medication Sig Dispense Refill   • estradiol (ESTRACE) 0.1 MG/GM vaginal cream INSERT 1 GRAM VAGINALLY EVERY DAY 42.5 g 0   • DILTIAZem CD (CARTIA XT) 120 MG CAPSULE SR 24 HR Take 1 Cap by mouth every day. 90 Cap 0   • diphenhydrAMINE (BENADRYL) 25 MG Tab Take 25 mg by mouth every 6 hours as needed for Sleep.     • Docusate Sodium (COLACE PO) Take  by mouth.     • pravastatin (PRAVACHOL) 40 MG tablet Take 1 Tab by mouth every evening. 90 Tab 2   • levothyroxine (SYNTHROID) 75 MCG Tab TAKE 1 TABLET BY MOUTH EVERY MORNING ON AN EMPTY STOMACH 90 Tab 3   • PROAIR  (90 Base) MCG/ACT Aero Soln inhalation aerosol Inhale 2 Puffs by mouth every 6 hours as needed for Shortness of Breath. 1 Inhaler 3   • clopidogrel (PLAVIX) 75 MG Tab Take 1 Tab by mouth every day. 30 Tab 11   • acetaminophen (TYLENOL 8 HOUR ARTHRITIS PAIN) 650 MG CR tablet Take 650 mg by mouth every 6 hours as needed.     • Probiotic Product (ALIGN PO) Take  by mouth every day.     • Wheat Dextrin (BENEFIBER DRINK MIX PO) Take 1 Dose by mouth 3 times a day.       No facility-administered encounter medications on file as of 2/19/2019.      Review of Systems   Constitutional: Positive for malaise/fatigue. Negative for fever.   Respiratory: Positive for cough and sputum production. Negative for shortness of breath.         White sputum daily   Cardiovascular: Negative for chest pain, palpitations, orthopnea, claudication, leg swelling and PND.   Gastrointestinal: Negative for abdominal pain.   Musculoskeletal:  "Negative for myalgias.   Neurological: Negative for dizziness.   All other systems reviewed and are negative.       Objective:   /62 (BP Location: Right arm, Patient Position: Sitting, BP Cuff Size: Adult)   Pulse 82   Ht 1.575 m (5' 2\")   Wt 46.4 kg (102 lb 4.7 oz)   SpO2 94%   BMI 18.71 kg/m²      Physical Exam   Constitutional: She is oriented to person, place, and time. She appears well-developed and well-nourished. She appears cachectic. No distress.   HENT:   Head: Normocephalic and atraumatic.   Eyes: EOM are normal.   Neck: Normal range of motion. No JVD present.   Cardiovascular: Normal rate, regular rhythm, normal heart sounds and intact distal pulses.    No murmur heard.  Pulmonary/Chest: Effort normal and breath sounds normal. No respiratory distress.   Abdominal: Soft. Bowel sounds are normal.   Musculoskeletal: Normal range of motion. She exhibits no edema.   Neurological: She is alert and oriented to person, place, and time.   Skin: Skin is warm and dry.   Psychiatric: She has a normal mood and affect.   Nursing note and vitals reviewed.    Lab Results   Component Value Date/Time    CHOLSTRLTOT 166 02/07/2019 08:00 AM    LDL 74 02/07/2019 08:00 AM    HDL 71 02/07/2019 08:00 AM    TRIGLYCERIDE 105 02/07/2019 08:00 AM       Lab Results   Component Value Date/Time    SODIUM 137 02/07/2019 08:00 AM    POTASSIUM 4.0 02/07/2019 08:00 AM    CHLORIDE 105 02/07/2019 08:00 AM    CO2 27 02/07/2019 08:00 AM    GLUCOSE 88 02/07/2019 08:00 AM    BUN 8 02/07/2019 08:00 AM    CREATININE 1.02 02/07/2019 08:00 AM    CREATININE 1.3 01/19/2009 09:40 AM     Lab Results   Component Value Date/Time    ALKPHOSPHAT 71 02/07/2019 08:00 AM    ASTSGOT 19 02/07/2019 08:00 AM    ALTSGPT 10 02/07/2019 08:00 AM    TBILIRUBIN 0.6 02/07/2019 08:00 AM      2014 CAROTID DUPLEX CONCLUSIONS   Mild bilateral internal carotid artery stenosis (<50%).    Flow within both subclavian arteries appears to be within normal limits.     "     Assessment:     1. Atherosclerosis of both carotid arteries     2. Dyslipidemia     3. Nocturnal hypoxia     4. PSVT (paroxysmal supraventricular tachycardia) (HCC)     5. Essential hypertension     6. Fatigue, unspecified type     7. Current smoker       Medical Decision Making:  Today's Assessment / Status / Plan:     1. Carotid disease with concern for TIA symptoms in '18  -mild disease noted  -no more symptoms of TIA once started plavix  -cont statin, plavix  -follow symptoms   -check duplex in 4 years    2. HLD  -good control with recent labs  -cont pravastatin  -labs yearly with PCP    3. PSVT  -controlled well with cartia  mg QD  -vagal maneuvers PRN  -short lived and not bothersome    4. COPD with tobacco abuse and nocturnal hypoxia  -followed by pulmonary and PCP  -smoking cessation not wanted at all  -O2 sat >90% with ambulation today    FU in clinic in 6 months with FK    Patient verbalizes understanding and agrees with the plan of care.     Collaborating MD: Emmanuel HERNANDEZ    Physical Exam   Constitutional: She is oriented to person, place, and time. She appears well-developed and well-nourished. She appears cachectic. No distress.   HENT:   Head: Normocephalic and atraumatic.   Eyes: EOM are normal.   Neck: Normal range of motion. No JVD present.   Cardiovascular: Normal rate, regular rhythm, normal heart sounds and intact distal pulses.    No murmur heard.  Pulmonary/Chest: Effort normal and breath sounds normal. No respiratory distress.   Abdominal: Soft. Bowel sounds are normal.   Musculoskeletal: Normal range of motion. She exhibits no edema.   Neurological: She is alert and oriented to person, place, and time.   Skin: Skin is warm and dry.   Psychiatric: She has a normal mood and affect.   Nursing note and vitals reviewed.          No Recipients

## 2019-02-20 NOTE — PROGRESS NOTES
Subjective:   Antonia Stover is a 81 y.o. female who presents today for follow up.    She is a patient of Dr. Bernard in our office     Hx of PSVT, HLD, mild carotid disease with TIA symptoms, and COPD with nocturnal oxygen.    She does have occasional episodes of palpitations with her PSVT, but she is able to manage with vagal maneuvers and they are usually very short-lived. She had one that lasted 3 minutes the other day.    She does have chronic shortness of breath, sputum production, and a cough with her COPD. She continues to smoke 6 cigarettes daily. She does admit to generalized fatigue.    She has had no episodes of chest pain, dizziness/lightheadedness, orthopnea, or peripheral edema.    Past Medical History:   Diagnosis Date   • Anemia    • Arthritis    • Carotid artery plaque 3/19/2014   • COPD    • Dizziness     • Dyslipidemia     • Fatigue 7/3/2014   • HLD (hyperlipidemia) 9/12/2014   • Hypothyroidism    • Insomnia     • Nocturnal hypoxia      Uses oxygen QHS.   • PSVT (paroxysmal supraventricular tachycardia) (HCC) February 2013    Echocardiogram with normal LV size, LVEF 65-70%.   • Sickle cell disease (HCC)    • Tobacco use      Past Surgical History:   Procedure Laterality Date   • KNEE REPLACEMENT, TOTAL  2005    Right   • ABDOMINAL HYSTERECTOMY TOTAL     • APPENDECTOMY     • CATARACT EXTRACTION WITH IOL      Bilateral   • CATARACT EXTRACTION WITH IOL      Bilateral   • HYSTERECTOMY, TOTAL ABDOMINAL       Family History   Problem Relation Age of Onset   • Heart Attack Brother 70     History   Smoking Status   • Current Every Day Smoker   • Packs/day: 0.25   • Years: 55.00   • Types: Cigarettes, Cigars   Smokeless Tobacco   • Never Used     Comment: does not inhale, small filtered cigars      Allergies   Allergen Reactions   • Hydrocodone Hives   • Iodine Anaphylaxis     RXN=>20 years ago  Heavy metal dyes and preservatives   • Nsaids Hives and Shortness of Breath   • Penicillins Anaphylaxis      "RXN=>20 years ago   • Asa [Aspirin] Hives     Hives   • Codeine    • Erythromycin    • Morphine    • Sulfa Drugs      Outpatient Encounter Prescriptions as of 2/19/2019   Medication Sig Dispense Refill   • estradiol (ESTRACE) 0.1 MG/GM vaginal cream INSERT 1 GRAM VAGINALLY EVERY DAY 42.5 g 0   • DILTIAZem CD (CARTIA XT) 120 MG CAPSULE SR 24 HR Take 1 Cap by mouth every day. 90 Cap 0   • diphenhydrAMINE (BENADRYL) 25 MG Tab Take 25 mg by mouth every 6 hours as needed for Sleep.     • Docusate Sodium (COLACE PO) Take  by mouth.     • pravastatin (PRAVACHOL) 40 MG tablet Take 1 Tab by mouth every evening. 90 Tab 2   • levothyroxine (SYNTHROID) 75 MCG Tab TAKE 1 TABLET BY MOUTH EVERY MORNING ON AN EMPTY STOMACH 90 Tab 3   • PROAIR  (90 Base) MCG/ACT Aero Soln inhalation aerosol Inhale 2 Puffs by mouth every 6 hours as needed for Shortness of Breath. 1 Inhaler 3   • clopidogrel (PLAVIX) 75 MG Tab Take 1 Tab by mouth every day. 30 Tab 11   • acetaminophen (TYLENOL 8 HOUR ARTHRITIS PAIN) 650 MG CR tablet Take 650 mg by mouth every 6 hours as needed.     • Probiotic Product (ALIGN PO) Take  by mouth every day.     • Wheat Dextrin (BENEFIBER DRINK MIX PO) Take 1 Dose by mouth 3 times a day.       No facility-administered encounter medications on file as of 2/19/2019.      Review of Systems   Constitutional: Positive for malaise/fatigue. Negative for fever.   Respiratory: Positive for cough and sputum production. Negative for shortness of breath.         White sputum daily   Cardiovascular: Negative for chest pain, palpitations, orthopnea, claudication, leg swelling and PND.   Gastrointestinal: Negative for abdominal pain.   Musculoskeletal: Negative for myalgias.   Neurological: Negative for dizziness.   All other systems reviewed and are negative.       Objective:   /62 (BP Location: Right arm, Patient Position: Sitting, BP Cuff Size: Adult)   Pulse 82   Ht 1.575 m (5' 2\")   Wt 46.4 kg (102 lb 4.7 oz)   " SpO2 94%   BMI 18.71 kg/m²     Physical Exam   Constitutional: She is oriented to person, place, and time. She appears well-developed and well-nourished. She appears cachectic. No distress.   HENT:   Head: Normocephalic and atraumatic.   Eyes: EOM are normal.   Neck: Normal range of motion. No JVD present.   Cardiovascular: Normal rate, regular rhythm, normal heart sounds and intact distal pulses.    No murmur heard.  Pulmonary/Chest: Effort normal and breath sounds normal. No respiratory distress.   Abdominal: Soft. Bowel sounds are normal.   Musculoskeletal: Normal range of motion. She exhibits no edema.   Neurological: She is alert and oriented to person, place, and time.   Skin: Skin is warm and dry.   Psychiatric: She has a normal mood and affect.   Nursing note and vitals reviewed.    Lab Results   Component Value Date/Time    CHOLSTRLTOT 166 02/07/2019 08:00 AM    LDL 74 02/07/2019 08:00 AM    HDL 71 02/07/2019 08:00 AM    TRIGLYCERIDE 105 02/07/2019 08:00 AM       Lab Results   Component Value Date/Time    SODIUM 137 02/07/2019 08:00 AM    POTASSIUM 4.0 02/07/2019 08:00 AM    CHLORIDE 105 02/07/2019 08:00 AM    CO2 27 02/07/2019 08:00 AM    GLUCOSE 88 02/07/2019 08:00 AM    BUN 8 02/07/2019 08:00 AM    CREATININE 1.02 02/07/2019 08:00 AM    CREATININE 1.3 01/19/2009 09:40 AM     Lab Results   Component Value Date/Time    ALKPHOSPHAT 71 02/07/2019 08:00 AM    ASTSGOT 19 02/07/2019 08:00 AM    ALTSGPT 10 02/07/2019 08:00 AM    TBILIRUBIN 0.6 02/07/2019 08:00 AM      2014 CAROTID DUPLEX CONCLUSIONS   Mild bilateral internal carotid artery stenosis (<50%).    Flow within both subclavian arteries appears to be within normal limits.     Assessment:     1. Atherosclerosis of both carotid arteries     2. Dyslipidemia     3. Nocturnal hypoxia     4. PSVT (paroxysmal supraventricular tachycardia) (HCC)     5. Essential hypertension     6. Fatigue, unspecified type     7. Current smoker       Medical Decision Making:   Today's Assessment / Status / Plan:     1. Carotid disease with concern for TIA symptoms in '18  -mild disease noted  -no more symptoms of TIA once started plavix  -cont statin, plavix  -follow symptoms   -check duplex in 4 years    2. HLD  -good control with recent labs  -cont pravastatin  -labs yearly with PCP    3. PSVT  -controlled well with cartia  mg QD  -vagal maneuvers PRN  -short lived and not bothersome    4. COPD with tobacco abuse and nocturnal hypoxia  -followed by pulmonary and PCP  -smoking cessation not wanted at all  -O2 sat >90% with ambulation today    FU in clinic in 6 months with FK    Patient verbalizes understanding and agrees with the plan of care.     Collaborating MD: Emmanuel HERNANDEZ    Physical Exam   Constitutional: She is oriented to person, place, and time. She appears well-developed and well-nourished. She appears cachectic. No distress.   HENT:   Head: Normocephalic and atraumatic.   Eyes: EOM are normal.   Neck: Normal range of motion. No JVD present.   Cardiovascular: Normal rate, regular rhythm, normal heart sounds and intact distal pulses.    No murmur heard.  Pulmonary/Chest: Effort normal and breath sounds normal. No respiratory distress.   Abdominal: Soft. Bowel sounds are normal.   Musculoskeletal: Normal range of motion. She exhibits no edema.   Neurological: She is alert and oriented to person, place, and time.   Skin: Skin is warm and dry.   Psychiatric: She has a normal mood and affect.   Nursing note and vitals reviewed.

## 2019-04-04 ENCOUNTER — OFFICE VISIT (OUTPATIENT)
Dept: MEDICAL GROUP | Age: 84
End: 2019-04-04
Payer: COMMERCIAL

## 2019-04-04 VITALS
HEART RATE: 71 BPM | WEIGHT: 101.6 LBS | DIASTOLIC BLOOD PRESSURE: 60 MMHG | SYSTOLIC BLOOD PRESSURE: 124 MMHG | OXYGEN SATURATION: 97 % | TEMPERATURE: 98.1 F | BODY MASS INDEX: 18.69 KG/M2 | HEIGHT: 62 IN

## 2019-04-04 DIAGNOSIS — F43.9 STRESS: ICD-10-CM

## 2019-04-04 DIAGNOSIS — E07.9 THYROID DISORDER: ICD-10-CM

## 2019-04-04 DIAGNOSIS — F17.200 CURRENT SMOKER: ICD-10-CM

## 2019-04-04 DIAGNOSIS — J42 CHRONIC BRONCHITIS, UNSPECIFIED CHRONIC BRONCHITIS TYPE (HCC): ICD-10-CM

## 2019-04-04 DIAGNOSIS — E03.8 OTHER SPECIFIED HYPOTHYROIDISM: ICD-10-CM

## 2019-04-04 PROCEDURE — 99214 OFFICE O/P EST MOD 30 MIN: CPT | Performed by: FAMILY MEDICINE

## 2019-04-04 RX ORDER — LEVOTHYROXINE SODIUM 0.07 MG/1
75 TABLET ORAL EVERY MORNING
Qty: 90 TAB | Refills: 1 | Status: SHIPPED | OUTPATIENT
Start: 2019-04-04 | End: 2019-12-16

## 2019-04-04 ASSESSMENT — PAIN SCALES - GENERAL: PAINLEVEL: NO PAIN

## 2019-04-04 NOTE — ASSESSMENT & PLAN NOTE
currrently smoking 10 ciggarretes per day  Patient is interested in quitting.  She states that she does not want to use a cold turkey method.  She does not want any nicotine replacement.

## 2019-04-04 NOTE — ASSESSMENT & PLAN NOTE
Patient states that she continues to have stress in the to her daughter.  She has no relationship with her daughter because her daughter has cut her out of her life.  This also meant that she has been cut out of her grandchildren's life.  Her grandson is 30 years old right now.  She has wanted contact but has been denied.  She was offered a counselor and psychology medications in the past but she is not interested in this.  She has good support from her  who constantly listens to her concerns.  She also has a very loving dog Mark which is a minature poodle, and helps with her anxiety.  She denies any suicidal or homicidal ideations.

## 2019-04-04 NOTE — PROGRESS NOTES
This medical record contains text that has been entered with the assistance of computer voice recognition and dictation software.  Therefore, it may contain unintended errors in text, spelling, punctuation, or grammar    Chief Complaint   Patient presents with   • Sinusitis   • Cough     chest congestion         Antonia Stover is a 83 y.o. female here evaluation and management of: r  Routine visit    HPI:     Current smoker      currrently smoking 10 ciggarretes per day  Patient is interested in quitting.  She states that she does not want to use a cold turkey method.  She does not want any nicotine replacement.    Hypothyroidism  Levothyroxine 75 mcg p.o. Daily    denies any new fatigue, cold/heat intolerance, weight gain/weight loss, diarrhea/constipation, dry skin, myalgia, depressed mood, palpitations, tremmor, hair loss, and no goiter.    We will obtain new labs to update clinical profile.  Then we will adjust therapy as needed.          Stress  Patient states that she continues to have stress in the to her daughter.  She has no relationship with her daughter because her daughter has cut her out of her life.  This also meant that she has been cut out of her grandchildren's life.  Her grandson is 30 years old right now.  She has wanted contact but has been denied.  She was offered a counselor and psychology medications in the past but she is not interested in this.  She has good support from her  who constantly listens to her concerns.  She also has a very loving dog Mark which is a minature poodle, and helps with her anxiety.  She denies any suicidal or homicidal ideations.    Current medicines (including changes today)  Current Outpatient Prescriptions   Medication Sig Dispense Refill   • levothyroxine (SYNTHROID) 75 MCG Tab Take 1 Tab by mouth every morning. ON A EMPTY STOMACH 90 Tab 1   • estradiol (ESTRACE) 0.1 MG/GM vaginal cream INSERT 1 GRAM VAGINALLY EVERY DAY 42.5 g 0   • DILTIAZem CD  (CARTIA XT) 120 MG CAPSULE SR 24 HR Take 1 Cap by mouth every day. 90 Cap 0   • diphenhydrAMINE (BENADRYL) 25 MG Tab Take 25 mg by mouth every 6 hours as needed for Sleep.     • Docusate Sodium (COLACE PO) Take  by mouth.     • pravastatin (PRAVACHOL) 40 MG tablet Take 1 Tab by mouth every evening. 90 Tab 2   • clopidogrel (PLAVIX) 75 MG Tab Take 1 Tab by mouth every day. 30 Tab 11   • acetaminophen (TYLENOL 8 HOUR ARTHRITIS PAIN) 650 MG CR tablet Take 650 mg by mouth every 6 hours as needed.     • Probiotic Product (ALIGN PO) Take  by mouth every day.     • Wheat Dextrin (BENEFIBER DRINK MIX PO) Take 1 Dose by mouth 3 times a day.     • PROAIR  (90 Base) MCG/ACT Aero Soln inhalation aerosol Inhale 2 Puffs by mouth every 6 hours as needed for Shortness of Breath. 1 Inhaler 3     No current facility-administered medications for this visit.      She  has a past medical history of Anemia; Arthritis; Carotid artery plaque (3/19/2014); COPD; Dizziness ( ); Dyslipidemia ( ); Fatigue (7/3/2014); HLD (hyperlipidemia) (2014); Hypothyroidism; Insomnia ( ); Nocturnal hypoxia ( ); PSVT (paroxysmal supraventricular tachycardia) (HCC) (2013); Sickle cell disease ([facility]); and Tobacco use.  She  has a past surgical history that includes hysterectomy, total abdominal; knee replacement, total (); cataract extraction with iol; cataract extraction with iol; appendectomy; and abdominal hysterectomy total.  Social History   Substance Use Topics   • Smoking status: Current Every Day Smoker     Packs/day: 0.25     Years: 55.00     Types: Cigarettes, Cigars   • Smokeless tobacco: Never Used      Comment: does not inhale, small filtered cigars    • Alcohol use No     Social History     Social History Narrative   • No narrative on file     Family History   Problem Relation Age of Onset   • Heart Attack Brother 70     Family Status   Relation Status   • Mo    • Fa    • Bro (Not Specified)  "        ROS    Please see hpi     All other systems reviewed and are negative     Objective:     /60   Pulse 71   Temp 36.7 °C (98.1 °F) (Temporal)   Ht 1.575 m (5' 2\")   Wt 46.1 kg (101 lb 9.6 oz)   SpO2 97%  Body mass index is 18.58 kg/m².  Physical Exam:    Constitutional: Alert, no distress.  Skin: Warm, dry, good turgor, no rashes in visible areas  Eye: Equal, round and reactive, conjunctiva clear, lids normal.  ENMT: Lips without lesions, good dentition, oropharynx clear.  Neck: Trachea midline, no masses, no thyromegaly. No cervical or supraclavicular lymphadenopathy.  Respiratory: Unlabored respiratory effort, lungs clear to auscultation, no wheezes, no ronchi.  Cardiovascular: Normal S1, S2, no murmur, no edema  Abdomen: Soft, non-tender, no masses, no hepatosplenomegaly.  Psych: Alert and oriented x3, normal affect and mood.          Assessment and Plan:   The following treatment plan was discussed      1. Current smoker      Patient was counseled on smoking cessation, we discussed the benefits of quitting including decrease risk of MI by 50% after one year of cessation, decreased risk of lung cancer after 12 years, one cigarette is enough to paralyze cilia for 24 hours leading to increase risk of pulmonary infection, etc.... .Also encouraged to set a stop date.    She refuses pharmacological treatment    2. Chronic bronchitis, unspecified chronic bronchitis type (HCC)  No clinical indications for imaging or antibiotics  Patient is afebrile and hemodynamically stable.    Recommended garlic preparations (raw, powdered, and aged garlic extracts at a daily dose approximately equivalent to one 4 gram clove)    Echanicea  High dose Vitamic C from fruit  nsaids prn      3.  Other specified hypothyroidism    Patient has been stable with current management  We will make no changes for now    - levothyroxine (SYNTHROID) 75 MCG Tab; Take 1 Tab by mouth every morning. ON A EMPTY STOMACH  Dispense: 90 Tab; " Refill: 1      4. Stress  Encouraged her to continue to speak/event to her .  She refuses behavioral medicine and refuses pharmacotherapy.  I also informed her that she may come here anytime to discuss any issues.  She denies any suicidal homicidal ideations.        Instructed to Follow up in clinic or ER for worsening symptoms, difficulty breathing, lack of expected recovery, or should new symptoms or problems arise.    Followup: Return in about 3 months (around 7/4/2019) for Reevaluation.       Once again this medical record contains text that has been entered with the assistance of computer voice recognition and dictation software.  Therefore, it may contain unintended errors in text, spelling, punctuation, or grammar

## 2019-04-04 NOTE — ASSESSMENT & PLAN NOTE
Levothyroxine 75 mcg p.o. Daily    denies any new fatigue, cold/heat intolerance, weight gain/weight loss, diarrhea/constipation, dry skin, myalgia, depressed mood, palpitations, tremmor, hair loss, and no goiter.    We will obtain new labs to update clinical profile.  Then we will adjust therapy as needed.

## 2019-05-11 DIAGNOSIS — I47.10 PSVT (PAROXYSMAL SUPRAVENTRICULAR TACHYCARDIA): ICD-10-CM

## 2019-05-13 RX ORDER — DILTIAZEM HYDROCHLORIDE 120 MG/1
CAPSULE, EXTENDED RELEASE ORAL
Qty: 90 CAP | Refills: 3 | Status: CANCELLED | OUTPATIENT
Start: 2019-05-13

## 2019-05-14 DIAGNOSIS — I47.10 PSVT (PAROXYSMAL SUPRAVENTRICULAR TACHYCARDIA): ICD-10-CM

## 2019-05-14 RX ORDER — CLOPIDOGREL BISULFATE 75 MG/1
TABLET ORAL
Qty: 90 TAB | Refills: 3 | Status: SHIPPED | OUTPATIENT
Start: 2019-05-14 | End: 2020-05-05 | Stop reason: SDUPTHER

## 2019-05-14 RX ORDER — DILTIAZEM HYDROCHLORIDE 120 MG/1
120 CAPSULE, COATED, EXTENDED RELEASE ORAL
Qty: 90 CAP | Refills: 3 | Status: SHIPPED | OUTPATIENT
Start: 2019-05-14 | End: 2019-07-17

## 2019-06-26 ENCOUNTER — TELEPHONE (OUTPATIENT)
Dept: CARDIOLOGY | Facility: MEDICAL CENTER | Age: 84
End: 2019-06-26

## 2019-06-26 NOTE — TELEPHONE ENCOUNTER
"discuss diltiazem meds.   Received: Today   Message Contents   Elisabeth JONESMichelle Sanchez, Med Ass't  Akilah Camargo R.N.   Phone Number: [phone]             SC     Pt calling to discuss diltiazem meds.   Ph# [phone]      S/w pt who explains that the diltiazem she is getting now is not from the same company and she thinks its not working as well to prevent her SVT. Last 1-2 weeks pt has had more episodes of SVT, 4 total, and they are lasting longer.  Continues to use vasovagal maneuvers. She noticed that the pill looks different, is a different color, and says it's made in \"[place] and processed in [place]\". Pharmaceutical company is [employer]. She doesn't know what the previous  was.     She s/w the pharmacist and they acknowledged that the manufacture is different but told her the active ingredient is the same and dismissed her concerns.     Also, pt is working with PCP to quit smoking so has reduced her cigarette intake. Wondering if nicotine withdrawal could be contributing to this and possibly has nothing to do with the different .     Advised pt that she should call pharmacy back, determine the old manufacture, then call other pharmacy to determine who has it. Then we can send rx to that pharmacy. Then we can determine if its really from the  of not.     Pt agrees.     FYI to SC       "

## 2019-07-01 ENCOUNTER — TELEPHONE (OUTPATIENT)
Dept: CARDIOLOGY | Facility: MEDICAL CENTER | Age: 84
End: 2019-07-01

## 2019-07-01 NOTE — TELEPHONE ENCOUNTER
Lizet Beckford R.N.   Phone Number: [phone]             SC/Shilpa     Pt has some updated information in regards to last week's phone call with Blaine on 6/26. She can be reached at [phone].        Pt is calling back to report that she found out the  of the diltiazem she previously took is [employer]. She had previously formed a plan with an RN last week to find a pharmacy that stocks diltiazem from this , but pt states she doesn't currently want to do this research. She has had 1 more episode of SVT since talking to Blaine last week, which stopped after performing vagal maneuvers. She states this normally works for her. She is still wondering if her increased SVT episodes correlate with quitting smoking. Advised that pt monitor her symptoms and frequency of SVT episodes over the next week and update us on whether they are increasing or decreasing in frequency. At that time, pt can decide whether she would like to go forward with previous plan of trying to find a pharmacy that stocks previous 's diltiazem. Pt is appreciative of this plan. Discussed ED precautions in the case that pt ever has severe episode of SVT that cause her symptoms. Pt verbalizes understanding to this.

## 2019-07-03 ENCOUNTER — APPOINTMENT (OUTPATIENT)
Dept: MEDICAL GROUP | Age: 84
End: 2019-07-03
Payer: COMMERCIAL

## 2019-07-03 ENCOUNTER — HOSPITAL ENCOUNTER (EMERGENCY)
Facility: MEDICAL CENTER | Age: 84
End: 2019-07-03
Attending: EMERGENCY MEDICINE
Payer: COMMERCIAL

## 2019-07-03 VITALS
HEART RATE: 78 BPM | BODY MASS INDEX: 18.51 KG/M2 | RESPIRATION RATE: 20 BRPM | OXYGEN SATURATION: 95 % | WEIGHT: 101.19 LBS | TEMPERATURE: 98.1 F

## 2019-07-03 DIAGNOSIS — I47.10 SVT (SUPRAVENTRICULAR TACHYCARDIA): ICD-10-CM

## 2019-07-03 LAB
ALBUMIN SERPL BCP-MCNC: 4 G/DL (ref 3.2–4.9)
ALBUMIN/GLOB SERPL: 1.4 G/DL
ALP SERPL-CCNC: 68 U/L (ref 30–99)
ALT SERPL-CCNC: 11 U/L (ref 2–50)
ANION GAP SERPL CALC-SCNC: 13 MMOL/L (ref 0–11.9)
AST SERPL-CCNC: 22 U/L (ref 12–45)
BASOPHILS # BLD AUTO: 0.5 % (ref 0–1.8)
BASOPHILS # BLD: 0.05 K/UL (ref 0–0.12)
BILIRUB SERPL-MCNC: 0.5 MG/DL (ref 0.1–1.5)
BUN SERPL-MCNC: 9 MG/DL (ref 8–22)
CALCIUM SERPL-MCNC: 8.9 MG/DL (ref 8.4–10.2)
CHLORIDE SERPL-SCNC: 102 MMOL/L (ref 96–112)
CO2 SERPL-SCNC: 22 MMOL/L (ref 20–33)
CREAT SERPL-MCNC: 1.05 MG/DL (ref 0.5–1.4)
EOSINOPHIL # BLD AUTO: 0.09 K/UL (ref 0–0.51)
EOSINOPHIL NFR BLD: 0.8 % (ref 0–6.9)
ERYTHROCYTE [DISTWIDTH] IN BLOOD BY AUTOMATED COUNT: 46.6 FL (ref 35.9–50)
GLOBULIN SER CALC-MCNC: 2.9 G/DL (ref 1.9–3.5)
GLUCOSE SERPL-MCNC: 123 MG/DL (ref 65–99)
HCT VFR BLD AUTO: 38.1 % (ref 37–47)
HGB BLD-MCNC: 12.7 G/DL (ref 12–16)
IMM GRANULOCYTES # BLD AUTO: 0.02 K/UL (ref 0–0.11)
IMM GRANULOCYTES NFR BLD AUTO: 0.2 % (ref 0–0.9)
LYMPHOCYTES # BLD AUTO: 3.77 K/UL (ref 1–4.8)
LYMPHOCYTES NFR BLD: 34 % (ref 22–41)
MCH RBC QN AUTO: 31.6 PG (ref 27–33)
MCHC RBC AUTO-ENTMCNC: 33.3 G/DL (ref 33.6–35)
MCV RBC AUTO: 94.8 FL (ref 81.4–97.8)
MONOCYTES # BLD AUTO: 0.96 K/UL (ref 0–0.85)
MONOCYTES NFR BLD AUTO: 8.7 % (ref 0–13.4)
NEUTROPHILS # BLD AUTO: 6.2 K/UL (ref 2–7.15)
NEUTROPHILS NFR BLD: 55.8 % (ref 44–72)
NRBC # BLD AUTO: 0 K/UL
NRBC BLD-RTO: 0 /100 WBC
PLATELET # BLD AUTO: 223 K/UL (ref 164–446)
PMV BLD AUTO: 11.6 FL (ref 9–12.9)
POTASSIUM SERPL-SCNC: 3.8 MMOL/L (ref 3.6–5.5)
PROT SERPL-MCNC: 6.9 G/DL (ref 6–8.2)
RBC # BLD AUTO: 4.02 M/UL (ref 4.2–5.4)
SODIUM SERPL-SCNC: 137 MMOL/L (ref 135–145)
TROPONIN I SERPL-MCNC: <0.02 NG/ML (ref 0–0.04)
WBC # BLD AUTO: 11.1 K/UL (ref 4.8–10.8)

## 2019-07-03 PROCEDURE — 99284 EMERGENCY DEPT VISIT MOD MDM: CPT

## 2019-07-03 PROCEDURE — 85025 COMPLETE CBC W/AUTO DIFF WBC: CPT

## 2019-07-03 PROCEDURE — 84484 ASSAY OF TROPONIN QUANT: CPT

## 2019-07-03 PROCEDURE — 93005 ELECTROCARDIOGRAM TRACING: CPT

## 2019-07-03 PROCEDURE — 36415 COLL VENOUS BLD VENIPUNCTURE: CPT

## 2019-07-03 PROCEDURE — 80053 COMPREHEN METABOLIC PANEL: CPT

## 2019-07-03 PROCEDURE — 93005 ELECTROCARDIOGRAM TRACING: CPT | Performed by: EMERGENCY MEDICINE

## 2019-07-03 NOTE — TELEPHONE ENCOUNTER
"S/w Aria BOWIE. Recommends ER.     Called pt back. Ice cold water did not resolve.   BP now 80/65 . Recommend ambulance or ride to ER from  immediately. PT states she will comply. Worried about \"all the people\". Recommended [facility]. Pt agrees. Advised her to call and schedule follow up once stable.    "

## 2019-07-03 NOTE — ED NOTES
Pt reports five episodes over the last 1.5 weeks, reports is able to change rhythm after coughing hard. Pt currently denies all c/o pain including CP, abd pain or back pain, denies difficulty breathing.

## 2019-07-03 NOTE — ED NOTES
1650:  PIV placed in RFA.  Pt cried out during IV placement, HR slowed to 94 from 141.  Repeat EKG in process

## 2019-07-03 NOTE — TELEPHONE ENCOUNTER
Pt called in reporting a persistent episode of SVT that began at 1215 (a little over two and half hours ago).     About 5 minutes ago BP 82/56, . Reports being extremely dizzy and fatigued. Denies any significant SOB.  Feels fullness at the top of clavicle and throat. Using her nocturnal oxygen currently for added O2 support.     Pt has tried numerous vagal maneuvers including carotid massages, different forms of bearing down, quickly rolling over to left side, and jumping. Recommended quickly drinking glass of ice cold water or putting face in cold water and if doesn't resolve sx recommend ER.       Pt agrees to try cold water but does not want to go to ER, saying its germ ridden and is afraid of getting adenosine again.  Educated pt regarding decompensation from SVT and seriousness of her symptoms. Reassurance given and will call back if any other recommendations from provider.     To SC: any other recommendations?

## 2019-07-03 NOTE — ED TRIAGE NOTES
Pt to triage via w/c w spouse; c/o rapid heart rate since this am. Pt states hx SVT. ekg compl. Pt brought directly back to rm#5.

## 2019-07-04 LAB
EKG IMPRESSION: NORMAL
EKG IMPRESSION: NORMAL

## 2019-07-04 NOTE — ED NOTES
"Reviewed discharge instructions w/ pt and family member, verbalized understanding to information provided including follow up care and return precautions, denied questions/concerns.  Pt denied c/o pain at time of discharge.  Pt thanked RN for care and thanked for \"getting me out of here.\"  Pt ambulated from ED w/ spouse.    "

## 2019-07-04 NOTE — ED PROVIDER NOTES
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  The patient says he has a history of SVT and has been having a rapid heart rate since earlier this morning.  She says normally she is able to do a vagal maneuver to convert her back into a sinus rhythm.  However since this morning she has not been able to get herself to convert.  She notes this is her fifth episode over the last 1/2 to 2weeks.  She thinks it may be due to the change in her medications.  She has been on diltiazem, and says that since her last prescription the pills to be from a different .  She has discussed with her cardiologist, and is a process of trying to see if she can get her old diltiazem which was from [employer].  The patient has had no fever, chills, sore throat, cough, vomiting, or diarrhea.  She has been tolerating food and fluids without difficulty.  She has had no chest pain or shortness of breath.     REVIEW OF SYSTEMS  See HPI for further details. All other systems are negative.     PAST MEDICAL HISTORY  Past Medical History:   Diagnosis Date   • Anemia    • Arthritis    • Carotid artery plaque 3/19/2014   • COPD    • Dizziness     • Dyslipidemia     • Fatigue 7/3/2014   • HLD (hyperlipidemia) 9/12/2014   • Hypothyroidism    • Insomnia     • Nocturnal hypoxia      Uses oxygen QHS.   • PSVT (paroxysmal supraventricular tachycardia) ([facility]) February 2013    Echocardiogram with normal LV size, LVEF 65-70%.   • Sickle cell disease ([facility])    • Tobacco use        FAMILY HISTORY  Family History   Problem Relation Age of Onset   • Heart Attack Brother 70       SOCIAL HISTORY  Social History     Social History   • Marital status:      Spouse name: N/A   • Number of children: N/A    • Years of education: N/A     Social History Main Topics   • Smoking status: Current Every Day Smoker     Packs/day: 0.25     Years: 55.00     Types: Cigarettes, Cigars   • Smokeless tobacco: Never Used      Comment: does not inhale, small filtered cigars    • Alcohol use No   • Drug use: No   • Sexual activity: Not Currently     Other Topics Concern   • Not on file     Social History Narrative   • No narrative on file       SURGICAL HISTORY  Past Surgical History:   Procedure Laterality Date   • KNEE REPLACEMENT, TOTAL  2005    Right   • ABDOMINAL HYSTERECTOMY TOTAL     • APPENDECTOMY     • CATARACT EXTRACTION WITH IOL      Bilateral   • CATARACT EXTRACTION WITH IOL      Bilateral   • HYSTERECTOMY, TOTAL ABDOMINAL         CURRENT MEDICATIONS  Home Medications    **Home medications have not yet been reviewed for this encounter**         ALLERGIES  Allergies   Allergen Reactions   • Hydrocodone Hives   • Iodine Anaphylaxis     RXN=>20 years ago  Heavy metal dyes and preservatives   • Nsaids Hives and Shortness of Breath   • Penicillins Anaphylaxis     RXN=>20 years ago   • Asa [Aspirin] Hives     Hives   • Codeine    • Erythromycin    • Morphine    • Sulfa Drugs        PHYSICAL EXAM  VITAL SIGNS: Pulse 70   Temp 36.7 °C (98.1 °F) (Temporal)   Resp 20   Wt 45.9 kg (101 lb 3.1 oz)   SpO2 92%   BMI 18.51 kg/m²   Constitutional: Awake, alert, in no acute distress, Non-toxic appearance.   HENT: Atraumatic. Bilateral external ears normal, mucous membranes moist, throat nonerythematous without exudates, nose is normal.  Eyes: PERRL, EOMI, conjunctiva moist, noninjected.  Neck: Nontender, Normal range of motion, No nuchal rigidity, No stridor.   Lymphatic: No lymphadenopathy noted.   Cardiovascular: Regular rhythm, tachycardic and rate in the 150s, no murmurs, rubs, gallops.  Thorax & Lungs:  Good breath sounds bilaterally, no wheezes, rales, or retractions.  No chest tenderness.  Abdomen: Bowel sounds normal, Soft,  nontender, nondistended, no rebound, guarding, masses.  Back: No CVA or spinal tenderness.  Extremities: Intact distal pulses, No edema, No tenderness.   Skin: Warm, Dry, No rashes.   Musculoskeletal: No joint swelling or tenderness.  Neurologic: Alert & oriented x 3, sensory and motor function normal. No focal deficits.   Psychiatric: Affect normal, Judgment normal, Mood normal.     EKG  Twelve-lead EKG shows what appears to be supraventricular tachycardia with a rate of 148, normal intervals, normal axis, no acute ST wave elevations or ST depressions, no pathologic Q waves, no evidence of any acute injury or ischemic pattern on my reading, he comparison to previous EKG from November, 2017, the patient was previously in a sinus rhythm.    EKG #2:  Twelve-lead EKG shows a normal sinus rhythm, rate of 70, normal intervals, normal axis, no acute ST wave elevations or ST depressions, no pathologic Q waves, no evidence of any acute injury or ischemic pattern on my reading, comparison to the previous EEG done earlier today, the patient is now converted into a sinus rhythm.        RADIOLOGY/PROCEDURES  No orders to display         COURSE & MEDICAL DECISION MAKING  Pertinent Labs & Imaging studies reviewed. (See chart for details)  The patient presents with above complaints.  She appears to be in SVT.  She has had multiple episodes of this in the past and usually is able to convert herself with vagal maneuvers.  She says those have not been working today.  As there she went to place an IV, the patient spontaneously converted into a sinus rhythm.  This was confirmed on a repeat EKG.  On examination patient denies any chest pain or shortness of breath and is otherwise asymptomatic.    CBC shows a white count 11,100, hemoglobin 12.7, normal differential, chemistry shows normal electrolytes, normal BUN and creatinine, mildly decreased GFR of 50, normal liver function test, troponin less than 0.02.  Findings were discussed with  the patient.  As she has had no chest pain or shortness of breath, and is spontaneous converted, she will be discharged home.  I did discuss case with  of cardiology who felt there was no need to change any of her medications.  Patient is to follow-up in the office, return to ER for any recurrent symptoms, chest pain, shortness of breath, dizziness, or any other problems.    FINAL IMPRESSION  1.  SVT with spontaneous conversion to sinus rhythm  2.    3.         Electronically signed by: Keith Marin, 7/3/2019 6:50 PM

## 2019-07-05 NOTE — TELEPHONE ENCOUNTER
E/R visit review   Received: Today   Message Contents   Sarah Camargo R.N.   Phone Number: [phone]             SC/juma     Pt calling to review visit to [facility] E/R on 7/3 for rapid h/r.       Please call Antonia [phone].      Pt explained her ER visit. She was very satisfied with her visit and very happy with Dr. Keith Jackson's care in the ER.     She needs a follow up after the ER visit sooner than her currently scheduled visit 8/13. No sooner appt with Dr. Bernard available at this time (will message scheduling supervisor about any other availability). Offered pt 7/15 with Aria BOWIE for now and pt agrees.      Regarding finding diltiazem distributed [employer], called [facility] pharmacy and they do not carry this distributer. Advised pt that she will have to call around as well or try calling the pharmaceutically company directly.

## 2019-07-09 ENCOUNTER — OFFICE VISIT (OUTPATIENT)
Dept: MEDICAL GROUP | Age: 84
End: 2019-07-09
Payer: COMMERCIAL

## 2019-07-09 VITALS
HEIGHT: 62 IN | HEART RATE: 69 BPM | DIASTOLIC BLOOD PRESSURE: 64 MMHG | TEMPERATURE: 98 F | WEIGHT: 99.6 LBS | SYSTOLIC BLOOD PRESSURE: 118 MMHG | OXYGEN SATURATION: 97 % | BODY MASS INDEX: 18.33 KG/M2

## 2019-07-09 DIAGNOSIS — E03.9 HYPOTHYROIDISM, UNSPECIFIED TYPE: ICD-10-CM

## 2019-07-09 DIAGNOSIS — I47.10 PSVT (PAROXYSMAL SUPRAVENTRICULAR TACHYCARDIA): ICD-10-CM

## 2019-07-09 DIAGNOSIS — E78.5 DYSLIPIDEMIA: ICD-10-CM

## 2019-07-09 PROCEDURE — 99214 OFFICE O/P EST MOD 30 MIN: CPT | Performed by: FAMILY MEDICINE

## 2019-07-09 NOTE — TELEPHONE ENCOUNTER
Received okay for pt to see Dr. Bernard 7/19. Pt notified and appreciated sooner follow up with Dr. Bernard    7/10 at 1330: Appt had to be changed due to a miscommunication in our office. Appt is now 7/17 with Dr. Bernard at 0800. Pt has been notified and agrees to change.

## 2019-07-09 NOTE — PROGRESS NOTES
This medical record contains text that has been entered with the assistance of computer voice recognition and dictation software.  Therefore, it may contain unintended errors in text, spelling, punctuation, or grammar        Chief Complaint   Patient presents with   • Hospital Follow-up     3 mt fv         Antonia Stover is a 83 y.o. female here evaluation and management of: Recent hospital follow-up, hyperlipidemia, thyroid, routine follow-up      HPI:     1. PSVT (paroxysmal supraventricular tachycardia) (HCC)  The patient is a 82 y/o female who has a history of paroxysmal supraventricular tachycardia, and has been controlled on a calcium channel blocker diltiazem 120 mg p.o. daily.  However, she states that she feels this medication has fillers that her original generic did not.  She was evaluated in the emergency room for SVT on 03/July/2019 , while trying to obtain IV access the patient felt pain and spontaneously converted back to sinus rhythm.The patient denies palpitations, tachycardia, fatigue, weakness, dizziness, lightheadedness, reduced exercise capacity, increased urination, or mild dyspnea. Also denies  dyspnea at rest, angina, presyncope, or syncope. In addition,  Denies , peripheral edema, weight gain, or ascites.    Patient states she does have a follow-up visit with her cardiologist next week.  She is due for repeat thyroid labs.    2. Dyslipidemia  Pravastatin 40 mg p.o. Nightly    The patient has been on a statin for years and tolerating this fine. The patient denies any muscle aches, no abdominal pain and no history of elevated liver enzymes.    3. Hypothyroidism, unspecified type  Levothyroxine 75 mcg p.o. daily  She is due for repeat labs.  The patient denies any new fatigue, cold/heat intolerance, weight gain/weight loss, diarrhea/constipation, dry skin, myalgia, depressed mood, palpitations, tremmor, hair loss, and no goiter.      Current medicines (including changes today)  Current  Outpatient Prescriptions   Medication Sig Dispense Refill   • clopidogrel (PLAVIX) 75 MG Tab TAKE 1 TABLET BY MOUTH EVERY DAY 90 Tab 3   • DILTIAZem CD (CARTIA XT) 120 MG CAPSULE SR 24 HR Take 1 Cap by mouth every day. 90 Cap 3   • levothyroxine (SYNTHROID) 75 MCG Tab Take 1 Tab by mouth every morning. ON A EMPTY STOMACH 90 Tab 1   • estradiol (ESTRACE) 0.1 MG/GM vaginal cream INSERT 1 GRAM VAGINALLY EVERY DAY 42.5 g 0   • diphenhydrAMINE (BENADRYL) 25 MG Tab Take 25 mg by mouth every 6 hours as needed for Sleep.     • Docusate Sodium (COLACE PO) Take  by mouth.     • pravastatin (PRAVACHOL) 40 MG tablet Take 1 Tab by mouth every evening. 90 Tab 2   • PROAIR  (90 Base) MCG/ACT Aero Soln inhalation aerosol Inhale 2 Puffs by mouth every 6 hours as needed for Shortness of Breath. 1 Inhaler 3   • acetaminophen (TYLENOL 8 HOUR ARTHRITIS PAIN) 650 MG CR tablet Take 650 mg by mouth every 6 hours as needed.     • Probiotic Product (ALIGN PO) Take  by mouth every day.     • Wheat Dextrin (BENEFIBER DRINK MIX PO) Take 1 Dose by mouth 3 times a day.       No current facility-administered medications for this visit.      She  has a past medical history of Anemia; Arthritis; Carotid artery plaque (3/19/2014); COPD; Dizziness ( ); Dyslipidemia ( ); Fatigue (7/3/2014); HLD (hyperlipidemia) (9/12/2014); Hypothyroidism; Insomnia ( ); Nocturnal hypoxia ( ); PSVT (paroxysmal supraventricular tachycardia) ([facility]) (February 2013); Sickle cell disease ([facility]); and Tobacco use.  She  has a past surgical history that includes hysterectomy, total abdominal; knee replacement, total (2005); cataract extraction with iol; cataract extraction with iol; appendectomy; and abdominal hysterectomy total.  Social History   Substance Use Topics   • Smoking status: Current Every Day Smoker     Packs/day: 0.25     Years: 55.00     Types: Cigarettes, Cigars   • Smokeless tobacco: Never Used      Comment: does not inhale, small filtered cigars    •  "Alcohol use No     Social History     Social History Narrative   • No narrative on file     Family History   Problem Relation Age of Onset   • Heart Attack Brother 70     Family Status   Relation Status   • Mo    • Fa    • Bro (Not Specified)         ROS    The pertinent  ROS findings can be seen in the HPI above.     All other systems reviewed and are negative     Objective:     /64   Pulse 69   Temp 36.7 °C (98 °F)   Ht 1.575 m (5' 2\")   Wt 45.2 kg (99 lb 9.6 oz)   SpO2 97%  Body mass index is 18.22 kg/m².      Physical Exam:    Constitutional: Alert, no distress.  Skin: no rashes  Eye: Equal, round and reactive, conjunctiva clear, lids normal.  ENMT: Lips without lesions, good dentition, oropharynx clear.  Neck: Trachea midline, no masses, no thyromegaly. No cervical or supraclavicular lymphadenopathy.  Respiratory: Unlabored respiratory effort, lungs clear to auscultation, no wheezes, no ronchi.  Cardiovascular: Normal S1, S2, no murmur, no edema  Abdomen: Soft, non-tender, no masses, no hepatosplenomegaly.  Psych: Alert and oriented x3, normal affect and mood.          Assessment and Plan:   The following treatment plan was discussed      1. PSVT (paroxysmal supraventricular tachycardia) (HCC)      Continue Rate control with diltiazem  Continue anticoagulation with Plavix 75 mg p.o. daily  Continue to avoid caffeinated  beverages  Avoid etoh and tobacco  Make certain that you get adequate rest at night  Make certain thyroid labs are up-to-date      2. Dyslipidemia  Patient has been stable with current management  We will make no changes for now    3. Hypothyroidism, unspecified type  We will obtain new labs to update clinical profile.  Then we will adjust therapy as needed.          Instructed to Follow up in clinic or ER for worsening symptoms, difficulty breathing, lack of expected recovery, or should new symptoms or problems arise.    Followup: Return in about 3 months (around " 10/9/2019) for Reevaluation.       Once again this medical record contains text that has been entered with the assistance of computer voice recognition and dictation software.  Therefore, it may contain unintended errors in text, spelling, punctuation, or grammar

## 2019-07-11 ENCOUNTER — HOSPITAL ENCOUNTER (OUTPATIENT)
Dept: LAB | Facility: MEDICAL CENTER | Age: 84
End: 2019-07-11
Attending: FAMILY MEDICINE
Payer: COMMERCIAL

## 2019-07-11 DIAGNOSIS — E03.9 HYPOTHYROIDISM, UNSPECIFIED TYPE: ICD-10-CM

## 2019-07-11 LAB — TSH SERPL DL<=0.005 MIU/L-ACNC: 0.54 UIU/ML (ref 0.38–5.33)

## 2019-07-11 PROCEDURE — 36415 COLL VENOUS BLD VENIPUNCTURE: CPT

## 2019-07-11 PROCEDURE — 84443 ASSAY THYROID STIM HORMONE: CPT

## 2019-07-17 ENCOUNTER — OFFICE VISIT (OUTPATIENT)
Dept: CARDIOLOGY | Facility: MEDICAL CENTER | Age: 84
End: 2019-07-17
Payer: COMMERCIAL

## 2019-07-17 VITALS
HEART RATE: 80 BPM | SYSTOLIC BLOOD PRESSURE: 120 MMHG | BODY MASS INDEX: 18.37 KG/M2 | WEIGHT: 99.8 LBS | HEIGHT: 62 IN | DIASTOLIC BLOOD PRESSURE: 64 MMHG | OXYGEN SATURATION: 97 %

## 2019-07-17 DIAGNOSIS — E78.5 DYSLIPIDEMIA: ICD-10-CM

## 2019-07-17 DIAGNOSIS — I47.10 PSVT (PAROXYSMAL SUPRAVENTRICULAR TACHYCARDIA): ICD-10-CM

## 2019-07-17 DIAGNOSIS — I10 ESSENTIAL HYPERTENSION: ICD-10-CM

## 2019-07-17 DIAGNOSIS — I65.23 ATHEROSCLEROSIS OF BOTH CAROTID ARTERIES: ICD-10-CM

## 2019-07-17 PROCEDURE — 99214 OFFICE O/P EST MOD 30 MIN: CPT | Performed by: INTERNAL MEDICINE

## 2019-07-17 RX ORDER — DILTIAZEM HYDROCHLORIDE 180 MG/1
180 CAPSULE, EXTENDED RELEASE ORAL DAILY
Qty: 30 CAP | Refills: 11 | Status: SHIPPED | OUTPATIENT
Start: 2019-07-17 | End: 2019-10-22

## 2019-07-17 NOTE — PROGRESS NOTES
Chief Complaint   Patient presents with   • Paroxysmal Supraventricular Tachycardia (PSVT)     F/V: 1 YR       Subjective:   Antonia Stover is a 83 y.o. female who presents today for followup of her PSVT, history of near syncope, hyperlipidemia and carotid plaque.    Her brand of diltiazem was changed a few months ago.  Over the last few weeks she has had increasing episodes of PSVT.  Her longest episode lasted about 4 hours the most are much shorter.  She does know how to do a Valsalva maneuver and carotid sinus massage.  She is concerned that the change in medication may be the cause of her increasing dysrhythmia.    She is had no recurrent TIA type symptoms.  She denies any chest discomfort, dyspnea or edema.  She does have some difficulty with her balance and occasional orthostatic lightheadedness.    Past Medical History:   Diagnosis Date   • Anemia    • Arthritis    • Carotid artery plaque 3/19/2014   • COPD    • Dizziness     • Dyslipidemia     • Fatigue 7/3/2014   • HLD (hyperlipidemia) 9/12/2014   • Hypothyroidism    • Insomnia     • Nocturnal hypoxia      Uses oxygen QHS.   • PSVT (paroxysmal supraventricular tachycardia) ([facility]) February 2013    Echocardiogram with normal LV size, LVEF 65-70%.   • Sickle cell disease (HCC)    • Tobacco use      Past Surgical History:   Procedure Laterality Date   • KNEE REPLACEMENT, TOTAL  2005    Right   • ABDOMINAL HYSTERECTOMY TOTAL     • APPENDECTOMY     • CATARACT EXTRACTION WITH IOL      Bilateral   • CATARACT EXTRACTION WITH IOL      Bilateral   • HYSTERECTOMY, TOTAL ABDOMINAL       Family History   Problem Relation Age of Onset   • Heart Attack Brother 70     Social History     Social History   • Marital status:      Spouse name: N/A   • Number of children: N/A   • Years of education: N/A     Occupational History   • Not on file.     Social History Main Topics   • Smoking status: Current Every Day Smoker     Packs/day: 0.25     Years: 55.00     Types:  "Cigarettes, Cigars   • Smokeless tobacco: Never Used      Comment: does not inhale, small filtered cigars    • Alcohol use No   • Drug use: No   • Sexual activity: Not Currently     Other Topics Concern   • Not on file     Social History Narrative   • No narrative on file     Allergies   Allergen Reactions   • Hydrocodone Hives   • Iodine Anaphylaxis     RXN=>20 years ago  Heavy metal dyes and preservatives   • Nsaids Hives and Shortness of Breath   • Penicillins Anaphylaxis     RXN=>20 years ago   • Asa [Aspirin] Hives     Hives   • Codeine    • Erythromycin    • Morphine    • Sulfa Drugs      Outpatient Encounter Prescriptions as of 7/17/2019   Medication Sig Dispense Refill   • clopidogrel (PLAVIX) 75 MG Tab TAKE 1 TABLET BY MOUTH EVERY DAY 90 Tab 3   • DILTIAZem CD (CARTIA XT) 120 MG CAPSULE SR 24 HR Take 1 Cap by mouth every day. 90 Cap 3   • levothyroxine (SYNTHROID) 75 MCG Tab Take 1 Tab by mouth every morning. ON A EMPTY STOMACH 90 Tab 1   • estradiol (ESTRACE) 0.1 MG/GM vaginal cream INSERT 1 GRAM VAGINALLY EVERY DAY 42.5 g 0   • pravastatin (PRAVACHOL) 40 MG tablet Take 1 Tab by mouth every evening. 90 Tab 2   • Probiotic Product (ALIGN PO) Take  by mouth every day.     • Wheat Dextrin (BENEFIBER DRINK MIX PO) Take 1 Dose by mouth 3 times a day.     • diphenhydrAMINE (BENADRYL) 25 MG Tab Take 25 mg by mouth every 6 hours as needed for Sleep.     • Docusate Sodium (COLACE PO) Take  by mouth.     • PROAIR  (90 Base) MCG/ACT Aero Soln inhalation aerosol Inhale 2 Puffs by mouth every 6 hours as needed for Shortness of Breath. 1 Inhaler 3   • acetaminophen (TYLENOL 8 HOUR ARTHRITIS PAIN) 650 MG CR tablet Take 650 mg by mouth every 6 hours as needed.       No facility-administered encounter medications on file as of 7/17/2019.      ROS       Objective:   /64 (BP Location: Left arm, Patient Position: Sitting, BP Cuff Size: Adult)   Pulse 80   Ht 1.575 m (5' 2\")   Wt 45.3 kg (99 lb 12.8 oz)   SpO2 " 97%   BMI 18.25 kg/m²     Physical Exam   Constitutional: She appears well-developed and well-nourished.   Neck: No JVD present.   Cardiovascular: Normal rate and regular rhythm.    No murmur heard.  Pulmonary/Chest: Effort normal and breath sounds normal. She has no rales.   Abdominal: Soft. There is no tenderness.   Musculoskeletal: She exhibits no edema.         Lab Results   Component Value Date/Time    CHOLSTRLTOT 166 02/07/2019 08:00 AM    LDL 74 02/07/2019 08:00 AM    HDL 71 02/07/2019 08:00 AM    TRIGLYCERIDE 105 02/07/2019 08:00 AM       Lab Results   Component Value Date/Time    SODIUM 137 07/03/2019 04:50 PM    POTASSIUM 3.8 07/03/2019 04:50 PM    CHLORIDE 102 07/03/2019 04:50 PM    CO2 22 07/03/2019 04:50 PM    GLUCOSE 123 (H) 07/03/2019 04:50 PM    BUN 9 07/03/2019 04:50 PM    CREATININE 1.05 07/03/2019 04:50 PM    CREATININE 1.3 01/19/2009 09:40 AM     Lab Results   Component Value Date/Time    ALKPHOSPHAT 68 07/03/2019 04:50 PM    ASTSGOT 22 07/03/2019 04:50 PM    ALTSGPT 11 07/03/2019 04:50 PM    TBILIRUBIN 0.5 07/03/2019 04:50 PM      Lab Results   Component Value Date/Time    BNPBTYPENAT 143 (H) 09/01/2015 01:05 PM        Carotid ultrasound from August 6, 2018:       FINDINGS   Right carotid.    Plaque of the bifurcation extending into the internal carotid. Velocities    are consistent with < 50% stenosis of the internal carotid artery. Plaque    is irregular on the surface and homogeneous with calcific acoustic density.      Left carotid.    Plaque of the bifurcation extending into the internal carotid. Velocities    are consistent with < 50% stenosis of the internal carotid artery. Plaque    is irregular on the surface and homogeneous with calcific acoustic density.      Bilateral subclavian and vertebral artery waveforms are antegrade and    waveforms are normal in character and velocity.     Rc Hamlin MD   (Electronically Signed)   Final Date:      06 August 2018         Assessment:      1. PSVT (paroxysmal supraventricular tachycardia) (HCC)     2. Dyslipidemia     3. Atherosclerosis of both carotid arteries     4. Essential hypertension         Medical Decision Making:  Today's Assessment / Status / Plan:     Ms. Stover is clinically stable.  She is having some increasing difficulty with PSVT.  We discussed her options and she is agreeable to try diltiazem  mg daily.  She has had no recurrent TIA type symptoms on clopidogrel therapy.  Her lipid status is under fair control and she has not tolerated any statins other than pravastatin.  She has also not tolerated ezetimibe.  Her blood pressure appears to be under good control.  She will follow-up in a few months.

## 2019-07-17 NOTE — LETTER
[facility]   [address], [address]  [place] [address]  Phone: [phone]  Fax: [phone]              Antonia Stover  1935    Encounter Date: 7/17/2019    Diogo Bernard M.D.          PROGRESS NOTE:  Chief Complaint   Patient presents with   • Paroxysmal Supraventricular Tachycardia (PSVT)     F/V: 1 YR       Subjective:   Antonia Stover is a 83 y.o. female who presents today for followup of her PSVT, history of near syncope, hyperlipidemia and carotid plaque.    Her brand of diltiazem was changed a few months ago.  Over the last few weeks she has had increasing episodes of PSVT.  Her longest episode lasted about 4 hours the most are much shorter.  He does know how to do Valsalva maneuver and carotid sinus massage.  She is concerned that the change in medication may be the cause of her increasing dysrhythmia.    She is had no recurrent TIA type symptoms.  She denies any chest discomfort, dyspnea or edema.  She does have some difficulty with her balance and occasional orthostatic lightheadedness.    Past Medical History:   Diagnosis Date   • Anemia    • Arthritis    • Carotid artery plaque 3/19/2014   • COPD    • Dizziness     • Dyslipidemia     • Fatigue 7/3/2014   • HLD (hyperlipidemia) 9/12/2014   • Hypothyroidism    • Insomnia     • Nocturnal hypoxia      Uses oxygen QHS.   • PSVT (paroxysmal supraventricular tachycardia) ([facility]) February 2013    Echocardiogram with normal LV size, LVEF 65-70%.   • Sickle cell disease (HCC)    • Tobacco use      Past Surgical History:   Procedure Laterality Date   • KNEE REPLACEMENT, TOTAL  2005    Right   • ABDOMINAL HYSTERECTOMY TOTAL     • APPENDECTOMY     • CATARACT EXTRACTION WITH IOL      Bilateral   • CATARACT EXTRACTION WITH IOL      Bilateral   • HYSTERECTOMY, TOTAL ABDOMINAL       Family History   Problem Relation Age of Onset   • Heart Attack Brother 70     Social History     Social History   •  Marital status:      Spouse name: N/A   • Number of children: N/A   • Years of education: N/A     Occupational History   • Not on file.     Social History Main Topics   • Smoking status: Current Every Day Smoker     Packs/day: 0.25     Years: 55.00     Types: Cigarettes, Cigars   • Smokeless tobacco: Never Used      Comment: does not inhale, small filtered cigars    • Alcohol use No   • Drug use: No   • Sexual activity: Not Currently     Other Topics Concern   • Not on file     Social History Narrative   • No narrative on file     Allergies   Allergen Reactions   • Hydrocodone Hives   • Iodine Anaphylaxis     RXN=>20 years ago  Heavy metal dyes and preservatives   • Nsaids Hives and Shortness of Breath   • Penicillins Anaphylaxis     RXN=>20 years ago   • Asa [Aspirin] Hives     Hives   • Codeine    • Erythromycin    • Morphine    • Sulfa Drugs      Outpatient Encounter Prescriptions as of 7/17/2019   Medication Sig Dispense Refill   • clopidogrel (PLAVIX) 75 MG Tab TAKE 1 TABLET BY MOUTH EVERY DAY 90 Tab 3   • DILTIAZem CD (CARTIA XT) 120 MG CAPSULE SR 24 HR Take 1 Cap by mouth every day. 90 Cap 3   • levothyroxine (SYNTHROID) 75 MCG Tab Take 1 Tab by mouth every morning. ON A EMPTY STOMACH 90 Tab 1   • estradiol (ESTRACE) 0.1 MG/GM vaginal cream INSERT 1 GRAM VAGINALLY EVERY DAY 42.5 g 0   • pravastatin (PRAVACHOL) 40 MG tablet Take 1 Tab by mouth every evening. 90 Tab 2   • Probiotic Product (ALIGN PO) Take  by mouth every day.     • Wheat Dextrin (BENEFIBER DRINK MIX PO) Take 1 Dose by mouth 3 times a day.     • diphenhydrAMINE (BENADRYL) 25 MG Tab Take 25 mg by mouth every 6 hours as needed for Sleep.     • Docusate Sodium (COLACE PO) Take  by mouth.     • PROAIR  (90 Base) MCG/ACT Aero Soln inhalation aerosol Inhale 2 Puffs by mouth every 6 hours as needed for Shortness of Breath. 1 Inhaler 3   • acetaminophen (TYLENOL 8 HOUR ARTHRITIS PAIN) 650 MG CR tablet Take 650 mg by mouth every 6 hours as  "needed.       No facility-administered encounter medications on file as of 7/17/2019.      ROS       Objective:   /64 (BP Location: Left arm, Patient Position: Sitting, BP Cuff Size: Adult)   Pulse 80   Ht 1.575 m (5' 2\")   Wt 45.3 kg (99 lb 12.8 oz)   SpO2 97%   BMI 18.25 kg/m²      Physical Exam   Constitutional: She appears well-developed and well-nourished.   Neck: No JVD present.   Cardiovascular: Normal rate and regular rhythm.    No murmur heard.  Pulmonary/Chest: Effort normal and breath sounds normal. She has no rales.   Abdominal: Soft. There is no tenderness.   Musculoskeletal: She exhibits no edema.         Lab Results   Component Value Date/Time    CHOLSTRLTOT 166 02/07/2019 08:00 AM    LDL 74 02/07/2019 08:00 AM    HDL 71 02/07/2019 08:00 AM    TRIGLYCERIDE 105 02/07/2019 08:00 AM       Lab Results   Component Value Date/Time    SODIUM 137 07/03/2019 04:50 PM    POTASSIUM 3.8 07/03/2019 04:50 PM    CHLORIDE 102 07/03/2019 04:50 PM    CO2 22 07/03/2019 04:50 PM    GLUCOSE 123 (H) 07/03/2019 04:50 PM    BUN 9 07/03/2019 04:50 PM    CREATININE 1.05 07/03/2019 04:50 PM    CREATININE 1.3 01/19/2009 09:40 AM     Lab Results   Component Value Date/Time    ALKPHOSPHAT 68 07/03/2019 04:50 PM    ASTSGOT 22 07/03/2019 04:50 PM    ALTSGPT 11 07/03/2019 04:50 PM    TBILIRUBIN 0.5 07/03/2019 04:50 PM      Lab Results   Component Value Date/Time    BNPBTYPENAT 143 (H) 09/01/2015 01:05 PM        Carotid ultrasound from August 6, 2018:       FINDINGS   Right carotid.    Plaque of the bifurcation extending into the internal carotid. Velocities    are consistent with < 50% stenosis of the internal carotid artery. Plaque    is irregular on the surface and homogeneous with calcific acoustic density.      Left carotid.    Plaque of the bifurcation extending into the internal carotid. Velocities    are consistent with < 50% stenosis of the internal carotid artery. Plaque    is irregular on the surface and " homogeneous with calcific acoustic density.      Bilateral subclavian and vertebral artery waveforms are antegrade and    waveforms are normal in character and velocity.     Rc Hamlin MD   (Electronically Signed)   Final Date:      06 August 2018         Assessment:     1. PSVT (paroxysmal supraventricular tachycardia) (HCC)     2. Dyslipidemia     3. Atherosclerosis of both carotid arteries     4. Essential hypertension         Medical Decision Making:  Today's Assessment / Status / Plan:     Ms. Stover is clinically stable.  She is having some increasing difficulty with PSVT.  We discussed her options and she is agreeable to try diltiazem  mg daily.  He has had no recurrent TIA type symptoms on clopidogrel therapy.  Her lipid status is under fair control and she has not tolerated any statins other than pravastatin.  She is also not tolerated ezetimibe.  Her blood pressure appears to be under good control.  She will follow-up in a few months.         Hardik Berger M.D.  [address] [place] [address] [address] [place] [place] [id number]  VIA In Basket

## 2019-07-30 ENCOUNTER — TELEPHONE (OUTPATIENT)
Dept: MEDICAL GROUP | Age: 84
End: 2019-07-30

## 2019-07-30 NOTE — TELEPHONE ENCOUNTER
1. Caller Name: Antonia Stover                                           Call Back Number: [phone] (home)         Patient approves a detailed voicemail message: yes    2. Patient is requesting lab results dated: 7/11/19    3. Confirmed results are in chart. Patient advised they will be contacted once interpreted by provider.

## 2019-08-05 NOTE — TELEPHONE ENCOUNTER
"Phone Number Called: [phone] (home)       Call outcome: left message for patient to call back regarding message below    Message: Left VM for Pt to call back.  Pt needs to be notified of results:    \"the only concerning thing was elevated sugar.  We can repeat labs in 1 week\"    "

## 2019-08-06 NOTE — TELEPHONE ENCOUNTER
Phone Number Called: [phone] (home)       Call outcome: spoke to patient regarding message below    Message: Pt stated she got blood work done at about 4 pm she had last eaten last at 12pm she had an English muffin with jelly, patient states she not a sugar person, more of a salt person. She has natural sugar like fresh fruit, patient states she does have some ice cream before bed but no candy, she would like to know if she can disregard those lab works as she doesn't think it is necessary since she rarely has sugar she states she might have also had a mint in her mouth at the time of the draw. Patient also wanted to let provider know she is down to 3 1/2 cigarettes a day and wants to know what her next step is, patches? pt does not want any medications.       Please advise

## 2019-08-27 DIAGNOSIS — N95.2 ATROPHIC VULVOVAGINITIS: ICD-10-CM

## 2019-08-28 RX ORDER — ESTRADIOL 0.1 MG/G
CREAM VAGINAL
Qty: 42.5 G | Refills: 0 | Status: SHIPPED | OUTPATIENT
Start: 2019-08-28 | End: 2020-05-28 | Stop reason: SDUPTHER

## 2019-09-10 DIAGNOSIS — E78.5 DYSLIPIDEMIA: ICD-10-CM

## 2019-09-11 RX ORDER — PRAVASTATIN SODIUM 40 MG
TABLET ORAL
Qty: 90 TAB | Refills: 3 | Status: SHIPPED | OUTPATIENT
Start: 2019-09-11 | End: 2020-07-28

## 2019-10-02 ENCOUNTER — HOSPITAL ENCOUNTER (EMERGENCY)
Facility: MEDICAL CENTER | Age: 84
End: 2019-10-03
Attending: EMERGENCY MEDICINE
Payer: COMMERCIAL

## 2019-10-02 ENCOUNTER — APPOINTMENT (OUTPATIENT)
Dept: RADIOLOGY | Facility: MEDICAL CENTER | Age: 84
End: 2019-10-02
Attending: EMERGENCY MEDICINE
Payer: COMMERCIAL

## 2019-10-02 VITALS
SYSTOLIC BLOOD PRESSURE: 102 MMHG | BODY MASS INDEX: 19.47 KG/M2 | HEART RATE: 64 BPM | WEIGHT: 105.82 LBS | OXYGEN SATURATION: 96 % | TEMPERATURE: 97.1 F | HEIGHT: 62 IN | DIASTOLIC BLOOD PRESSURE: 69 MMHG | RESPIRATION RATE: 20 BRPM

## 2019-10-02 DIAGNOSIS — I47.10 SVT (SUPRAVENTRICULAR TACHYCARDIA): ICD-10-CM

## 2019-10-02 LAB
ANION GAP SERPL CALC-SCNC: 17 MMOL/L (ref 0–11.9)
BASOPHILS # BLD AUTO: 0.3 % (ref 0–1.8)
BASOPHILS # BLD: 0.04 K/UL (ref 0–0.12)
BUN SERPL-MCNC: 9 MG/DL (ref 8–22)
CALCIUM SERPL-MCNC: 9.3 MG/DL (ref 8.4–10.2)
CHLORIDE SERPL-SCNC: 100 MMOL/L (ref 96–112)
CO2 SERPL-SCNC: 21 MMOL/L (ref 20–33)
CREAT SERPL-MCNC: 0.98 MG/DL (ref 0.5–1.4)
EKG IMPRESSION: NORMAL
EOSINOPHIL # BLD AUTO: 0.05 K/UL (ref 0–0.51)
EOSINOPHIL NFR BLD: 0.4 % (ref 0–6.9)
ERYTHROCYTE [DISTWIDTH] IN BLOOD BY AUTOMATED COUNT: 48.2 FL (ref 35.9–50)
GLUCOSE SERPL-MCNC: 124 MG/DL (ref 65–99)
HCT VFR BLD AUTO: 38.6 % (ref 37–47)
HGB BLD-MCNC: 12.8 G/DL (ref 12–16)
IMM GRANULOCYTES # BLD AUTO: 0.05 K/UL (ref 0–0.11)
IMM GRANULOCYTES NFR BLD AUTO: 0.4 % (ref 0–0.9)
LYMPHOCYTES # BLD AUTO: 2.26 K/UL (ref 1–4.8)
LYMPHOCYTES NFR BLD: 18 % (ref 22–41)
MAGNESIUM SERPL-MCNC: 1.8 MG/DL (ref 1.5–2.5)
MCH RBC QN AUTO: 31.8 PG (ref 27–33)
MCHC RBC AUTO-ENTMCNC: 33.2 G/DL (ref 33.6–35)
MCV RBC AUTO: 95.8 FL (ref 81.4–97.8)
MONOCYTES # BLD AUTO: 0.78 K/UL (ref 0–0.85)
MONOCYTES NFR BLD AUTO: 6.2 % (ref 0–13.4)
NEUTROPHILS # BLD AUTO: 9.36 K/UL (ref 2–7.15)
NEUTROPHILS NFR BLD: 74.7 % (ref 44–72)
NRBC # BLD AUTO: 0 K/UL
NRBC BLD-RTO: 0 /100 WBC
PLATELET # BLD AUTO: 198 K/UL (ref 164–446)
PMV BLD AUTO: 11.6 FL (ref 9–12.9)
POTASSIUM SERPL-SCNC: 3.9 MMOL/L (ref 3.6–5.5)
RBC # BLD AUTO: 4.03 M/UL (ref 4.2–5.4)
SODIUM SERPL-SCNC: 138 MMOL/L (ref 135–145)
TROPONIN T SERPL-MCNC: 25 NG/L (ref 6–19)
TSH SERPL DL<=0.005 MIU/L-ACNC: 0.55 UIU/ML (ref 0.38–5.33)
WBC # BLD AUTO: 12.5 K/UL (ref 4.8–10.8)

## 2019-10-02 PROCEDURE — 84484 ASSAY OF TROPONIN QUANT: CPT

## 2019-10-02 PROCEDURE — 83735 ASSAY OF MAGNESIUM: CPT

## 2019-10-02 PROCEDURE — 85025 COMPLETE CBC W/AUTO DIFF WBC: CPT

## 2019-10-02 PROCEDURE — 99285 EMERGENCY DEPT VISIT HI MDM: CPT

## 2019-10-02 PROCEDURE — 700111 HCHG RX REV CODE 636 W/ 250 OVERRIDE (IP): Performed by: EMERGENCY MEDICINE

## 2019-10-02 PROCEDURE — 93005 ELECTROCARDIOGRAM TRACING: CPT | Performed by: EMERGENCY MEDICINE

## 2019-10-02 PROCEDURE — 84443 ASSAY THYROID STIM HORMONE: CPT

## 2019-10-02 PROCEDURE — 71045 X-RAY EXAM CHEST 1 VIEW: CPT

## 2019-10-02 PROCEDURE — 96374 THER/PROPH/DIAG INJ IV PUSH: CPT

## 2019-10-02 PROCEDURE — 80048 BASIC METABOLIC PNL TOTAL CA: CPT

## 2019-10-02 RX ORDER — DILTIAZEM HYDROCHLORIDE 5 MG/ML
5 INJECTION INTRAVENOUS ONCE
Status: COMPLETED | OUTPATIENT
Start: 2019-10-02 | End: 2019-10-02

## 2019-10-02 RX ORDER — DILTIAZEM HYDROCHLORIDE 5 MG/ML
10 INJECTION INTRAVENOUS ONCE
Status: COMPLETED | OUTPATIENT
Start: 2019-10-02 | End: 2019-10-02

## 2019-10-02 RX ADMIN — DILTIAZEM HYDROCHLORIDE 5 MG: 5 INJECTION INTRAVENOUS at 23:21

## 2019-10-02 RX ADMIN — DILTIAZEM HYDROCHLORIDE 10 MG: 5 INJECTION INTRAVENOUS at 22:53

## 2019-10-03 PROCEDURE — 93005 ELECTROCARDIOGRAM TRACING: CPT

## 2019-10-03 NOTE — DISCHARGE INSTRUCTIONS
Take your diltiazem as prescribed and follow-up with Dr. Johns.  Return for rapid heart rate that does not resolve in 1 to 2 hours with vagal maneuvers.  Return for chest pain, shortness of breath, loss of consciousness or other more concerning symptoms.

## 2019-10-03 NOTE — ED PROVIDER NOTES
ED Provider Note    CHIEF COMPLAINT  Chief Complaint   Patient presents with   • Tired   • Tachycardia   • Abdominal Pain     started yesterday   • Nausea       HPI  Antonia Stover is a 84 y.o. female who presents with a palpitation since 5 PM today.  She is sure it is her SVT.  She is tried vagal maneuvers without benefit.  She had a recent increase in dose of her diltiazem 280 a day.  She has had adenosine once and said it was horrifying and never agreed to it again.  The last few time she has had SVT she converted spontaneously.  She has not yet taken her dose of diltiazem today.  Denies chest pain or shortness of breath but she does have some dizziness and fatigue.  Takes Synthroid after ablation for Graves' disease.  Dr. Bernard is her cardiologist.    REVIEW OF SYSTEMS  Pertinent positives include: Echocardiac, dizziness, fatigue, history of SVT.  Pertinent negatives include: Heart failure, coronary disease, vomiting, diarrhea, alcohol use.  10+ systems reviewed and negative.      PAST MEDICAL HISTORY  Past Medical History:   Diagnosis Date   • Anemia    • Arthritis    • Carotid artery plaque 3/19/2014   • COPD    • Dizziness     • Dyslipidemia     • Fatigue 7/3/2014   • HLD (hyperlipidemia) 9/12/2014   • Hypothyroidism    • Insomnia     • Nocturnal hypoxia      Uses oxygen QHS.   • PSVT (paroxysmal supraventricular tachycardia) ([facility]) February 2013    Echocardiogram with normal LV size, LVEF 65-70%.   • Sickle cell disease (HCC)    • Tobacco use        FAMILY HISTORY  Family History   Problem Relation Age of Onset   • Heart Attack Brother 70       SOCIAL HISTORY  Social History     Tobacco Use   • Smoking status: Current Every Day Smoker     Packs/day: 0.25     Years: 55.00     Pack years: 13.75     Types: Cigarettes, Cigars   • Smokeless tobacco: Never Used   • Tobacco comment: does not inhale, small filtered cigars    Substance Use Topics   • Alcohol use: No   • Drug use: No     Social History  "    Substance and Sexual Activity   Drug Use No       SURGICAL HISTORY  Past Surgical History:   Procedure Laterality Date   • KNEE REPLACEMENT, TOTAL  2005    Right   • ABDOMINAL HYSTERECTOMY TOTAL     • APPENDECTOMY     • CATARACT EXTRACTION WITH IOL      Bilateral   • CATARACT EXTRACTION WITH IOL      Bilateral   • HYSTERECTOMY, TOTAL ABDOMINAL         CURRENT MEDICATIONS    Current Outpatient Medications   Medication Sig Dispense Refill   • pravastatin (PRAVACHOL) 40 MG tablet TAKE 1 TABLET BY MOUTH EVERY EVENING 90 Tab 3   • estradiol (ESTRACE) 0.1 MG/GM vaginal cream INSERT 1 GRAM VAGINALLY EVERY DAY 42.5 g 0   • diltiazem (DILACOR XR) 180 MG XR capsule Take 1 Cap by mouth every day. 30 Cap 11   • clopidogrel (PLAVIX) 75 MG Tab TAKE 1 TABLET BY MOUTH EVERY DAY 90 Tab 3   • levothyroxine (SYNTHROID) 75 MCG Tab Take 1 Tab by mouth every morning. ON A EMPTY STOMACH 90 Tab 1   • diphenhydrAMINE (BENADRYL) 25 MG Tab Take 25 mg by mouth every 6 hours as needed for Sleep.     • Docusate Sodium (COLACE PO) Take  by mouth.     • PROAIR  (90 Base) MCG/ACT Aero Soln inhalation aerosol Inhale 2 Puffs by mouth every 6 hours as needed for Shortness of Breath. 1 Inhaler 3   • acetaminophen (TYLENOL 8 HOUR ARTHRITIS PAIN) 650 MG CR tablet Take 650 mg by mouth every 6 hours as needed.     • Probiotic Product (ALIGN PO) Take  by mouth every day.     • Wheat Dextrin (BENEFIBER DRINK MIX PO) Take 1 Dose by mouth 3 times a day.         ALLERGIES  Allergies   Allergen Reactions   • Hydrocodone Hives   • Iodine Anaphylaxis     RXN=>20 years ago  Heavy metal dyes and preservatives   • Nsaids Hives and Shortness of Breath   • Penicillins Anaphylaxis     RXN=>20 years ago   • Asa [Aspirin] Hives     Hives   • Codeine    • Erythromycin    • Morphine    • Sulfa Drugs        PHYSICAL EXAM  VITAL SIGNS: /77   Pulse (!) 144   Temp 36.2 °C (97.1 °F) (Temporal)   Resp 20   Ht 1.575 m (5' 2\")   Wt 48 kg (105 lb 13.1 oz)   " SpO2 98%   BMI 19.35 kg/m²   Reviewed and tachycardic  Constitutional: Well developed, Well nourished, well-appearing, spunky.  HENT: Normocephalic, atraumatic, bilateral external ears normal, oropharynx moist, No exudates or erythema.   Eyes: PERRLA, conjunctiva pink, no scleral icterus.   Cardiovascular: Regular tachycardic without murmur rub or gallop.  No dependent edema or calf cords.  Respiratory: No rales, rhonchi, wheeze.  Gastrointestinal: Soft, nontender, nondistended.  Skin: No erythema, no rash.   Genitourinary:  No costovertebral angle tenderness.   Neurologic: Alert & oriented x 3, cranial nerves 2-12 intact by passive exam.  No focal deficit noted.  Psychiatric: Affect normal, Judgment normal, Mood normal.     DIFFERENTIAL DIAGNOSIS:  SVT, atrial flutter, atrial fibrillation, electrolyte disorder, hyperthyroidism.    EKG  EKG Interpretation 10:49 PM    Interpreted by me.  Indication: Palpitation    Rhythm: normal sinus   Rate: Tachycardic at 141  Axis: normal  Ectopy: none  Conduction: normal  ST/T Waves: Diffuse nonspecific ST change  Q Waves: none  R Wave progression: normal  Comparison: Unchanged from prior July presentation    Clinical Impression: Supraventricular tachycardia with nonspecific ST change  .    RADIOLOGY/PROCEDURES  DX-CHEST-PORTABLE (1 VIEW)   Final Result         1.  Bilateral basilar atelectasis, no focal infiltrate   2.  Atherosclerosis   3.  Hyperexpansion of lungs favors changes of COPD.          LABORATORY:  Results for orders placed or performed during the hospital encounter of 10/02/19   CBC WITH DIFFERENTIAL   Result Value Ref Range    WBC 12.5 (H) 4.8 - 10.8 K/uL    RBC 4.03 (L) 4.20 - 5.40 M/uL    Hemoglobin 12.8 12.0 - 16.0 g/dL    Hematocrit 38.6 37.0 - 47.0 %    MCV 95.8 81.4 - 97.8 fL    MCH 31.8 27.0 - 33.0 pg    MCHC 33.2 (L) 33.6 - 35.0 g/dL    RDW 48.2 35.9 - 50.0 fL    Platelet Count 198 164 - 446 K/uL    MPV 11.6 9.0 - 12.9 fL    Neutrophils-Polys 74.70 (H)  44.00 - 72.00 %    Lymphocytes 18.00 (L) 22.00 - 41.00 %    Monocytes 6.20 0.00 - 13.40 %    Eosinophils 0.40 0.00 - 6.90 %    Basophils 0.30 0.00 - 1.80 %    Immature Granulocytes 0.40 0.00 - 0.90 %    Nucleated RBC 0.00 /100 WBC    Neutrophils (Absolute) 9.36 (H) 2.00 - 7.15 K/uL    Lymphs (Absolute) 2.26 1.00 - 4.80 K/uL    Monos (Absolute) 0.78 0.00 - 0.85 K/uL    Eos (Absolute) 0.05 0.00 - 0.51 K/uL    Baso (Absolute) 0.04 0.00 - 0.12 K/uL    Immature Granulocytes (abs) 0.05 0.00 - 0.11 K/uL    NRBC (Absolute) 0.00 K/uL   Basic Metabolic Panel   Result Value Ref Range    Sodium 138 135 - 145 mmol/L    Potassium 3.9 3.6 - 5.5 mmol/L    Chloride 100 96 - 112 mmol/L    Co2 21 20 - 33 mmol/L    Glucose 124 (H) 65 - 99 mg/dL    Bun 9 8 - 22 mg/dL    Creatinine 0.98 0.50 - 1.40 mg/dL    Calcium 9.3 8.4 - 10.2 mg/dL    Anion Gap 17.0 (H) 0.0 - 11.9   TROPONIN   Result Value Ref Range    Troponin T 25 (H) 6 - 19 ng/L   TSH   Result Value Ref Range    TSH 0.547 0.380 - 5.330 uIU/mL   MAGNESIUM   Result Value Ref Range    Magnesium 1.8 1.5 - 2.5 mg/dL       INTERVENTIONS:  Medications   DILTIAZem (CARDIZEM) injection 10 mg (10 mg Intravenous Given 10/2/19 8027)   DILTIAZem (CARDIZEM) injection 5 mg (5 mg Intravenous Given 10/2/19 5667)     Response: Return of sinus rhythm    EKG Interpretation 12:02 AM    Interpreted by me.  Indication: Rhythm change    Rhythm: normal sinus   Rate: Normal at 68  Axis: normal  Ectopy: none  Conduction: normal  ST/T Waves: no acute change  Q Waves: none  R Wave progression: normal  Comparison: Resolution of SVT    Clinical Impression: Normal sinus rhythm  .    COURSE & MEDICAL DECISION MAKING  Well-appearing patient presents with paroxysmal SVT without electrolyte disorder.  She has a history of hypothyroidism after ablation on Synthroid.  She had not yet taken her diltiazem today.  She failed vagal maneuvers at home and vagal maneuvers attempted with me including Valsalva, leg elevation  and carotid pressure.  She responded to diltiazem..    This patient has borderline or elevated blood pressure as recorded above and was instructed to followup with primary physician for comprehensive blood pressure evaluation and yearly fasting cholesterol assessment according to to CMS protocol.    PLAN:  Continue current medication  SVT handout given  Return for chest pain, shortness of breath, loss of consciousness, persistent palpitation which fails vagal maneuvers    Diogo Bernard M.D.  [address]  [address]  [facility] [id number] [phone]    Call   for appointment      CONDITION: Stable, improved, critical care time 33 minutes.    FINAL IMPRESSION  1. SVT (supraventricular tachycardia) (HCC)    2.      Critical care        Electronically signed by: Abdirashid Dominguez, 10/2/2019 10:47 PM

## 2019-10-03 NOTE — ED NOTES
Did a bladder scan on pt. Bladder scan showed that the pt had 999 or more in bladder. Doctor is aware nurse is too

## 2019-10-03 NOTE — ED TRIAGE NOTES
Pt BIB EMS from home   Chief Complaint   Patient presents with   • Tired   • Tachycardia   • Abdominal Pain     started yesterday   • Nausea   Pt A & 0 x 4, pt reports at 1700 her heart rate went up to 130  GCS 15  Pt received 300 ml of NS with no change in heart rate PTA  Pt denies pain and SOB. Bed in lowest position. Call bell within reach, will continue to monitor.

## 2019-10-06 LAB — EKG IMPRESSION: NORMAL

## 2019-10-15 ENCOUNTER — HOSPITAL ENCOUNTER (EMERGENCY)
Facility: MEDICAL CENTER | Age: 84
End: 2019-10-16
Attending: EMERGENCY MEDICINE
Payer: COMMERCIAL

## 2019-10-15 DIAGNOSIS — I47.10 SVT (SUPRAVENTRICULAR TACHYCARDIA): ICD-10-CM

## 2019-10-15 PROCEDURE — 84484 ASSAY OF TROPONIN QUANT: CPT

## 2019-10-15 PROCEDURE — 700111 HCHG RX REV CODE 636 W/ 250 OVERRIDE (IP)

## 2019-10-15 PROCEDURE — 93005 ELECTROCARDIOGRAM TRACING: CPT

## 2019-10-15 PROCEDURE — 700111 HCHG RX REV CODE 636 W/ 250 OVERRIDE (IP): Performed by: EMERGENCY MEDICINE

## 2019-10-15 PROCEDURE — 85025 COMPLETE CBC W/AUTO DIFF WBC: CPT

## 2019-10-15 PROCEDURE — 96374 THER/PROPH/DIAG INJ IV PUSH: CPT

## 2019-10-15 PROCEDURE — 80053 COMPREHEN METABOLIC PANEL: CPT

## 2019-10-15 PROCEDURE — 99284 EMERGENCY DEPT VISIT MOD MDM: CPT

## 2019-10-15 PROCEDURE — 93005 ELECTROCARDIOGRAM TRACING: CPT | Performed by: EMERGENCY MEDICINE

## 2019-10-15 RX ORDER — DILTIAZEM HYDROCHLORIDE 5 MG/ML
0.25 INJECTION INTRAVENOUS ONCE
Status: COMPLETED | OUTPATIENT
Start: 2019-10-15 | End: 2019-10-15

## 2019-10-15 RX ORDER — DILTIAZEM HYDROCHLORIDE 5 MG/ML
INJECTION INTRAVENOUS
Status: COMPLETED
Start: 2019-10-15 | End: 2019-10-15

## 2019-10-15 RX ORDER — DILTIAZEM HYDROCHLORIDE 5 MG/ML
10 INJECTION INTRAVENOUS ONCE
Status: COMPLETED | OUTPATIENT
Start: 2019-10-16 | End: 2019-10-15

## 2019-10-15 RX ADMIN — DILTIAZEM HYDROCHLORIDE 11.8 MG: 5 INJECTION INTRAVENOUS at 23:18

## 2019-10-15 RX ADMIN — DILTIAZEM HYDROCHLORIDE 10 MG: 5 INJECTION INTRAVENOUS at 23:46

## 2019-10-16 ENCOUNTER — TELEPHONE (OUTPATIENT)
Dept: CARDIOLOGY | Facility: MEDICAL CENTER | Age: 84
End: 2019-10-16

## 2019-10-16 VITALS
WEIGHT: 104.06 LBS | RESPIRATION RATE: 28 BRPM | HEART RATE: 77 BPM | TEMPERATURE: 97.9 F | DIASTOLIC BLOOD PRESSURE: 62 MMHG | OXYGEN SATURATION: 90 % | BODY MASS INDEX: 19.03 KG/M2 | SYSTOLIC BLOOD PRESSURE: 101 MMHG

## 2019-10-16 LAB
ALBUMIN SERPL BCP-MCNC: 4.5 G/DL (ref 3.2–4.9)
ALBUMIN/GLOB SERPL: 1.5 G/DL
ALP SERPL-CCNC: 93 U/L (ref 30–99)
ALT SERPL-CCNC: 17 U/L (ref 2–50)
ANION GAP SERPL CALC-SCNC: 18 MMOL/L (ref 0–11.9)
AST SERPL-CCNC: 28 U/L (ref 12–45)
BASOPHILS # BLD AUTO: 0.6 % (ref 0–1.8)
BASOPHILS # BLD: 0.06 K/UL (ref 0–0.12)
BILIRUB SERPL-MCNC: 0.3 MG/DL (ref 0.1–1.5)
BUN SERPL-MCNC: 11 MG/DL (ref 8–22)
CALCIUM SERPL-MCNC: 9.8 MG/DL (ref 8.4–10.2)
CHLORIDE SERPL-SCNC: 94 MMOL/L (ref 96–112)
CO2 SERPL-SCNC: 23 MMOL/L (ref 20–33)
CREAT SERPL-MCNC: 0.98 MG/DL (ref 0.5–1.4)
EKG IMPRESSION: NORMAL
EOSINOPHIL # BLD AUTO: 0.13 K/UL (ref 0–0.51)
EOSINOPHIL NFR BLD: 1.3 % (ref 0–6.9)
ERYTHROCYTE [DISTWIDTH] IN BLOOD BY AUTOMATED COUNT: 47.1 FL (ref 35.9–50)
GLOBULIN SER CALC-MCNC: 3 G/DL (ref 1.9–3.5)
GLUCOSE SERPL-MCNC: 98 MG/DL (ref 65–99)
HCT VFR BLD AUTO: 40.1 % (ref 37–47)
HGB BLD-MCNC: 13.2 G/DL (ref 12–16)
IMM GRANULOCYTES # BLD AUTO: 0.03 K/UL (ref 0–0.11)
IMM GRANULOCYTES NFR BLD AUTO: 0.3 % (ref 0–0.9)
LYMPHOCYTES # BLD AUTO: 4.14 K/UL (ref 1–4.8)
LYMPHOCYTES NFR BLD: 40.3 % (ref 22–41)
MCH RBC QN AUTO: 31.3 PG (ref 27–33)
MCHC RBC AUTO-ENTMCNC: 32.9 G/DL (ref 33.6–35)
MCV RBC AUTO: 95 FL (ref 81.4–97.8)
MONOCYTES # BLD AUTO: 0.85 K/UL (ref 0–0.85)
MONOCYTES NFR BLD AUTO: 8.3 % (ref 0–13.4)
NEUTROPHILS # BLD AUTO: 5.06 K/UL (ref 2–7.15)
NEUTROPHILS NFR BLD: 49.2 % (ref 44–72)
NRBC # BLD AUTO: 0 K/UL
NRBC BLD-RTO: 0 /100 WBC
PLATELET # BLD AUTO: 202 K/UL (ref 164–446)
PMV BLD AUTO: 11.9 FL (ref 9–12.9)
POTASSIUM SERPL-SCNC: 3.7 MMOL/L (ref 3.6–5.5)
PROT SERPL-MCNC: 7.5 G/DL (ref 6–8.2)
RBC # BLD AUTO: 4.22 M/UL (ref 4.2–5.4)
SODIUM SERPL-SCNC: 135 MMOL/L (ref 135–145)
TROPONIN T SERPL-MCNC: 11 NG/L (ref 6–19)
WBC # BLD AUTO: 10.3 K/UL (ref 4.8–10.8)

## 2019-10-16 NOTE — ED PROVIDER NOTES
ED Provider Note    CHIEF COMPLAINT  Chief Complaint   Patient presents with   • Rapid Heart Beat     Pt c/o about 10pm this evening pt felt like her heart was racing   • Weakness       HPI  Antonia Stover is a 84 y.o. female who presents for evaluation of sudden onset racing heart rate.  Patient states she was doing nothing in particular around 9:45 PM when she felt her heart racing and had sudden onset of lightheadedness.  Patient denies any chest pain, pressure, or tightness and does not feel particularly short of breath.  She does feel generalized weakness however.  Patient had no preceding symptoms and has had no recent infectious prodrome.  She has no abdominal pain, nausea, or vomiting.    REVIEW OF SYSTEMS  Constitutional: No recent fevers or chills  Skin: No rashes  HEENT: No sore throat, runny nose, sores, trouble swallowing, trouble speaking.  Neck: No neck pain, stiffness, or masses.  Chest: No pain or rashes  Pulm: No shortness of breath, cough, wheezing, stridor, or pain with inspiration/expiration  Gastrointestinal: No nausea, vomiting, or diarrhea  Heme: No bleeding or bruising problems.   Immuno: No hx of recurrent infections      PAST MEDICAL HISTORY   has a past medical history of Anemia, Arthritis, Carotid artery plaque (3/19/2014), COPD, Dizziness ( ), Dyslipidemia ( ), Fatigue (7/3/2014), HLD (hyperlipidemia) (9/12/2014), Hypothyroidism, Insomnia ( ), Nocturnal hypoxia ( ), PSVT (paroxysmal supraventricular tachycardia) (HCC) (February 2013), Sickle cell disease ([facility]), and Tobacco use.    SOCIAL HISTORY  Social History     Tobacco Use   • Smoking status: Current Every Day Smoker     Packs/day: 0.25     Years: 55.00     Pack years: 13.75     Types: Cigarettes, Cigars   • Smokeless tobacco: Never Used   • Tobacco comment: does not inhale, small filtered cigars    Substance and Sexual Activity   • Alcohol use: No   • Drug use: No   • Sexual activity: Not Currently       SURGICAL HISTORY    has a past surgical history that includes hysterectomy, total abdominal; knee replacement, total (2005); cataract extraction with iol; cataract extraction with iol; appendectomy; and abdominal hysterectomy total.    CURRENT MEDICATIONS  Home Medications    **Home medications have not yet been reviewed for this encounter**         ALLERGIES  Allergies   Allergen Reactions   • Hydrocodone Hives   • Iodine Anaphylaxis     RXN=>20 years ago  Heavy metal dyes and preservatives   • Nsaids Hives and Shortness of Breath   • Penicillins Anaphylaxis     RXN=>20 years ago   • Asa [Aspirin] Hives     Hives   • Codeine    • Erythromycin    • Morphine    • Sulfa Drugs        PHYSICAL EXAM  VITAL SIGNS: /62   Pulse 70   Temp 36.6 °C (97.9 °F)   Resp (!) 25   Wt 47.2 kg (104 lb 0.9 oz)   SpO2 95%   BMI 19.03 kg/m²    Gen: Appears tired but otherwise alert  HEENT: No signs of trauma, Bilateral external ears normal, Nose normal. Conjunctiva normal, Non-icteric.   Neck:  No tenderness, Supple, No masses  Lymphatic: No cervical lymphadenopathy noted.   Cardiovascular: Tachycardia, regular rhythm.  No lower extremity edema noted.  Capillary refill 3 to 4 seconds all extremities, 1+ distal pulses.  Thorax & Lungs: Normal breath sounds, No respiratory distress, No wheezing bilateral chest rise  Abdomen: Bowel sounds normal, Soft, No tenderness, No masses, No pulsatile masses. No Guarding or rebound  Skin: Warm, Dry, No erythema, No rash.   Extremities: Intact distal pulses, No edema  Neurologic: Alert , no facial droop, grossly normal coordination and strength      INITIAL IMPRESSION  Patient arrives with symptoms and EKG findings suggestive of recurrent SVT.  Atrial flutter with 2 1 conduction is also possible however I feel patient will benefit from rate control emergently.  Is notable the patient has a low systolic blood pressure however her map is well above 65 and she is mentating normally.  She does not have chest pain  or shortness of breath and I feel it is reasonable to avoid electrical cardioversion.  Is notable patient does not want a Axel per her previous records and I do not feel it is absolutely necessary at this point.  We will attempt 10 of Cardizem and reevaluate the patient.  Will perform screening labs as well.    LABS  Results for orders placed or performed during the hospital encounter of 10/15/19   CBC WITH DIFFERENTIAL   Result Value Ref Range    WBC 10.3 4.8 - 10.8 K/uL    RBC 4.22 4.20 - 5.40 M/uL    Hemoglobin 13.2 12.0 - 16.0 g/dL    Hematocrit 40.1 37.0 - 47.0 %    MCV 95.0 81.4 - 97.8 fL    MCH 31.3 27.0 - 33.0 pg    MCHC 32.9 (L) 33.6 - 35.0 g/dL    RDW 47.1 35.9 - 50.0 fL    Platelet Count 202 164 - 446 K/uL    MPV 11.9 9.0 - 12.9 fL    Neutrophils-Polys 49.20 44.00 - 72.00 %    Lymphocytes 40.30 22.00 - 41.00 %    Monocytes 8.30 0.00 - 13.40 %    Eosinophils 1.30 0.00 - 6.90 %    Basophils 0.60 0.00 - 1.80 %    Immature Granulocytes 0.30 0.00 - 0.90 %    Nucleated RBC 0.00 /100 WBC    Neutrophils (Absolute) 5.06 2.00 - 7.15 K/uL    Lymphs (Absolute) 4.14 1.00 - 4.80 K/uL    Monos (Absolute) 0.85 0.00 - 0.85 K/uL    Eos (Absolute) 0.13 0.00 - 0.51 K/uL    Baso (Absolute) 0.06 0.00 - 0.12 K/uL    Immature Granulocytes (abs) 0.03 0.00 - 0.11 K/uL    NRBC (Absolute) 0.00 K/uL   COMP METABOLIC PANEL   Result Value Ref Range    Sodium 135 135 - 145 mmol/L    Potassium 3.7 3.6 - 5.5 mmol/L    Chloride 94 (L) 96 - 112 mmol/L    Co2 23 20 - 33 mmol/L    Anion Gap 18.0 (H) 0.0 - 11.9    Glucose 98 65 - 99 mg/dL    Bun 11 8 - 22 mg/dL    Creatinine 0.98 0.50 - 1.40 mg/dL    Calcium 9.8 8.4 - 10.2 mg/dL    AST(SGOT) 28 12 - 45 U/L    ALT(SGPT) 17 2 - 50 U/L    Alkaline Phosphatase 93 30 - 99 U/L    Total Bilirubin 0.3 0.1 - 1.5 mg/dL    Albumin 4.5 3.2 - 4.9 g/dL    Total Protein 7.5 6.0 - 8.2 g/dL    Globulin 3.0 1.9 - 3.5 g/dL    A-G Ratio 1.5 g/dL   TROPONIN   Result Value Ref Range    Troponin T 11 6 - 19 ng/L    ESTIMATED GFR   Result Value Ref Range    GFR If African American >60 >60 mL/min/1.73 m 2    GFR If Non African American 54 (A) >60 mL/min/1.73 m 2   EKG   Result Value Ref Range    Report       [facility]    Test Date:  2019-10-15  Pt Name:    KYRIE NELSON               Department: EDS  MRN:        [id number]                      [facility]  Gender:     Female                       Technician: AMENA  :        [id number]                   Requested By:ER TRIAGE PROTOCOL  Order #:    [id number]                    Reading MD: Benoit Rene MD    Measurements  Intervals                                Axis  Rate:       152                          P:          0  SD:         79                           QRS:        87  QRSD:       91                           T:          16  QT:         297  QTc:        473    Interpretive Statements  Supraventricular tachycardia  Borderline right axis deviation  Borderline low voltage, extremity leads  ST depression, probably rate related  Compared to ECG 10/02/2019 23:55:13  ST (T wave) deviation now present  Sinus rhythm no longer present    Electronically Signed On 10- 1:05:13 PDT by Juan Rene MD         RADIOLOGY  No orders to display     Reevaluation   Time:11:45 PM  Vital signs: Noted  Assessment: Patient states she is feeling better however her heart rate is still around 130-140.  We will give her another dose of diltiazem    Reevaluation   Time:1:11 AM  Vital signs: Noted  Assessment: Patient's heart rate is in the 70s in sinus    Critical Care Note  Upon my evaluation, this patient had high probability of imminent and life-threatening deterioration due to SVT with hypotension, which required my direct attention, intervention, and personal management. I personally provided 35 minutes of critical care time exclusive of time spent on separately billable procedures. Time includes review of laboratory data, radiology  results, discussion with consultants, and monitoring for potential decompensation.     HYDRATION: Based on the patient's presentation of Hypotension the patient was given IV fluids. IV Hydration was used because oral hydration was not adequate alone. Upon recheck following hydration, the patient was stable.    COURSE & MEDICAL DECISION MAKING  Pertinent Labs & Imaging studies reviewed. (See chart for details)  Patient arrived for evaluation of SVT which appears to be recurrent for her.  She notes that she took her 180 mg diltiazem sustained release at 9 pm and around 9:45 pm she had sudden onset of a rapid heart rate.  Patient tried vagal maneuvers at home however this failed.  Her rate was controlled after 2 doses of diltiazem for a total of 20 mg.  Is notable the patient was mildly hypotensive but had a map over 65 on arrival.  Because of this, I felt it was safe to attempt a more gentle rate control then either electrical cardioversion or cardioversion by adenosine.  It is notable that the patient has had adenosine in the past and never wanted it again.  The patient also declined a repeat EKG after her rate had been controlled however it appeared to be a normal sinus rhythm on the monitor.  I felt it was safe to avoid both the chest x-ray and the repeat EKG.  I found no evidence to suggest the need for hospitalization however I did express concern over the end to events already this month.  Patient stated she has an appointment with her cardiologist on Friday and will discuss it with him at that time.    FINAL IMPRESSION  1. SVT (supraventricular tachycardia) ([facility])        Electronically signed by: Benoit Rene, 10/15/2019 11:22 PM

## 2019-10-16 NOTE — ED TRIAGE NOTES
Chief Complaint   Patient presents with   • Rapid Heart Beat     Pt c/o about 10pm this evening pt felt like her heart was racing   • Weakness     EKG done in triage. Pt roomed immediately. Pt with hx of svt. Pt also c/o generalized weakness.

## 2019-10-16 NOTE — TELEPHONE ENCOUNTER
Returned patient call. Patient states when asked if ER would fax this information she was told to call here to inform us of the 2 ER visits. Patient refuses sooner midlevel appointment. Pt will wait for FK 10/22 f/u appt. Pt will call us for sooner appt if needed.

## 2019-10-16 NOTE — TELEPHONE ENCOUNTER
FK      Patient was in the ER twice in the last 2 weeks. ER doctor told her last night to call Dr. Bernard today. She can be reached at [phone].

## 2019-10-22 ENCOUNTER — OFFICE VISIT (OUTPATIENT)
Dept: CARDIOLOGY | Facility: MEDICAL CENTER | Age: 84
End: 2019-10-22
Payer: COMMERCIAL

## 2019-10-22 VITALS
SYSTOLIC BLOOD PRESSURE: 118 MMHG | BODY MASS INDEX: 18.58 KG/M2 | WEIGHT: 101 LBS | DIASTOLIC BLOOD PRESSURE: 60 MMHG | OXYGEN SATURATION: 93 % | HEIGHT: 62 IN | HEART RATE: 78 BPM

## 2019-10-22 DIAGNOSIS — I10 ESSENTIAL HYPERTENSION: ICD-10-CM

## 2019-10-22 DIAGNOSIS — I65.23 ATHEROSCLEROSIS OF BOTH CAROTID ARTERIES: ICD-10-CM

## 2019-10-22 DIAGNOSIS — E78.5 DYSLIPIDEMIA: ICD-10-CM

## 2019-10-22 DIAGNOSIS — I47.10 PSVT (PAROXYSMAL SUPRAVENTRICULAR TACHYCARDIA): ICD-10-CM

## 2019-10-22 PROCEDURE — 99214 OFFICE O/P EST MOD 30 MIN: CPT | Performed by: INTERNAL MEDICINE

## 2019-10-22 RX ORDER — DILTIAZEM HYDROCHLORIDE 240 MG/1
240 CAPSULE, COATED, EXTENDED RELEASE ORAL DAILY
Qty: 30 CAP | Refills: 11 | Status: SHIPPED | OUTPATIENT
Start: 2019-10-22 | End: 2020-02-25

## 2019-10-22 NOTE — PROGRESS NOTES
Chief Complaint   Patient presents with   • Paroxysmal Supraventricular Tachycardia (PSVT)     FV, recent hospital stay(please review)       Subjective:   Antonia Stover is a 83 y.o. female who presents today for followup of her PSVT, history of near syncope, hyperlipidemia and carotid plaque.    Over the last month, she has had 2 episodes of PSVT which required going to the emergency room for termination.  She is also had a short episode terminated by carotid sinus massage.    She has had no chest discomfort, dyspnea or edema.    Past Medical History:   Diagnosis Date   • Anemia    • Arthritis    • Carotid artery plaque 3/19/2014   • COPD    • Dizziness     • Dyslipidemia     • Fatigue 7/3/2014   • HLD (hyperlipidemia) 9/12/2014   • Hypothyroidism    • Insomnia     • Nocturnal hypoxia      Uses oxygen QHS.   • PSVT (paroxysmal supraventricular tachycardia) ([facility]) February 2013    Echocardiogram with normal LV size, LVEF 65-70%.   • Sickle cell disease (HCC)    • Tobacco use      Past Surgical History:   Procedure Laterality Date   • KNEE REPLACEMENT, TOTAL  2005    Right   • ABDOMINAL HYSTERECTOMY TOTAL     • APPENDECTOMY     • CATARACT EXTRACTION WITH IOL      Bilateral   • CATARACT EXTRACTION WITH IOL      Bilateral   • HYSTERECTOMY, TOTAL ABDOMINAL       Family History   Problem Relation Age of Onset   • Heart Attack Brother 70     Social History     Socioeconomic History   • Marital status:      Spouse name: Not on file   • Number of children: Not on file   • Years of education: Not on file   • Highest education level: Not on file   Occupational History   • Not on file   Social Needs   • Financial resource strain: Not on file   • Food insecurity:     Worry: Not on file     Inability: Not on file   • Transportation needs:     Medical: Not on file     Non-medical: Not on file   Tobacco Use   • Smoking status: Current Every Day Smoker     Packs/day: 0.25     Years: 55.00     Pack years: 13.75      Types: Cigarettes, Cigars   • Smokeless tobacco: Never Used   • Tobacco comment: does not inhale, small filtered cigars, 4 cigs/ day    Substance and Sexual Activity   • Alcohol use: No   • Drug use: No   • Sexual activity: Not Currently   Lifestyle   • Physical activity:     Days per week: Not on file     Minutes per session: Not on file   • Stress: Not on file   Relationships   • Social connections:     Talks on phone: Not on file     Gets together: Not on file     Attends Taoist service: Not on file     Active member of club or organization: Not on file     Attends meetings of clubs or organizations: Not on file     Relationship status: Not on file   • Intimate partner violence:     Fear of current or ex partner: Not on file     Emotionally abused: Not on file     Physically abused: Not on file     Forced sexual activity: Not on file   Other Topics Concern   • Not on file   Social History Narrative   • Not on file     Allergies   Allergen Reactions   • Hydrocodone Hives   • Iodine Anaphylaxis     RXN=>20 years ago  Heavy metal dyes and preservatives   • Nsaids Hives and Shortness of Breath   • Penicillins Anaphylaxis     RXN=>20 years ago   • Asa [Aspirin] Hives     Hives   • Codeine    • Erythromycin    • Morphine    • Sulfa Drugs      Outpatient Encounter Medications as of 10/22/2019   Medication Sig Dispense Refill   • pravastatin (PRAVACHOL) 40 MG tablet TAKE 1 TABLET BY MOUTH EVERY EVENING 90 Tab 3   • estradiol (ESTRACE) 0.1 MG/GM vaginal cream INSERT 1 GRAM VAGINALLY EVERY DAY 42.5 g 0   • diltiazem (DILACOR XR) 180 MG XR capsule Take 1 Cap by mouth every day. 30 Cap 11   • clopidogrel (PLAVIX) 75 MG Tab TAKE 1 TABLET BY MOUTH EVERY DAY 90 Tab 3   • levothyroxine (SYNTHROID) 75 MCG Tab Take 1 Tab by mouth every morning. ON A EMPTY STOMACH 90 Tab 1   • diphenhydrAMINE (BENADRYL) 25 MG Tab Take 25 mg by mouth every 6 hours as needed for Sleep.     • PROAIR  (90 Base) MCG/ACT Aero Soln inhalation  "aerosol Inhale 2 Puffs by mouth every 6 hours as needed for Shortness of Breath. 1 Inhaler 3   • acetaminophen (TYLENOL 8 HOUR ARTHRITIS PAIN) 650 MG CR tablet Take 650 mg by mouth every 6 hours as needed.     • Probiotic Product (ALIGN PO) Take  by mouth every day.     • Wheat Dextrin (BENEFIBER DRINK MIX PO) Take 1 Dose by mouth 3 times a day.     • Docusate Sodium (COLACE PO) Take  by mouth.       No facility-administered encounter medications on file as of 10/22/2019.      ROS       Objective:   /60 (BP Location: Left arm, Patient Position: Sitting, BP Cuff Size: Adult)   Pulse 78   Ht 1.575 m (5' 2\")   Wt 45.8 kg (101 lb)   SpO2 93%   BMI 18.47 kg/m²     Physical Exam   Constitutional: She appears well-developed and well-nourished.   Neck: No JVD present.   Cardiovascular: Normal rate and regular rhythm.   No murmur heard.  Pulmonary/Chest: Effort normal and breath sounds normal. She has no rales.   Abdominal: Soft. There is no tenderness.   Musculoskeletal: She exhibits no edema.         Lab Results   Component Value Date/Time    CHOLSTRLTOT 166 02/07/2019 08:00 AM    LDL 74 02/07/2019 08:00 AM    HDL 71 02/07/2019 08:00 AM    TRIGLYCERIDE 105 02/07/2019 08:00 AM       Lab Results   Component Value Date/Time    SODIUM 135 10/15/2019 11:15 PM    POTASSIUM 3.7 10/15/2019 11:15 PM    CHLORIDE 94 (L) 10/15/2019 11:15 PM    CO2 23 10/15/2019 11:15 PM    GLUCOSE 98 10/15/2019 11:15 PM    BUN 11 10/15/2019 11:15 PM    CREATININE 0.98 10/15/2019 11:15 PM    CREATININE 1.3 01/19/2009 09:40 AM     Lab Results   Component Value Date/Time    ALKPHOSPHAT 93 10/15/2019 11:15 PM    ASTSGOT 28 10/15/2019 11:15 PM    ALTSGPT 17 10/15/2019 11:15 PM    TBILIRUBIN 0.3 10/15/2019 11:15 PM      Lab Results   Component Value Date/Time    BNPBTYPENAT 143 (H) 09/01/2015 01:05 PM        Carotid ultrasound from August 6, 2018:       FINDINGS   Right carotid.    Plaque of the bifurcation extending into the internal carotid. " Velocities    are consistent with < 50% stenosis of the internal carotid artery. Plaque    is irregular on the surface and homogeneous with calcific acoustic density.      Left carotid.    Plaque of the bifurcation extending into the internal carotid. Velocities    are consistent with < 50% stenosis of the internal carotid artery. Plaque    is irregular on the surface and homogeneous with calcific acoustic density.      Bilateral subclavian and vertebral artery waveforms are antegrade and    waveforms are normal in character and velocity.     Rc Hamlin MD   (Electronically Signed)   Final Date:      06 August 2018         Assessment:     1. PSVT (paroxysmal supraventricular tachycardia) (HCC)     2. Dyslipidemia     3. Atherosclerosis of both carotid arteries     4. Essential hypertension         Medical Decision Making:  Today's Assessment / Status / Plan:     Ms. Stover is clinically stable.  She continues have episodes of PSVT.  We discussed the possibility of referral to EP and possibly ablation.  However, she prefers to increase her medical therapy and I feel this is reasonable.  We will increase diltiazem ER to 240 mg daily.  We again discussed carotid sinus massage and Valsalva maneuver.  Her blood pressure appears to be under excellent control.  Her lipid status is under fair control.  She is not able to tolerate other statins or ezetimibe.  She will follow-up in a couple of months.

## 2019-10-29 ENCOUNTER — OFFICE VISIT (OUTPATIENT)
Dept: MEDICAL GROUP | Age: 84
End: 2019-10-29
Payer: COMMERCIAL

## 2019-10-29 VITALS
HEIGHT: 62 IN | HEART RATE: 64 BPM | DIASTOLIC BLOOD PRESSURE: 56 MMHG | WEIGHT: 101.6 LBS | TEMPERATURE: 98.9 F | OXYGEN SATURATION: 96 % | BODY MASS INDEX: 18.69 KG/M2 | SYSTOLIC BLOOD PRESSURE: 108 MMHG

## 2019-10-29 DIAGNOSIS — N95.1 MENOPAUSAL STATE: ICD-10-CM

## 2019-10-29 DIAGNOSIS — E78.5 DYSLIPIDEMIA: ICD-10-CM

## 2019-10-29 DIAGNOSIS — Z78.0 POSTMENOPAUSAL STATUS (AGE-RELATED) (NATURAL): ICD-10-CM

## 2019-10-29 DIAGNOSIS — E03.9 HYPOTHYROIDISM, UNSPECIFIED TYPE: ICD-10-CM

## 2019-10-29 DIAGNOSIS — J42 CHRONIC BRONCHITIS, UNSPECIFIED CHRONIC BRONCHITIS TYPE (HCC): ICD-10-CM

## 2019-10-29 DIAGNOSIS — Z23 NEED FOR VACCINATION: Primary | ICD-10-CM

## 2019-10-29 DIAGNOSIS — F17.200 TOBACCO DEPENDENCE: ICD-10-CM

## 2019-10-29 PROCEDURE — 99214 OFFICE O/P EST MOD 30 MIN: CPT | Mod: 25 | Performed by: FAMILY MEDICINE

## 2019-10-29 PROCEDURE — 90662 IIV NO PRSV INCREASED AG IM: CPT | Performed by: FAMILY MEDICINE

## 2019-10-29 PROCEDURE — 90471 IMMUNIZATION ADMIN: CPT | Performed by: FAMILY MEDICINE

## 2019-10-29 NOTE — PROGRESS NOTES
This medical record contains text that has been entered with the assistance of computer voice recognition and dictation software.  Therefore, it may contain unintended errors in text, spelling, punctuation, or grammar    Chief Complaint   Patient presents with   • Nicotine Dependence     pt is having urges to smoke more    • Other     multiple issues to discuss         Antonia Bre Stover is a 84 y.o. female here evaluation and management of: nicotine dependence, hypothyroidism, dyslipidemia, and chronic bronchitis.       HPI:       1. Tobacco dependence  Patient states she is going to continue to smoke four cigars per day. She has been smoking for about 60 years. She states she originally smoked cigarettes but moved to cigars about 20 years ago. She states she does not use any inhalers. She denies shortness of breath or chest pain.      2. Menopausal state  Patient is due for DEXA scan.    3. Postmenopausal status (age-related) (natural)  Patient is due for DEXA scan.    4. Chronic bronchitis, unspecified chronic bronchitis type (HCC)  Patient is smoking four cigars per day. She denies increased inhaler use, shortness of breath, or chest pain.    5. Hypothyroidism, unspecified type  The patient denies any new fatigue, cold/heat intolerance, weight gain/weight loss, diarrhea/constipation, dry skin, myalgia, depressed mood, palpitations, tremmor, hair loss, and no goiter.    6. Dyslipidemia  The patient has been on a statin for years and tolerating this fine. The patient denies any muscle aches, no abdominal pain and no history of elevated liver enzymes.    7. Need for vaccination  Patient is due for her influenza vaccine and shingles vaccine.       Current medicines (including changes today)  Current Outpatient Medications   Medication Sig Dispense Refill   • Zoster Vac Recomb Adjuvanted (SHINGRIX) 50 MCG/0.5ML Recon Susp 0.5 mL by Intramuscular route Once for 1 dose. 0.5 mL 0   • DILTIAZem CD (CARDIZEM CD) 240 MG  CAPSULE SR 24 HR Take 1 Cap by mouth every day. 30 Cap 11   • pravastatin (PRAVACHOL) 40 MG tablet TAKE 1 TABLET BY MOUTH EVERY EVENING 90 Tab 3   • estradiol (ESTRACE) 0.1 MG/GM vaginal cream INSERT 1 GRAM VAGINALLY EVERY DAY 42.5 g 0   • clopidogrel (PLAVIX) 75 MG Tab TAKE 1 TABLET BY MOUTH EVERY DAY 90 Tab 3   • levothyroxine (SYNTHROID) 75 MCG Tab Take 1 Tab by mouth every morning. ON A EMPTY STOMACH 90 Tab 1   • diphenhydrAMINE (BENADRYL) 25 MG Tab Take 25 mg by mouth every 6 hours as needed for Sleep.     • Docusate Sodium (COLACE PO) Take  by mouth.     • PROAIR  (90 Base) MCG/ACT Aero Soln inhalation aerosol Inhale 2 Puffs by mouth every 6 hours as needed for Shortness of Breath. 1 Inhaler 3   • acetaminophen (TYLENOL 8 HOUR ARTHRITIS PAIN) 650 MG CR tablet Take 650 mg by mouth every 6 hours as needed.     • Probiotic Product (ALIGN PO) Take  by mouth every day.     • Wheat Dextrin (BENEFIBER DRINK MIX PO) Take 1 Dose by mouth 3 times a day.       No current facility-administered medications for this visit.      She  has a past medical history of Anemia, Arthritis, Carotid artery plaque (3/19/2014), COPD, Dizziness ( ), Dyslipidemia ( ), Fatigue (7/3/2014), HLD (hyperlipidemia) (9/12/2014), Hypothyroidism, Insomnia ( ), Nocturnal hypoxia ( ), PSVT (paroxysmal supraventricular tachycardia) (HCC) (February 2013), Sickle cell disease ([facility]), and Tobacco use.  She  has a past surgical history that includes hysterectomy, total abdominal; knee replacement, total (2005); cataract extraction with iol; cataract extraction with iol; appendectomy; and abdominal hysterectomy total.  Social History     Tobacco Use   • Smoking status: Current Every Day Smoker     Packs/day: 0.25     Years: 55.00     Pack years: 13.75     Types: Cigarettes, Cigars   • Smokeless tobacco: Never Used   • Tobacco comment: does not inhale, small filtered cigars, 4 cigs/ day    Substance Use Topics   • Alcohol use: No   • Drug use: No  "    Social History     Social History Narrative   • Not on file     Family History   Problem Relation Age of Onset   • Heart Attack Brother 70     Family Status   Relation Name Status   • Mo     • Fa     • Bro  (Not Specified)         ROS    Please see hpi     All other systems reviewed and are negative     Objective:     /56 (BP Location: Left arm, Patient Position: Sitting, BP Cuff Size: Adult)   Pulse 64   Temp 37.2 °C (98.9 °F) (Temporal)   Ht 1.575 m (5' 2\")   Wt 46.1 kg (101 lb 9.6 oz)   SpO2 96%  Body mass index is 18.58 kg/m².     Physical Exam:    Constitutional: Alert, no distress.  Skin: Warm, dry, good turgor, no rashes in visible areas.  Eye: Equal, round and reactive, conjunctiva clear, lids normal.  ENMT: Lips without lesions, good dentition, oropharynx clear.  Neck: Trachea midline, no masses, no thyromegaly. No cervical or supraclavicular lymphadenopathy.  Respiratory: Unlabored respiratory effort, lungs clear to auscultation, no wheezes, no ronchi.  Cardiovascular: Normal S1, S2, no murmur, no edema.  Psych: Alert and oriented x3, normal affect and mood.      Assessment and Plan:   The following treatment plan was discussed        1. Tobacco dependence  Patient has decreased smoking to 4 cigars  Not willing   Ordered pulmonary function test    - PULMONARY FUNCTION TESTS -Test requested: Complete Pulmonary Function Test; Future      2. Menopausal state  DEXA scan ordered.    - DS-BONE DENSITY STUDY (DEXA)    3. Postmenopausal status (age-related) (natural)  DEXA scan ordered.    - DS-BONE DENSITY STUDY (DEXA)    4. Chronic bronchitis, unspecified chronic bronchitis type (HCC)  Patient has been stable with current management  We will make no changes for now    5. Hypothyroidism, unspecified type  Patient has been stable with current management  We will make no changes for now  Continue same regimen of Levothyroxine 75mcg    6. Dyslipidemia  Patient has been stable with " current management  We will make no changes for now  Continue same regimen of Pravastatin 40mg    7. Need for vaccination  Influenza vaccine was administered today without adverse event. Order for shingles vaccine provided to patient.    - Zoster Vac Recomb Adjuvanted (SHINGRIX) 50 MCG/0.5ML Recon Susp; 0.5 mL by Intramuscular route Once for 1 dose.  Dispense: 0.5 mL; Refill: 0  - Influenza Vaccine, High Dose (65+ Only)    HEALTH MAINTENANCE: due for pulmonary function test, Tdap vaccine, influenza vaccine, shingles vaccine, and DEXA scan.    Instructed to Follow up in clinic or ER for worsening symptoms, difficulty breathing, lack of expected recovery, or should new symptoms or problems arise.    Followup: Return in about 6 months (around 4/29/2020) for Reevaluation.      Once again this medical record contains text that has been entered with the assistance of computer voice recognition, dictation software, and medical scribes.  Therefore, it may contain unintended errors in text, spelling, punctuation, or grammar.    Annalee TOLLIVER (Carrolibalexander), am scribing for, and in the presence of, Hardik Merino M.D.    Electronically signed by: Annalee Miller (Carrolibalexander), 10/29/2019     Hardik TOLLIVER M.D. personally performed the services described in this documentation, as scribed by Annalee Miller in my presence, and it is both accurate and complete.

## 2019-11-22 ENCOUNTER — TELEPHONE (OUTPATIENT)
Dept: CARDIOLOGY | Facility: MEDICAL CENTER | Age: 84
End: 2019-11-22

## 2019-11-22 ENCOUNTER — HOSPITAL ENCOUNTER (EMERGENCY)
Facility: MEDICAL CENTER | Age: 84
End: 2019-11-22
Attending: EMERGENCY MEDICINE
Payer: COMMERCIAL

## 2019-11-22 VITALS
HEART RATE: 69 BPM | BODY MASS INDEX: 18.26 KG/M2 | SYSTOLIC BLOOD PRESSURE: 111 MMHG | OXYGEN SATURATION: 96 % | TEMPERATURE: 98.3 F | HEIGHT: 62 IN | DIASTOLIC BLOOD PRESSURE: 67 MMHG | WEIGHT: 99.21 LBS | RESPIRATION RATE: 26 BRPM

## 2019-11-22 DIAGNOSIS — R00.2 PALPITATIONS: ICD-10-CM

## 2019-11-22 LAB — EKG IMPRESSION: NORMAL

## 2019-11-22 PROCEDURE — 93005 ELECTROCARDIOGRAM TRACING: CPT | Performed by: EMERGENCY MEDICINE

## 2019-11-22 PROCEDURE — 99284 EMERGENCY DEPT VISIT MOD MDM: CPT

## 2019-11-22 NOTE — ED NOTES
Pt given discharge instructions. RN answered questions. VSS. Pt ambulated steadily out to ER lobby with spouse

## 2019-11-22 NOTE — TELEPHONE ENCOUNTER
Returned patient call. Pts BP is now High 80's over High 50's and HR back up into 150's. Pt directed to proceed to ER as advised. Pt educated on why this is important. Pt states understanding. Pt is reluctant and tries to reassure me, I explain that her heart can't continue at the rate it is and the fact that her blood pressure has dropped may be indicative of that. Pt states understanding and will have her  drive her there now. Pt is appreciative of call back.

## 2019-11-22 NOTE — TELEPHONE ENCOUNTER
FK      Patient thought she was having an SVT and when she couldn't get back in rhythm her  called 911. Paramedic arrived with a cardiac nurse, did 12-point lead and was told it wasn't SVT, it was sinus-tachycardia, nurse wanted her to go to ER but she refused because of past ER experience. She said her pulse was in the 150s but now it's 133, she is calling to ask if she should go to the ER. Patient would like a call back at [phone]

## 2019-11-22 NOTE — ED NOTES
meño ware with pt complaint of being in svt. Medics responded twice to residence and pt found in sinus tach both times with improving hr en route

## 2019-11-22 NOTE — ED PROVIDER NOTES
"ED Provider Note    CHIEF COMPLAINT  Chief Complaint   Patient presents with   • Rapid Heart Beat     pt states she was coughing up \"mucus plug and heart started racing\". hx of svt.       HPI  Antonia Stover is a 84 y.o. female who presents to the emergency department with a history of palpitations.  The patient was coughing this morning.  She has what she calls low-grade COPD.  She is a smoker.  She has a periodic cough.  She was having a normal cough today when she just did not seem to be able to get up the mucus.  She then felt the onset of a rapid heartbeat.  Patient has a history of supraventricular tachycardia.  Normally she can get it to go away but she was unable to get it go away today.  Therefore the medics were called.  Patient was brought to the emergency department.  During transport she feels like the arrhythmia resolved.  Patient is a retired nurse.  She says that she does not want any additional testing.  She like to go home.  Symptoms have resolved and the patient is back to baseline at this time.    REVIEW OF SYSTEMS  See HPI for further details. All other systems are negative.     PAST MEDICAL HISTORY  Past Medical History:   Diagnosis Date   • Anemia    • Arthritis    • Carotid artery plaque 3/19/2014   • COPD    • Dizziness     • Dyslipidemia     • Fatigue 7/3/2014   • HLD (hyperlipidemia) 9/12/2014   • Hypothyroidism    • Insomnia     • Nocturnal hypoxia      Uses oxygen QHS.   • PSVT (paroxysmal supraventricular tachycardia) ([facility]) February 2013    Echocardiogram with normal LV size, LVEF 65-70%.   • Sickle cell disease (HCC)    • Tobacco use        FAMILY HISTORY  Family History   Problem Relation Age of Onset   • Heart Attack Brother 70       SOCIAL HISTORY  Social History     Socioeconomic History   • Marital status:      Spouse name: Not on file   • Number of children: Not on file   • Years of education: Not on file   • Highest education level: Not on file   Occupational " History   • Not on file   Social Needs   • Financial resource strain: Not on file   • Food insecurity:     Worry: Not on file     Inability: Not on file   • Transportation needs:     Medical: Not on file     Non-medical: Not on file   Tobacco Use   • Smoking status: Current Every Day Smoker     Packs/day: 0.25     Years: 55.00     Pack years: 13.75     Types: Cigars   • Smokeless tobacco: Never Used   • Tobacco comment: does not inhale, small filtered cigars, 4 cigs/ day    Substance and Sexual Activity   • Alcohol use: No   • Drug use: No   • Sexual activity: Not Currently   Lifestyle   • Physical activity:     Days per week: Not on file     Minutes per session: Not on file   • Stress: Not on file   Relationships   • Social connections:     Talks on phone: Not on file     Gets together: Not on file     Attends Uatsdin service: Not on file     Active member of club or organization: Not on file     Attends meetings of clubs or organizations: Not on file     Relationship status: Not on file   • Intimate partner violence:     Fear of current or ex partner: Not on file     Emotionally abused: Not on file     Physically abused: Not on file     Forced sexual activity: Not on file   Other Topics Concern   • Not on file   Social History Narrative   • Not on file       SURGICAL HISTORY  Past Surgical History:   Procedure Laterality Date   • KNEE REPLACEMENT, TOTAL  2005    Right   • ABDOMINAL HYSTERECTOMY TOTAL     • APPENDECTOMY     • CATARACT EXTRACTION WITH IOL      Bilateral   • CATARACT EXTRACTION WITH IOL      Bilateral   • HYSTERECTOMY, TOTAL ABDOMINAL         CURRENT MEDICATIONS  Home Medications    **Home medications have not yet been reviewed for this encounter**         ALLERGIES  Allergies   Allergen Reactions   • Hydrocodone Hives   • Iodine Anaphylaxis     RXN=>20 years ago  Heavy metal dyes and preservatives   • Nsaids Hives and Shortness of Breath   • Penicillins Anaphylaxis     RXN=>20 years ago   • Asa  "[Aspirin] Hives     Hives   • Codeine    • Erythromycin    • Morphine    • Sulfa Drugs        PHYSICAL EXAM  VITAL SIGNS: /66   Pulse 80   Temp 36.8 °C (98.3 °F) (Temporal)   Resp 18   Ht 1.575 m (5' 2\")   Wt 45 kg (99 lb 3.3 oz)   BMI 18.15 kg/m²    Constitutional: Well developed, Well nourished, No acute distress, Non-toxic appearance.   HENT: Normocephalic, Atraumatic, Bilateral external ears normal, Oropharynx moist, No oral exudates, Nose normal.   Eyes: PERRLA, EOMI, Conjunctiva normal, No discharge.   Neck: Normal range of motion, No tenderness, Supple, No stridor.   Cardiovascular: Regular rate and rhythm, no audible murmur  Thorax & Lungs: Clear to auscultation bilaterally.  No rales, rhonchi, wheezing.  Abdomen: Bowel sounds normal, Soft, No tenderness, No masses, No pulsatile masses.   Skin: Warm, Dry, No erythema, No rash.   Back: No tenderness, No CVA tenderness.   Extremities: Intact distal pulses, No tenderness, No cyanosis, No clubbing.  Neurologic: Alert & oriented x 3, Normal motor function, Normal sensory function, No focal deficits noted.     EKG  Results for orders placed or performed during the hospital encounter of 19   EKG   Result Value Ref Range    Report       [facility] Emergency Dept.    Test Date:  2019  Pt Name:    KYRIE NELSON               Department: [facility]  MRN:        [id number]                      [facility]  Gender:     Female                       Technician: [id number]  :        [id number]                   Requested By:JERRY JUNIOR  Order #:    [id number]                    Reading MD:    Measurements  Intervals                                Axis  Rate:       72                           P:          91  MT:         155                          QRS:        95  QRSD:       102                          T:          66  QT:         371  QTc:        406    Interpretive Statements  Sinus rhythm  Right axis " deviation  Borderline low voltage, extremity leads  Compared to ECG 10/15/2019 23:03:05  Supraventricular tachycardia no longer present  ST (T wave) deviation no longer present           RADIOLOGY/PROCEDURES      COURSE & MEDICAL DECISION MAKING  Pertinent Labs & Imaging studies reviewed. (See chart for details)    Patient presents today after an episode of palpitations.  Has a history of supra ventricular tachycardia.  Confirmed by prior EKG.  EKG today demonstrates normal sinus rhythm.  The patient is now symptom-free.  The patient declines further diagnostic testing.  Declined blood work on IV.  Patient has a benign physical examination at this time.  Reassuring EKG.  Good insight and medical knowledge.  Patient will be discharged home in stable condition.  Return precautions discussed.  Patient and significant other verbalized understanding.    The patient will return for new or worsening symptoms and is stable at the time of discharge.    The patient is referred to a primary physician for blood pressure management, diabetic screening, and for all other preventative health concerns.    DISPOSITION:  Patient will be discharged home in stable condition.    FOLLOW UP:  Hardik Merino M.D.  [address] [place] [id number] [phone]    Schedule an appointment as soon as possible for a visit       [facility] Emergency Dept  [address]  [place] [address] [phone]    If symptoms worsen    Diogo Bernard M.D.  [address]  [address]  [place] [id number] [phone]    Schedule an appointment as soon as possible for a visit         OUTPATIENT MEDICATIONS:  New Prescriptions    No medications on file     FINAL IMPRESSION  1. Palpitations              Electronically signed by: Krunal Bustamante, 11/22/2019 1:51 PM

## 2019-12-10 ENCOUNTER — OFFICE VISIT (OUTPATIENT)
Dept: MEDICAL GROUP | Age: 84
End: 2019-12-10
Payer: COMMERCIAL

## 2019-12-10 VITALS
OXYGEN SATURATION: 97 % | TEMPERATURE: 98 F | HEART RATE: 69 BPM | HEIGHT: 62 IN | SYSTOLIC BLOOD PRESSURE: 104 MMHG | BODY MASS INDEX: 18.03 KG/M2 | DIASTOLIC BLOOD PRESSURE: 62 MMHG | WEIGHT: 98 LBS

## 2019-12-10 DIAGNOSIS — E03.9 ACQUIRED HYPOTHYROIDISM: ICD-10-CM

## 2019-12-10 DIAGNOSIS — N18.2 STAGE 2 CHRONIC KIDNEY DISEASE: ICD-10-CM

## 2019-12-10 DIAGNOSIS — I47.10 PSVT (PAROXYSMAL SUPRAVENTRICULAR TACHYCARDIA): ICD-10-CM

## 2019-12-10 PROBLEM — R53.82 CHRONIC FATIGUE: Status: ACTIVE | Noted: 2018-07-16

## 2019-12-10 PROCEDURE — 99214 OFFICE O/P EST MOD 30 MIN: CPT | Performed by: FAMILY MEDICINE

## 2019-12-10 NOTE — PROGRESS NOTES
This medical record contains text that has been entered with the assistance of computer voice recognition and dictation software. Therefore, it may contain unintended errors in text, spelling, punctuation or grammar.    Chief Complaint   Patient presents with   • Anxiety   • GI Problem       HPI:   Antonia Nelson is an 84 y.o. female here evaluation and management of:     Acquired hypothyroidism  Chronic history.    Patient is taking Levothyroxine 75 mcg once every morning on an empty stomach. She denies any medication side effects or acute complaints including palpitations, skin changes, temperature intolerance or changes in bowel habits. Thyroid function was last evaluated on 10/02/19 as noted above.    Results for ANTONIA NELSON (MRN [id number]) as of 12/10/2019 14:41   Ref. Range 7/11/2019 13:37 10/2/2019 22:08   TSH Latest Ref Range: 0.380 - 5.330 uIU/mL 0.540 0.547       PSVT (paroxysmal supraventricular tachycardia) (HCC)  Chronic history.    The patient is monitored for a history of paroxysmal supraventricular tachycardia by  Cardiology. Her dose of Cardizem CD was increased from 180 mg to 240 mg once daily on 10/22/19. Since the dosage increase, she has experienced generalized weakness, decreased energy and issues with balance. Additionally, she complains of intermittent dizziness with standing. Her blood pressure today is 104/62 and states it was been consistently low while checking it at home. She denies acute complaints of depression, suicidal ideations, fevers, chills, chest pain or palpitations.    Stage 2 chronic kidney disease  Chronic history.    The patient is monitored for a history of stage II kidney disease. She avoids NSAIDs and has increased her water intake since the diagnosis. She is asymptomatic and denies urinary complaints. Kidney function was last evaluated on 10/15/19.    Results for ANTONIA NELSON (MRN [id number]) as of 12/10/2019 14:41   Ref. Range 10/2/2019 22:08  10/15/2019 23:15   Bun Latest Ref Range: 8 - 22 mg/dL 9 11   Creatinine Latest Ref Range: 0.50 - 1.40 mg/dL 0.98 0.98   GFR If  Latest Ref Range: >60 mL/min/1.73 m 2 >60 >60   GFR If Non  Latest Ref Range: >60 mL/min/1.73 m 2 54 (A) 54 (A)       Current medicines (including changes today)  Current Outpatient Medications   Medication Sig Dispense Refill   • DILTIAZem CD (CARDIZEM CD) 240 MG CAPSULE SR 24 HR Take 1 Cap by mouth every day. 30 Cap 11   • pravastatin (PRAVACHOL) 40 MG tablet TAKE 1 TABLET BY MOUTH EVERY EVENING 90 Tab 3   • estradiol (ESTRACE) 0.1 MG/GM vaginal cream INSERT 1 GRAM VAGINALLY EVERY DAY 42.5 g 0   • clopidogrel (PLAVIX) 75 MG Tab TAKE 1 TABLET BY MOUTH EVERY DAY 90 Tab 3   • levothyroxine (SYNTHROID) 75 MCG Tab Take 1 Tab by mouth every morning. ON A EMPTY STOMACH 90 Tab 1   • Docusate Sodium (COLACE PO) Take  by mouth.     • acetaminophen (TYLENOL 8 HOUR ARTHRITIS PAIN) 650 MG CR tablet Take 650 mg by mouth every 6 hours as needed.     • Probiotic Product (ALIGN PO) Take  by mouth every day.     • Wheat Dextrin (BENEFIBER DRINK MIX PO) Take 1 Dose by mouth 3 times a day.     • diphenhydrAMINE (BENADRYL) 25 MG Tab Take 25 mg by mouth every 6 hours as needed for Sleep.     • PROAIR  (90 Base) MCG/ACT Aero Soln inhalation aerosol Inhale 2 Puffs by mouth every 6 hours as needed for Shortness of Breath. (Patient not taking: Reported on 12/10/2019) 1 Inhaler 3     No current facility-administered medications for this visit.      She  has a past medical history of Anemia, Arthritis, Carotid artery plaque (3/19/2014), COPD, Dizziness ( ), Dyslipidemia ( ), Fatigue (7/3/2014), HLD (hyperlipidemia) (9/12/2014), Hypothyroidism, Insomnia ( ), Nocturnal hypoxia ( ), PSVT (paroxysmal supraventricular tachycardia) (HCC) (February 2013), Sickle cell disease (HCC), and Tobacco use.  She  has a past surgical history that includes hysterectomy, total abdominal; knee  "replacement, total (); cataract extraction with iol; cataract extraction with iol; appendectomy; and abdominal hysterectomy total.  Social History     Tobacco Use   • Smoking status: Current Every Day Smoker     Packs/day: 0.25     Years: 55.00     Pack years: 13.75     Types: Cigars   • Smokeless tobacco: Never Used   • Tobacco comment: does not inhale, small filtered cigars, 4 cigs/ day    Substance Use Topics   • Alcohol use: No   • Drug use: No     Social History     Patient does not qualify to have social determinant information on file (likely too young).   Social History Narrative   • Not on file     Family History   Problem Relation Age of Onset   • Heart Attack Brother 70     Family Status   Relation Name Status   • Mo     • Fa     • Bro  (Not Specified)       ROS    Please see HPI for further details.     All other systems reviewed and are negative.     Objective:     /62 (BP Location: Left arm, Patient Position: Sitting, BP Cuff Size: Small adult)   Pulse 69   Temp 36.7 °C (98 °F) (Temporal)   Ht 1.575 m (5' 2\")   Wt 44.5 kg (98 lb)   SpO2 97%  Body mass index is 17.92 kg/m².    Physical Exam:    Constitutional: Alert, no distress.  Skin: Warm, dry, good turgor, no rashes in visible areas.  Eye: Equal, round and reactive, conjunctiva clear, lids normal.  ENMT: Lips without lesions, good dentition, oropharynx clear.  Neck: Trachea midline, no masses, no thyromegaly. No cervical or supraclavicular lymphadenopathy.  Respiratory: Unlabored respiratory effort, lungs clear to auscultation, no wheezes, no ronchi.  Cardiovascular: Normal S1, S2, no murmur, no edema.  Psych: Alert and oriented x3, normal affect and mood.      Assessment and Plan:   The following treatment plan was discussed:    1. Acquired hypothyroidism    Chronic, stable history. Well controlled with current medication of Levothyroxine 75 mcg once daily taken on an empty stomach. Patient understands she must wait " at least 30 minutes prior to eating or drinking coffee after taking the medication.  - Thyroid testing was last completed on 10/02/19 revealing a normal TSH of 0.547.    2. PSVT (paroxysmal supraventricular tachycardia) (HCC)    Chronic history. Followed by Dr. Bernard, Cardiology. Dosage of Cardizem CD was increased from 180 mg to 240 mg once daily on 10/22/19. Since then, she has experienced dizziness, generalized weakness, decreased energy and issues with balance when ambulating.  Symptoms are likely attributed to the increase in dosage. She was encouraged to discuss modifying the dosage or starting a different medication with Cardiology on 12/13/19 if possibe.      3. Stage 2 chronic kidney disease    Continue bp control  Avoid Nsaids and all nephrotoxins  Renally dose all medications when indicated      HEALTH MAINTENANCE: Due for tetanus and shingrix vaccines.     Instructed to follow up in clinic or ER for worsening symptoms, difficulty breathing, lack of expected recovery or should new symptoms or problems arise.    Follow up: Return in about 3 months (around 3/10/2020) for Reevaluation.      Once again this medical record contains text that has been entered with the assistance of computer voice recognition, dictation software and medical scribes. Therefore, it may contain unintended errors in text, spelling, punctuation or grammar.    IPoppy (Scribe), am scribing for, and in the presence of, Hardik Merino M.D.    Electronically signed by: Poppy Hartmann (Scribe), 12/10/2019     IHardik M.D. personally performed the services described in this documentation, as scribed by Poppy Hartmann in my presence, and it is both accurate and complete.

## 2019-12-13 DIAGNOSIS — E07.9 THYROID DISORDER: ICD-10-CM

## 2019-12-17 RX ORDER — LEVOTHYROXINE SODIUM 0.07 MG/1
TABLET ORAL
Qty: 90 TAB | Refills: 1 | Status: SHIPPED | OUTPATIENT
Start: 2019-12-17 | End: 2020-06-16

## 2019-12-18 ENCOUNTER — OFFICE VISIT (OUTPATIENT)
Dept: CARDIOLOGY | Facility: MEDICAL CENTER | Age: 84
End: 2019-12-18
Payer: COMMERCIAL

## 2019-12-18 VITALS
DIASTOLIC BLOOD PRESSURE: 62 MMHG | SYSTOLIC BLOOD PRESSURE: 112 MMHG | HEIGHT: 62 IN | BODY MASS INDEX: 18.03 KG/M2 | WEIGHT: 98 LBS | OXYGEN SATURATION: 96 % | HEART RATE: 76 BPM

## 2019-12-18 DIAGNOSIS — E78.5 DYSLIPIDEMIA: ICD-10-CM

## 2019-12-18 DIAGNOSIS — I10 ESSENTIAL HYPERTENSION: ICD-10-CM

## 2019-12-18 DIAGNOSIS — I47.10 PSVT (PAROXYSMAL SUPRAVENTRICULAR TACHYCARDIA): ICD-10-CM

## 2019-12-18 DIAGNOSIS — I65.23 ATHEROSCLEROSIS OF BOTH CAROTID ARTERIES: ICD-10-CM

## 2019-12-18 PROCEDURE — 99214 OFFICE O/P EST MOD 30 MIN: CPT | Performed by: INTERNAL MEDICINE

## 2019-12-18 NOTE — PROGRESS NOTES
Chief Complaint   Patient presents with   • Paroxysmal Supraventricular Tachycardia (PSVT)       Subjective:   Antonia Stover is a 84 y.o. female who presents today for followup of her PSVT, history of near syncope, hyperlipidemia and carotid plaque.    She continues have episodes of palpitations which are predominantly SVT.  Her heart rate will go up to about 150.  She has been seen in the emergency room periodically.  She is not keen on increasing her medical therapy.    She has had no chest discomfort, dyspnea or edema.  She does have some difficulty with intermittent chest congestion.  She does have an inhaler but is not very effective.    Past Medical History:   Diagnosis Date   • Anemia    • Arthritis    • Carotid artery plaque 3/19/2014   • COPD    • Dizziness     • Dyslipidemia     • Fatigue 7/3/2014   • HLD (hyperlipidemia) 9/12/2014   • Hypothyroidism    • Insomnia     • Nocturnal hypoxia      Uses oxygen QHS.   • PSVT (paroxysmal supraventricular tachycardia) (HCC) February 2013    Echocardiogram with normal LV size, LVEF 65-70%.   • Sickle cell disease (HCC)    • Tobacco use      Past Surgical History:   Procedure Laterality Date   • KNEE REPLACEMENT, TOTAL  2005    Right   • ABDOMINAL HYSTERECTOMY TOTAL     • APPENDECTOMY     • CATARACT EXTRACTION WITH IOL      Bilateral   • CATARACT EXTRACTION WITH IOL      Bilateral   • HYSTERECTOMY, TOTAL ABDOMINAL       Family History   Problem Relation Age of Onset   • Heart Attack Brother 70     Social History     Socioeconomic History   • Marital status:      Spouse name: Not on file   • Number of children: Not on file   • Years of education: Not on file   • Highest education level: Not on file   Occupational History   • Not on file   Social Needs   • Financial resource strain: Not on file   • Food insecurity:     Worry: Not on file     Inability: Not on file   • Transportation needs:     Medical: Not on file     Non-medical: Not on file   Tobacco  Use   • Smoking status: Current Every Day Smoker     Packs/day: 0.25     Years: 55.00     Pack years: 13.75     Types: Cigars   • Smokeless tobacco: Never Used   • Tobacco comment: does not inhale, small filtered cigars, 4 cigs/ day    Substance and Sexual Activity   • Alcohol use: No   • Drug use: No   • Sexual activity: Not Currently   Lifestyle   • Physical activity:     Days per week: Not on file     Minutes per session: Not on file   • Stress: Not on file   Relationships   • Social connections:     Talks on phone: Not on file     Gets together: Not on file     Attends Spiritism service: Not on file     Active member of club or organization: Not on file     Attends meetings of clubs or organizations: Not on file     Relationship status: Not on file   • Intimate partner violence:     Fear of current or ex partner: Not on file     Emotionally abused: Not on file     Physically abused: Not on file     Forced sexual activity: Not on file   Other Topics Concern   • Not on file   Social History Narrative   • Not on file     Allergies   Allergen Reactions   • Hydrocodone Hives   • Iodine Anaphylaxis     RXN=>20 years ago  Heavy metal dyes and preservatives   • Nsaids Hives and Shortness of Breath   • Penicillins Anaphylaxis     RXN=>20 years ago   • Asa [Aspirin] Hives     Hives   • Codeine    • Erythromycin    • Morphine    • Sulfa Drugs      Outpatient Encounter Medications as of 12/18/2019   Medication Sig Dispense Refill   • levothyroxine (SYNTHROID) 75 MCG Tab TAKE 1 TABLET BY MOUTH EVERY MORNING ON AN EMPTY STOMACH 90 Tab 1   • DILTIAZem CD (CARDIZEM CD) 240 MG CAPSULE SR 24 HR Take 1 Cap by mouth every day. 30 Cap 11   • pravastatin (PRAVACHOL) 40 MG tablet TAKE 1 TABLET BY MOUTH EVERY EVENING 90 Tab 3   • estradiol (ESTRACE) 0.1 MG/GM vaginal cream INSERT 1 GRAM VAGINALLY EVERY DAY 42.5 g 0   • clopidogrel (PLAVIX) 75 MG Tab TAKE 1 TABLET BY MOUTH EVERY DAY 90 Tab 3   • diphenhydrAMINE (BENADRYL) 25 MG Tab Take  "25 mg by mouth every 6 hours as needed for Sleep.     • Docusate Sodium (COLACE PO) Take  by mouth.     • PROAIR  (90 Base) MCG/ACT Aero Soln inhalation aerosol Inhale 2 Puffs by mouth every 6 hours as needed for Shortness of Breath. 1 Inhaler 3   • acetaminophen (TYLENOL 8 HOUR ARTHRITIS PAIN) 650 MG CR tablet Take 650 mg by mouth every 6 hours as needed.     • Probiotic Product (ALIGN PO) Take  by mouth every day.     • Wheat Dextrin (BENEFIBER DRINK MIX PO) Take 1 Dose by mouth 3 times a day.       No facility-administered encounter medications on file as of 12/18/2019.      ROS       Objective:   /62 (BP Location: Left arm, Patient Position: Sitting, BP Cuff Size: Adult)   Pulse 76   Ht 1.575 m (5' 2\")   Wt 44.5 kg (98 lb)   SpO2 96%   BMI 17.92 kg/m²     Physical Exam   Constitutional: She appears well-developed and well-nourished.   Neck: No JVD present.   Cardiovascular: Normal rate and regular rhythm.   No murmur heard.  Pulmonary/Chest: Effort normal and breath sounds normal. She has no rales.   Abdominal: Soft. There is no tenderness.   Musculoskeletal:         General: No edema.         Lab Results   Component Value Date/Time    CHOLSTRLTOT 166 02/07/2019 08:00 AM    LDL 74 02/07/2019 08:00 AM    HDL 71 02/07/2019 08:00 AM    TRIGLYCERIDE 105 02/07/2019 08:00 AM       Lab Results   Component Value Date/Time    SODIUM 135 10/15/2019 11:15 PM    POTASSIUM 3.7 10/15/2019 11:15 PM    CHLORIDE 94 (L) 10/15/2019 11:15 PM    CO2 23 10/15/2019 11:15 PM    GLUCOSE 98 10/15/2019 11:15 PM    BUN 11 10/15/2019 11:15 PM    CREATININE 0.98 10/15/2019 11:15 PM    CREATININE 1.3 01/19/2009 09:40 AM     Lab Results   Component Value Date/Time    ALKPHOSPHAT 93 10/15/2019 11:15 PM    ASTSGOT 28 10/15/2019 11:15 PM    ALTSGPT 17 10/15/2019 11:15 PM    TBILIRUBIN 0.3 10/15/2019 11:15 PM      Lab Results   Component Value Date/Time    BNPBTYPENAT 143 (H) 09/01/2015 01:05 PM        Carotid ultrasound from " "August 6, 2018:       FINDINGS   Right carotid.    Plaque of the bifurcation extending into the internal carotid. Velocities    are consistent with < 50% stenosis of the internal carotid artery. Plaque    is irregular on the surface and homogeneous with calcific acoustic density.      Left carotid.    Plaque of the bifurcation extending into the internal carotid. Velocities    are consistent with < 50% stenosis of the internal carotid artery. Plaque    is irregular on the surface and homogeneous with calcific acoustic density.      Bilateral subclavian and vertebral artery waveforms are antegrade and    waveforms are normal in character and velocity.     Rc Hamlin MD   (Electronically Signed)   Final Date:      06 August 2018         Assessment:     1. PSVT (paroxysmal supraventricular tachycardia) ([facility])     2. Dyslipidemia     3. Atherosclerosis of both carotid arteries     4. Essential hypertension         Medical Decision Making:  Today's Assessment / Status / Plan:     Ms. Stover is clinically stable.  She has had some difficulty feeling \"foggy\" in the morning since we increased her diltiazem dose.  We discussed switching her from diltiazem to a beta-blocker and possibly referring to EP.  However, she would like to maintain her current medications for now.  She will follow-up in a few months for reevaluation.  Her blood pressure and lipid status are under fair control.  She continues on pravastatin because she had difficulty with atorvastatin and rosuvastatin.  "

## 2020-02-25 ENCOUNTER — OFFICE VISIT (OUTPATIENT)
Dept: CARDIOLOGY | Facility: MEDICAL CENTER | Age: 85
End: 2020-02-25
Payer: COMMERCIAL

## 2020-02-25 VITALS
HEART RATE: 78 BPM | BODY MASS INDEX: 17.66 KG/M2 | WEIGHT: 96 LBS | SYSTOLIC BLOOD PRESSURE: 128 MMHG | HEIGHT: 62 IN | DIASTOLIC BLOOD PRESSURE: 78 MMHG | OXYGEN SATURATION: 97 %

## 2020-02-25 DIAGNOSIS — J42 CHRONIC BRONCHITIS, UNSPECIFIED CHRONIC BRONCHITIS TYPE (HCC): ICD-10-CM

## 2020-02-25 DIAGNOSIS — G47.34 NOCTURNAL HYPOXIA: ICD-10-CM

## 2020-02-25 DIAGNOSIS — I10 ESSENTIAL HYPERTENSION: ICD-10-CM

## 2020-02-25 DIAGNOSIS — F17.200 CURRENT SMOKER: ICD-10-CM

## 2020-02-25 DIAGNOSIS — E78.5 DYSLIPIDEMIA: ICD-10-CM

## 2020-02-25 DIAGNOSIS — I47.10 PSVT (PAROXYSMAL SUPRAVENTRICULAR TACHYCARDIA) (HCC): ICD-10-CM

## 2020-02-25 DIAGNOSIS — N18.2 STAGE 2 CHRONIC KIDNEY DISEASE: ICD-10-CM

## 2020-02-25 DIAGNOSIS — I65.23 ATHEROSCLEROSIS OF BOTH CAROTID ARTERIES: ICD-10-CM

## 2020-02-25 PROBLEM — N39.0 RECURRENT URINARY TRACT INFECTION: Status: RESOLVED | Noted: 2017-01-19 | Resolved: 2020-02-25

## 2020-02-25 PROCEDURE — 99214 OFFICE O/P EST MOD 30 MIN: CPT | Performed by: NURSE PRACTITIONER

## 2020-02-25 RX ORDER — ADHESIVE BANDAGE 3/4"
BANDAGE TOPICAL
Qty: 1 EACH | Refills: 0 | Status: SHIPPED
Start: 2020-02-25 | End: 2020-07-28

## 2020-02-25 RX ORDER — BLOOD PRESSURE TEST KIT
1 KIT MISCELLANEOUS ONCE
Qty: 1 KIT | Refills: 0 | Status: SHIPPED | OUTPATIENT
Start: 2020-02-25 | End: 2020-02-25

## 2020-02-25 RX ORDER — DILTIAZEM HYDROCHLORIDE 180 MG/1
180 CAPSULE, EXTENDED RELEASE ORAL DAILY
Qty: 90 CAP | Refills: 3
Start: 2020-02-25 | End: 2020-07-28

## 2020-02-25 RX ORDER — ADHESIVE BANDAGE 3/4"
BANDAGE TOPICAL
Qty: 1 EACH | Refills: 0 | Status: SHIPPED | OUTPATIENT
Start: 2020-02-25 | End: 2020-02-25 | Stop reason: SDUPTHER

## 2020-02-25 ASSESSMENT — ENCOUNTER SYMPTOMS
PALPITATIONS: 0
MYALGIAS: 0
HEADACHES: 0
CHILLS: 0
PND: 0
NAUSEA: 0
LOSS OF CONSCIOUSNESS: 0
ABDOMINAL PAIN: 0
BLURRED VISION: 1
FEVER: 0
ORTHOPNEA: 0
INSOMNIA: 0
COUGH: 1
DIZZINESS: 0
SHORTNESS OF BREATH: 0
BRUISES/BLEEDS EASILY: 0

## 2020-02-25 NOTE — PROGRESS NOTES
"Chief Complaint   Patient presents with   • Follow-Up   • Paroxysmal Supraventricular Tachycardia (PSVT)   • HTN (Controlled)   • Hyperlipidemia   • COPD   • Carotid Artery Stenosis       Subjective:   Antonia Stover is a 84 y.o. female who presents today for early follow-up of PSVT.    Antonia is an 84 year old female with history of PSVT since the 1980s, generally well controlled with Diltiazem and Valsalva maneuvers, hyperlipidemia, hypothyroidism, mild carotid stenosis and COPD secondary to tobacco use, previously followed by Dr. Bernard, and last seen in December 2019. At that time, she was feeling a little \"foggy\" on the higher (240mg) dose of Diltiazem; there was mention of decreasing it back to 180mg.     She didn't do this at first, but on 2/15/2020, she was tired of feeling \"foggy\" and finally lowered the dose to 180mg. She is feeling much better. She has rare episodes of SVT, and if she does, they don't last long.  No chest pain, pressure, discomfort or tightness; no shortness of breath; she does use oxygen at nighttime.  No dizziness, lightheadedness or syncope; no edema. She does still smoke about 4 cigarettes per day. She does have a lot of other health issues. She will be seeing Dr. Flynn in April 2020, to establish care.    Past Medical History:   Diagnosis Date   • Anemia    • Arthritis    • Carotid artery plaque 08/2018    Carotid US with <50% stenosis bilaterally.   • COPD    • Dizziness     • Dyslipidemia     • Fatigue 7/3/2014   • Hypothyroidism    • Insomnia     • Nocturnal hypoxia      Uses oxygen QHS.   • PSVT (paroxysmal supraventricular tachycardia) ([facility]) 02/2013    Echocardiogram with normal LV size, LVEF 65-70%.   • Sickle cell disease (HCC)    • Tobacco use      Past Surgical History:   Procedure Laterality Date   • KNEE REPLACEMENT, TOTAL  2005    Right   • ABDOMINAL HYSTERECTOMY TOTAL     • APPENDECTOMY     • CATARACT EXTRACTION WITH IOL      Bilateral   • CATARACT EXTRACTION " WITH IOL      Bilateral   • HYSTERECTOMY, TOTAL ABDOMINAL       Family History   Problem Relation Age of Onset   • Heart Attack Brother 70     Social History     Socioeconomic History   • Marital status:      Spouse name: Not on file   • Number of children: Not on file   • Years of education: Not on file   • Highest education level: Not on file   Occupational History   • Not on file   Social Needs   • Financial resource strain: Not on file   • Food insecurity     Worry: Not on file     Inability: Not on file   • Transportation needs     Medical: Not on file     Non-medical: Not on file   Tobacco Use   • Smoking status: Current Every Day Smoker     Packs/day: 0.25     Years: 55.00     Pack years: 13.75     Types: Cigars   • Smokeless tobacco: Never Used   • Tobacco comment: does not inhale, small filtered cigars, 4 cigs/ day    Substance and Sexual Activity   • Alcohol use: No   • Drug use: No   • Sexual activity: Not Currently   Lifestyle   • Physical activity     Days per week: Not on file     Minutes per session: Not on file   • Stress: Not on file   Relationships   • Social connections     Talks on phone: Not on file     Gets together: Not on file     Attends Orthodoxy service: Not on file     Active member of club or organization: Not on file     Attends meetings of clubs or organizations: Not on file     Relationship status: Not on file   • Intimate partner violence     Fear of current or ex partner: Not on file     Emotionally abused: Not on file     Physically abused: Not on file     Forced sexual activity: Not on file   Other Topics Concern   • Not on file   Social History Narrative   • Not on file     Allergies   Allergen Reactions   • Hydrocodone Hives   • Iodine Anaphylaxis     RXN=>20 years ago  Heavy metal dyes and preservatives   • Nsaids Hives and Shortness of Breath   • Penicillins Anaphylaxis     RXN=>20 years ago   • Asa [Aspirin] Hives     Hives   • Codeine    • Erythromycin    • Morphine     • Sulfa Drugs      Outpatient Encounter Medications as of 2/25/2020   Medication Sig Dispense Refill   • diltiazem (DILACOR XR) 180 MG XR capsule Take 1 Cap by mouth every day. 90 Cap 3   • Blood Pressure Kit 1 Each by Does not apply route Once for 1 dose. 1 Kit 0   • Blood Pressure Monitoring (BLOOD PRESSURE CUFF) [facility] Child BP cuff for monitoring BP at home. 1 Each 0   • levothyroxine (SYNTHROID) 75 MCG Tab TAKE 1 TABLET BY MOUTH EVERY MORNING ON AN EMPTY STOMACH 90 Tab 1   • pravastatin (PRAVACHOL) 40 MG tablet TAKE 1 TABLET BY MOUTH EVERY EVENING 90 Tab 3   • estradiol (ESTRACE) 0.1 MG/GM vaginal cream INSERT 1 GRAM VAGINALLY EVERY DAY 42.5 g 0   • clopidogrel (PLAVIX) 75 MG Tab TAKE 1 TABLET BY MOUTH EVERY DAY 90 Tab 3   • diphenhydrAMINE (BENADRYL) 25 MG Tab Take 25 mg by mouth every 6 hours as needed for Sleep.     • Docusate Sodium (COLACE PO) Take  by mouth.     • PROAIR  (90 Base) MCG/ACT Aero Soln inhalation aerosol Inhale 2 Puffs by mouth every 6 hours as needed for Shortness of Breath. 1 Inhaler 3   • acetaminophen (TYLENOL 8 HOUR ARTHRITIS PAIN) 650 MG CR tablet Take 650 mg by mouth every 6 hours as needed.     • Probiotic Product (ALIGN PO) Take  by mouth every day.     • Wheat Dextrin (BENEFIBER DRINK MIX PO) Take 1 Dose by mouth 3 times a day.     • [DISCONTINUED] Blood Pressure Monitoring (BLOOD PRESSURE CUFF) [facility] Child BP cuff for monitoring BP at home. 1 Each 0   • [DISCONTINUED] DILTIAZem CD (CARDIZEM CD) 240 MG CAPSULE SR 24 HR Take 1 Cap by mouth every day. (Patient taking differently: Take 240 mg by mouth every day. Indications: taking 180) 30 Cap 11     No facility-administered encounter medications on file as of 2/25/2020.      Review of Systems   Constitutional: Positive for malaise/fatigue. Negative for chills and fever.   HENT: Negative for congestion.    Eyes: Positive for blurred vision.   Respiratory: Positive for cough. Negative for shortness of breath.    Cardiovascular:  "Negative for chest pain, palpitations, orthopnea, leg swelling and PND.   Gastrointestinal: Negative for abdominal pain and nausea.   Musculoskeletal: Negative for myalgias.   Skin: Negative for rash.   Neurological: Negative for dizziness, loss of consciousness and headaches.   Endo/Heme/Allergies: Does not bruise/bleed easily.   Psychiatric/Behavioral: The patient does not have insomnia.         Objective:   /78 (BP Location: Left arm, Patient Position: Sitting, BP Cuff Size: Adult)   Pulse 78   Ht 1.575 m (5' 2\")   Wt 43.5 kg (96 lb)   SpO2 97%   BMI 17.56 kg/m²     Physical Exam   Constitutional: She is oriented to person, place, and time. She appears well-developed and well-nourished.   Thin, petite (BMI 17.56)   HENT:   Head: Normocephalic.   Eyes: EOM are normal.   Neck: Normal range of motion. Neck supple. No JVD present.   Cardiovascular: Normal rate, regular rhythm and normal heart sounds.   Pulmonary/Chest: Effort normal. No respiratory distress. She has decreased breath sounds. She has no wheezes. She has no rales.   Abdominal: Soft. Bowel sounds are normal. She exhibits no distension. There is no abdominal tenderness.   Musculoskeletal: Normal range of motion.         General: No edema.   Neurological: She is alert and oriented to person, place, and time.   Skin: Skin is warm and dry. No rash noted.   Psychiatric: She has a normal mood and affect.     CONCLUSIONS OF CAROTID US OF 8/6/2018:   <50% bilateral ICA stenoses   nl subclavians and vertebrals    RESULTS OF ECHOCARDIOGRAM OF 2/16/2013:  Normal LV size  Grade I diastolic dysfunction  LVEF 65-60%  Normal RA, LA and RV  Mild-moderate MR  Mild TR  RVSP 40-45mmHg    Lab Results   Component Value Date/Time    CHOLSTRLTOT 166 02/07/2019 08:00 AM    LDL 74 02/07/2019 08:00 AM    HDL 71 02/07/2019 08:00 AM    TRIGLYCERIDE 105 02/07/2019 08:00 AM       Lab Results   Component Value Date/Time    SODIUM 135 10/15/2019 11:15 PM    POTASSIUM 3.7 " 10/15/2019 11:15 PM    CHLORIDE 94 (L) 10/15/2019 11:15 PM    CO2 23 10/15/2019 11:15 PM    GLUCOSE 98 10/15/2019 11:15 PM    BUN 11 10/15/2019 11:15 PM    CREATININE 0.98 10/15/2019 11:15 PM    CREATININE 1.3 01/19/2009 09:40 AM     Lab Results   Component Value Date/Time    ALKPHOSPHAT 93 10/15/2019 11:15 PM    ASTSGOT 28 10/15/2019 11:15 PM    ALTSGPT 17 10/15/2019 11:15 PM    TBILIRUBIN 0.3 10/15/2019 11:15 PM        Assessment:     1. PSVT (paroxysmal supraventricular tachycardia) (HCC)  diltiazem (DILACOR XR) 180 MG XR capsule   2. Essential hypertension  Blood Pressure Kit    Blood Pressure Monitoring (BLOOD PRESSURE CUFF) Misc    DISCONTINUED: Blood Pressure Monitoring (BLOOD PRESSURE CUFF) Misc   3. Dyslipidemia  Comp Metabolic Panel    Lipid Profile   4. Atherosclerosis of both carotid arteries     5. Chronic bronchitis, unspecified chronic bronchitis type (HCC)     6. Current smoker     7. Nocturnal hypoxia     8. Stage 2 chronic kidney disease         Medical Decision Making:  Today's Assessment / Status / Plan:     1. History of PSVT, well controlled on Diltiazem. She is feeling better on 180mg dosage.    2. Hypertension, treated and stable. She is given an Rx for BP cuff (child size) for monitoring BP at home.    3. Carotid stenosis, bilaterally, mild, stable.    4. COPD, related to smoking. She does use oxygen QHS.    5. Chronic kidney disease, stable.    Same medications for now. Labs as above, prior to FU with Dr. Flynn in April 2020, to establish care. FU sooner if clinical condition changes. Keep FU with other providers too. Keep working on quitting smoking completely.    Collaborating MD: Cornell

## 2020-02-26 ENCOUNTER — TELEPHONE (OUTPATIENT)
Dept: CARDIOLOGY | Facility: MEDICAL CENTER | Age: 85
End: 2020-02-26

## 2020-02-26 DIAGNOSIS — N18.2 STAGE 2 CHRONIC KIDNEY DISEASE: ICD-10-CM

## 2020-02-26 NOTE — TELEPHONE ENCOUNTER
"AB      Patient was seen yesterday and is very upset. She said her after visit summary has erroneous entries and she has several diagnoses on the summary that are \"just not true\" and she wants it cleared up. She said the system is failing her and doesn't know what to do. She would like a call back at [phone].  "

## 2020-03-19 ENCOUNTER — TELEPHONE (OUTPATIENT)
Dept: CARDIOLOGY | Facility: MEDICAL CENTER | Age: 85
End: 2020-03-19

## 2020-03-19 NOTE — TELEPHONE ENCOUNTER
AB Landon Navarrete    Pt called and stated that she was in PSVT for 4 hrs. She said she did the maneuvers for 3 hrs, and by the time [facility] got there she cardioverted. She says that she feels like as a result. She might have damaged her carotid as she was pressing hard for 3 hrs. She says her jaw hurts and it is difficult to eat. She says is sounds like she is in an echo chamber. She says her ear is itchy. She said she swabbed her ear 3 times and there was blood on the swab. She would like a call back only from you. She does not want to speak w/Nancy Guzmán. She can be reached at: [phone].    Thank you so much,    Catrachito

## 2020-03-31 DIAGNOSIS — I10 ESSENTIAL HYPERTENSION: Primary | ICD-10-CM

## 2020-03-31 RX ORDER — DILTIAZEM HYDROCHLORIDE 180 MG/1
CAPSULE, EXTENDED RELEASE ORAL
Qty: 90 CAP | Refills: 3 | Status: SHIPPED | OUTPATIENT
Start: 2020-03-31 | End: 2020-09-15

## 2020-04-16 ENCOUNTER — APPOINTMENT (OUTPATIENT)
Dept: CARDIOLOGY | Facility: MEDICAL CENTER | Age: 85
End: 2020-04-16
Payer: COMMERCIAL

## 2020-05-05 DIAGNOSIS — I65.23 ATHEROSCLEROSIS OF BOTH CAROTID ARTERIES: Primary | ICD-10-CM

## 2020-05-05 RX ORDER — CLOPIDOGREL BISULFATE 75 MG/1
75 TABLET ORAL
Qty: 90 TAB | Refills: 2 | Status: SHIPPED | OUTPATIENT
Start: 2020-05-05 | End: 2021-01-29 | Stop reason: SDUPTHER

## 2020-05-23 ENCOUNTER — HOSPITAL ENCOUNTER (EMERGENCY)
Facility: MEDICAL CENTER | Age: 85
End: 2020-05-23
Attending: EMERGENCY MEDICINE
Payer: COMMERCIAL

## 2020-05-23 ENCOUNTER — APPOINTMENT (OUTPATIENT)
Dept: RADIOLOGY | Facility: MEDICAL CENTER | Age: 85
End: 2020-05-23
Attending: EMERGENCY MEDICINE
Payer: COMMERCIAL

## 2020-05-23 VITALS
TEMPERATURE: 98.4 F | HEART RATE: 79 BPM | OXYGEN SATURATION: 95 % | SYSTOLIC BLOOD PRESSURE: 111 MMHG | RESPIRATION RATE: 20 BRPM | DIASTOLIC BLOOD PRESSURE: 78 MMHG | HEIGHT: 62 IN | WEIGHT: 97 LBS | BODY MASS INDEX: 17.85 KG/M2

## 2020-05-23 DIAGNOSIS — R00.0 TACHYCARDIA: ICD-10-CM

## 2020-05-23 DIAGNOSIS — R79.89 ELEVATED TROPONIN: ICD-10-CM

## 2020-05-23 LAB
ALBUMIN SERPL BCP-MCNC: 3.9 G/DL (ref 3.2–4.9)
ALBUMIN/GLOB SERPL: 1.5 G/DL
ALP SERPL-CCNC: 89 U/L (ref 30–99)
ALT SERPL-CCNC: 21 U/L (ref 2–50)
ANION GAP SERPL CALC-SCNC: 12 MMOL/L (ref 7–16)
AST SERPL-CCNC: 30 U/L (ref 12–45)
BASOPHILS # BLD AUTO: 0.4 % (ref 0–1.8)
BASOPHILS # BLD: 0.06 K/UL (ref 0–0.12)
BILIRUB SERPL-MCNC: 0.4 MG/DL (ref 0.1–1.5)
BUN SERPL-MCNC: 12 MG/DL (ref 8–22)
CALCIUM SERPL-MCNC: 8.7 MG/DL (ref 8.4–10.2)
CHLORIDE SERPL-SCNC: 103 MMOL/L (ref 96–112)
CO2 SERPL-SCNC: 23 MMOL/L (ref 20–33)
CREAT SERPL-MCNC: 0.84 MG/DL (ref 0.5–1.4)
EKG IMPRESSION: NORMAL
EKG IMPRESSION: NORMAL
EOSINOPHIL # BLD AUTO: 0.08 K/UL (ref 0–0.51)
EOSINOPHIL NFR BLD: 0.6 % (ref 0–6.9)
ERYTHROCYTE [DISTWIDTH] IN BLOOD BY AUTOMATED COUNT: 46.4 FL (ref 35.9–50)
GLOBULIN SER CALC-MCNC: 2.6 G/DL (ref 1.9–3.5)
GLUCOSE SERPL-MCNC: 108 MG/DL (ref 65–99)
HCT VFR BLD AUTO: 38.4 % (ref 37–47)
HGB BLD-MCNC: 12.6 G/DL (ref 12–16)
IMM GRANULOCYTES # BLD AUTO: 0.06 K/UL (ref 0–0.11)
IMM GRANULOCYTES NFR BLD AUTO: 0.4 % (ref 0–0.9)
LIPASE SERPL-CCNC: 29 U/L (ref 7–58)
LYMPHOCYTES # BLD AUTO: 3.07 K/UL (ref 1–4.8)
LYMPHOCYTES NFR BLD: 22 % (ref 22–41)
MCH RBC QN AUTO: 31.1 PG (ref 27–33)
MCHC RBC AUTO-ENTMCNC: 32.8 G/DL (ref 33.6–35)
MCV RBC AUTO: 94.8 FL (ref 81.4–97.8)
MONOCYTES # BLD AUTO: 1.07 K/UL (ref 0–0.85)
MONOCYTES NFR BLD AUTO: 7.7 % (ref 0–13.4)
NEUTROPHILS # BLD AUTO: 9.6 K/UL (ref 2–7.15)
NEUTROPHILS NFR BLD: 68.9 % (ref 44–72)
NRBC # BLD AUTO: 0 K/UL
NRBC BLD-RTO: 0 /100 WBC
PLATELET # BLD AUTO: 198 K/UL (ref 164–446)
PMV BLD AUTO: 11.8 FL (ref 9–12.9)
POTASSIUM SERPL-SCNC: 3.8 MMOL/L (ref 3.6–5.5)
PROT SERPL-MCNC: 6.5 G/DL (ref 6–8.2)
RBC # BLD AUTO: 4.05 M/UL (ref 4.2–5.4)
SODIUM SERPL-SCNC: 138 MMOL/L (ref 135–145)
TROPONIN T SERPL-MCNC: 14 NG/L (ref 6–19)
TROPONIN T SERPL-MCNC: 22 NG/L (ref 6–19)
WBC # BLD AUTO: 13.9 K/UL (ref 4.8–10.8)

## 2020-05-23 PROCEDURE — 99285 EMERGENCY DEPT VISIT HI MDM: CPT

## 2020-05-23 PROCEDURE — 83690 ASSAY OF LIPASE: CPT

## 2020-05-23 PROCEDURE — 80053 COMPREHEN METABOLIC PANEL: CPT

## 2020-05-23 PROCEDURE — 84484 ASSAY OF TROPONIN QUANT: CPT

## 2020-05-23 PROCEDURE — 93005 ELECTROCARDIOGRAM TRACING: CPT | Performed by: EMERGENCY MEDICINE

## 2020-05-23 PROCEDURE — 36415 COLL VENOUS BLD VENIPUNCTURE: CPT

## 2020-05-23 PROCEDURE — 700111 HCHG RX REV CODE 636 W/ 250 OVERRIDE (IP): Performed by: EMERGENCY MEDICINE

## 2020-05-23 PROCEDURE — 85025 COMPLETE CBC W/AUTO DIFF WBC: CPT

## 2020-05-23 PROCEDURE — 96374 THER/PROPH/DIAG INJ IV PUSH: CPT

## 2020-05-23 PROCEDURE — 71045 X-RAY EXAM CHEST 1 VIEW: CPT

## 2020-05-23 RX ORDER — DILTIAZEM HYDROCHLORIDE 5 MG/ML
0.25 INJECTION INTRAVENOUS ONCE
Status: COMPLETED | OUTPATIENT
Start: 2020-05-23 | End: 2020-05-23

## 2020-05-23 RX ADMIN — DILTIAZEM HYDROCHLORIDE 11 MG: 5 INJECTION INTRAVENOUS at 20:23

## 2020-05-23 ASSESSMENT — FIBROSIS 4 INDEX: FIB4 SCORE: 2.76

## 2020-05-24 NOTE — DISCHARGE INSTRUCTIONS
Your troponin is trending upward and is mildly elevated there is concerns that you may have had some heart injury or have heart disease.  This could lead to congestive heart failure, and further heart injury such as heart attack and death.  It is your choice to leave against my advice.  But we are always open and if anything changes worsens or you change your mind please return to the emergency department.    I left a message with the cardiology department, they should contact you to make a follow-up appointment

## 2020-05-24 NOTE — ED PROVIDER NOTES
ED Provider Note    ED Provider    Means of arrival: EMS  History obtained from: Paramedics, patient  History limited by: None    CHIEF COMPLAINT  Chief Complaint   Patient presents with   • Dizziness   • Nausea   • Tachycardia       HPI  Antonia Stover is a 84 y.o. female who presents complains of palpitations.  She felt a fast heart rate and her blood pressure machine at home was reading 150.  This lasted for 1 to 1-1/2 hours.  She has a history of SVT and was not concerned at the time.  She had no chest pain or shortness of breath with that.    The paramedics found her to have a heart rate of 160 and on arrival she is still 140s.    Her complaint at this time is nausea, and feeling tired.  She has no more palpitations and she is not had any chest pain.    No recent fevers nausea vomiting diarrhea no hematuria or dysuria    REVIEW OF SYSTEMS  See HPI for further details. All other systems are negative.     PAST MEDICAL HISTORY   has a past medical history of Anemia, Arthritis, Carotid artery plaque (08/2018), COPD, Dizziness ( ), Dyslipidemia ( ), Fatigue (7/3/2014), Hypothyroidism, Insomnia ( ), Nocturnal hypoxia ( ), PSVT (paroxysmal supraventricular tachycardia) (HCC) (02/2013), Sickle cell disease ([facility]), and Tobacco use.    SOCIAL HISTORY  Social History     Tobacco Use   • Smoking status: Current Every Day Smoker     Packs/day: 0.25     Years: 55.00     Pack years: 13.75     Types: Cigars   • Smokeless tobacco: Never Used   • Tobacco comment: does not inhale, small filtered cigars, 4 cigs/ day    Substance and Sexual Activity   • Alcohol use: No   • Drug use: No   • Sexual activity: Not Currently       SURGICAL HISTORY   has a past surgical history that includes hysterectomy, total abdominal; knee replacement, total (2005); cataract extraction with iol; cataract extraction with iol; appendectomy; and abdominal hysterectomy total.    CURRENT MEDICATIONS  Home Medications    **Home medications have not  "yet been reviewed for this encounter**         ALLERGIES  Allergies   Allergen Reactions   • Hydrocodone Hives   • Iodine Anaphylaxis     RXN=>20 years ago  Heavy metal dyes and preservatives   • Nsaids Hives and Shortness of Breath   • Penicillins Anaphylaxis     RXN=>20 years ago   • Asa [Aspirin] Hives     Hives   • Codeine    • Erythromycin    • Morphine    • Sulfa Drugs        PHYSICAL EXAM  VITAL SIGNS: /73   Pulse 68   Temp 36.9 °C (98.4 °F) (Temporal)   Resp (!) 25   Ht 1.575 m (5' 2\")   Wt 44 kg (97 lb)   SpO2 94%   BMI 17.74 kg/m²   Constitutional: Alert in no apparent distress.  HENT: No signs of trauma, Mucous membranes are moist   Eyes:  Conjunctiva normal, Non-icteric.   Neck: Normal range of motion, No tenderness, Supple,  Lymphatic: No lymphadenopathy noted.   Cardiovascular: Tachycardic rate, no murmurs.   Thorax & Lungs: Normal breath sounds, No respiratory distress, No wheezing, No chest tenderness.   Abdomen: Bowel sounds normal, Soft, No tenderness, No masses, No pulsatile masses. No peritoneal signs.  Skin: Warm, Dry,Normal color  Back: No bony tenderness, No CVA tenderness.   Extremities:No edema, No tenderness, No cyanosis,    Musculoskeletal: Good range of motion in all major joints. No tenderness to palpation or major deformities noted.   Neurologic: Alert ,Oriented x4, Normal motor function, Normal sensory function, No focal deficits noted.   Psychiatric: Affect normal, Judgment normal, Mood normal.       MEDICAL DECISION MAKING  This is a 84 y.o. female who presents with tachycardia.  On her EKG she is having a rate of 142 units very regular, P waves are not seen and may be buried in the T and QRS place.  Is very consistent 140 at home was 150 this is concerning for possible a flutter with a 2-1 block.  Cardizem will be given IV to slow the heart rate down.  Cardiac evaluation is being initiated    DIAGNOSTIC STUDIES / PROCEDURES    EKG  EKG #1 a tachycardia with a rate of " 142 very regular, rhythm cannot be determined Axis is leftward, QRS is normal, ST and T waves are normal interpretation tachycardia    LABS  Results for orders placed or performed during the hospital encounter of 05/23/20   CBC w/ Differential   Result Value Ref Range    WBC 13.9 (H) 4.8 - 10.8 K/uL    RBC 4.05 (L) 4.20 - 5.40 M/uL    Hemoglobin 12.6 12.0 - 16.0 g/dL    Hematocrit 38.4 37.0 - 47.0 %    MCV 94.8 81.4 - 97.8 fL    MCH 31.1 27.0 - 33.0 pg    MCHC 32.8 (L) 33.6 - 35.0 g/dL    RDW 46.4 35.9 - 50.0 fL    Platelet Count 198 164 - 446 K/uL    MPV 11.8 9.0 - 12.9 fL    Neutrophils-Polys 68.90 44.00 - 72.00 %    Lymphocytes 22.00 22.00 - 41.00 %    Monocytes 7.70 0.00 - 13.40 %    Eosinophils 0.60 0.00 - 6.90 %    Basophils 0.40 0.00 - 1.80 %    Immature Granulocytes 0.40 0.00 - 0.90 %    Nucleated RBC 0.00 /100 WBC    Neutrophils (Absolute) 9.60 (H) 2.00 - 7.15 K/uL    Lymphs (Absolute) 3.07 1.00 - 4.80 K/uL    Monos (Absolute) 1.07 (H) 0.00 - 0.85 K/uL    Eos (Absolute) 0.08 0.00 - 0.51 K/uL    Baso (Absolute) 0.06 0.00 - 0.12 K/uL    Immature Granulocytes (abs) 0.06 0.00 - 0.11 K/uL    NRBC (Absolute) 0.00 K/uL   Complete Metabolic Panel (CMP)   Result Value Ref Range    Sodium 138 135 - 145 mmol/L    Potassium 3.8 3.6 - 5.5 mmol/L    Chloride 103 96 - 112 mmol/L    Co2 23 20 - 33 mmol/L    Anion Gap 12.0 7.0 - 16.0    Glucose 108 (H) 65 - 99 mg/dL    Bun 12 8 - 22 mg/dL    Creatinine 0.84 0.50 - 1.40 mg/dL    Calcium 8.7 8.4 - 10.2 mg/dL    AST(SGOT) 30 12 - 45 U/L    ALT(SGPT) 21 2 - 50 U/L    Alkaline Phosphatase 89 30 - 99 U/L    Total Bilirubin 0.4 0.1 - 1.5 mg/dL    Albumin 3.9 3.2 - 4.9 g/dL    Total Protein 6.5 6.0 - 8.2 g/dL    Globulin 2.6 1.9 - 3.5 g/dL    A-G Ratio 1.5 g/dL   Troponin STAT   Result Value Ref Range    Troponin T 14 6 - 19 ng/L   Lipase   Result Value Ref Range    Lipase 29 7 - 58 U/L   ESTIMATED GFR   Result Value Ref Range    GFR If African American >60 >60 mL/min/1.73 m 2     GFR If Non African American >60 >60 mL/min/1.73 m 2   TROPONIN   Result Value Ref Range    Troponin T 22 (H) 6 - 19 ng/L   EKG   Result Value Ref Range    Report       [facility] Emergency Dept.    Test Date:  2020  Pt Name:    KYRIE NELSON               Department: [facility]  MRN:        [id number]                      Room:       [facility]  Gender:     Female                       Technician: IBETH  :        [id number]                   Requested By:JUANIS KENNEY  Order #:    [id number]                    Reading MD:    Measurements  Intervals                                Axis  Rate:       142                          P:  OH:                                      QRS:        95  QRSD:       76                           T:          -6  QT:         304  QTc:        467    Interpretive Statements  JUNCTIONAL TACHYCARDIA  RIGHT AXIS DEVIATION  LOW VOLTAGE IN FRONTAL LEADS  CONSIDER LEFT VENTRICULAR HYPERTROPHY  Compared to ECG 2019 13:30:53  Junctional tachycardia now present  Sinus rhythm no longer present     EKG (NOW)   Result Value Ref Range    Report       [facility] Emergency Dept.    Test Date:  2020  Pt Name:    KYRIE NELSON               Department: [facility]  MRN:        [id number]                      Room:       [facility] 3  Gender:     Female                       Technician: JULIANA  :        [id number]                   Requested By:JUANIS KENNEY  Order #:    [id number]                    Reading MD: JUANIS KENNEY D.O.    Measurements  Intervals                                Axis  Rate:       76                           P:          81  OH:         160                          QRS:        94  QRSD:       86                           T:          65  QT:         380  QTc:        428    Interpretive Statements  SINUS RHYTHM  RIGHT AXIS DEVIATION  BORDERLINE LOW VOLTAGE IN FRONTAL LEADS  CONSIDER LEFT VENTRICULAR HYPERTROPHY  Compared to ECG  05/23/2020 19:58:43  Junctional tachycardia no longer present  Electronically Signed On 5- 22:45:12 PDT by JUANIS KENNEY D.O.           RADIOLOGY  DX-CHEST-PORTABLE (1 VIEW)   Final Result      1.  No acute cardiopulmonary disease.   2.  Mildly prominent interstitium again seen, likely chronic.      X-rays were independently visualized by me    COURSE  Pertinent Labs & Imaging studies reviewed. (See chart for details)    8:31 PM - Patient seen and examined at bedside. Discussed plan of care,    Hospital course patient arrived emergency room with a tachycardic rate EKG you had an undetermined rhythm was concerns for a a flutter with RVR.  Cardizem was given as a one-time IV bolus and her heart rate did improve.  Second EKG showed a sinus rhythm with a rate of 76 which was a significant improvement from her first EKG.  Her initial troponin was 14 but a serial troponin did elevate to 22.  This is concerning for cardiac disease, and cardiac disease injury.    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  I did explain to her my concerns of a heart injury and heart disease and potential for death and disability and she is decided that she just does not want to stay in the hospital because she is 84 years old and she says her partner just do not work so well anymore.    This patient is awake alert and is normal in mentation, and psyche Minh.  She can make these decisions on her own.  She will be discharged AMA      Critical care time 30 minutes    FINAL IMPRESSION  1. Tachycardia    2. Elevated troponin        The note accurately reflects work and decisions made by me.  Juanis Kenney D.O.  5/23/2020  10:51 PM

## 2020-05-26 ENCOUNTER — HOSPITAL ENCOUNTER (EMERGENCY)
Facility: MEDICAL CENTER | Age: 85
End: 2020-05-26
Attending: EMERGENCY MEDICINE
Payer: COMMERCIAL

## 2020-05-26 VITALS
DIASTOLIC BLOOD PRESSURE: 64 MMHG | HEIGHT: 62 IN | SYSTOLIC BLOOD PRESSURE: 93 MMHG | HEART RATE: 81 BPM | TEMPERATURE: 98.3 F | BODY MASS INDEX: 17.74 KG/M2 | RESPIRATION RATE: 19 BRPM | OXYGEN SATURATION: 90 %

## 2020-05-26 DIAGNOSIS — I47.10 SVT (SUPRAVENTRICULAR TACHYCARDIA) (HCC): Primary | ICD-10-CM

## 2020-05-26 LAB
ALBUMIN SERPL BCP-MCNC: 3.8 G/DL (ref 3.2–4.9)
ALBUMIN/GLOB SERPL: 1.5 G/DL
ALP SERPL-CCNC: 84 U/L (ref 30–99)
ALT SERPL-CCNC: 15 U/L (ref 2–50)
ANION GAP SERPL CALC-SCNC: 13 MMOL/L (ref 7–16)
AST SERPL-CCNC: 22 U/L (ref 12–45)
BASOPHILS # BLD AUTO: 1 % (ref 0–1.8)
BASOPHILS # BLD: 0.08 K/UL (ref 0–0.12)
BILIRUB SERPL-MCNC: 0.2 MG/DL (ref 0.1–1.5)
BUN SERPL-MCNC: 10 MG/DL (ref 8–22)
CALCIUM SERPL-MCNC: 8.8 MG/DL (ref 8.4–10.2)
CHLORIDE SERPL-SCNC: 106 MMOL/L (ref 96–112)
CO2 SERPL-SCNC: 25 MMOL/L (ref 20–33)
CREAT SERPL-MCNC: 0.83 MG/DL (ref 0.5–1.4)
EKG IMPRESSION: NORMAL
EOSINOPHIL # BLD AUTO: 0 K/UL (ref 0–0.51)
EOSINOPHIL NFR BLD: 0 % (ref 0–6.9)
ERYTHROCYTE [DISTWIDTH] IN BLOOD BY AUTOMATED COUNT: 46.2 FL (ref 35.9–50)
GLOBULIN SER CALC-MCNC: 2.5 G/DL (ref 1.9–3.5)
GLUCOSE SERPL-MCNC: 117 MG/DL (ref 65–99)
HCT VFR BLD AUTO: 36.9 % (ref 37–47)
HGB BLD-MCNC: 12.3 G/DL (ref 12–16)
LYMPHOCYTES # BLD AUTO: 3.28 K/UL (ref 1–4.8)
LYMPHOCYTES NFR BLD: 39 % (ref 22–41)
MAGNESIUM SERPL-MCNC: 1.8 MG/DL (ref 1.5–2.5)
MANUAL DIFF BLD: NORMAL
MCH RBC QN AUTO: 31.3 PG (ref 27–33)
MCHC RBC AUTO-ENTMCNC: 33.3 G/DL (ref 33.6–35)
MCV RBC AUTO: 93.9 FL (ref 81.4–97.8)
MONOCYTES # BLD AUTO: 0.76 K/UL (ref 0–0.85)
MONOCYTES NFR BLD AUTO: 9 % (ref 0–13.4)
NEUTROPHILS # BLD AUTO: 4.28 K/UL (ref 2–7.15)
NEUTROPHILS NFR BLD: 50 % (ref 44–72)
NEUTS BAND NFR BLD MANUAL: 1 % (ref 0–10)
NRBC # BLD AUTO: 0 K/UL
NRBC BLD-RTO: 0 /100 WBC
PLATELET # BLD AUTO: 186 K/UL (ref 164–446)
PLATELET BLD QL SMEAR: NORMAL
PMV BLD AUTO: 11.4 FL (ref 9–12.9)
POTASSIUM SERPL-SCNC: 4 MMOL/L (ref 3.6–5.5)
PROT SERPL-MCNC: 6.3 G/DL (ref 6–8.2)
RBC # BLD AUTO: 3.93 M/UL (ref 4.2–5.4)
RBC BLD AUTO: NORMAL
SODIUM SERPL-SCNC: 144 MMOL/L (ref 135–145)
TROPONIN T SERPL-MCNC: 18 NG/L (ref 6–19)
VARIANT LYMPHS BLD QL SMEAR: NORMAL
WBC # BLD AUTO: 8.4 K/UL (ref 4.8–10.8)

## 2020-05-26 PROCEDURE — 700111 HCHG RX REV CODE 636 W/ 250 OVERRIDE (IP): Performed by: EMERGENCY MEDICINE

## 2020-05-26 PROCEDURE — 85027 COMPLETE CBC AUTOMATED: CPT

## 2020-05-26 PROCEDURE — 83735 ASSAY OF MAGNESIUM: CPT

## 2020-05-26 PROCEDURE — 93005 ELECTROCARDIOGRAM TRACING: CPT | Performed by: EMERGENCY MEDICINE

## 2020-05-26 PROCEDURE — 80053 COMPREHEN METABOLIC PANEL: CPT

## 2020-05-26 PROCEDURE — 99285 EMERGENCY DEPT VISIT HI MDM: CPT

## 2020-05-26 PROCEDURE — 700105 HCHG RX REV CODE 258: Performed by: EMERGENCY MEDICINE

## 2020-05-26 PROCEDURE — 94760 N-INVAS EAR/PLS OXIMETRY 1: CPT

## 2020-05-26 PROCEDURE — 93005 ELECTROCARDIOGRAM TRACING: CPT

## 2020-05-26 PROCEDURE — 96374 THER/PROPH/DIAG INJ IV PUSH: CPT

## 2020-05-26 PROCEDURE — 96376 TX/PRO/DX INJ SAME DRUG ADON: CPT

## 2020-05-26 PROCEDURE — 85007 BL SMEAR W/DIFF WBC COUNT: CPT

## 2020-05-26 PROCEDURE — 84484 ASSAY OF TROPONIN QUANT: CPT

## 2020-05-26 RX ORDER — SODIUM CHLORIDE 9 MG/ML
500 INJECTION, SOLUTION INTRAVENOUS ONCE
Status: COMPLETED | OUTPATIENT
Start: 2020-05-26 | End: 2020-05-26

## 2020-05-26 RX ORDER — DILTIAZEM HYDROCHLORIDE 5 MG/ML
10 INJECTION INTRAVENOUS ONCE
Status: COMPLETED | OUTPATIENT
Start: 2020-05-26 | End: 2020-05-26

## 2020-05-26 RX ADMIN — DILTIAZEM HYDROCHLORIDE 10 MG: 5 INJECTION INTRAVENOUS at 21:26

## 2020-05-26 RX ADMIN — SODIUM CHLORIDE 500 ML: 9 INJECTION, SOLUTION INTRAVENOUS at 21:24

## 2020-05-26 RX ADMIN — DILTIAZEM HYDROCHLORIDE 10 MG: 5 INJECTION INTRAVENOUS at 22:24

## 2020-05-26 ASSESSMENT — ENCOUNTER SYMPTOMS
VOMITING: 0
COUGH: 0
CHILLS: 0
PALPITATIONS: 1
HEADACHES: 0
PND: 0
SHORTNESS OF BREATH: 0
FEVER: 0
NAUSEA: 0
DIZZINESS: 0

## 2020-05-27 ENCOUNTER — TELEPHONE (OUTPATIENT)
Dept: CARDIOLOGY | Facility: MEDICAL CENTER | Age: 85
End: 2020-05-27

## 2020-05-27 NOTE — TELEPHONE ENCOUNTER
Called patient to schedule an appointment with EP. Patient does not want to see an EP doctor. Per the patient, she is not interested in having any procedures done due to her age. She is scheduled to see Temi Hall on 5-28-20. She would like to keep that appointment and go from there. JACY to Dr. Flynn.

## 2020-05-27 NOTE — ED TRIAGE NOTES
"Pt BIB ambulance for Rapid HR and chest heaviness that started at approx 715 pm when then pt \"coughed up a mucous plug and the started feeling heart racing\". EMS started IV and gave Zofran and fluids for nausea. Pt is A&Ox4. Ambulates at home with assistive device. HR currently 150 bpm. Admits to recent episode on Saturday of Rapid HR and was converted to NSR. Takes clopidogrel for hx of TIA 2019.  "

## 2020-05-27 NOTE — ED PROVIDER NOTES
ED Provider Note     5/26/2020  8:57 PM    Means of Arrival: EMS  History obtained by: patient  Limitations:none    CHIEF COMPLAINT  Chief Complaint   Patient presents with   • Rapid Heart Beat       HPI  Antonia Stover is a 84 y.o. female with paroxysmal SVT who presents with concerns of recurrent palpitations. She was seen in the emergency department 3 days ago on 5/23/2020 with findings of rapid narrow complex rhythm.  She was treated with single dose of Cardizem.  She left AGAINST MEDICAL ADVICE.  She says she had to go home because her dog is very sick with cancer.  This afternoon she noticed that her heart rate increased, she was feeling palpitations.  She tried vagal maneuvers but no relief.  Typically SVT controlled with diltiazem 180 mg.  Over the past 2 months she was decreased from 220 mg because it was making her feel very weak.  She denies any chest pain or trouble breathing at this time.    REVIEW OF SYSTEMS  Review of Systems   Constitutional: Negative for chills and fever.   Respiratory: Negative for cough and shortness of breath.    Cardiovascular: Positive for palpitations. Negative for chest pain, leg swelling and PND.   Gastrointestinal: Negative for nausea and vomiting.   Neurological: Negative for dizziness and headaches.   All other systems reviewed and are negative.    See HPI for further details.    PAST MEDICAL HISTORY   has a past medical history of Anemia, Arthritis, Carotid artery plaque (08/2018), COPD, Dizziness ( ), Dyslipidemia ( ), Fatigue (7/3/2014), Hypothyroidism, Insomnia ( ), Nocturnal hypoxia ( ), PSVT (paroxysmal supraventricular tachycardia) (HCC) (02/2013), Sickle cell disease (HCC), and Tobacco use.    SOCIAL HISTORY  Social History     Tobacco Use   • Smoking status: Current Every Day Smoker     Packs/day: 0.25     Years: 55.00     Pack years: 13.75     Types: Cigars   • Smokeless tobacco: Never Used   • Tobacco comment: does not inhale, small filtered cigars, 4  "cigs/ day    Substance and Sexual Activity   • Alcohol use: No   • Drug use: No   • Sexual activity: Not Currently       SURGICAL HISTORY   has a past surgical history that includes hysterectomy, total abdominal; knee replacement, total (2005); cataract extraction with iol; cataract extraction with iol; appendectomy; and abdominal hysterectomy total.    CURRENT MEDICATIONS  Home Medications    **Home medications have not yet been reviewed for this encounter**         ALLERGIES  Allergies   Allergen Reactions   • Hydrocodone Hives   • Iodine Anaphylaxis     RXN=>20 years ago  Heavy metal dyes and preservatives   • Nsaids Hives and Shortness of Breath   • Penicillins Anaphylaxis     RXN=>20 years ago   • Asa [Aspirin] Hives     Hives   • Codeine    • Erythromycin    • Morphine    • Sulfa Drugs        PHYSICAL EXAM  VITAL SIGNS: BP (!) 93/64 Comment: asymptomatic  Pulse 81   Temp 36.8 °C (98.3 °F) (Temporal)   Resp 19   Ht 1.575 m (5' 2\")   SpO2 90%   BMI 17.74 kg/m²     Pulse ox interpretation: I interpret this pulse ox as normal.  Constitutional: Alert in no apparent distress. Pleasant. Thin body habitus.  HENT: No signs of trauma, Bilateral external ears normal, Nose normal.   Eyes: Pupils are equal, Conjunctiva normal, Non-icteric.   Neck: Normal range of motion, No tenderness, Supple, No stridor. No JVD  Cardiovascular: Rapid regular rhythm. Symmetric distal pulses. No cyanosis of extremities. No peripheral edema of extremities.  Thorax & Lungs: Normal breath sounds, No respiratory distress, No wheezing, No chest tenderness.   Abdomen: Bowel sounds normal, Soft, No tenderness, No masses, No pulsatile masses. No peritoneal signs.  Skin: Warm, Dry, No erythema, No rash.   Back: No midline bony tenderness, No CVA tenderness.   Musculoskeletal: Good range of motion in all major joints. No tenderness to palpation or major deformities noted.   Neurologic: Alert , Normal motor function, Normal sensory function, No " focal deficits noted.   Psychiatric: Affect normal, Judgment normal, Mood normal.   Physical Exam      DIAGNOSTIC STUDIES / PROCEDURES    EKG  Results for orders placed or performed during the hospital encounter of 20   EKG   Result Value Ref Range    Report       [facility] Emergency Dept.    Test Date:  2020  Pt Name:    KYRIE NELSON               Department: [facility]  MRN:        [id number]                      Room:  Gender:     Female                       Technician: NIR  :        [id number]                   Requested By:ER TRIAGE PROTOCOL  Order #:    [id number]                    Reading MD: Bassam Desir II, MD    Measurements  Intervals                                Axis  Rate:       145                          P:  CT:                                      QRS:        95  QRSD:       76                           T:          -59  QT:         300  QTc:        466    Interpretive Statements  JUNCTIONAL TACHYCARDIA, SVT  Normal intervals.  No ST elevation. Twaves upright.  ST DEPRESSION, PROBABLY RATE RELATED  Junctional arrhythmia with signs of possible ischemia related to rapid rate.  Compared to ECG 2020 20:48:39  Junctional tachycardia now present  ST (T wave) deviation now present  Sinus rhythm n o longer present  Electronically Signed On 2020 21:18:41 PDT by Bassam Desir II, MD           LABS  Pertinent Labs & Imaging studies reviewed. (See chart for details)    RADIOLOGY  Pertinent Labs & Imaging studies reviewed. (See chart for details)    COURSE & MEDICAL DECISION MAKING  Pertinent Labs & Imaging studies reviewed. (See chart for details)    8:57 PM This is an emergent evaluation of a  84 y.o. female who has had multiple visits for SVT returning to the emerge department 3 days after leaving AMA.  She currently has a rapid heart rate in the 150s rhythm is regular.  Consistent with SVT on EKG.  Blood pressure is 95 or 64 and she will receive a fluid  bolus.  Low blood pressure could be due to the rapid rate.  Plan to slow rate with diltiazem.  We did discuss adenosine and she adamantly refused this medication.  I have sent serum studies as well as she had a trending up troponin last time.  I suspect that trending upward troponin last time was due to rapid rate.  I am concerned that she is having increased episodes because the diltiazem dose at home was lowered.  She is unsure if she wants to stay in the hospital for further cardiac monitoring.  In my opinion it would be beneficial to see how her heart rate response over the next day or so.  She may need further adjustments to her medications.    10:27 PM  Given diltiazem 10mg IV and heart down to 130s. Blood pressure stable and normal. Will give additional dose of diltiazem now. Troponin wnl. Lytes wnl.     10:56 PM  After 20 mg IV diltiazem heart rate now in the 70s.  She says she is feeling dramatically better.  She is requesting be discharged home.  Her labs are within acceptable limits.  I am concerned that she has had recurrent episodes, likely needs adjustments to her medications.  She is going to try to call the heart [place] tomorrow.  I am also going to leave a message with her cardiologist to help ensure close follow-up.      CRITICAL CARE  The very real possibilty of a deterioration of this patient's condition required the highest level of my preparedness for sudden, emergent intervention.  I provided critical care services, which included medication orders, frequent reevaluations of the patient's condition and response to treatment, ordering and reviewing test results, and discussing the case with various consultants.  The critical care time associated with the care of the patient was 30 minutes. Review chart for interventions. This time is exclusive of any other billable procedures.          Future Appointments   Date Time Provider Department Center   9/3/2020  2:20 PM Eun Flynn M.D.  [facility] None       The patient will return for worsening symptoms and is stable at the time of discharge. The patient verbalizes understanding. Guidance was provided on appropriate use of medications including driving under the influence, overdose, and side effects.     FINAL IMPRESSION    ICD-10-CM   1. SVT (supraventricular tachycardia) (HCC)  I47.1        This dictation was created using voice recognition software. The accuracy of the dictation is limited to the abilities of the software. I expect there may be some errors of grammar and possibly content. The nursing notes were reviewed and certain aspects of this information were incorporated into this note.    Electronically signed by: Bassam Desir II, M.D., 5/26/2020 8:57 PM

## 2020-05-27 NOTE — ED NOTES
Pt's HR remains elevated 140-150 's Denies CP or SOB ERP  Made aware and new orders rec'd Further medicated with diltiazem

## 2020-05-27 NOTE — TELEPHONE ENCOUNTER
----- Message from Eun Flynn M.D. sent at 5/27/2020 10:51 AM PDT -----  Regarding: RE: ED Follow up  Thank you for reaching out.  We will get her in with EP to discuss options.  Eun Anderson Pls get pt in with EP/APN.  ----- Message -----  From: Bassam Desir II, M.D.  Sent: 5/26/2020  10:59 PM PDT  To: Eun Flynn M.D.  Subject: ED Follow up                                     Florencio Haq,    Mrs. Stover is a former patient of Dr. Langston in Cardiology Clinic. She is a very pleasant 84 year old woman with paroxysmal SVT. She has been to the ER twice this week with SVT. She refuses to be hospitalized  tonight which isn't totally unreasonable given normal labs, appearing well clinically, and resolution after IV diltiazem. She takes diltiazem 180mg at home, previously 220mg but decreased dose because it made her fatigued. I'm hoping she can get into your clinic or one of your colleagues sooner than her appointment with you in September. She may benefit from a discussion about medication changes which she is resistant to do so today.  Thank you!     -Bassam

## 2020-05-28 ENCOUNTER — TELEPHONE (OUTPATIENT)
Dept: MEDICAL GROUP | Age: 85
End: 2020-05-28

## 2020-05-28 DIAGNOSIS — N95.2 ATROPHIC VULVOVAGINITIS: ICD-10-CM

## 2020-05-28 RX ORDER — ESTRADIOL 0.1 MG/G
CREAM VAGINAL
Qty: 42.5 G | Refills: 0 | Status: SHIPPED | OUTPATIENT
Start: 2020-05-28 | End: 2020-07-28

## 2020-05-29 NOTE — TELEPHONE ENCOUNTER
Phone Number Called: [phone] (home)     Call outcome: Spoke to patient regarding message below.    Message: informed patient of needing a visit for future refills. Patient states that she refuses to go out in public and that she does not do technology. Patient called back to ask our system is to come into the office for a visit. She states that she is unable in any way able to come into the office. She is unable to do a virtual visit due to not having the technology to complete the visit. Patient is wondering if she needs to really come in in regards to her medications. Tried to let the patient know that some medications require to have appointments. Patient still said she wont due it and cannot do it.

## 2020-06-12 DIAGNOSIS — E07.9 THYROID DISORDER: ICD-10-CM

## 2020-06-16 RX ORDER — LEVOTHYROXINE SODIUM 0.07 MG/1
TABLET ORAL
Qty: 90 TAB | Refills: 1 | Status: SHIPPED | OUTPATIENT
Start: 2020-06-16 | End: 2020-07-28

## 2020-07-28 ENCOUNTER — HOSPITAL ENCOUNTER (EMERGENCY)
Facility: MEDICAL CENTER | Age: 85
End: 2020-07-28
Attending: EMERGENCY MEDICINE
Payer: COMMERCIAL

## 2020-07-28 ENCOUNTER — APPOINTMENT (OUTPATIENT)
Dept: RADIOLOGY | Facility: MEDICAL CENTER | Age: 85
End: 2020-07-28
Attending: EMERGENCY MEDICINE
Payer: COMMERCIAL

## 2020-07-28 VITALS
BODY MASS INDEX: 17.38 KG/M2 | TEMPERATURE: 98.5 F | OXYGEN SATURATION: 91 % | HEART RATE: 88 BPM | RESPIRATION RATE: 18 BRPM | WEIGHT: 95 LBS | SYSTOLIC BLOOD PRESSURE: 105 MMHG | DIASTOLIC BLOOD PRESSURE: 66 MMHG

## 2020-07-28 DIAGNOSIS — I47.10 SVT (SUPRAVENTRICULAR TACHYCARDIA) (HCC): ICD-10-CM

## 2020-07-28 LAB
ALBUMIN SERPL BCP-MCNC: 3.7 G/DL (ref 3.2–4.9)
ALBUMIN/GLOB SERPL: 1.4 G/DL
ALP SERPL-CCNC: 99 U/L (ref 30–99)
ALT SERPL-CCNC: 15 U/L (ref 2–50)
ANION GAP SERPL CALC-SCNC: 11 MMOL/L (ref 7–16)
AST SERPL-CCNC: 26 U/L (ref 12–45)
BASOPHILS # BLD AUTO: 0.5 % (ref 0–1.8)
BASOPHILS # BLD: 0.05 K/UL (ref 0–0.12)
BILIRUB SERPL-MCNC: 0.3 MG/DL (ref 0.1–1.5)
BUN SERPL-MCNC: 14 MG/DL (ref 8–22)
CALCIUM SERPL-MCNC: 8.8 MG/DL (ref 8.4–10.2)
CHLORIDE SERPL-SCNC: 105 MMOL/L (ref 96–112)
CO2 SERPL-SCNC: 25 MMOL/L (ref 20–33)
CREAT SERPL-MCNC: 0.84 MG/DL (ref 0.5–1.4)
EKG IMPRESSION: NORMAL
EKG IMPRESSION: NORMAL
EOSINOPHIL # BLD AUTO: 0.09 K/UL (ref 0–0.51)
EOSINOPHIL NFR BLD: 1 % (ref 0–6.9)
ERYTHROCYTE [DISTWIDTH] IN BLOOD BY AUTOMATED COUNT: 45.1 FL (ref 35.9–50)
GLOBULIN SER CALC-MCNC: 2.7 G/DL (ref 1.9–3.5)
GLUCOSE SERPL-MCNC: 92 MG/DL (ref 65–99)
HCT VFR BLD AUTO: 35.7 % (ref 37–47)
HGB BLD-MCNC: 11.9 G/DL (ref 12–16)
IMM GRANULOCYTES # BLD AUTO: 0.04 K/UL (ref 0–0.11)
IMM GRANULOCYTES NFR BLD AUTO: 0.4 % (ref 0–0.9)
LYMPHOCYTES # BLD AUTO: 2.86 K/UL (ref 1–4.8)
LYMPHOCYTES NFR BLD: 30.3 % (ref 22–41)
MCH RBC QN AUTO: 31.3 PG (ref 27–33)
MCHC RBC AUTO-ENTMCNC: 33.3 G/DL (ref 33.6–35)
MCV RBC AUTO: 93.9 FL (ref 81.4–97.8)
MONOCYTES # BLD AUTO: 0.84 K/UL (ref 0–0.85)
MONOCYTES NFR BLD AUTO: 8.9 % (ref 0–13.4)
NEUTROPHILS # BLD AUTO: 5.56 K/UL (ref 2–7.15)
NEUTROPHILS NFR BLD: 58.9 % (ref 44–72)
NRBC # BLD AUTO: 0 K/UL
NRBC BLD-RTO: 0 /100 WBC
PLATELET # BLD AUTO: 196 K/UL (ref 164–446)
PMV BLD AUTO: 10.9 FL (ref 9–12.9)
POTASSIUM SERPL-SCNC: 4.1 MMOL/L (ref 3.6–5.5)
PROT SERPL-MCNC: 6.4 G/DL (ref 6–8.2)
RBC # BLD AUTO: 3.8 M/UL (ref 4.2–5.4)
SODIUM SERPL-SCNC: 141 MMOL/L (ref 135–145)
TROPONIN T SERPL-MCNC: 12 NG/L (ref 6–19)
TSH SERPL DL<=0.005 MIU/L-ACNC: 0.33 UIU/ML (ref 0.38–5.33)
WBC # BLD AUTO: 9.4 K/UL (ref 4.8–10.8)

## 2020-07-28 PROCEDURE — 96376 TX/PRO/DX INJ SAME DRUG ADON: CPT

## 2020-07-28 PROCEDURE — 99285 EMERGENCY DEPT VISIT HI MDM: CPT

## 2020-07-28 PROCEDURE — 80053 COMPREHEN METABOLIC PANEL: CPT

## 2020-07-28 PROCEDURE — 85025 COMPLETE CBC W/AUTO DIFF WBC: CPT

## 2020-07-28 PROCEDURE — 96374 THER/PROPH/DIAG INJ IV PUSH: CPT

## 2020-07-28 PROCEDURE — 84443 ASSAY THYROID STIM HORMONE: CPT

## 2020-07-28 PROCEDURE — 93005 ELECTROCARDIOGRAM TRACING: CPT | Performed by: EMERGENCY MEDICINE

## 2020-07-28 PROCEDURE — 71045 X-RAY EXAM CHEST 1 VIEW: CPT

## 2020-07-28 PROCEDURE — 700105 HCHG RX REV CODE 258

## 2020-07-28 PROCEDURE — 84484 ASSAY OF TROPONIN QUANT: CPT

## 2020-07-28 PROCEDURE — 700111 HCHG RX REV CODE 636 W/ 250 OVERRIDE (IP): Performed by: EMERGENCY MEDICINE

## 2020-07-28 PROCEDURE — 36415 COLL VENOUS BLD VENIPUNCTURE: CPT

## 2020-07-28 RX ORDER — ACETAMINOPHEN 500 MG
500 TABLET ORAL EVERY 6 HOURS PRN
Status: SHIPPED | COMMUNITY
End: 2020-09-15

## 2020-07-28 RX ORDER — PRAVASTATIN SODIUM 40 MG
40 TABLET ORAL EVERY EVENING
Status: SHIPPED | COMMUNITY
End: 2020-09-17 | Stop reason: SDUPTHER

## 2020-07-28 RX ORDER — LEVOTHYROXINE SODIUM 0.07 MG/1
75 TABLET ORAL
Status: SHIPPED | COMMUNITY
End: 2020-09-18 | Stop reason: SDUPTHER

## 2020-07-28 RX ORDER — DILTIAZEM HYDROCHLORIDE 5 MG/ML
10 INJECTION INTRAVENOUS ONCE
Status: COMPLETED | OUTPATIENT
Start: 2020-07-28 | End: 2020-07-28

## 2020-07-28 RX ORDER — ESTRADIOL 0.1 MG/G
1 CREAM VAGINAL SEE ADMIN INSTRUCTIONS
Status: SHIPPED | COMMUNITY
End: 2022-03-20

## 2020-07-28 RX ORDER — SODIUM CHLORIDE 9 MG/ML
250 INJECTION, SOLUTION INTRAVENOUS ONCE
Status: COMPLETED | OUTPATIENT
Start: 2020-07-28 | End: 2020-07-28

## 2020-07-28 RX ADMIN — SODIUM CHLORIDE 250 ML: 9 INJECTION, SOLUTION INTRAVENOUS at 16:48

## 2020-07-28 RX ADMIN — DILTIAZEM HYDROCHLORIDE 10 MG: 5 INJECTION INTRAVENOUS at 15:52

## 2020-07-28 RX ADMIN — DILTIAZEM HYDROCHLORIDE 10 MG: 5 INJECTION INTRAVENOUS at 16:58

## 2020-07-28 ASSESSMENT — FIBROSIS 4 INDEX: FIB4 SCORE: 2.81

## 2020-07-28 NOTE — ED NOTES
MD at bedside to speak with patient about using adenosine for her heart rate. Pt states she is adamant about not taking adenosine, states she only wants diltiazem as it has worked for her in the past. MD spoke with patient at length about risks and benefits of each and patient continues to refuse adenosine.

## 2020-07-28 NOTE — ED PROVIDER NOTES
ED Provider Note    CHIEF COMPLAINT  Chief Complaint   Patient presents with   • Tachycardia     pt has hx of svt was bib ems for elevated HR in the 140's when she moves or gets up.        HPI  Antonia Stover is a 84 y.o. female who presents here requesting diltiazem.  The patient states she started having tachycardia this morning.  She attributes this to the fact that she has to sleep on her back instead of her side because her ears are bothering her.  She apparently has planned follow-up next week with ENT to address this.  She has a frequent history of SVT.  She is maintained on Cardizem which she has been taking.  She had called the day after her last ER visit and it sounds that they are trying to set her up with EP.  Not since followed up with them.  I do see a recent refill of her Synthroid we do not see a recent TSH checked.  She denies any dizziness, syncope or near syncope.  There is no chest pain or shortness of breath.  No fever chills.  Aside from ears and her palpitations there is no other complaint.    PAST MEDICAL HISTORY  Past Medical History:   Diagnosis Date   • Anemia    • Arthritis    • Carotid artery plaque 08/2018    Carotid US with <50% stenosis bilaterally.   • COPD    • Dizziness     • Dyslipidemia     • Fatigue 7/3/2014   • Hypothyroidism    • Insomnia     • Nocturnal hypoxia      Uses oxygen QHS.   • PSVT (paroxysmal supraventricular tachycardia) ([facility]) 02/2013    Echocardiogram with normal LV size, LVEF 65-70%.   • Sickle cell disease (HCC)    • Tobacco use        FAMILY HISTORY  Family History   Problem Relation Age of Onset   • Heart Attack Brother 70       SOCIAL HISTORY  Social History     Tobacco Use   • Smoking status: Current Every Day Smoker     Packs/day: 0.25     Years: 55.00     Pack years: 13.75     Types: Cigars   • Smokeless tobacco: Never Used   • Tobacco comment: does not inhale, small filtered cigars, 4 cigs/ day    Substance Use Topics   • Alcohol use: No   • Drug  use: No     Retired nurse.    SURGICAL HISTORY  Past Surgical History:   Procedure Laterality Date   • KNEE REPLACEMENT, TOTAL  2005    Right   • ABDOMINAL HYSTERECTOMY TOTAL     • APPENDECTOMY     • CATARACT EXTRACTION WITH IOL      Bilateral   • CATARACT EXTRACTION WITH IOL      Bilateral   • HYSTERECTOMY, TOTAL ABDOMINAL         CURRENT MEDICATIONS    I have reviewed the nurses notes and/or the list brought with the patient.    ALLERGIES  Allergies   Allergen Reactions   • Hydrocodone Hives   • Iodine Anaphylaxis     RXN=>20 years ago  Heavy metal dyes and preservatives   • Nsaids Hives and Shortness of Breath   • Penicillins Anaphylaxis     RXN=>20 years ago   • Asa [Aspirin] Hives     Hives   • Codeine    • Erythromycin    • Morphine    • Sulfa Drugs        REVIEW OF SYSTEMS  See HPI for further details. Review of systems as above, otherwise all other systems are negative.     PHYSICAL EXAM  VITAL SIGNS: /65   Pulse (!) 140   Temp 36.9 °C (98.5 °F) (Oral)   Resp 18   SpO2 92%   Breastfeeding No     Constitutional: Well appearing patient in no acute distress.  Not toxic, nor ill in appearance.  HENT: Mucus membranes moist.  Oropharynx is clear.  She refuses me to look at her ears.  There is no obvious abnormality to inspection of her mastoid process or the external ear.  Eyes: Pupils equally round.  No scleral icterus.   Neck: Full nontender range of motion.  Lymphatic: No cervical lymphadenopathy noted.   Cardiovascular: Fast but regular.  No murmurs, rubs, nor gallop appreciated.   Thorax & Lungs: Chest is nontender.  Lungs are clear to auscultation with good air movement bilaterally.  No wheeze, rhonchi, nor rales.   Abdomen: Soft, with no tenderness, rebound nor guarding.  No mass, pulsatile mass, nor hepatosplenomegaly appreciated.  Skin: No purpura nor petechia noted.  Extremities/Musculoskeletal: No sign of trauma.  Calves are nontender with no cords nor edema.  No Adore's sign.  Pulses are  intact all around.   Neurologic: Alert & oriented.  Strength and sensation is intact all around.  Gait is normal.  Psychiatric: Normal affect appropriate for the clinical situation.    EKG #1  I interpreted this EKG myself.  This is a 12-lead study.  The rhythm is a supraventricular tachycardia with a rate of 138.  There are no ST segment nor T wave abnormalities.  Interpretation: No ST segment elevation myocardial infarction.    EKG #2  I interpreted this EKG myself.  This is a 12-lead study.  The rhythm is a supraventricular tachycardia with a rate of 127.  There are no ST segment nor T wave abnormalities.  Interpretation: No ST segment elevation myocardial infarction.    LABS  Labs Reviewed   CBC WITH DIFFERENTIAL - Abnormal; Notable for the following components:       Result Value    RBC 3.80 (*)     Hemoglobin 11.9 (*)     Hematocrit 35.7 (*)     MCHC 33.3 (*)     All other components within normal limits   TSH - Abnormal; Notable for the following components:    TSH 0.325 (*)     All other components within normal limits   COMP METABOLIC PANEL   TROPONIN   ESTIMATED GFR         RADIOLOGY/PROCEDURES  I have reviewed the patient's film interpretations myself, and they are read out by the radiologist as:   DX-CHEST-PORTABLE (1 VIEW)   Final Result      No acute cardiac or pulmonary abnormality is noted.        .    MEDICAL RECORD  I have reviewed patient's medical record and pertinent results are listed above.    COURSE & MEDICAL DECISION MAKING  I have reviewed any medical record information, laboratory studies and radiographic results as noted above.  1557: Patient presents with a recurrence of SVT.  She is already on a calcium channel blocker.  She is been seen multiple times for SVT.  I see from Dr. Desir's note that she has adamantly refused adenosine.  I talked with her about this.  She, sure enough, refuses this with me as well.  I point out in no uncertain terms that this is a direct deviation from  ACLS protocol.  She is not deterred in her refusal.  So she was given 2 doses of diltiazem 10 mg each with her last visit, and then 11 mg visit prior to that.  We will start with 10 presently.  I have also ordered laboratories.    1630: Still tachycardic.  Appears regular but there is some variability in the rate.  Repeat EKG is obtained.  We will give her second dose of diltiazem.  She is on the softer side with her blood pressure.  Continues to refuse adenosine.    After the second dose of diltiazem, her rhythm was converted to a normal sinus rhythm in the 80s.  Blood pressure is 106 systolic.  She has no palpitations, continues to be asymptomatic otherwise.    At this point, we talked more about electrophysiology.  She had talked with Dr. Langston in the past, before he left, and it does not sound as if she wanted any aggressive intervention.  However, I do wonder if they could put her on a better medication regimen to help prevent her from coming to the hospital.       Laboratories today are noted.  I do not suspect a ischemic etiology with her having had symptoms since this morning with a normal troponin.  I do note that her TSH is just outside the normal range.  Looks like this is similar to what she has had previously.      FINAL IMPRESSION  1. SVT (supraventricular tachycardia) (HCC)           This dictation was created using voice recognition software.    Electronically signed by: Apolinar Taylor M.D., 7/28/2020 3:51 PM

## 2020-07-28 NOTE — ED TRIAGE NOTES
Bib ems for above complaints.    Chief Complaint   Patient presents with   • Tachycardia     pt has hx of svt was bib ems for elevated HR in the 140's when she moves or gets up.      /65   Pulse (!) 140   Resp 18   SpO2 92%   Breastfeeding No

## 2020-07-29 NOTE — DISCHARGE INSTRUCTIONS
Talk to Dr. Richardson or EP partner.  Although it sounds like you do not want an ablation, I do wonder if they may put you on a better medication regimen to keep you out of this.    For any new symptoms or turn for the worse, return to the ER.

## 2020-08-04 DIAGNOSIS — Z72.0 TOBACCO ABUSE: ICD-10-CM

## 2020-08-06 RX ORDER — ALBUTEROL SULFATE 90 UG/1
2 AEROSOL, METERED RESPIRATORY (INHALATION) EVERY 6 HOURS PRN
Qty: 1 G | Refills: 0 | Status: SHIPPED | OUTPATIENT
Start: 2020-08-06 | End: 2020-09-15

## 2020-09-15 ENCOUNTER — HOSPITAL ENCOUNTER (OUTPATIENT)
Facility: MEDICAL CENTER | Age: 85
End: 2020-09-16
Attending: EMERGENCY MEDICINE | Admitting: INTERNAL MEDICINE
Payer: COMMERCIAL

## 2020-09-15 ENCOUNTER — APPOINTMENT (OUTPATIENT)
Dept: RADIOLOGY | Facility: MEDICAL CENTER | Age: 85
End: 2020-09-15
Attending: EMERGENCY MEDICINE
Payer: COMMERCIAL

## 2020-09-15 DIAGNOSIS — R42 VERTIGO: ICD-10-CM

## 2020-09-15 LAB
ANION GAP SERPL CALC-SCNC: 11 MMOL/L (ref 7–16)
BASOPHILS # BLD AUTO: 0.9 % (ref 0–1.8)
BASOPHILS # BLD: 0.05 K/UL (ref 0–0.12)
BUN SERPL-MCNC: 7 MG/DL (ref 8–22)
CALCIUM SERPL-MCNC: 9.5 MG/DL (ref 8.4–10.2)
CHLORIDE SERPL-SCNC: 98 MMOL/L (ref 96–112)
CO2 SERPL-SCNC: 31 MMOL/L (ref 20–33)
COVID ORDER STATUS COVID19: NORMAL
CREAT SERPL-MCNC: 0.7 MG/DL (ref 0.5–1.4)
EKG IMPRESSION: NORMAL
EOSINOPHIL # BLD AUTO: 0.07 K/UL (ref 0–0.51)
EOSINOPHIL NFR BLD: 1.3 % (ref 0–6.9)
ERYTHROCYTE [DISTWIDTH] IN BLOOD BY AUTOMATED COUNT: 45.3 FL (ref 35.9–50)
GLUCOSE SERPL-MCNC: 91 MG/DL (ref 65–99)
HCT VFR BLD AUTO: 37.5 % (ref 37–47)
HGB BLD-MCNC: 12.2 G/DL (ref 12–16)
IMM GRANULOCYTES # BLD AUTO: 0.01 K/UL (ref 0–0.11)
IMM GRANULOCYTES NFR BLD AUTO: 0.2 % (ref 0–0.9)
LYMPHOCYTES # BLD AUTO: 2.17 K/UL (ref 1–4.8)
LYMPHOCYTES NFR BLD: 38.8 % (ref 22–41)
MCH RBC QN AUTO: 30.8 PG (ref 27–33)
MCHC RBC AUTO-ENTMCNC: 32.5 G/DL (ref 33.6–35)
MCV RBC AUTO: 94.7 FL (ref 81.4–97.8)
MONOCYTES # BLD AUTO: 0.52 K/UL (ref 0–0.85)
MONOCYTES NFR BLD AUTO: 9.3 % (ref 0–13.4)
NEUTROPHILS # BLD AUTO: 2.78 K/UL (ref 2–7.15)
NEUTROPHILS NFR BLD: 49.5 % (ref 44–72)
NRBC # BLD AUTO: 0 K/UL
NRBC BLD-RTO: 0 /100 WBC
PLATELET # BLD AUTO: 172 K/UL (ref 164–446)
PMV BLD AUTO: 12.2 FL (ref 9–12.9)
POTASSIUM SERPL-SCNC: 4.2 MMOL/L (ref 3.6–5.5)
RBC # BLD AUTO: 3.96 M/UL (ref 4.2–5.4)
SODIUM SERPL-SCNC: 140 MMOL/L (ref 135–145)
WBC # BLD AUTO: 5.6 K/UL (ref 4.8–10.8)

## 2020-09-15 PROCEDURE — 99285 EMERGENCY DEPT VISIT HI MDM: CPT

## 2020-09-15 PROCEDURE — 99220 PR INITIAL OBSERVATION CARE,LEVL III: CPT | Performed by: INTERNAL MEDICINE

## 2020-09-15 PROCEDURE — 85025 COMPLETE CBC W/AUTO DIFF WBC: CPT

## 2020-09-15 PROCEDURE — 700102 HCHG RX REV CODE 250 W/ 637 OVERRIDE(OP): Performed by: INTERNAL MEDICINE

## 2020-09-15 PROCEDURE — G0378 HOSPITAL OBSERVATION PER HR: HCPCS

## 2020-09-15 PROCEDURE — A9270 NON-COVERED ITEM OR SERVICE: HCPCS | Performed by: EMERGENCY MEDICINE

## 2020-09-15 PROCEDURE — U0003 INFECTIOUS AGENT DETECTION BY NUCLEIC ACID (DNA OR RNA); SEVERE ACUTE RESPIRATORY SYNDROME CORONAVIRUS 2 (SARS-COV-2) (CORONAVIRUS DISEASE [COVID-19]), AMPLIFIED PROBE TECHNIQUE, MAKING USE OF HIGH THROUGHPUT TECHNOLOGIES AS DESCRIBED BY CMS-2020-01-R: HCPCS

## 2020-09-15 PROCEDURE — 93005 ELECTROCARDIOGRAM TRACING: CPT | Performed by: EMERGENCY MEDICINE

## 2020-09-15 PROCEDURE — C9803 HOPD COVID-19 SPEC COLLECT: HCPCS | Performed by: INTERNAL MEDICINE

## 2020-09-15 PROCEDURE — 700102 HCHG RX REV CODE 250 W/ 637 OVERRIDE(OP): Performed by: EMERGENCY MEDICINE

## 2020-09-15 PROCEDURE — 36415 COLL VENOUS BLD VENIPUNCTURE: CPT

## 2020-09-15 PROCEDURE — 93005 ELECTROCARDIOGRAM TRACING: CPT

## 2020-09-15 PROCEDURE — A9270 NON-COVERED ITEM OR SERVICE: HCPCS | Performed by: INTERNAL MEDICINE

## 2020-09-15 PROCEDURE — 70450 CT HEAD/BRAIN W/O DYE: CPT

## 2020-09-15 PROCEDURE — 80048 BASIC METABOLIC PNL TOTAL CA: CPT

## 2020-09-15 RX ORDER — ONDANSETRON 2 MG/ML
4 INJECTION INTRAMUSCULAR; INTRAVENOUS EVERY 4 HOURS PRN
Status: DISCONTINUED | OUTPATIENT
Start: 2020-09-15 | End: 2020-09-16 | Stop reason: HOSPADM

## 2020-09-15 RX ORDER — BISACODYL 10 MG
10 SUPPOSITORY, RECTAL RECTAL
Status: DISCONTINUED | OUTPATIENT
Start: 2020-09-15 | End: 2020-09-16 | Stop reason: HOSPADM

## 2020-09-15 RX ORDER — DILTIAZEM HYDROCHLORIDE 180 MG/1
180 CAPSULE, EXTENDED RELEASE ORAL EVERY EVENING
COMMUNITY
End: 2020-10-06 | Stop reason: SDUPTHER

## 2020-09-15 RX ORDER — AMOXICILLIN 250 MG
2 CAPSULE ORAL 2 TIMES DAILY
Status: DISCONTINUED | OUTPATIENT
Start: 2020-09-15 | End: 2020-09-16 | Stop reason: HOSPADM

## 2020-09-15 RX ORDER — DILTIAZEM HYDROCHLORIDE 180 MG/1
180 CAPSULE, COATED, EXTENDED RELEASE ORAL EVERY EVENING
Status: DISCONTINUED | OUTPATIENT
Start: 2020-09-15 | End: 2020-09-16 | Stop reason: HOSPADM

## 2020-09-15 RX ORDER — PRAVASTATIN SODIUM 20 MG
40 TABLET ORAL EVERY EVENING
Status: DISCONTINUED | OUTPATIENT
Start: 2020-09-15 | End: 2020-09-16 | Stop reason: HOSPADM

## 2020-09-15 RX ORDER — CLOPIDOGREL BISULFATE 75 MG/1
75 TABLET ORAL
Status: DISCONTINUED | OUTPATIENT
Start: 2020-09-15 | End: 2020-09-16 | Stop reason: HOSPADM

## 2020-09-15 RX ORDER — MECLIZINE HYDROCHLORIDE 25 MG/1
25 TABLET ORAL 3 TIMES DAILY PRN
Status: DISCONTINUED | OUTPATIENT
Start: 2020-09-15 | End: 2020-09-16 | Stop reason: HOSPADM

## 2020-09-15 RX ORDER — WHEAT DEXTRIN 3 G/3.8 G
1 POWDER (GRAM) ORAL DAILY
COMMUNITY
End: 2020-10-06

## 2020-09-15 RX ORDER — LEVOTHYROXINE SODIUM 0.05 MG/1
75 TABLET ORAL
Status: DISCONTINUED | OUTPATIENT
Start: 2020-09-15 | End: 2020-09-16 | Stop reason: HOSPADM

## 2020-09-15 RX ORDER — ONDANSETRON 4 MG/1
4 TABLET, ORALLY DISINTEGRATING ORAL EVERY 4 HOURS PRN
Status: DISCONTINUED | OUTPATIENT
Start: 2020-09-15 | End: 2020-09-16 | Stop reason: HOSPADM

## 2020-09-15 RX ORDER — MECLIZINE HYDROCHLORIDE 25 MG/1
25 TABLET ORAL ONCE
Status: COMPLETED | OUTPATIENT
Start: 2020-09-15 | End: 2020-09-15

## 2020-09-15 RX ORDER — POLYETHYLENE GLYCOL 3350 17 G/17G
1 POWDER, FOR SOLUTION ORAL
Status: DISCONTINUED | OUTPATIENT
Start: 2020-09-15 | End: 2020-09-16 | Stop reason: HOSPADM

## 2020-09-15 RX ORDER — ACETAMINOPHEN 325 MG/1
650 TABLET ORAL EVERY 6 HOURS PRN
Status: DISCONTINUED | OUTPATIENT
Start: 2020-09-15 | End: 2020-09-16 | Stop reason: HOSPADM

## 2020-09-15 RX ADMIN — PRAVASTATIN SODIUM 40 MG: 20 TABLET ORAL at 18:17

## 2020-09-15 RX ADMIN — MECLIZINE HYDROCHLORIDE 25 MG: 25 TABLET ORAL at 11:21

## 2020-09-15 RX ADMIN — DILTIAZEM HYDROCHLORIDE 180 MG: 180 CAPSULE, COATED, EXTENDED RELEASE ORAL at 21:10

## 2020-09-15 RX ADMIN — CLOPIDOGREL BISULFATE 75 MG: 75 TABLET ORAL at 15:08

## 2020-09-15 ASSESSMENT — ENCOUNTER SYMPTOMS
COUGH: 0
SPUTUM PRODUCTION: 0
DIZZINESS: 1
MYALGIAS: 0
ORTHOPNEA: 0
STRIDOR: 0
SEIZURES: 0
CHILLS: 0
DEPRESSION: 0
BACK PAIN: 0
INSOMNIA: 0
SHORTNESS OF BREATH: 0
HEADACHES: 0
FEVER: 0
FOCAL WEAKNESS: 0
ABDOMINAL PAIN: 0
WEIGHT LOSS: 0
EYE REDNESS: 0
NERVOUS/ANXIOUS: 0
BLURRED VISION: 0
NECK PAIN: 0
EYE DISCHARGE: 0
NAUSEA: 0
EYE PAIN: 0
VOMITING: 0
HEARTBURN: 0
PALPITATIONS: 0
DIARRHEA: 0

## 2020-09-15 ASSESSMENT — COGNITIVE AND FUNCTIONAL STATUS - GENERAL
MOBILITY SCORE: 18
DAILY ACTIVITIY SCORE: 19
TOILETING: A LITTLE
SUGGESTED CMS G CODE MODIFIER MOBILITY: CK
SUGGESTED CMS G CODE MODIFIER DAILY ACTIVITY: CK
MOVING TO AND FROM BED TO CHAIR: A LITTLE
DRESSING REGULAR LOWER BODY CLOTHING: A LOT
CLIMB 3 TO 5 STEPS WITH RAILING: A LOT
MOVING FROM LYING ON BACK TO SITTING ON SIDE OF FLAT BED: A LITTLE
DRESSING REGULAR UPPER BODY CLOTHING: A LITTLE
WALKING IN HOSPITAL ROOM: A LITTLE
HELP NEEDED FOR BATHING: A LITTLE
STANDING UP FROM CHAIR USING ARMS: A LITTLE

## 2020-09-15 ASSESSMENT — FIBROSIS 4 INDEX: FIB4 SCORE: 2.84

## 2020-09-15 NOTE — ED NOTES
Med rec updated and complete  Allergies reviewed  Pt reports she was taking BENEFIBER once a day and stopped on 9/13/2020, because she started having diarrhea.   Pt reports no antibiotics in the last 2 weeks

## 2020-09-15 NOTE — ASSESSMENT & PLAN NOTE
Likely peripheral vertigo  Started on meclizine  PT for epley's maneuver  MRI brain to rule out central vertigo  Fall precaution

## 2020-09-15 NOTE — H&P
Hospital Medicine History & Physical Note    Date of Service  9/15/2020    Primary Care Physician  Hardik Merino M.D.    Consultants  none    Code Status  Full Code    Chief Complaint  Chief Complaint   Patient presents with   • Dizziness   • Nausea       History of Presenting Illness  85 y.o. female with PMH of HTN who presented 9/15/2020 with dizziness since last night.  She described it as room spinning sensation and triggered by certain head position.  The patient denied any fall or syncope episode.  Associated with nausea.  She also denies any neurological deficit.  In the ER she has CT scan of the head done with no acute finding.  She will be admitted for observation.    Review of Systems  Review of Systems   Constitutional: Negative for chills, fever and weight loss.   HENT: Negative for congestion and nosebleeds.    Eyes: Negative for blurred vision, pain, discharge and redness.   Respiratory: Negative for cough, sputum production, shortness of breath and stridor.    Cardiovascular: Negative for chest pain, palpitations and orthopnea.   Gastrointestinal: Negative for abdominal pain, diarrhea, heartburn, nausea and vomiting.   Genitourinary: Negative for dysuria, frequency and urgency.   Musculoskeletal: Negative for back pain, myalgias and neck pain.   Skin: Negative for itching and rash.   Neurological: Positive for dizziness. Negative for focal weakness, seizures and headaches.   Psychiatric/Behavioral: Negative for depression. The patient is not nervous/anxious and does not have insomnia.        Past Medical History   has a past medical history of Anemia, Arthritis, Carotid artery plaque (08/2018), COPD, Dizziness ( ), Dyslipidemia ( ), Fatigue (7/3/2014), Hypothyroidism, Insomnia ( ), Nocturnal hypoxia ( ), PSVT (paroxysmal supraventricular tachycardia) ([facility]) (02/2013), Sickle cell disease ([facility]), and Tobacco use.    Surgical History   has a past surgical history that includes hysterectomy, total  abdominal; knee replacement, total (2005); cataract extraction with iol; cataract extraction with iol; appendectomy; and abdominal hysterectomy total.     Family History  family history includes Heart Attack (age of onset: 70) in her brother.     Social History   reports that she has been smoking cigars. She has a 13.75 pack-year smoking history. She has never used smokeless tobacco. She reports that she does not drink alcohol or use drugs.    Allergies  Allergies   Allergen Reactions   • Hydrocodone Hives   • Iodine Anaphylaxis     RXN=>20 years ago  Heavy metal dyes and preservatives   • Nsaids Hives and Shortness of Breath   • Penicillins Anaphylaxis     RXN=>20 years ago   • Asa [Aspirin] Hives     Hives   • Codeine      Pt reports that she falls to the ground, she passes out   • Erythromycin      Pt reports that she falls to the ground, she passes out   • Morphine      Pt reports that she falls to the ground, she passes out   • Sulfa Drugs      Pt reports that she falls to the ground, she passes out       Medications  Prior to Admission Medications   Prescriptions Last Dose Informant Patient Reported? Taking?   Probiotic Product (ALIGN PO) 9/14/2020 at 0900 Patient Yes No   Sig: Take 1 Cap by mouth every day.   Wheat Dextrin (BENEFIBER) Powder 9/13/2020 at STOPPED Patient Yes Yes   Sig: Take 1 Package by mouth every day.   clopidogrel (PLAVIX) 75 MG Tab 9/14/2020 at 1200 Patient No No   Sig: Take 1 Tab by mouth every day.   diltiazem (TIAZAC) 180 MG SR capsule 9/14/2020 at 2100 Patient Yes Yes   Sig: Take 180 mg by mouth every evening.   estradiol (ESTRACE) 0.1 MG/GM vaginal cream 9/13/2020 at Unknown Patient Yes No   Sig: Insert 1 g in vagina See Admin Instructions. Pt uses twice a week    levothyroxine (SYNTHROID) 75 MCG Tab 9/15/2020 at 0600 Patient Yes No   Sig: Take 75 mcg by mouth Every morning on an empty stomach.   pravastatin (PRAVACHOL) 40 MG tablet 9/14/2020 at 1800 Patient Yes No   Sig: Take 40 mg  by mouth every evening.      Facility-Administered Medications: None       Physical Exam  Temp:  [36.6 °C (97.8 °F)] 36.6 °C (97.8 °F)  Pulse:  [66] 66  Resp:  [20-23] 23  BP: (139)/(67) 139/67  SpO2:  [95 %] 95 %    Physical Exam  Vitals signs reviewed.   Constitutional:       General: She is not in acute distress.     Appearance: Normal appearance.   HENT:      Head: Normocephalic and atraumatic.      Nose: No congestion or rhinorrhea.   Eyes:      Extraocular Movements: Extraocular movements intact.      Pupils: Pupils are equal, round, and reactive to light.   Neck:      Musculoskeletal: Normal range of motion and neck supple.   Cardiovascular:      Rate and Rhythm: Normal rate and regular rhythm.      Pulses: Normal pulses.   Pulmonary:      Effort: Pulmonary effort is normal. No respiratory distress.      Breath sounds: Normal breath sounds.   Abdominal:      General: Bowel sounds are normal. There is no distension.      Palpations: Abdomen is soft.      Tenderness: There is no abdominal tenderness.   Musculoskeletal:         General: No swelling or tenderness.   Skin:     General: Skin is warm.      Findings: No erythema.   Neurological:      General: No focal deficit present.      Mental Status: She is alert and oriented to person, place, and time.         Laboratory:  Recent Labs     09/15/20  1030   WBC 5.6   RBC 3.96*   HEMOGLOBIN 12.2   HEMATOCRIT 37.5   MCV 94.7   MCH 30.8   MCHC 32.5*   RDW 45.3   PLATELETCT 172   MPV 12.2     Recent Labs     09/15/20  1030   SODIUM 140   POTASSIUM 4.2   CHLORIDE 98   CO2 31   GLUCOSE 91   BUN 7*   CREATININE 0.70   CALCIUM 9.5     Recent Labs     09/15/20  1030   GLUCOSE 91         No results for input(s): NTPROBNP in the last 72 hours.      No results for input(s): TROPONINT in the last 72 hours.    Imaging:  CT-HEAD W/O   Final Result      1. No CT evidence of acute infarct, hemorrhage or mass.   2. Moderate to severe global parenchymal atrophy. Chronic small vessel  ischemic changes.      MR-BRAIN-W/O    (Results Pending)         Assessment/Plan:  I anticipate this patient is appropriate for observation status at this time.    Essential hypertension- (present on admission)  Assessment & Plan  Continue outpatient meds    Vertigo- (present on admission)  Assessment & Plan  Likely peripheral vertigo  Started on meclizine  PT for epley's maneuver  MRI brain to rule out central vertigo  Fall precaution    Hypothyroidism- (present on admission)  Assessment & Plan  On levothyroxine

## 2020-09-15 NOTE — ED PROVIDER NOTES
ED Provider Note    CHIEF COMPLAINT  Chief Complaint   Patient presents with   • Dizziness   • Nausea       HPI  Antonia Bre Stover is a 85 y.o. female who presents to the emergency department with a chief complaint of vertigo.  The patient was in bed last night when she rolled over and developed acute onset of vertigo.  She said it looked like everything in the room was spinning around in circles like it was in hurricane.  She noticed that this seemed to get better if she was at rest.  She states that she feels much worse when she lays back.  When she sitting up and staying still she feels better.  She does not have any tinnitus.  No hearing loss.  No fevers.  No headache.  No numbness, tingling, or weakness in her extremities.  She is able to ambulate without difficulty.  She says that the vertigo was very severe when she lays flat but as she is sitting up it is much better.    REVIEW OF SYSTEMS  See HPI for further details. All other systems are negative.     PAST MEDICAL HISTORY  Past Medical History:   Diagnosis Date   • Anemia    • Arthritis    • Carotid artery plaque 08/2018    Carotid US with <50% stenosis bilaterally.   • COPD    • Dizziness     • Dyslipidemia     • Fatigue 7/3/2014   • Hypothyroidism    • Insomnia     • Nocturnal hypoxia      Uses oxygen QHS.   • PSVT (paroxysmal supraventricular tachycardia) ([facility]) 02/2013    Echocardiogram with normal LV size, LVEF 65-70%.   • Sickle cell disease ([facility])    • Tobacco use        FAMILY HISTORY  Family History   Problem Relation Age of Onset   • Heart Attack Brother 70       SOCIAL HISTORY  Social History     Socioeconomic History   • Marital status:      Spouse name: Not on file   • Number of children: Not on file   • Years of education: Not on file   • Highest education level: Not on file   Occupational History   • Not on file   Social Needs   • Financial resource strain: Not on file   • Food insecurity     Worry: Not on file     Inability: Not on  file   • Transportation needs     Medical: Not on file     Non-medical: Not on file   Tobacco Use   • Smoking status: Current Every Day Smoker     Packs/day: 0.25     Years: 55.00     Pack years: 13.75     Types: Cigars   • Smokeless tobacco: Never Used   • Tobacco comment: does not inhale, small filtered cigars, 4 cigs/ day    Substance and Sexual Activity   • Alcohol use: No   • Drug use: No   • Sexual activity: Not Currently   Lifestyle   • Physical activity     Days per week: Not on file     Minutes per session: Not on file   • Stress: Not on file   Relationships   • Social connections     Talks on phone: Not on file     Gets together: Not on file     Attends [employer] service: Not on file     Active member of club or organization: Not on file     Attends meetings of clubs or organizations: Not on file     Relationship status: Not on file   • Intimate partner violence     Fear of current or ex partner: Not on file     Emotionally abused: Not on file     Physically abused: Not on file     Forced sexual activity: Not on file   Other Topics Concern   • Not on file   Social History Narrative   • Not on file       SURGICAL HISTORY  Past Surgical History:   Procedure Laterality Date   • KNEE REPLACEMENT, TOTAL  2005    Right   • ABDOMINAL HYSTERECTOMY TOTAL     • APPENDECTOMY     • CATARACT EXTRACTION WITH IOL      Bilateral   • CATARACT EXTRACTION WITH IOL      Bilateral   • HYSTERECTOMY, TOTAL ABDOMINAL         CURRENT MEDICATIONS  Home Medications    **Home medications have not yet been reviewed for this encounter**         ALLERGIES  Allergies   Allergen Reactions   • Hydrocodone Hives   • Iodine Anaphylaxis     RXN=>20 years ago  Heavy metal dyes and preservatives   • Nsaids Hives and Shortness of Breath   • Penicillins Anaphylaxis     RXN=>20 years ago   • Asa [Aspirin] Hives     Hives   • Codeine      Pt reports that she falls to the ground, she passes out   • Erythromycin      Pt reports that she falls to the  "ground, she passes out   • Morphine      Pt reports that she falls to the ground, she passes out   • Sulfa Drugs      Pt reports that she falls to the ground, she passes out       PHYSICAL EXAM  VITAL SIGNS: /67   Pulse 66   Temp 36.6 °C (97.8 °F) (Oral)   Resp (!) 23   Ht 1.6 m (5' 3\")   Wt 40.8 kg (90 lb)   SpO2 95%   BMI 15.94 kg/m²   Constitutional: Well developed, Well nourished, No acute distress, Non-toxic appearance.   HENT: Normocephalic, atraumatic, oropharynx is moist, posterior pharynx is benign.  Eyes: Pupils are equal round and reactive to light.  Extraocular movements are intact.  No nystagmus appreciated.  Neck: Normal range of motion, No tenderness, Supple, No stridor.   Cardiovascular: Normal heart rate, Normal rhythm, No murmurs, No rubs, No gallops.   Thorax & Lungs: Normal breath sounds, No respiratory distress, No wheezing, No chest tenderness.   Abdomen: Bowel sounds normal, Soft, No tenderness, No masses, No pulsatile masses.   Skin: Warm, Dry, No erythema, No rash.   Back: No tenderness, No CVA tenderness.   Extremities: Intact distal pulses, No edema, No tenderness, No cyanosis, No clubbing.   Neurologic: Patient says that she feels dizzy when she lays flat.  Feeling better as she sits up keeping her head still.  No nystagmus.  Face is symmetric.  Cranial nerves II through XII are intact.  Normal strength and sensation bilateral upper and lower extremities.  No focal deficits.  Psychiatric: Affect normal, Judgment normal, Mood normal.     EKG  Interpreted by me.  See below.    RADIOLOGY/PROCEDURES  CT-HEAD W/O    (Results Pending)     Results for orders placed or performed during the hospital encounter of 09/15/20   CBC WITH DIFFERENTIAL   Result Value Ref Range    WBC 5.6 4.8 - 10.8 K/uL    RBC 3.96 (L) 4.20 - 5.40 M/uL    Hemoglobin 12.2 12.0 - 16.0 g/dL    Hematocrit 37.5 37.0 - 47.0 %    MCV 94.7 81.4 - 97.8 fL    MCH 30.8 27.0 - 33.0 pg    MCHC 32.5 (L) 33.6 - 35.0 g/dL    " RDW 45.3 35.9 - 50.0 fL    Platelet Count 172 164 - 446 K/uL    MPV 12.2 9.0 - 12.9 fL    Neutrophils-Polys 49.50 44.00 - 72.00 %    Lymphocytes 38.80 22.00 - 41.00 %    Monocytes 9.30 0.00 - 13.40 %    Eosinophils 1.30 0.00 - 6.90 %    Basophils 0.90 0.00 - 1.80 %    Immature Granulocytes 0.20 0.00 - 0.90 %    Nucleated RBC 0.00 /100 WBC    Neutrophils (Absolute) 2.78 2.00 - 7.15 K/uL    Lymphs (Absolute) 2.17 1.00 - 4.80 K/uL    Monos (Absolute) 0.52 0.00 - 0.85 K/uL    Eos (Absolute) 0.07 0.00 - 0.51 K/uL    Baso (Absolute) 0.05 0.00 - 0.12 K/uL    Immature Granulocytes (abs) 0.01 0.00 - 0.11 K/uL    NRBC (Absolute) 0.00 K/uL   BASIC METABOLIC PANEL   Result Value Ref Range    Sodium 140 135 - 145 mmol/L    Potassium 4.2 3.6 - 5.5 mmol/L    Chloride 98 96 - 112 mmol/L    Co2 31 20 - 33 mmol/L    Glucose 91 65 - 99 mg/dL    Bun 7 (L) 8 - 22 mg/dL    Creatinine 0.70 0.50 - 1.40 mg/dL    Calcium 9.5 8.4 - 10.2 mg/dL    Anion Gap 11.0 7.0 - 16.0   ESTIMATED GFR   Result Value Ref Range    GFR If African American >60 >60 mL/min/1.73 m 2    GFR If Non African American >60 >60 mL/min/1.73 m 2   EKG   Result Value Ref Range    Report       [facility] Emergency Dept.    Test Date:  2020-09-15  Pt Name:    KYRIE NELSON               [facility]  MRN:        [id number]                      Room:       -ROOM 10  Gender:     Female                       Technician: [id number]  :        [id number]                   Requested By:ER TRIAGE PROTOCOL  Order #:    [id number]                    Reading MD: JERRY JUNIOR MD    Measurements  Intervals                                Axis  Rate:       62                           P:          80  AR:         148                          QRS:        82  QRSD:       88                           T:          61  QT:         400  QTc:        407    Interpretive Statements  SINUS RHYTHM  BORDERLINE RIGHT AXIS DEVIATION  Compared to ECG 2020  16:40:58  Junctional tachycardia no longer present  Electronically Signed On 9- 12:09:40 PDT by KRUNAL JUNIOR MD           COURSE & MEDICAL DECISION MAKING  Pertinent Labs & Imaging studies reviewed. (See chart for details)    The patient presents today with what is likely benign positional vertigo.  Given her advanced age we will pursue some additional work-up including a CT scan of the head and laboratory studies.    Patient is treated with meclizine for symptom control.    12:47 PM CT scan shows atrophy.  Laboratory studies are unrevealing.  Patient got up to use the bedside commode and almost collapsed.  Full assist with this.  Given this the patient will be admitted to the hospital for further evaluation and treatment.    Spoke with the on-call hospitalist  was offered admission.    FINAL IMPRESSION  1. Vertigo              Electronically signed by: Krunal Junior M.D., 9/15/2020 12:07 PM

## 2020-09-15 NOTE — ED TRIAGE NOTES
"Pt OLIVE Melendez  Chief Complaint   Patient presents with   • Dizziness   • Nausea     awoke last night to room spinning \"as fast as a catagory 5 hurricane\"    Hx of Vertigo    Given 4 mg Zofran PTA    Pt A & 0 x 4, speech clear    COVID-19 screening criteria completed, pt denies high risk travel and denies contact with COVID-19 positive pt    Pt resting on gurney, pt in no acute distress, pt provided call light, instructed to call if needing any assistance, instructed not to get up by self, gurney in lowest position.    "

## 2020-09-15 NOTE — PROGRESS NOTES
Patient arrived to floor via gurney. Able to slowly ambulate to hospital bed, hand held assist with two RNs. Patient is A/Ox4, still experiencing dizziness when moving or ambulating. Denies pain, nausea, and vomiting. Plan of care discussed, patient hesitant to stay overnight. RN re-assured and educated about need for stay and safety concerns. Patient verbalized understanding. Call light within reach. Hourly rounding in place.

## 2020-09-16 ENCOUNTER — APPOINTMENT (OUTPATIENT)
Dept: RADIOLOGY | Facility: MEDICAL CENTER | Age: 85
End: 2020-09-16
Attending: INTERNAL MEDICINE
Payer: COMMERCIAL

## 2020-09-16 VITALS
TEMPERATURE: 98.5 F | HEART RATE: 67 BPM | WEIGHT: 91.93 LBS | BODY MASS INDEX: 16.29 KG/M2 | RESPIRATION RATE: 16 BRPM | DIASTOLIC BLOOD PRESSURE: 50 MMHG | SYSTOLIC BLOOD PRESSURE: 98 MMHG | OXYGEN SATURATION: 94 % | HEIGHT: 63 IN

## 2020-09-16 LAB
SARS-COV-2 RNA RESP QL NAA+PROBE: NOTDETECTED
SPECIMEN SOURCE: NORMAL

## 2020-09-16 PROCEDURE — 70551 MRI BRAIN STEM W/O DYE: CPT

## 2020-09-16 PROCEDURE — 700102 HCHG RX REV CODE 250 W/ 637 OVERRIDE(OP): Performed by: INTERNAL MEDICINE

## 2020-09-16 PROCEDURE — 97535 SELF CARE MNGMENT TRAINING: CPT

## 2020-09-16 PROCEDURE — G0378 HOSPITAL OBSERVATION PER HR: HCPCS

## 2020-09-16 PROCEDURE — A9270 NON-COVERED ITEM OR SERVICE: HCPCS | Performed by: INTERNAL MEDICINE

## 2020-09-16 PROCEDURE — 99217 PR OBSERVATION CARE DISCHARGE: CPT | Performed by: FAMILY MEDICINE

## 2020-09-16 RX ORDER — MECLIZINE HYDROCHLORIDE 25 MG/1
25 TABLET ORAL 3 TIMES DAILY PRN
Qty: 30 TAB | Refills: 0 | Status: SHIPPED | OUTPATIENT
Start: 2020-09-16 | End: 2021-09-29

## 2020-09-16 RX ADMIN — LEVOTHYROXINE SODIUM 75 MCG: 0.05 TABLET ORAL at 06:28

## 2020-09-16 ASSESSMENT — PATIENT HEALTH QUESTIONNAIRE - PHQ9
SUM OF ALL RESPONSES TO PHQ9 QUESTIONS 1 AND 2: 0
1. LITTLE INTEREST OR PLEASURE IN DOING THINGS: NOT AT ALL
2. FEELING DOWN, DEPRESSED, IRRITABLE, OR HOPELESS: NOT AT ALL

## 2020-09-16 ASSESSMENT — PAIN DESCRIPTION - PAIN TYPE: TYPE: ACUTE PAIN

## 2020-09-16 ASSESSMENT — LIFESTYLE VARIABLES
EVER FELT BAD OR GUILTY ABOUT YOUR DRINKING: NO
HOW MANY TIMES IN THE PAST YEAR HAVE YOU HAD 5 OR MORE DRINKS IN A DAY: 0
TOTAL SCORE: 0
ON A TYPICAL DAY WHEN YOU DRINK ALCOHOL HOW MANY DRINKS DO YOU HAVE: 0
TOTAL SCORE: 0
EVER HAD A DRINK FIRST THING IN THE MORNING TO STEADY YOUR NERVES TO GET RID OF A HANGOVER: NO
TOTAL SCORE: 0
ALCOHOL_USE: NO
CONSUMPTION TOTAL: NEGATIVE
HAVE PEOPLE ANNOYED YOU BY CRITICIZING YOUR DRINKING: NO
AVERAGE NUMBER OF DAYS PER WEEK YOU HAVE A DRINK CONTAINING ALCOHOL: 0
HAVE YOU EVER FELT YOU SHOULD CUT DOWN ON YOUR DRINKING: NO

## 2020-09-16 ASSESSMENT — FIBROSIS 4 INDEX: FIB4 SCORE: 3.24

## 2020-09-16 NOTE — PROGRESS NOTES
Discharge paperwork reviewed with patient at bedside. Copy given. Questions encouraged and answered. IV removed. Pain controlled, VSS. Patient escorted to ED entrance via wheelchair. Patient discharged to home..

## 2020-09-16 NOTE — DIETARY
"Nutrition services: Day 0 of admit.  Antonia Stover is a 85 y.o. female with admitting DX of Vertigo.  Hx of HTN, carotid artery plaque, COPD, dyslipidemia, sickle cell disease.    Consult received for low BMI      Assessment:  Height: 160 cm (5' 2.99\")  Weight: 41.7 kg (91 lb 14.9 oz)  Body mass index is 16.29 kg/m²., BMI classification: underweight  Diet/Intake: regular/no documented PO intake yet    Evaluation:   1. Report of nausea due to vertigo.  2. Weight hx reviewed in Epic:  1. 2/25/20=43.5 kg  2. 10/22/19=45.8 kg  Insignificant wt loss noted of 1.8 kg (4%) x 6 1/2 months and 4.1 kg (9%) x 11 months. Weight loss is worth noting since pt is underweight and elderly putting her at risk for malnutrition and sarcopenia.      Malnutrition Risk: ***    Recommendations/Plan:  1. ***   2. Encourage intake of ***  3. Document intake of all ***  as % taken in ADL's to provide interdisciplinary communication across all shifts.   4. Monitor weight.  5. Nutrition rep will continue to see patient for ongoing meal and snack preferences.             "

## 2020-09-16 NOTE — DISCHARGE SUMMARY
Discharge Summary    CHIEF COMPLAINT ON ADMISSION  Chief Complaint   Patient presents with   • Dizziness   • Nausea       Reason for Admission  dizziness     Admission Date  9/15/2020    CODE STATUS  Full Code    HPI & HOSPITAL COURSE  85 y.o. female with PMH of HTN who presented 9/15/2020 with dizziness since previous night.  She described it as room spinning sensation and triggered by certain head position.  The patient denied any fall or syncope episode.  Associated with nausea.  She also denies any neurological deficit.  In the ER she has CT scan of the head done with no acute finding.  She will be admitted for observation.  This morning the patient feels much improved with only mild dizziness. She believes the meclizine was effective. She does not wish to follow up with primary care given her concerns for Covid-19 exposure.  She denies any other problems at this time.  -Her MRI shows no acute changes.  -Patient educated about risks of not following up outpatient for primary care.  -Continue meclizine as needed for dizziness.       Therefore, she is discharged in good and stable condition to home with close outpatient follow-up.    The patient met 2-midnight criteria for an inpatient stay at the time of discharge.    Discharge Date  09/16/20    FOLLOW UP ITEMS POST DISCHARGE  Please follow with primary care    DISCHARGE DIAGNOSES  Active Problems:    Vertigo POA: Yes    Essential hypertension POA: Yes    Hypothyroidism POA: Yes  Resolved Problems:    * No resolved hospital problems. *      FOLLOW UP  No future appointments.  No follow-up provider specified.    MEDICATIONS ON DISCHARGE     Medication List      START taking these medications      Instructions   meclizine 25 MG Tabs  Commonly known as: ANTIVERT   Take 1 Tab by mouth 3 times a day as needed for Dizziness or Vertigo.  Dose: 25 mg        CONTINUE taking these medications      Instructions   ALIGN PO   Take 1 Cap by mouth every day.  Dose: 1 Cap      Benefiber Powd   Take 1 Package by mouth every day.  Dose: 1 Package     clopidogrel 75 MG Tabs  Commonly known as: PLAVIX   Take 1 Tab by mouth every day.  Dose: 75 mg     diltiazem 180 MG SR capsule  Commonly known as: TIAZAC   Take 180 mg by mouth every evening.  Dose: 180 mg     estradiol 0.1 MG/GM vaginal cream  Commonly known as: ESTRACE   Insert 1 g in vagina See Admin Instructions. Pt uses twice a week  Dose: 1 g     levothyroxine 75 MCG Tabs  Commonly known as: SYNTHROID   Take 75 mcg by mouth Every morning on an empty stomach.  Dose: 75 mcg     Pravachol 40 MG tablet  Generic drug: pravastatin   Take 40 mg by mouth every evening.  Dose: 40 mg            Allergies  Allergies   Allergen Reactions   • Hydrocodone Hives   • Iodine Anaphylaxis     RXN=>20 years ago  Heavy metal dyes and preservatives   • Nsaids Hives and Shortness of Breath   • Penicillins Anaphylaxis     RXN=>20 years ago   • Asa [Aspirin] Hives     Hives   • Codeine      Pt reports that she falls to the ground, she passes out   • Erythromycin      Pt reports that she falls to the ground, she passes out   • Morphine      Pt reports that she falls to the ground, she passes out   • Sulfa Drugs      Pt reports that she falls to the ground, she passes out       DIET  Orders Placed This Encounter   Procedures   • Diet Order Regular     Standing Status:   Standing     Number of Occurrences:   1     Order Specific Question:   Diet:     Answer:   Regular [1]       ACTIVITY  As tolerated.  Weight bearing as tolerated    CONSULTATIONS  none    PROCEDURES  none    LABORATORY  Lab Results   Component Value Date    SODIUM 140 09/15/2020    POTASSIUM 4.2 09/15/2020    CHLORIDE 98 09/15/2020    CO2 31 09/15/2020    GLUCOSE 91 09/15/2020    BUN 7 (L) 09/15/2020    CREATININE 0.70 09/15/2020    CREATININE 1.3 01/19/2009        Lab Results   Component Value Date    WBC 5.6 09/15/2020    HEMOGLOBIN 12.2 09/15/2020    HEMATOCRIT 37.5 09/15/2020    PLATELETCT  172 09/15/2020      MR-BRAIN-W/O  Narrative: AMINATION:  9/16/2020 7:40 AM    HISTORY/REASON FOR EXAM:  Vertigo    TECHNIQUE/EXAM DESCRIPTION:  MRI of the brain without and with contrast.    T1 sagittal, T2 fast spin-echo axial, T1 coronal, FLAIR coronal, Diffusion weighted and Apparent Diffusion Coefficient (ADC map) axial images were obtained of the whole brain. T1 post-contrast axial and T1 post-contrast coronal images were obtained.    The study was performed on a GE 1.5 Gay MRI scanner.    COMPARISON:   None    FINDINGS:    The calvariae are normal.  There are no extra-axial fluid collections.    The ventricular system is mild to moderately prominent. There is mild-to-moderate prominence of the cortical sulci and gyri.  There are scattered punctate and patchy areas of increased T2 signal intensity in the periventricular white matter.  There are   no mass effects or shift of midline structures.    The diffusion weighted images are unremarkable.    There are no hemorrhagic lesions.  The brainstem and posterior fossa structures are unremarkable.    Vascular flow voids in the carotid and vertebrobasilar arteries, [place], and dural venous sinuses are intact.    The paranasal sinuses and mastoid air cells are free of mucosal inflammatory change.  Impression: 1. Age-related cerebral atrophy.    2. Moderate periventricular white matter changes consistent with chronic microvascular ischemic gliosis.        Total time of the discharge process exceeds 31 minutes.    JAZZY Adan.

## 2020-09-16 NOTE — DISCHARGE INSTRUCTIONS
How to Perform the Epley Maneuver  The Epley maneuver is an exercise that relieves symptoms of vertigo. Vertigo is the feeling that you or your surroundings are moving when they are not. When you feel vertigo, you may feel like the room is spinning and have trouble walking. Dizziness is a little different than vertigo. When you are dizzy, you may feel unsteady or light-headed.  You can do this maneuver at home whenever you have symptoms of vertigo. You can do it up to 3 times a day until your symptoms go away.  Even though the Epley maneuver may relieve your vertigo for a few weeks, it is possible that your symptoms will return. This maneuver relieves vertigo, but it does not relieve dizziness.  What are the risks?  If it is done correctly, the Epley maneuver is considered safe. Sometimes it can lead to dizziness or nausea that goes away after a short time. If you develop other symptoms, such as changes in vision, weakness, or numbness, stop doing the maneuver and call your health care provider.  How to perform the Epley maneuver  1. Sit on the edge of a bed or table with your back straight and your legs extended or hanging over the edge of the bed or table.  2. Turn your head group home toward the affected ear or side.  3. Lie backward quickly with your head turned until you are lying flat on your back. You may want to position a pillow under your shoulders.  4. Hold this position for 30 seconds. You may experience an attack of vertigo. This is normal.  5. Turn your head to the opposite direction until your unaffected ear is facing the floor.  6. Hold this position for 30 seconds. You may experience an attack of vertigo. This is normal. Hold this position until the vertigo stops.  7. Turn your whole body to the same side as your head. Hold for another 30 seconds.  8. Sit back up.  You can repeat this exercise up to 3 times a day.  Follow these instructions at home:  · After doing the Epley maneuver, you can return to  your normal activities.  · Ask your health care provider if there is anything you should do at home to prevent vertigo. He or she may recommend that you:  ? Keep your head raised (elevated) with two or more pillows while you sleep.  ? Do not sleep on the side of your affected ear.  ? Get up slowly from bed.  ? Avoid sudden movements during the day.  ? Avoid extreme head movement, like looking up or bending over.  Contact a health care provider if:  · Your vertigo gets worse.  · You have other symptoms, including:  ? Nausea.  ? Vomiting.  ? Headache.  Get help right away if:  · You have vision changes.  · You have a severe or worsening headache or neck pain.  · You cannot stop vomiting.  · You have new numbness or weakness in any part of your body.  Summary  · Vertigo is the feeling that you or your surroundings are moving when they are not.  · The Epley maneuver is an exercise that relieves symptoms of vertigo.  · If the Epley maneuver is done correctly, it is considered safe. You can do it up to 3 times a day.  This information is not intended to replace advice given to you by your health care provider. Make sure you discuss any questions you have with your health care provider.  Document Released: 12/23/2014 Document Revised: 11/30/2018 Document Reviewed: 11/07/2017  [employer] Patient Education © 2020 [employer]    Discharge Instructions    Discharged to home by car with relative. Discharged via wheelchair, hospital escort: Yes.  Special equipment needed: Not Applicable    Be sure to schedule a follow-up appointment with your primary care doctor or any specialists as instructed.     Discharge Plan:   Diet Plan: Discussed  Activity Level: Discussed  Confirmed Follow up Appointment: Patient to Call and Schedule Appointment  Confirmed Symptoms Management: Discussed  Medication Reconciliation Updated: Yes    I understand that a diet low in cholesterol, fat, and sodium is recommended for good health. Unless I have  been given specific instructions below for another diet, I accept this instruction as my diet prescription.   Other diet: Regular Diet    Special Instructions: None    · Is patient discharged on Warfarin / Coumadin?   No     Depression / Suicide Risk    As you are discharged from this [facility] facility, it is important to learn how to keep safe from harming yourself.    Recognize the warning signs:  · Abrupt changes in personality, positive or negative- including increase in energy   · Giving away possessions  · Change in eating patterns- significant weight changes-  positive or negative  · Change in sleeping patterns- unable to sleep or sleeping all the time   · Unwillingness or inability to communicate  · Depression  · Unusual sadness, discouragement and loneliness  · Talk of wanting to die  · Neglect of personal appearance   · Rebelliousness- reckless behavior  · Withdrawal from people/activities they love  · Confusion- inability to concentrate     If you or a loved one observes any of these behaviors or has concerns about self-harm, here's what you can do:  · Talk about it- your feelings and reasons for harming yourself  · Remove any means that you might use to hurt yourself (examples: pills, rope, extension cords, firearm)  · Get professional help from the community (Mental Health, Substance Abuse, psychological counseling)  · Do not be alone:Call your Safe Contact- someone whom you trust who will be there for you.  · Call your local CRISIS HOTLINE 925-4765 or 102-958-2354  · Call your local Children's Mobile Crisis Response Team [place] (055) 927-7226 or www.[employer]  · Call the toll free National Suicide Prevention Hotlines   · National Suicide Prevention Lifeline 338-391-HNOW (0929)  · National Hope Line Network 800-SUICIDE (408-9558)

## 2020-09-16 NOTE — CARE PLAN
Problem: Communication  Goal: The ability to communicate needs accurately and effectively will improve  Outcome: PROGRESSING AS EXPECTED   Pt able to communicate needs appropriately to staff. Plan of care discussed to pt. Questions and concerns answered. Pt. Verbalized understanding. Communication board updated.     Problem: Safety  Goal: Will remain free from injury  Outcome: PROGRESSING AS EXPECTED   Pt educated to call when in need. Call light and personal belongings w/n reach. Room free of clutters. Bed locked and in lowest position. Answer call light immediately. Bed alarm on    Problem: Knowledge Deficit  Goal: Knowledge of disease process/condition, treatment plan, diagnostic tests, and medications will improve  Outcome: PROGRESSING AS EXPECTED   Pt educated and informed on the treatment plans, the need to follow prescribed medications, and routine labs.  Encouraged to voice feelings.

## 2020-09-16 NOTE — PROGRESS NOTES
Report received from Romero Portillo. Pt AAOx4. Denies any pain or discomfort. Reports using 3L O2 NC at night at home as baseline. O2 in applied per baseline. POC discussed with pt including possible MRI in the morning. Pt verbalized understanding. Pt ambulated to the bathroom to void on handheld assist. Safety and comfort measures in place. No additional needs at this time.

## 2020-09-16 NOTE — PROGRESS NOTES
Received report from FELECIA Cardoso. Patient is A/Ox4, denies pain. VSS. Patient is still experiencing some dizziness when first walking. Patient expressing she would like to leave and does not want to stay any longer. RN educated patient on reasons for staying. MD aware. Patient agreeable to stay and wait for MRI results. Bed alarm on, fall precautions in place. Call light within reach. Hourly rounding in place.

## 2020-09-16 NOTE — CARE PLAN
Problem: Communication  Goal: The ability to communicate needs accurately and effectively will improve  Outcome: PROGRESSING AS EXPECTED  Note: Patient is able to effectively communicate needs to staff. Call light within reach.        Problem: Safety  Goal: Will remain free from falls  Outcome: PROGRESSING AS EXPECTED  Note: Patient calls staff appropriately when needing to get up.

## 2020-09-16 NOTE — THERAPY
PT evaluation order received. Upon evaluation pt declined full PT evaluation for safe d/c home. Pt reports she feels safe to go home at this time and has FWW at home to use. Pt educated on Epley's maneuver to assist in reducing dizziness. However, pt declined to perform maneuver at this time, however, wanted more education and handout on how to perform on her own. Pt provided with handout and discussed methods on performing Epley's maneuver at home with assist. Pt highly encouraged to have a vestibular therapist to perform maneuver and provide with home exercise programs. Will recommend OP therapy services at this time.    Margaux Griggs, PT, DPT

## 2020-09-17 RX ORDER — LEVOTHYROXINE SODIUM 0.07 MG/1
75 TABLET ORAL
Qty: 90 TAB | Refills: 0 | Status: CANCELLED | OUTPATIENT
Start: 2020-09-17

## 2020-09-18 ENCOUNTER — TELEPHONE (OUTPATIENT)
Dept: MEDICAL GROUP | Age: 85
End: 2020-09-18

## 2020-09-18 RX ORDER — LEVOTHYROXINE SODIUM 0.07 MG/1
75 TABLET ORAL
Qty: 90 TAB | Refills: 2 | Status: SHIPPED | OUTPATIENT
Start: 2020-09-18 | End: 2021-06-09

## 2020-09-18 RX ORDER — PRAVASTATIN SODIUM 40 MG
40 TABLET ORAL EVERY EVENING
Qty: 90 TAB | Refills: 0 | Status: SHIPPED | OUTPATIENT
Start: 2020-09-18 | End: 2020-12-16

## 2020-09-18 RX ORDER — PRAVASTATIN SODIUM 40 MG
TABLET ORAL
Qty: 90 TAB | Refills: 0 | Status: SHIPPED | OUTPATIENT
Start: 2020-09-18 | End: 2020-10-06

## 2020-09-18 NOTE — TELEPHONE ENCOUNTER
Phone Number Called: [phone] (home)     Call outcome: Spoke to patient regarding message below.    Message: Pt. Called earlier this week stating that she got out of the ED for Vertigo and that she is needing a FV visit. I called patient and offered an in person or a virtual visit Pt. Refused both and asked if there were any other options like a telephone visit. I informed the patient that I would need to get clearance from my manger to schedule her for a telephone visit. I reached out to my manager Yari Bullock through e-mail to see if this is an option we can use for this patients FV. Yari informed me that we cannot complete the FV with a telephone visit. I called and informed the patient she refused both virtual and in person visits again.

## 2020-10-06 ENCOUNTER — OFFICE VISIT (OUTPATIENT)
Dept: CARDIOLOGY | Facility: MEDICAL CENTER | Age: 85
End: 2020-10-06
Payer: COMMERCIAL

## 2020-10-06 VITALS
WEIGHT: 90 LBS | HEIGHT: 62 IN | OXYGEN SATURATION: 94 % | BODY MASS INDEX: 16.56 KG/M2 | DIASTOLIC BLOOD PRESSURE: 70 MMHG | SYSTOLIC BLOOD PRESSURE: 132 MMHG | HEART RATE: 82 BPM

## 2020-10-06 DIAGNOSIS — I65.23 ATHEROSCLEROSIS OF BOTH CAROTID ARTERIES: ICD-10-CM

## 2020-10-06 DIAGNOSIS — E78.5 DYSLIPIDEMIA: ICD-10-CM

## 2020-10-06 DIAGNOSIS — I47.10 PAROXYSMAL SUPRAVENTRICULAR TACHYCARDIA (HCC): ICD-10-CM

## 2020-10-06 DIAGNOSIS — J42 CHRONIC BRONCHITIS, UNSPECIFIED CHRONIC BRONCHITIS TYPE (HCC): ICD-10-CM

## 2020-10-06 DIAGNOSIS — I10 ESSENTIAL HYPERTENSION: ICD-10-CM

## 2020-10-06 DIAGNOSIS — Z71.89 ADVANCED CARE PLANNING/COUNSELING DISCUSSION: ICD-10-CM

## 2020-10-06 DIAGNOSIS — G47.34 NOCTURNAL HYPOXIA: ICD-10-CM

## 2020-10-06 DIAGNOSIS — I27.20 PULMONARY HYPERTENSION (HCC): ICD-10-CM

## 2020-10-06 PROBLEM — Z51.5 END OF LIFE CARE: Status: ACTIVE | Noted: 2020-10-06

## 2020-10-06 PROCEDURE — 99215 OFFICE O/P EST HI 40 MIN: CPT | Mod: 25 | Performed by: INTERNAL MEDICINE

## 2020-10-06 PROCEDURE — 99497 ADVNCD CARE PLAN 30 MIN: CPT | Performed by: INTERNAL MEDICINE

## 2020-10-06 RX ORDER — DILTIAZEM HYDROCHLORIDE 180 MG/1
180 CAPSULE, EXTENDED RELEASE ORAL EVERY EVENING
Qty: 90 CAP | Refills: 3 | Status: SHIPPED | OUTPATIENT
Start: 2020-10-06 | End: 2021-09-09

## 2020-10-06 RX ORDER — ACETAMINOPHEN 325 MG/1
650 TABLET ORAL EVERY 4 HOURS PRN
COMMUNITY
End: 2022-03-20

## 2020-10-06 ASSESSMENT — ENCOUNTER SYMPTOMS
IRREGULAR HEARTBEAT: 0
CONSTIPATION: 0
NAUSEA: 0
PND: 0
NEAR-SYNCOPE: 0
ABDOMINAL PAIN: 0
DIARRHEA: 0
SHORTNESS OF BREATH: 0
WEIGHT LOSS: 0
BLURRED VISION: 0
CLAUDICATION: 0
COUGH: 0
DIZZINESS: 0
FLANK PAIN: 0
SYNCOPE: 0
BACK PAIN: 0
HEARTBURN: 0
PALPITATIONS: 0
WEIGHT GAIN: 0
ORTHOPNEA: 0
DECREASED APPETITE: 0
FEVER: 0
ALTERED MENTAL STATUS: 0
DYSPNEA ON EXERTION: 0
DEPRESSION: 0
VOMITING: 0

## 2020-10-06 ASSESSMENT — FIBROSIS 4 INDEX: FIB4 SCORE: 3.24

## 2020-10-06 NOTE — PROGRESS NOTES
Cardiology Note    Chief Complaint   Patient presents with   • Paroxysmal Supraventricular Tachycardia (PSVT)       History of Present Illness: Antonia Stover is a 85 y.o. female PMH pSVT, HTN, HLD, COPD, former tobacco use, occasional cigar, carotid artery stenosis, HLD, hypothyroidism who presents for follow up.    No cardiac symptoms currently. Home blood pressure typically runs 100-110/60s. Uses oxygen at night likely for sleep apnea; describes hypoxia at night on sleep study. She doesn't really get out very much due to covid pandemic risk. This is first time out of the house in past four months. She is compliant with medications and denies adverse effects. Overall doing well. 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 Her wishes are to allow natural death. She understands risks and benefits and elects to change status to DNR/DNI, no feeding tubes, IV fluids ok, limited medical care.    Review of Systems   Constitution: Negative for decreased appetite, fever, malaise/fatigue, weight gain and weight loss.   HENT: Negative for congestion and nosebleeds.    Eyes: Negative for blurred vision.   Cardiovascular: Negative for chest pain, claudication, dyspnea on exertion, irregular heartbeat, leg swelling, near-syncope, orthopnea, palpitations, paroxysmal nocturnal dyspnea and syncope.   Respiratory: Negative for cough and shortness of breath.    Endocrine: Negative for cold intolerance and heat intolerance.   Skin: Negative for rash.   Musculoskeletal: Negative for back pain.   Gastrointestinal: Negative for abdominal pain, constipation, diarrhea, heartburn, melena, nausea and vomiting.   Genitourinary: Negative for dysuria, flank pain and hematuria.   Neurological: Negative for dizziness.   Psychiatric/Behavioral: Negative for altered mental status and depression.         Past Medical History:   Diagnosis Date   • Anemia    • Arthritis    • Carotid artery plaque 08/2018    Carotid US  with <50% stenosis bilaterally.   • COPD    • Dizziness     • Dyslipidemia     • Fatigue 7/3/2014   • Hypothyroidism    • Insomnia     • Nocturnal hypoxia      Uses oxygen QHS.   • PSVT (paroxysmal supraventricular tachycardia) ([facility]) 02/2013    Echocardiogram with normal LV size, LVEF 65-70%.   • Sickle cell disease (HCC)    • Tobacco use          Past Surgical History:   Procedure Laterality Date   • KNEE REPLACEMENT, TOTAL  2005    Right   • ABDOMINAL HYSTERECTOMY TOTAL     • APPENDECTOMY     • CATARACT EXTRACTION WITH IOL      Bilateral   • CATARACT EXTRACTION WITH IOL      Bilateral   • HYSTERECTOMY, TOTAL ABDOMINAL           Current Outpatient Medications   Medication Sig Dispense Refill   • acetaminophen (TYLENOL) 325 MG Tab Take 650 mg by mouth every four hours as needed.     • diltiazem (TIAZAC) 180 MG SR capsule Take 1 Cap by mouth every evening. 90 Cap 3   • pravastatin (PRAVACHOL) 40 MG tablet Take 1 Tab by mouth every evening. 90 Tab 0   • levothyroxine (SYNTHROID) 75 MCG Tab Take 1 Tab by mouth Every morning on an empty stomach. 90 Tab 2   • meclizine (ANTIVERT) 25 MG Tab Take 1 Tab by mouth 3 times a day as needed for Dizziness or Vertigo. 30 Tab 0   • estradiol (ESTRACE) 0.1 MG/GM vaginal cream Insert 1 g in vagina See Admin Instructions. Pt uses twice a week      • clopidogrel (PLAVIX) 75 MG Tab Take 1 Tab by mouth every day. 90 Tab 2   • Probiotic Product (ALIGN PO) Take 1 Cap by mouth every day.       No current facility-administered medications for this visit.          Allergies   Allergen Reactions   • Hydrocodone Hives   • Iodine Anaphylaxis     RXN=>20 years ago  Heavy metal dyes and preservatives   • Nsaids Hives and Shortness of Breath   • Penicillins Anaphylaxis     RXN=>20 years ago   • Asa [Aspirin] Hives     Hives   • Codeine      Pt reports that she falls to the ground, she passes out   • Erythromycin      Pt reports that she falls to the ground, she passes out   • Morphine      Pt  "reports that she falls to the ground, she passes out   • Sulfa Drugs      Pt reports that she falls to the ground, she passes out         Family History   Problem Relation Age of Onset   • Heart Attack Brother 70         Social History     Socioeconomic History   • Marital status:      Spouse name: Not on file   • Number of children: Not on file   • Years of education: Not on file   • Highest education level: Not on file   Occupational History   • Not on file   Social Needs   • Financial resource strain: Not on file   • Food insecurity     Worry: Not on file     Inability: Not on file   • Transportation needs     Medical: Not on file     Non-medical: Not on file   Tobacco Use   • Smoking status: Current Every Day Smoker     Packs/day: 0.25     Years: 55.00     Pack years: 13.75     Types: Cigars   • Smokeless tobacco: Never Used   • Tobacco comment: does not inhale, small filtered cigars, 4 cigs/ day    Substance and Sexual Activity   • Alcohol use: No   • Drug use: No   • Sexual activity: Not Currently   Lifestyle   • Physical activity     Days per week: Not on file     Minutes per session: Not on file   • Stress: Not on file   Relationships   • Social connections     Talks on phone: Not on file     Gets together: Not on file     Attends Shinto service: Not on file     Active member of club or organization: Not on file     Attends meetings of clubs or organizations: Not on file     Relationship status: Not on file   • Intimate partner violence     Fear of current or ex partner: Not on file     Emotionally abused: Not on file     Physically abused: Not on file     Forced sexual activity: Not on file   Other Topics Concern   • Not on file   Social History Narrative   • Not on file         Physical Exam:  Ambulatory Vitals  /70 (BP Location: Left arm, Patient Position: Sitting)   Pulse 82   Ht 1.575 m (5' 2\")   Wt 40.8 kg (90 lb)   SpO2 94%    BP Readings from Last 4 Encounters:   10/06/20 132/70 " "  09/16/20 (!) 98/50   07/28/20 105/66   05/26/20 (!) 93/64     Weight/BMI:   Vitals:    10/06/20 1425   BP: 132/70   Weight: 40.8 kg (90 lb)   Height: 1.575 m (5' 2\")    Body mass index is 16.46 kg/m².  Wt Readings from Last 4 Encounters:   10/06/20 40.8 kg (90 lb)   09/16/20 41.7 kg (91 lb 14.9 oz)   07/28/20 43.1 kg (95 lb)   05/23/20 44 kg (97 lb)       Physical Exam   Constitutional: She is oriented to person, place, and time and well-developed, well-nourished, and in no distress. No distress.   HENT:   Head: Normocephalic and atraumatic.   Eyes: Pupils are equal, round, and reactive to light. Conjunctivae are normal.   Neck: Normal range of motion. Neck supple. No JVD present.   Cardiovascular: Normal rate, regular rhythm, normal heart sounds and intact distal pulses. Exam reveals no gallop and no friction rub.   No murmur heard.  Pulmonary/Chest: Effort normal and breath sounds normal. No respiratory distress. She has no wheezes. She has no rales. She exhibits no tenderness.   Abdominal: Soft. Bowel sounds are normal. She exhibits no distension.   Musculoskeletal:         General: No edema.   Neurological: She is alert and oriented to person, place, and time.   Skin: Skin is warm and dry.   Psychiatric: Affect and judgment normal.       Lab Data Review:  Lab Results   Component Value Date/Time    CHOLSTRLTOT 166 02/07/2019 08:00 AM    LDL 74 02/07/2019 08:00 AM    HDL 71 02/07/2019 08:00 AM    TRIGLYCERIDE 105 02/07/2019 08:00 AM       Lab Results   Component Value Date/Time    SODIUM 140 09/15/2020 10:30 AM    POTASSIUM 4.2 09/15/2020 10:30 AM    CHLORIDE 98 09/15/2020 10:30 AM    CO2 31 09/15/2020 10:30 AM    GLUCOSE 91 09/15/2020 10:30 AM    BUN 7 (L) 09/15/2020 10:30 AM    CREATININE 0.70 09/15/2020 10:30 AM    CREATININE 1.3 01/19/2009 09:40 AM     CrCl cannot be calculated (Patient's most recent lab result is older than the maximum 7 days allowed.).  Lab Results   Component Value Date/Time    " ALKPHOSPHAT 99 07/28/2020 03:40 PM    ASTSGOT 26 07/28/2020 03:40 PM    ALTSGPT 15 07/28/2020 03:40 PM    TBILIRUBIN 0.3 07/28/2020 03:40 PM      Lab Results   Component Value Date/Time    WBC 5.6 09/15/2020 10:30 AM     No results found for: HBA1C  No components found for: TROP      Cardiac Imaging and Procedures Review:      Carotid duplex 08/2018  CONCLUSIONS   <50% bilateral ICA stenoses   nl subclavians and vertebrals    TTE 02/2013  CONCLUSIONS   Normal left ventricular size and function. Grade I diastolic   dysfunction is present. Left ventricular ejection fraction is 65% to   70%. Aberrant chord noted in the apex of  the LV.   Structurally normal tricuspid valve. Mild tricuspid regurgitation.   Right ventricular systolic pressure is estimated to be 40 to 45 mmHg   consistent with moderate pulmonary hypertension.     Medical Decision Making:  Problem List Items Addressed This Visit     PSVT (paroxysmal supraventricular tachycardia) (HCC)    Relevant Medications    diltiazem (TIAZAC) 180 MG SR capsule    Dyslipidemia    Relevant Orders    LIPID PANEL    Nocturnal hypoxia    Atherosclerosis of both carotid arteries    Relevant Medications    diltiazem (TIAZAC) 180 MG SR capsule    Chronic obstructive pulmonary disease (HCC)    Relevant Orders    EC-ECHOCARDIOGRAM COMPLETE W/O CONT    Essential hypertension    Relevant Medications    diltiazem (TIAZAC) 180 MG SR capsule    Pulmonary hypertension (HCC)    Relevant Medications    diltiazem (TIAZAC) 180 MG SR capsule    Advanced care planning/counseling discussion        HTN - controlled based on home readings. Goal 120/80.    HLD - stable. Repeat annual lipids. Goal LDL <70.    Nocturnal hypoxia can continue oxygen.    Carotid stenosis - continue plavix and statin. Aspirin allergy.    SVT - controlled on diltiazem. Refill medications.    COPD / pulm htn / ?sleep apnea - repeat TTE reassess pulmonary pressures and RV function.    Advanced care planning discussion  25 min - patient elects DNR/DNI. No feeding tubes. IV fluids ok. Limited medical care. Completed POLST form.     It was my pleasure to meet with Ms. Stover.

## 2020-10-15 ENCOUNTER — OFFICE VISIT (OUTPATIENT)
Dept: MEDICAL GROUP | Age: 85
End: 2020-10-15
Payer: COMMERCIAL

## 2020-10-15 VITALS
SYSTOLIC BLOOD PRESSURE: 100 MMHG | BODY MASS INDEX: 16.39 KG/M2 | HEIGHT: 62 IN | TEMPERATURE: 99.6 F | WEIGHT: 89.07 LBS | OXYGEN SATURATION: 99 % | DIASTOLIC BLOOD PRESSURE: 70 MMHG | HEART RATE: 75 BPM

## 2020-10-15 DIAGNOSIS — Z00.00 ANNUAL PHYSICAL EXAM: ICD-10-CM

## 2020-10-15 DIAGNOSIS — E78.5 DYSLIPIDEMIA: ICD-10-CM

## 2020-10-15 DIAGNOSIS — Z23 FLU VACCINE NEED: ICD-10-CM

## 2020-10-15 PROCEDURE — 90471 IMMUNIZATION ADMIN: CPT | Performed by: FAMILY MEDICINE

## 2020-10-15 PROCEDURE — 99397 PER PM REEVAL EST PAT 65+ YR: CPT | Mod: 25 | Performed by: FAMILY MEDICINE

## 2020-10-15 PROCEDURE — 90662 IIV NO PRSV INCREASED AG IM: CPT | Performed by: FAMILY MEDICINE

## 2020-10-15 ASSESSMENT — FIBROSIS 4 INDEX: FIB4 SCORE: 3.24

## 2020-10-15 ASSESSMENT — PATIENT HEALTH QUESTIONNAIRE - PHQ9: CLINICAL INTERPRETATION OF PHQ2 SCORE: 0

## 2020-10-16 NOTE — PROGRESS NOTES
Subjective:     CC:   Chief Complaint   Patient presents with   • Annual Exam   • Immunizations     flu       HPI:   Antonia Stover is a 85 y.o. female who presents for annual exam    Patient has GYN provider: No   Last Pap Smear: NA  H/O Abnormal Pap: No  Last Mammogram: 2016  Last Bone Density Test: 2014  Last Colorectal Cancer Screenin/15/2014  Last Tdap: overdue  Received HPV series: Aged out    Exercise: no regular exercise, sedentary  Diet: regular      No LMP recorded. Patient has had a hysterectomy.  She has not utilized hormone replacement therapy.  Reports menopause symptoms of hot flashes.  No significant bloating/fluid retention, pelvic pain, or dyspareunia. No abnormal vaginal discharge.   No breast tenderness, mass, nipple discharge or changes in size or contour.    OB History    Para Term  AB Living   1 1 1 0 0 0   SAB TAB Ectopic Molar Multiple Live Births   0 0 0 0 0 0      She  reports previously being sexually active.    She  has a past medical history of Anemia, Arthritis, Carotid artery plaque (2018), COPD, Dizziness ( ), Dyslipidemia ( ), Fatigue (7/3/2014), Hypothyroidism, Insomnia ( ), Nocturnal hypoxia ( ), PSVT (paroxysmal supraventricular tachycardia) (HCC) (2013), Sickle cell disease ([facility]), and Tobacco use.  She  has a past surgical history that includes hysterectomy, total abdominal; knee replacement, total (); cataract extraction with iol; cataract extraction with iol; appendectomy; and abdominal hysterectomy total.    Family History   Problem Relation Age of Onset   • Heart Attack Brother 70     Social History     Tobacco Use   • Smoking status: Current Every Day Smoker     Packs/day: 0.25     Years: 55.00     Pack years: 13.75     Types: Cigars   • Smokeless tobacco: Never Used   • Tobacco comment: does not inhale, small filtered cigars, 4 cigs/ day    Substance Use Topics   • Alcohol use: No   • Drug use: No       Patient Active Problem  List    Diagnosis Date Noted   • PSVT (paroxysmal supraventricular tachycardia) ([facility]) 09/29/2011     Priority: High   • Current smoker 02/19/2019     Priority: Medium   • Chronic fatigue 07/16/2018     Priority: Medium   • Essential hypertension 11/30/2017     Priority: Medium   • Chronic obstructive pulmonary disease (HCC) 11/03/2016     Priority: Medium   • Tobacco dependence 10/18/2016     Priority: Medium   • Atherosclerosis of both carotid arteries 03/19/2014     Priority: Medium   • Nocturnal hypoxia 08/07/2013     Priority: Medium   • Vertigo 02/15/2013     Priority: Medium   • Dyslipidemia 09/29/2011     Priority: Medium   • History of influenza vaccine allergy 11/08/2018     Priority: Low   • Chronic nasal congestion 11/08/2018     Priority: Low   • Relationship problems 08/07/2018     Priority: Low   • Decreased GFR 07/16/2018     Priority: Low   • Chronic neck pain 01/05/2018     Priority: Low   • Atypical nevus 08/22/2017     Priority: Low   • Dysphagia 08/22/2017     Priority: Low   • AMD (age-related macular degeneration), bilateral 05/08/2017     Priority: Low   • Seasonal allergic rhinitis due to pollen 03/16/2017     Priority: Low   • Chronic pain of left knee 12/22/2016     Priority: Low   • H/O knee surgery 08/28/2014     Priority: Low   • Osteoporosis 07/31/2014     Priority: Low   • Hypothyroidism 08/07/2013     Priority: Low   • Insomnia 09/23/2011     Priority: Low   • Pulmonary hypertension ([facility]) 10/06/2020   • Advanced care planning/counseling discussion 10/06/2020   • Stage 2 chronic kidney disease 12/10/2019     Current Outpatient Medications   Medication Sig Dispense Refill   • acetaminophen (TYLENOL) 325 MG Tab Take 650 mg by mouth every four hours as needed.     • diltiazem (TIAZAC) 180 MG SR capsule Take 1 Cap by mouth every evening. 90 Cap 3   • pravastatin (PRAVACHOL) 40 MG tablet Take 1 Tab by mouth every evening. 90 Tab 0   • levothyroxine (SYNTHROID) 75 MCG Tab Take 1 Tab by mouth  "Every morning on an empty stomach. 90 Tab 2   • estradiol (ESTRACE) 0.1 MG/GM vaginal cream Insert 1 g in vagina See Admin Instructions. Pt uses twice a week      • clopidogrel (PLAVIX) 75 MG Tab Take 1 Tab by mouth every day. 90 Tab 2   • Probiotic Product (ALIGN PO) Take 1 Cap by mouth every day.     • meclizine (ANTIVERT) 25 MG Tab Take 1 Tab by mouth 3 times a day as needed for Dizziness or Vertigo. (Patient not taking: Reported on 10/15/2020) 30 Tab 0     No current facility-administered medications for this visit.      Allergies   Allergen Reactions   • Hydrocodone Hives   • Iodine Anaphylaxis     RXN=>20 years ago  Heavy metal dyes and preservatives   • Nsaids Hives and Shortness of Breath   • Penicillins Anaphylaxis     RXN=>20 years ago   • Asa [Aspirin] Hives     Hives   • Codeine      Pt reports that she falls to the ground, she passes out   • Erythromycin      Pt reports that she falls to the ground, she passes out   • Morphine      Pt reports that she falls to the ground, she passes out   • Sulfa Drugs      Pt reports that she falls to the ground, she passes out       Review of Systems   Constitutional: Negative for fever, chills and malaise/fatigue.   HENT: Negative for congestion.    Eyes: Negative for pain.   Respiratory: Negative for cough and shortness of breath.    Cardiovascular: Negative for chest pain and leg swelling.   Gastrointestinal: Negative for nausea, vomiting, abdominal pain and diarrhea.   Genitourinary: Negative for dysuria and hematuria.   Skin: Negative for rash.   Neurological: Negative for dizziness, focal weakness and headaches.   Endo/Heme/Allergies: Does not bruise/bleed easily.   Psychiatric/Behavioral: Negative for depression.  The patient is not nervous/anxious.      Objective:   /70 (BP Location: Left arm, Patient Position: Sitting, BP Cuff Size: Adult)   Pulse 75   Temp 37.6 °C (99.6 °F) (Temporal)   Ht 1.575 m (5' 2\")   Wt 40.4 kg (89 lb 1.1 oz)   SpO2 99%   " BMI 16.29 kg/m²     Wt Readings from Last 4 Encounters:   10/15/20 40.4 kg (89 lb 1.1 oz)   10/06/20 40.8 kg (90 lb)   09/16/20 41.7 kg (91 lb 14.9 oz)   07/28/20 43.1 kg (95 lb)           Physical Exam:  Constitutional: Well-developed and well-nourished. Not diaphoretic. No distress.   Skin: Skin is warm and dry. No rash noted.  Head: Atraumatic without lesions.  Eyes: Conjunctivae and extraocular motions are normal. Pupils are equal, round, and reactive to light. No scleral icterus.   Ears:  External ears unremarkable. Tympanic membranes clear and intact.  Nose: Nares patent. Septum midline. Turbinates without erythema nor edema. No discharge.   Mouth/Throat: Tongue normal. Oropharynx is clear and moist. Posterior pharynx without erythema or exudates.  Neck: Supple, trachea midline. Normal range of motion. No thyromegaly present. No lymphadenopathy--cervical or supraclavicular.  Cardiovascular: Regular rate and rhythm, S1 and S2 without murmur, rubs, or gallops.      Abdomen: Soft, non tender, and without distention. Active bowel sounds in all four quadrants. No rebound, guarding, masses or HSM.    Extremities: No cyanosis, clubbing, erythema, nor edema. Distal pulses intact and symmetric.   Musculoskeletal: All major joints AROM full in all directions without pain.  Neurological: Alert and oriented x 3. Grossly non-focal. Strength and sensation grossly intact. DTRs 2+/3 and symmetric.   Psychiatric:  Behavior, mood, and affect are appropriate.      Assessment and Plan:     1. Annual physical exam  Health maintenance:     Labs per orders  Immunizations per orders  Patient counseled about skin care, diet, supplements, and exercise.  Discussed  mammography screening    2. Flu vaccine need  - INFLUENZA VACCINE, HIGH DOSE (65+ ONLY)    3. Dyslipidemia    We will obtain new labs to update clinical profile.  Then we will adjust therapy as needed.      Follow-up: Return in about 6 months (around 4/15/2021) for  Reevaluation, labs.

## 2020-11-03 ENCOUNTER — APPOINTMENT (OUTPATIENT)
Dept: CARDIOLOGY | Facility: MEDICAL CENTER | Age: 85
End: 2020-11-03
Attending: INTERNAL MEDICINE
Payer: COMMERCIAL

## 2020-12-18 RX ORDER — PRAVASTATIN SODIUM 40 MG
TABLET ORAL
Qty: 90 TAB | Refills: 1 | Status: SHIPPED | OUTPATIENT
Start: 2020-12-18 | End: 2021-06-14

## 2021-01-11 DIAGNOSIS — Z23 NEED FOR VACCINATION: ICD-10-CM

## 2021-01-29 DIAGNOSIS — I65.23 ATHEROSCLEROSIS OF BOTH CAROTID ARTERIES: ICD-10-CM

## 2021-02-02 RX ORDER — CLOPIDOGREL BISULFATE 75 MG/1
75 TABLET ORAL
Qty: 90 TAB | Refills: 2 | Status: SHIPPED | OUTPATIENT
Start: 2021-02-02 | End: 2021-09-09

## 2021-06-11 ENCOUNTER — HOSPITAL ENCOUNTER (EMERGENCY)
Facility: MEDICAL CENTER | Age: 86
End: 2021-06-12
Attending: EMERGENCY MEDICINE
Payer: COMMERCIAL

## 2021-06-11 DIAGNOSIS — R00.0 TACHYCARDIA: ICD-10-CM

## 2021-06-11 PROCEDURE — 99283 EMERGENCY DEPT VISIT LOW MDM: CPT

## 2021-06-11 PROCEDURE — 93005 ELECTROCARDIOGRAM TRACING: CPT

## 2021-06-11 PROCEDURE — 93005 ELECTROCARDIOGRAM TRACING: CPT | Performed by: EMERGENCY MEDICINE

## 2021-06-11 RX ORDER — LEVOTHYROXINE SODIUM 0.07 MG/1
TABLET ORAL
Qty: 90 TABLET | Refills: 3 | Status: ON HOLD | OUTPATIENT
Start: 2021-06-11 | End: 2022-04-07

## 2021-06-11 ASSESSMENT — FIBROSIS 4 INDEX: FIB4 SCORE: 3.24

## 2021-06-12 ENCOUNTER — APPOINTMENT (OUTPATIENT)
Dept: RADIOLOGY | Facility: MEDICAL CENTER | Age: 86
End: 2021-06-12
Attending: EMERGENCY MEDICINE
Payer: COMMERCIAL

## 2021-06-12 VITALS
TEMPERATURE: 98 F | RESPIRATION RATE: 18 BRPM | OXYGEN SATURATION: 99 % | WEIGHT: 85 LBS | DIASTOLIC BLOOD PRESSURE: 59 MMHG | HEART RATE: 81 BPM | SYSTOLIC BLOOD PRESSURE: 104 MMHG | BODY MASS INDEX: 15.64 KG/M2 | HEIGHT: 62 IN

## 2021-06-12 LAB — EKG IMPRESSION: NORMAL

## 2021-06-12 PROCEDURE — 71045 X-RAY EXAM CHEST 1 VIEW: CPT

## 2021-06-12 PROCEDURE — 93005 ELECTROCARDIOGRAM TRACING: CPT | Performed by: EMERGENCY MEDICINE

## 2021-06-12 NOTE — ED TRIAGE NOTES
Vitals:    06/11/21 2325   BP: 122/73   Pulse: 91   Resp: 17   Temp: 36.7 °C (98 °F)   SpO2: 99%     Chief Complaint   Patient presents with   • Tachycardia     Hx SVT, on plavix and diltiazem, HR 140s at home, self converted en route     Pt was at home taking her BP as she normally does, and it showed her HR in the 140s. Therefore she called EMS. Upon EMS arrival, HR 140s. She walked to the ambulance and she had self-converted to SR 90s. She believes she has a hx of SVT and takes diltiazem.     She has a POLST at bedside stating DNR/DNI with selective treatment.

## 2021-06-12 NOTE — DISCHARGE INSTRUCTIONS
You were seen in the ER for tachycardia/fast heart rate.  You declined any work-up as you are feeling better and requested to go home.  I am discharging you AGAINST MEDICAL ADVICE.  I would like you to continue taking her medication as directed.  Please follow-up with your primary care physician tomorrow for recheck.  Just because you are leaving AGAINST MEDICAL ADVICE does not mean that you cannot or should not return to the ER if you develop any new or worsening symptoms.  Indeed, I would like you to return immediately if you develop any new or worsening symptoms.  Good luck, I hope you feel better soon!

## 2021-06-15 RX ORDER — PRAVASTATIN SODIUM 40 MG
TABLET ORAL
Qty: 90 TABLET | Refills: 3 | Status: ON HOLD | OUTPATIENT
Start: 2021-06-15 | End: 2022-04-07

## 2021-09-09 DIAGNOSIS — I65.23 ATHEROSCLEROSIS OF BOTH CAROTID ARTERIES: ICD-10-CM

## 2021-09-09 RX ORDER — DILTIAZEM HYDROCHLORIDE 180 MG/1
CAPSULE, EXTENDED RELEASE ORAL
Qty: 90 CAPSULE | Refills: 0 | Status: SHIPPED | OUTPATIENT
Start: 2021-09-09 | End: 2021-12-10

## 2021-09-09 RX ORDER — CLOPIDOGREL BISULFATE 75 MG/1
TABLET ORAL
Qty: 90 TABLET | Refills: 0 | Status: SHIPPED | OUTPATIENT
Start: 2021-09-09 | End: 2021-12-10

## 2021-09-10 ENCOUNTER — TELEPHONE (OUTPATIENT)
Dept: CARDIOLOGY | Facility: MEDICAL CENTER | Age: 86
End: 2021-09-10

## 2021-09-10 NOTE — TELEPHONE ENCOUNTER
----- Message from Shilpa Smiley R.N. sent at 9/9/2021  5:12 PM PDT -----  Pt overdue for f/u. Please contact to schedule appt.  Thank you!

## 2021-09-18 ENCOUNTER — APPOINTMENT (OUTPATIENT)
Dept: RADIOLOGY | Facility: MEDICAL CENTER | Age: 86
End: 2021-09-18
Attending: EMERGENCY MEDICINE
Payer: COMMERCIAL

## 2021-09-18 ENCOUNTER — HOSPITAL ENCOUNTER (EMERGENCY)
Facility: MEDICAL CENTER | Age: 86
End: 2021-09-18
Attending: EMERGENCY MEDICINE
Payer: COMMERCIAL

## 2021-09-18 VITALS
HEIGHT: 62 IN | OXYGEN SATURATION: 93 % | DIASTOLIC BLOOD PRESSURE: 56 MMHG | WEIGHT: 85 LBS | RESPIRATION RATE: 20 BRPM | TEMPERATURE: 98 F | SYSTOLIC BLOOD PRESSURE: 116 MMHG | HEART RATE: 72 BPM | BODY MASS INDEX: 15.64 KG/M2

## 2021-09-18 DIAGNOSIS — I47.10 PAROXYSMAL SUPRAVENTRICULAR TACHYCARDIA (HCC): ICD-10-CM

## 2021-09-18 LAB
ALBUMIN SERPL BCP-MCNC: 3.4 G/DL (ref 3.2–4.9)
ALBUMIN/GLOB SERPL: 1.4 G/DL
ALP SERPL-CCNC: 77 U/L (ref 30–99)
ALT SERPL-CCNC: 7 U/L (ref 2–50)
ANION GAP SERPL CALC-SCNC: 13 MMOL/L (ref 7–16)
AST SERPL-CCNC: 14 U/L (ref 12–45)
BASOPHILS # BLD AUTO: 0.4 % (ref 0–1.8)
BASOPHILS # BLD: 0.03 K/UL (ref 0–0.12)
BILIRUB SERPL-MCNC: 0.4 MG/DL (ref 0.1–1.5)
BUN SERPL-MCNC: 7 MG/DL (ref 8–22)
CALCIUM SERPL-MCNC: 8 MG/DL (ref 8.5–10.5)
CHLORIDE SERPL-SCNC: 105 MMOL/L (ref 96–112)
CO2 SERPL-SCNC: 23 MMOL/L (ref 20–33)
CREAT SERPL-MCNC: 0.72 MG/DL (ref 0.5–1.4)
EKG IMPRESSION: NORMAL
EKG IMPRESSION: NORMAL
EOSINOPHIL # BLD AUTO: 0.06 K/UL (ref 0–0.51)
EOSINOPHIL NFR BLD: 0.9 % (ref 0–6.9)
ERYTHROCYTE [DISTWIDTH] IN BLOOD BY AUTOMATED COUNT: 47.6 FL (ref 35.9–50)
GLOBULIN SER CALC-MCNC: 2.5 G/DL (ref 1.9–3.5)
GLUCOSE SERPL-MCNC: 98 MG/DL (ref 65–99)
HCT VFR BLD AUTO: 38.4 % (ref 37–47)
HGB BLD-MCNC: 12.7 G/DL (ref 12–16)
IMM GRANULOCYTES # BLD AUTO: 0.01 K/UL (ref 0–0.11)
IMM GRANULOCYTES NFR BLD AUTO: 0.1 % (ref 0–0.9)
LYMPHOCYTES # BLD AUTO: 2.6 K/UL (ref 1–4.8)
LYMPHOCYTES NFR BLD: 37.8 % (ref 22–41)
MCH RBC QN AUTO: 31.8 PG (ref 27–33)
MCHC RBC AUTO-ENTMCNC: 33.1 G/DL (ref 33.6–35)
MCV RBC AUTO: 96.2 FL (ref 81.4–97.8)
MONOCYTES # BLD AUTO: 0.82 K/UL (ref 0–0.85)
MONOCYTES NFR BLD AUTO: 11.9 % (ref 0–13.4)
NEUTROPHILS # BLD AUTO: 3.36 K/UL (ref 2–7.15)
NEUTROPHILS NFR BLD: 48.9 % (ref 44–72)
NRBC # BLD AUTO: 0 K/UL
NRBC BLD-RTO: 0 /100 WBC
PLATELET # BLD AUTO: 205 K/UL (ref 164–446)
PMV BLD AUTO: 12 FL (ref 9–12.9)
POTASSIUM SERPL-SCNC: 3.5 MMOL/L (ref 3.6–5.5)
PROT SERPL-MCNC: 5.9 G/DL (ref 6–8.2)
RBC # BLD AUTO: 3.99 M/UL (ref 4.2–5.4)
SODIUM SERPL-SCNC: 141 MMOL/L (ref 135–145)
TROPONIN T SERPL-MCNC: 8 NG/L (ref 6–19)
TSH SERPL DL<=0.005 MIU/L-ACNC: 0.23 UIU/ML (ref 0.38–5.33)
WBC # BLD AUTO: 6.9 K/UL (ref 4.8–10.8)

## 2021-09-18 PROCEDURE — 85025 COMPLETE CBC W/AUTO DIFF WBC: CPT

## 2021-09-18 PROCEDURE — 96374 THER/PROPH/DIAG INJ IV PUSH: CPT

## 2021-09-18 PROCEDURE — 71045 X-RAY EXAM CHEST 1 VIEW: CPT

## 2021-09-18 PROCEDURE — 99285 EMERGENCY DEPT VISIT HI MDM: CPT

## 2021-09-18 PROCEDURE — 700111 HCHG RX REV CODE 636 W/ 250 OVERRIDE (IP): Performed by: EMERGENCY MEDICINE

## 2021-09-18 PROCEDURE — 700105 HCHG RX REV CODE 258: Performed by: EMERGENCY MEDICINE

## 2021-09-18 PROCEDURE — 84484 ASSAY OF TROPONIN QUANT: CPT

## 2021-09-18 PROCEDURE — 93005 ELECTROCARDIOGRAM TRACING: CPT | Performed by: EMERGENCY MEDICINE

## 2021-09-18 PROCEDURE — 84443 ASSAY THYROID STIM HORMONE: CPT

## 2021-09-18 PROCEDURE — 80053 COMPREHEN METABOLIC PANEL: CPT

## 2021-09-18 RX ORDER — SODIUM CHLORIDE 9 MG/ML
500 INJECTION, SOLUTION INTRAVENOUS ONCE
Status: COMPLETED | OUTPATIENT
Start: 2021-09-18 | End: 2021-09-18

## 2021-09-18 RX ORDER — DILTIAZEM HYDROCHLORIDE 5 MG/ML
10 INJECTION INTRAVENOUS ONCE
Status: COMPLETED | OUTPATIENT
Start: 2021-09-18 | End: 2021-09-18

## 2021-09-18 RX ADMIN — SODIUM CHLORIDE 500 ML: 9 INJECTION, SOLUTION INTRAVENOUS at 15:19

## 2021-09-18 RX ADMIN — DILTIAZEM HYDROCHLORIDE 10 MG: 5 INJECTION INTRAVENOUS at 15:16

## 2021-09-18 ASSESSMENT — FIBROSIS 4 INDEX: FIB4 SCORE: 3.28

## 2021-09-18 NOTE — ED PROVIDER NOTES
ED Provider Note    Scribed for Rc Toscano M.D. by Manpreet Panchal. 9/18/2021, 2:31 PM.    Primary care provider: Hardik Merino M.D.  Means of arrival: EMS  History obtained from: Patient  History limited by: None    CHIEF COMPLAINT  Chief Complaint   Patient presents with    Palpitations     Pt reports hx of SVT and palpitations, pt reports HR sustaining in 130's at home prior to calling 911.  Pt hx a-fib on Dilt and plavix.  Pt reports taking medications at prescribed.        HPI  Antonia Stover is a 86 y.o. female who presents to the Emergency Department via EMS for sudden palpitations onset few hours ago. Patient states she has history of similar symptoms. She is taking Diltiazem, Pravastatin, thyroid medication, and blood thinners. She currently does not follow a Cardiologist. She endorses associated dizziness, shortness of breath, , but denies chest pain, fevers chills nausea vomiting or any other symptoms no alleviating or exacerbating factors noted.    REVIEW OF SYSTEMS  As above otherwise all other systems are negative    PAST MEDICAL HISTORY   has a past medical history of Anemia, Arthritis, Carotid artery plaque (08/2018), COPD, Dizziness ( ), Dyslipidemia ( ), Fatigue (7/3/2014), Hypothyroidism, Insomnia ( ), Nocturnal hypoxia ( ), PSVT (paroxysmal supraventricular tachycardia) (HCC) (02/2013), Sickle cell disease (HCC), and Tobacco use.    SURGICAL HISTORY   has a past surgical history that includes hysterectomy, total abdominal; knee replacement, total (2005); cataract extraction with iol; cataract extraction with iol; appendectomy; and abdominal hysterectomy total.    SOCIAL HISTORY  Social History     Tobacco Use    Smoking status: Current Every Day Smoker     Packs/day: 0.25     Years: 55.00     Pack years: 13.75     Types: Cigars    Smokeless tobacco: Never Used    Tobacco comment: does not inhale, small filtered cigars, 4 cigs/ day    Vaping Use    Vaping Use: Never used   Substance  "Use Topics    Alcohol use: No    Drug use: No      Social History     Substance and Sexual Activity   Drug Use No       FAMILY HISTORY  Family History   Problem Relation Age of Onset    Heart Attack Brother 70       CURRENT MEDICATIONS  Home Medications       Reviewed by Nancy Richardson R.N. (Registered Nurse) on 09/18/21 at 1418  Med List Status: Partial     Medication Last Dose Status   acetaminophen (TYLENOL) 325 MG Tab  Active   clopidogrel (PLAVIX) 75 MG Tab  Active   diltiazem (TIAZAC) 180 MG SR capsule  Active   estradiol (ESTRACE) 0.1 MG/GM vaginal cream  Active   levothyroxine (SYNTHROID) 75 MCG Tab  Active   meclizine (ANTIVERT) 25 MG Tab  Active   pravastatin (PRAVACHOL) 40 MG tablet  Active   Probiotic Product (ALIGN PO)  Active                    ALLERGIES  Allergies   Allergen Reactions    Hydrocodone Hives    Iodine Anaphylaxis     RXN=>20 years ago  Heavy metal dyes and preservatives    Nsaids Hives and Shortness of Breath    Penicillins Anaphylaxis     RXN=>20 years ago    Asa [Aspirin] Hives     Hives    Codeine      Pt reports that she falls to the ground, she passes out    Erythromycin      Pt reports that she falls to the ground, she passes out    Morphine      Pt reports that she falls to the ground, she passes out    Sulfa Drugs      Pt reports that she falls to the ground, she passes out       PHYSICAL EXAM  VITAL SIGNS: BP (!) 97/53   Pulse (!) 144   Temp 36.9 °C (98.4 °F) (Temporal)   Resp 20   Ht 1.575 m (5' 2\")   Wt 38.6 kg (85 lb)   SpO2 96%   BMI 15.55 kg/m²     Constitutional:Thin and elderly in appearance No acute distress,   HENT: Normocephalic, Atraumatic, Bilateral external ears normal, Dry mucous membranes, No oral exudates, Nose normal.   Eyes:conjunctiva is normal, there are no signs of exudate.   Neck: Supple, no meningeal signs.  Lymphatic: No lymphadenopathy noted.   Cardiovascular: Diminished heart tones and tachycardic. Regular rhythm without murmurs gallops or rubs. "   Thorax & Lungs: No respiratory distress. Breathing comfortably. Lungs are diminished but clear to auscultation bilaterally, there are no wheezes no rales. Chest wall is nontender.  Abdomen: Soft, nontender, nondistended. Bowel sounds are present.   Skin: Warm, Dry, No erythema,   Back: No tenderness, No CVA tenderness.  Musculoskeletal: Good range of motion in all major joints. No tenderness to palpation or major deformities noted. Intact distal pulses, no clubbing, no cyanosis, no edema,   Neurologic: Alert & oriented x 3, Moving all extremities. No gross abnormalities.    Psychiatric: Affect normal, Judgment normal, Mood normal.     LABS  Results for orders placed or performed during the hospital encounter of 09/18/21   CBC with Differential   Result Value Ref Range    WBC 6.9 4.8 - 10.8 K/uL    RBC 3.99 (L) 4.20 - 5.40 M/uL    Hemoglobin 12.7 12.0 - 16.0 g/dL    Hematocrit 38.4 37.0 - 47.0 %    MCV 96.2 81.4 - 97.8 fL    MCH 31.8 27.0 - 33.0 pg    MCHC 33.1 (L) 33.6 - 35.0 g/dL    RDW 47.6 35.9 - 50.0 fL    Platelet Count 205 164 - 446 K/uL    MPV 12.0 9.0 - 12.9 fL    Neutrophils-Polys 48.90 44.00 - 72.00 %    Lymphocytes 37.80 22.00 - 41.00 %    Monocytes 11.90 0.00 - 13.40 %    Eosinophils 0.90 0.00 - 6.90 %    Basophils 0.40 0.00 - 1.80 %    Immature Granulocytes 0.10 0.00 - 0.90 %    Nucleated RBC 0.00 /100 WBC    Neutrophils (Absolute) 3.36 2.00 - 7.15 K/uL    Lymphs (Absolute) 2.60 1.00 - 4.80 K/uL    Monos (Absolute) 0.82 0.00 - 0.85 K/uL    Eos (Absolute) 0.06 0.00 - 0.51 K/uL    Baso (Absolute) 0.03 0.00 - 0.12 K/uL    Immature Granulocytes (abs) 0.01 0.00 - 0.11 K/uL    NRBC (Absolute) 0.00 K/uL   Complete Metabolic Panel (CMP)   Result Value Ref Range    Sodium 141 135 - 145 mmol/L    Potassium 3.5 (L) 3.6 - 5.5 mmol/L    Chloride 105 96 - 112 mmol/L    Co2 23 20 - 33 mmol/L    Anion Gap 13.0 7.0 - 16.0    Glucose 98 65 - 99 mg/dL    Bun 7 (L) 8 - 22 mg/dL    Creatinine 0.72 0.50 - 1.40 mg/dL     Calcium 8.0 (L) 8.5 - 10.5 mg/dL    AST(SGOT) 14 12 - 45 U/L    ALT(SGPT) 7 2 - 50 U/L    Alkaline Phosphatase 77 30 - 99 U/L    Total Bilirubin 0.4 0.1 - 1.5 mg/dL    Albumin 3.4 3.2 - 4.9 g/dL    Total Protein 5.9 (L) 6.0 - 8.2 g/dL    Globulin 2.5 1.9 - 3.5 g/dL    A-G Ratio 1.4 g/dL   Troponin STAT   Result Value Ref Range    Troponin T 8 6 - 19 ng/L   ESTIMATED GFR   Result Value Ref Range    GFR If African American >60 >60 mL/min/1.73 m 2    GFR If Non African American >60 >60 mL/min/1.73 m 2   TSH   Result Value Ref Range    TSH 0.230 (L) 0.380 - 5.330 uIU/mL   EKG   Result Value Ref Range    Report       [facility] Emergency Dept.    Test Date:  2021  Pt Name:    KYRIE NELSON               Department: ER  MRN:        [id number]                      Room:       BL 15  Gender:     Female                       Technician: [id number]  :        1935                   Requested By:JAMAL ABBASI  Order #:    [id number]                    Reading MD: JAMAL ABBASI MD    Measurements  Intervals                                Axis  Rate:       139                          P:  DE:                                      QRS:        96  QRSD:       83                           T:          44  QT:         299  QTc:        455    Interpretive Statements  Junctional tachycardia  Right axis deviation  Low voltage, extremity leads  Consider left ventricular hypertrophy  Compared to ECG 2021 23:33:52  Junctional tachycardia now present  Electronically Signed On 2021 17:10:49 PDT by JAMAL ABBASI MD     EKG   Result Value Ref Range    Report       [facility] Emergency Dept.    Test Date:  2021  Pt Name:    KYRIE NELSON               Department: ER  MRN:        [id number]                      Room:       BL 15  Gender:     Female                       Technician: [id number]  :        [id number]                   Requested By:JAMAL ABBASI  Order #:     [id number]                    Reading MD: JAMAL ABBASI MD    Measurements  Intervals                                Axis  Rate:       77                           P:          83  LA:         153                          QRS:        96  QRSD:       86                           T:          64  QT:         380  QTc:        431    Interpretive Statements  Sinus rhythm  Low voltage with right axis deviation  Consider left ventricular hypertrophy  Compared to ECG 09/18/2021 15:11:01  Junctional tachycardia no longer present  Electronically Signed On 9- 17:23:06 PDT by JAMAL ABBASI MD        All labs reviewed by me.    EKG  Interpreted by me        RADIOLOGY  DX-CHEST-PORTABLE (1 VIEW)   Final Result      1.  Emphysematous changes.   2.  No acute abnormality.        The radiologist's interpretation of all radiological studies have been reviewed by me.    COURSE & MEDICAL DECISION MAKING  Pertinent Labs & Imaging studies reviewed. (See chart for details)    2:31 PM - Patient seen and examined at bedside. Patient will be treated with Cardizem and NS. Ordered CBC w/ diff, CMP, Troponin, and DX-Chest to evaluate her symptoms. The differential diagnoses include but are not limited to: atrial fibrillation vs atrial flutter vs mild hypotension.    5:07 PM - Patient was reevaluated at bedside. Discussed lab and radiology results with the patient and informed them that her results are unremarkable. I am referring the patient to Cardiology. The plan for discharge was discussed. Patient was given the opportunity to ask any questions. Patient verbalizes understanding and agreement to this plan of care.        HYDRATION: Based on the patient's presentation of dehydration and hypotension,  the patient was given IV fluids. IV Hydration was used because oral hydration is unable to be done due to the patient's symptoms. Upon recheck following hydration, the patient was improved.     Decision Making:  Patient presents emerge  department for evaluation.  The patient appears to have an SVT or possible atrial flutter with 2-1 at 150 standard.  Patient did not want to have any adenosine given she stated she had a very bad reaction to that in the past so I did do 10 mg of diltiazem as a loading dose after the diltiazem the patient immediately went into a slower rhythm EKG shows sinus rhythm as above.  Patient states that she is feeling much better she did get a fluid bolus because she had an episode of mild hypotension that was responsive to the fluid bolus.  At this point all of her laboratory studies appear well except for the mildly low TSH she is currently on levothyroxine.  At this point I feel the patient stable for discharge she did request a follow-up with cardiology here because her cardiologist has retired.  I put in a referral for cardiology as an outpatient.  I recommended for the patient to follow the primary care physician for further outpatient treatment care return as needed.    The patient will return for new or worsening symptoms and is stable at the time of discharge.    The patient is referred to a primary physician for blood pressure management, diabetic screening, and for all other preventative health concerns.    DISPOSITION:  Patient will be discharged home in stable condition.    FOLLOW UP:  Hardik Merino M.D.  [address] [place] [address] [address] [place] [address]  [phone]          [facility] MEDICAL [address], [facility] [address]  [facility] [address]  [phone]        [facility]  [address] [place] [address]  [phone]        OUTPATIENT MEDICATIONS:  Discharge Medication List as of 9/18/2021  5:36 PM            FINAL IMPRESSION  1. PSVT (paroxysmal supraventricular tachycardia) ([facility])          Manpreet TOLLIVER (Scribe), am scribing for, and in the presence of, Rc Toscano M.D..    Electronically signed by: Manpreet Panchal (Scribe), 9/18/2021    SHAREE  Rc Toscano M.D. personally performed the services described in this documentation, as scribed by Manpreet Panchal in my presence, and it is both accurate and complete.    The note accurately reflects work and decisions made by me.  Rc Toscano M.D.  9/18/2021  8:05 PM

## 2021-09-18 NOTE — ED NOTES
Pt gown and linen changed due to being soiled. Assisted by VIVEK Roberts. Pt provided blankets and call light within reach.

## 2021-09-18 NOTE — ED TRIAGE NOTES
"Chief Complaint   Patient presents with   • Palpitations     Pt reports hx of SVT and palpitations, pt reports HR sustaining in 130's at home prior to calling 911.  Pt hx a-fib on Dilt and plavix.  Pt reports taking medications at prescribed.      Pt BIB [facility] with above complaint.      BP (!) 97/53   Pulse (!) 144   Temp 36.9 °C (98.4 °F) (Temporal)   Resp 20   Ht 1.575 m (5' 2\")   Wt 38.6 kg (85 lb)   SpO2 96%   BMI 15.55 kg/m²     "

## 2021-09-22 ENCOUNTER — APPOINTMENT (OUTPATIENT)
Dept: MEDICAL GROUP | Age: 86
End: 2021-09-22
Payer: COMMERCIAL

## 2021-09-29 ENCOUNTER — OFFICE VISIT (OUTPATIENT)
Dept: MEDICAL GROUP | Age: 86
End: 2021-09-29
Payer: COMMERCIAL

## 2021-09-29 VITALS
SYSTOLIC BLOOD PRESSURE: 100 MMHG | BODY MASS INDEX: 15.79 KG/M2 | HEART RATE: 78 BPM | TEMPERATURE: 100 F | RESPIRATION RATE: 16 BRPM | HEIGHT: 62 IN | WEIGHT: 85.8 LBS | DIASTOLIC BLOOD PRESSURE: 70 MMHG | OXYGEN SATURATION: 96 %

## 2021-09-29 DIAGNOSIS — R59.9 GLANDS SWOLLEN: ICD-10-CM

## 2021-09-29 DIAGNOSIS — I47.10 PAROXYSMAL SUPRAVENTRICULAR TACHYCARDIA (HCC): ICD-10-CM

## 2021-09-29 DIAGNOSIS — Z09 HOSPITAL DISCHARGE FOLLOW-UP: ICD-10-CM

## 2021-09-29 DIAGNOSIS — F43.21 GRIEF: ICD-10-CM

## 2021-09-29 PROCEDURE — 99214 OFFICE O/P EST MOD 30 MIN: CPT | Performed by: FAMILY MEDICINE

## 2021-09-29 RX ORDER — BUPROPION HYDROCHLORIDE 150 MG/1
150 TABLET ORAL EVERY MORNING
Qty: 90 TABLET | Refills: 0 | Status: SHIPPED | OUTPATIENT
Start: 2021-09-29 | End: 2022-03-20

## 2021-09-29 ASSESSMENT — PATIENT HEALTH QUESTIONNAIRE - PHQ9
SUM OF ALL RESPONSES TO PHQ QUESTIONS 1-9: 8
CLINICAL INTERPRETATION OF PHQ2 SCORE: 1
5. POOR APPETITE OR OVEREATING: 0 - NOT AT ALL

## 2021-09-29 ASSESSMENT — FIBROSIS 4 INDEX: FIB4 SCORE: 2.17

## 2021-09-29 NOTE — PROGRESS NOTES
This medical record contains text that has been entered with the assistance of computer voice recognition and dictation software.  Therefore, it may contain unintended errors in text, spelling, punctuation, or grammar      Chief Complaint   Patient presents with   • Adenopathy   • Skin Lesion         Antonia Stover is a 86 y.o. female here evaluation and management of: Hospital follow-up      HPI:     1. Hospital discharge follow-up  2. PSVT (paroxysmal supraventricular tachycardia) (HCC)  Antonia is a very pleasant 86-year-old female presents to clinic after being seen in the emergency room on September 18 for SVT.  The patient felt sudden palpitations few hours prior to going to the emergency room she does have a history of paroxysmal supraventricular tachycardia and is compliant with diltiazem and Plavix.  She was stabilized in the hospital and discharged without any adverse events.  She was instructed to follow-up with her PCP.  She has not had any palpitations since denies any shortness of breath no back pain no syncopal or presyncopal episodes.      3. Glands swollen  NEW UNDIAGNOSED PROBLEM    The patient states that since yesterday she noticed some neck gland swelling, which is mildly tender.  She denies any recent sore throat or infections.  She denies any fever chills night sweats no unintentional weight loss.        Current medicines (including changes today)  Current Outpatient Medications   Medication Sig Dispense Refill   • diltiazem (TIAZAC) 180 MG SR capsule TAKE ONE CAPSULE BY MOUTH EVERY EVENING 90 Capsule 0   • clopidogrel (PLAVIX) 75 MG Tab TAKE 1 TABLET BY MOUTH EVERY DAY 90 Tablet 0   • pravastatin (PRAVACHOL) 40 MG tablet TAKE 1 TABLET BY MOUTH EVERY EVENING 90 tablet 3   • levothyroxine (SYNTHROID) 75 MCG Tab TAKE 1 TABLET BY MOUTH EVERY MORNING ON AN EMPTY STOMACH 90 tablet 3   • acetaminophen (TYLENOL) 325 MG Tab Take 650 mg by mouth every four hours as needed.     • estradiol  "(ESTRACE) 0.1 MG/GM vaginal cream Insert 1 g in vagina See Admin Instructions. Pt uses twice a week      • Probiotic Product (ALIGN PO) Take 1 Cap by mouth every day.       No current facility-administered medications for this visit.     She  has a past medical history of Anemia, Arthritis, Carotid artery plaque (2018), COPD, Dizziness ( ), Dyslipidemia ( ), Fatigue (7/3/2014), Hypothyroidism, Insomnia ( ), Nocturnal hypoxia ( ), PSVT (paroxysmal supraventricular tachycardia) ([facility]) (2013), Sickle cell disease ([facility]), and Tobacco use.  She  has a past surgical history that includes hysterectomy, total abdominal; knee replacement, total (); cataract extraction with iol; cataract extraction with iol; appendectomy; and abdominal hysterectomy total.  Social History     Tobacco Use   • Smoking status: Current Every Day Smoker     Packs/day: 0.25     Years: 55.00     Pack years: 13.75     Types: Cigars   • Smokeless tobacco: Never Used   • Tobacco comment: does not inhale, small filtered cigars, 4 cigs/ day    Vaping Use   • Vaping Use: Never used   Substance Use Topics   • Alcohol use: No   • Drug use: No     Social History     Social History Narrative   • Not on file     Family History   Problem Relation Age of Onset   • Heart Attack Brother 70     Family Status   Relation Name Status   • Mo     • Fa     • Bro  (Not Specified)         ROS    The pertinent  ROS findings can be seen in the HPI above.     All other systems reviewed and are negative     Objective:     /70 (BP Location: Left arm, Patient Position: Sitting, BP Cuff Size: Small adult)   Pulse 78   Temp 37.8 °C (100 °F) (Temporal)   Resp 16   Ht 1.575 m (5' 2\")   Wt 38.9 kg (85 lb 12.8 oz)   SpO2 96%  Body mass index is 15.69 kg/m².      Physical Exam:    Constitutional: Alert, no distress.  Skin: There are two 1.8 and 2 cm raised verrucous plaques on the right bicep irregular and dark    Eye: Equal, round and reactive, " conjunctiva clear, lids normal.  ENMT: Lips without lesions, good dentition, oropharynx clear.  Neck: Trachea midline, no masses, no thyromegaly.  Mildly tender anterior cervical lymphadenopathy,  Respiratory: Unlabored respiratory effort, lungs clear to auscultation, no wheezes, no ronchi.  Cardiovascular: Normal S1, S2, no murmur, no edema  Abdomen: Soft, non-tender, no masses, no hepatosplenomegaly.        Assessment and Plan:   The following treatment plan was discussed      1. Hospital discharge follow-up  2. PSVT (paroxysmal supraventricular tachycardia) (HCC)  Overall she has been fine since discharge  Educated on continuing be compliant with her diltiazem and Plavix as well as a statin.  Maintain adequate rest avoid caffeinated beverages and alcohol    3. Glands swollen  We will recheck the patient's glands in 2 weeks when she comes back for excision of a NUBS on the right arm            Instructed to Follow up in clinic or ER for worsening symptoms, difficulty breathing, lack of expected recovery, or should new symptoms or problems arise.    Followup: Return in about 2 weeks (around 10/13/2021) for EXCISION.

## 2021-10-15 ENCOUNTER — TELEPHONE (OUTPATIENT)
Dept: CARDIOLOGY | Facility: MEDICAL CENTER | Age: 86
End: 2021-10-15

## 2021-10-15 NOTE — TELEPHONE ENCOUNTER
Called pt to see if pt had lab work ordered by VR at previous office visit done outside of [facility]. Pt stated she did not get lab work done. Lab order is now . Pt requested to cancel appt, stated it is not a good time for her. Pt stated she would call back to reschedule at another time. Appt canceled.

## 2021-10-19 ENCOUNTER — HOSPITAL ENCOUNTER (EMERGENCY)
Facility: MEDICAL CENTER | Age: 86
End: 2021-10-19
Attending: EMERGENCY MEDICINE | Admitting: EMERGENCY MEDICINE
Payer: COMMERCIAL

## 2021-10-19 ENCOUNTER — APPOINTMENT (OUTPATIENT)
Dept: RADIOLOGY | Facility: MEDICAL CENTER | Age: 86
End: 2021-10-19
Attending: EMERGENCY MEDICINE
Payer: COMMERCIAL

## 2021-10-19 VITALS
BODY MASS INDEX: 15.64 KG/M2 | SYSTOLIC BLOOD PRESSURE: 94 MMHG | OXYGEN SATURATION: 92 % | TEMPERATURE: 97.6 F | RESPIRATION RATE: 20 BRPM | HEART RATE: 82 BPM | DIASTOLIC BLOOD PRESSURE: 64 MMHG | HEIGHT: 62 IN | WEIGHT: 85 LBS

## 2021-10-19 DIAGNOSIS — I47.10 PAROXYSMAL SVT (SUPRAVENTRICULAR TACHYCARDIA) (HCC): ICD-10-CM

## 2021-10-19 LAB
ALBUMIN SERPL BCP-MCNC: 3.8 G/DL (ref 3.2–4.9)
ALBUMIN/GLOB SERPL: 1.4 G/DL
ALP SERPL-CCNC: 87 U/L (ref 30–99)
ALT SERPL-CCNC: 10 U/L (ref 2–50)
ANION GAP SERPL CALC-SCNC: 8 MMOL/L (ref 7–16)
AST SERPL-CCNC: 21 U/L (ref 12–45)
BASOPHILS # BLD AUTO: 0.4 % (ref 0–1.8)
BASOPHILS # BLD: 0.04 K/UL (ref 0–0.12)
BILIRUB SERPL-MCNC: 0.2 MG/DL (ref 0.1–1.5)
BUN SERPL-MCNC: 9 MG/DL (ref 8–22)
CALCIUM SERPL-MCNC: 8.8 MG/DL (ref 8.4–10.2)
CHLORIDE SERPL-SCNC: 103 MMOL/L (ref 96–112)
CO2 SERPL-SCNC: 28 MMOL/L (ref 20–33)
CREAT SERPL-MCNC: 0.82 MG/DL (ref 0.5–1.4)
EKG IMPRESSION: NORMAL
EKG IMPRESSION: NORMAL
EOSINOPHIL # BLD AUTO: 0.12 K/UL (ref 0–0.51)
EOSINOPHIL NFR BLD: 1.1 % (ref 0–6.9)
ERYTHROCYTE [DISTWIDTH] IN BLOOD BY AUTOMATED COUNT: 47.1 FL (ref 35.9–50)
GLOBULIN SER CALC-MCNC: 2.7 G/DL (ref 1.9–3.5)
GLUCOSE SERPL-MCNC: 112 MG/DL (ref 65–99)
HCT VFR BLD AUTO: 36.8 % (ref 37–47)
HGB BLD-MCNC: 11.9 G/DL (ref 12–16)
IMM GRANULOCYTES # BLD AUTO: 0.04 K/UL (ref 0–0.11)
IMM GRANULOCYTES NFR BLD AUTO: 0.4 % (ref 0–0.9)
LYMPHOCYTES # BLD AUTO: 2.43 K/UL (ref 1–4.8)
LYMPHOCYTES NFR BLD: 21.7 % (ref 22–41)
MCH RBC QN AUTO: 31.2 PG (ref 27–33)
MCHC RBC AUTO-ENTMCNC: 32.3 G/DL (ref 33.6–35)
MCV RBC AUTO: 96.6 FL (ref 81.4–97.8)
MONOCYTES # BLD AUTO: 0.96 K/UL (ref 0–0.85)
MONOCYTES NFR BLD AUTO: 8.6 % (ref 0–13.4)
NEUTROPHILS # BLD AUTO: 7.61 K/UL (ref 2–7.15)
NEUTROPHILS NFR BLD: 67.8 % (ref 44–72)
NRBC # BLD AUTO: 0 K/UL
NRBC BLD-RTO: 0 /100 WBC
PLATELET # BLD AUTO: 206 K/UL (ref 164–446)
PMV BLD AUTO: 11.2 FL (ref 9–12.9)
POTASSIUM SERPL-SCNC: 3.8 MMOL/L (ref 3.6–5.5)
PROT SERPL-MCNC: 6.5 G/DL (ref 6–8.2)
RBC # BLD AUTO: 3.81 M/UL (ref 4.2–5.4)
SODIUM SERPL-SCNC: 139 MMOL/L (ref 135–145)
T4 FREE SERPL-MCNC: 1.55 NG/DL (ref 0.93–1.7)
TSH SERPL DL<=0.005 MIU/L-ACNC: 0.29 UIU/ML (ref 0.38–5.33)
WBC # BLD AUTO: 11.2 K/UL (ref 4.8–10.8)

## 2021-10-19 PROCEDURE — 85025 COMPLETE CBC W/AUTO DIFF WBC: CPT

## 2021-10-19 PROCEDURE — 99285 EMERGENCY DEPT VISIT HI MDM: CPT

## 2021-10-19 PROCEDURE — 700105 HCHG RX REV CODE 258: Performed by: EMERGENCY MEDICINE

## 2021-10-19 PROCEDURE — 84439 ASSAY OF FREE THYROXINE: CPT

## 2021-10-19 PROCEDURE — 700111 HCHG RX REV CODE 636 W/ 250 OVERRIDE (IP): Performed by: EMERGENCY MEDICINE

## 2021-10-19 PROCEDURE — 84443 ASSAY THYROID STIM HORMONE: CPT

## 2021-10-19 PROCEDURE — 96374 THER/PROPH/DIAG INJ IV PUSH: CPT

## 2021-10-19 PROCEDURE — 80053 COMPREHEN METABOLIC PANEL: CPT

## 2021-10-19 PROCEDURE — 93005 ELECTROCARDIOGRAM TRACING: CPT | Performed by: EMERGENCY MEDICINE

## 2021-10-19 PROCEDURE — 71045 X-RAY EXAM CHEST 1 VIEW: CPT

## 2021-10-19 RX ORDER — DILTIAZEM HYDROCHLORIDE 5 MG/ML
10 INJECTION INTRAVENOUS ONCE
Status: COMPLETED | OUTPATIENT
Start: 2021-10-19 | End: 2021-10-19

## 2021-10-19 RX ORDER — SODIUM CHLORIDE, SODIUM LACTATE, POTASSIUM CHLORIDE, CALCIUM CHLORIDE 600; 310; 30; 20 MG/100ML; MG/100ML; MG/100ML; MG/100ML
1000 INJECTION, SOLUTION INTRAVENOUS ONCE
Status: COMPLETED | OUTPATIENT
Start: 2021-10-19 | End: 2021-10-19

## 2021-10-19 RX ADMIN — SODIUM CHLORIDE, POTASSIUM CHLORIDE, SODIUM LACTATE AND CALCIUM CHLORIDE 1000 ML: 600; 310; 30; 20 INJECTION, SOLUTION INTRAVENOUS at 01:59

## 2021-10-19 RX ADMIN — DILTIAZEM HYDROCHLORIDE 5 MG: 5 INJECTION INTRAVENOUS at 02:10

## 2021-10-19 ASSESSMENT — FIBROSIS 4 INDEX: FIB4 SCORE: 2.17

## 2021-10-19 NOTE — ED PROVIDER NOTES
ED Provider Note    Scribed for Tan Gottlieb M.D. by Idalia Dominguez. 10/19/2021  1:37 AM    Primary care provider: Hardik Merino M.D.  Means of arrival: EMS  History obtained from: Patient  History limited by: None    CHIEF COMPLAINT  Chief Complaint   Patient presents with   • Weakness     pt c/o generalized weakness, palpitations and labored breathing x 3 hours   • Palpitations   • Shortness of Breath       HPI  Antonia Stover is a 86 y.o. female who presents to the Emergency Department via EMS for evaluation of acute generalized weakness and heart palpitations onset about three hours prior to arrival. She has been having shortness of breath during this time as well. Prior to this episode she had been feeling normal. No recent fevers, vomiting, or diarrhea. She does smoke cigarettes.     REVIEW OF SYSTEMS  Pertinent positives include: generalized weakness, heart palpitations, and shortness of breath. Pertinent negatives include: no fevers, vomiting, or diarrhea. See history of present illness. All other systems are negative.     PAST MEDICAL HISTORY   has a past medical history of Anemia, Arthritis, Carotid artery plaque (08/2018), COPD, Dizziness ( ), Dyslipidemia ( ), Fatigue (7/3/2014), Hypothyroidism, Insomnia ( ), Nocturnal hypoxia ( ), PSVT (paroxysmal supraventricular tachycardia) ([facility]) (02/2013), Sickle cell disease ([facility]), and Tobacco use.    SURGICAL HISTORY   has a past surgical history that includes hysterectomy, total abdominal; knee replacement, total (2005); cataract extraction with iol; cataract extraction with iol; appendectomy; and abdominal hysterectomy total.    SOCIAL HISTORY  Social History     Tobacco Use   • Smoking status: Current Every Day Smoker     Packs/day: 0.25     Years: 55.00     Pack years: 13.75     Types: Cigars   • Smokeless tobacco: Never Used   • Tobacco comment: does not inhale, small filtered cigars, 4 cigs/ day    Vaping Use   • Vaping Use: Never used  "  Substance Use Topics   • Alcohol use: No   • Drug use: No      Social History     Substance and Sexual Activity   Drug Use No       FAMILY HISTORY  Family History   Problem Relation Age of Onset   • Heart Attack Brother 70       CURRENT MEDICATIONS  Current Outpatient Medications   Medication Instructions   • acetaminophen (TYLENOL) 650 mg, Oral, EVERY 4 HOURS PRN   • buPROPion (WELLBUTRIN XL) 150 mg, Oral, EVERY MORNING   • clopidogrel (PLAVIX) 75 MG Tab TAKE 1 TABLET BY MOUTH EVERY DAY   • diltiazem (TIAZAC) 180 MG SR capsule TAKE ONE CAPSULE BY MOUTH EVERY EVENING   • estradiol (ESTRACE) 1 g, Vaginal, SEE ADMIN INSTRUCTIONS, Pt uses twice a week   • levothyroxine (SYNTHROID) 75 MCG Tab TAKE 1 TABLET BY MOUTH EVERY MORNING ON AN EMPTY STOMACH   • pravastatin (PRAVACHOL) 40 MG tablet TAKE 1 TABLET BY MOUTH EVERY EVENING   • Probiotic Product (ALIGN PO) 1 Capsule, Oral, DAILY       ALLERGIES  Allergies   Allergen Reactions   • Hydrocodone Hives   • Iodine Anaphylaxis     RXN=>20 years ago  Heavy metal dyes and preservatives   • Nsaids Hives and Shortness of Breath   • Penicillins Anaphylaxis     RXN=>20 years ago   • Asa [Aspirin] Hives     Hives   • Codeine      Pt reports that she falls to the ground, she passes out   • Erythromycin      Pt reports that she falls to the ground, she passes out   • Morphine      Pt reports that she falls to the ground, she passes out   • Sulfa Drugs      Pt reports that she falls to the ground, she passes out       PHYSICAL EXAM  VITAL SIGNS: BP (!) 94/65   Pulse (!) 140   Temp 36 °C (96.8 °F) (Temporal)   Resp (!) 23   Ht 1.575 m (5' 2\")   Wt 38.6 kg (85 lb)   SpO2 94%   BMI 15.55 kg/m²     Constitutional: Alert in no apparent distress. Cachectic   HENT: No signs of trauma, Bilateral external ears normal, Nose normal. Uvula midline.   Eyes: Pupils are equal and reactive, Conjunctiva normal, Non-icteric.   Neck: Normal range of motion, No tenderness, Supple, No stridor. "   Lymphatic: No lymphadenopathy noted.   Cardiovascular: Tachycardic, Regular rhythm, no murmurs.   Thorax & Lungs: Barrel chested, Normal breath sounds, No respiratory distress, No wheezing, No chest tenderness.   Abdomen:  Soft, No tenderness, No peritoneal signs, No masses, No pulsatile masses.   Skin: Warm, Dry, No erythema, No rash.   Back: No bony tenderness, No CVA tenderness.   Extremities: Intact distal pulses, No edema, No tenderness, No cyanosis.  Musculoskeletal: Good range of motion in all major joints. No tenderness to palpation or major deformities noted.   Neurologic: Alert , Normal motor function, Normal sensory function, No focal deficits noted.   Psychiatric: Affect normal, Judgment normal, Mood normal.     DIAGNOSTIC STUDIES / PROCEDURES    LABS  Labs Reviewed   COMP METABOLIC PANEL - Abnormal; Notable for the following components:       Result Value    Glucose 112 (*)     All other components within normal limits   CBC WITH DIFFERENTIAL - Abnormal; Notable for the following components:    WBC 11.2 (*)     RBC 3.81 (*)     Hemoglobin 11.9 (*)     Hematocrit 36.8 (*)     MCHC 32.3 (*)     Lymphocytes 21.70 (*)     Neutrophils (Absolute) 7.61 (*)     Monos (Absolute) 0.96 (*)     All other components within normal limits   TSH - Abnormal; Notable for the following components:    TSH 0.293 (*)     All other components within normal limits   FREE THYROXINE   ESTIMATED GFR      All labs reviewed by me.    EKG  12 Lead EKG interpreted by me to show:  Indication: Arrhythmia   Time: 1:23 AM  SVT  Rate 130  Axis: Normal  Intervals: Normal  Normal T waves  Normal ST segments  My impression of this EKG: No STEMI. No significant change from prior on 9/15 at 1511    12 Lead EKG interpreted by me to show:  Indication: Arrhythmia   Time: 3:47  Normal sinus rhythm  Rate 76  Axis: Normal  Intervals: Normal  Normal T waves  Normal ST segments  My impression of this EKG: No STEMI.     RADIOLOGY  DX-CHEST-PORTABLE  (1 VIEW)   Final Result         1.  Interstitial pulmonary parenchymal prominence suggest chronic underlying lung disease, component of interstitial edema and/or infiltrates not excluded.   2.  Atherosclerosis   3.  Hyperexpansion of lungs favors changes of COPD.        The radiologist's interpretation of all radiological studies have been reviewed by me.    COURSE & MEDICAL DECISION MAKING  Nursing notes, VS, PMSFHx reviewed in chart.    86 y.o. female p/w chief complaint of generalized weakness, palpitations, and shortness of breath.    1:37 AM Patient seen and examined at bedside.      I verified that the patient was wearing a mask and I was wearing appropriate PPE every time I entered the room. The patient's mask was on the patient at all times during my encounter except for a brief view of the oropharynx.     The differential diagnoses include but are not limited to:     #palpitations  Pt with history of paroxysmal SVT with recent visit to ED for similar symptoms  Treated with LR and Diltiazem 5mg  No new anemia  Unremarkable free t4  Referral sent to cardiology to consider dosage adjustment of Diltiazem, however I did not wish to adjust patient's home medication regimen as she is gone 30 consecutive days with no prior events and had a follow-up visit since that initial event without incident. Pt to follow up with PCP.  Patient also expresses that she is unwilling to stay in the hospital for dose adjustment.  I believe this to be reasonable given her success in outpatient management over the last month but did discuss need to return immediately if symptoms were to return or new or different symptoms were to develop.    2:04 AM Patient was reevaluated at bedside. Diltiazem has been held as her blood pressure was in the 70s. Fluids have been started.     2:38 AM Patient was reevaluated at bedside. Her heart rate is now 80.    3:43 AM Patient was reevaluated at bedside. Discussed lab and radiology results with the  patient. Informed her that she needs to follow up with cardiology as this is her second episode of this. She is feeling better at this time. Discussed discharge instructions and return precautions with the patient and they were cleared for discharge. Patient was given the opportunity to ask any further questions. She is comfortable with discharge at this time.      The patient will return for new or worsening symptoms and is stable at the time of discharge.    The patient is referred to a primary physician for blood pressure management, diabetic screening, and for all other preventative health concerns.    DISPOSITION:  Patient will be discharged home in stable condition.    FOLLOW UP:  Hardik Merino M.D.  [address] [place] [address] [address] [place] [address]  [phone]    In 3 days      [facility], Emergency Dept  [address]  [place] [address]  [phone]  Today        OUTPATIENT MEDICATIONS:  Discharge Medication List as of 10/19/2021  3:56 AM          FINAL IMPRESSION  1. Paroxysmal SVT (supraventricular tachycardia) (HCC)          IIdalia (Scribe), am scribing for, and in the presence of, Tan Gottlieb M.D..    Electronically signed by: Idalia Dominguez (Alexia), 10/19/2021    ITan M.D. personally performed the services described in this documentation, as scribed by Idalia Dominguez in my presence, and it is both accurate and complete.    The note accurately reflects work and decisions made by me.  Tan Gottlieb M.D.  10/19/2021  7:18 AM

## 2021-10-19 NOTE — ED TRIAGE NOTES
"Pt bib EMS with c/o    Chief Complaint   Patient presents with   • Weakness     pt c/o generalized weakness, palpitations and labored breathing x 3 hours   • Palpitations   • Shortness of Breath       BP (!) 94/65   Pulse (!) 140   Temp 36 °C (96.8 °F) (Temporal)   Resp (!) 23   Ht 1.575 m (5' 2\")   Wt 38.6 kg (85 lb)   SpO2 94%   BMI 15.55 kg/m²   "

## 2021-10-19 NOTE — ED NOTES
Pt given discharge instructions and follow up with Cardiology; verbalized understanding of instructions. Pt out of ED in wheel chair; accompanied by .

## 2021-10-19 NOTE — DISCHARGE INSTRUCTIONS
Please take your blood pressure readings once or twice daily for the next few days to start a journal to discuss with your primary care physician on first available appointment.  As they may consider adjusting the dose of your home diltiazem.    Please discuss the following findings with your regular doctor:  Labs Reviewed   COMP METABOLIC PANEL - Abnormal; Notable for the following components:       Result Value    Glucose 112 (*)     All other components within normal limits   CBC WITH DIFFERENTIAL - Abnormal; Notable for the following components:    WBC 11.2 (*)     RBC 3.81 (*)     Hemoglobin 11.9 (*)     Hematocrit 36.8 (*)     MCHC 32.3 (*)     Lymphocytes 21.70 (*)     Neutrophils (Absolute) 7.61 (*)     Monos (Absolute) 0.96 (*)     All other components within normal limits   TSH - Abnormal; Notable for the following components:    TSH 0.293 (*)     All other components within normal limits   FREE THYROXINE   ESTIMATED GFR

## 2021-12-08 DIAGNOSIS — I47.10 PAROXYSMAL SUPRAVENTRICULAR TACHYCARDIA (HCC): ICD-10-CM

## 2021-12-08 DIAGNOSIS — I65.23 ATHEROSCLEROSIS OF BOTH CAROTID ARTERIES: ICD-10-CM

## 2021-12-10 ENCOUNTER — TELEPHONE (OUTPATIENT)
Dept: CARDIOLOGY | Facility: MEDICAL CENTER | Age: 86
End: 2021-12-10

## 2021-12-10 RX ORDER — CLOPIDOGREL BISULFATE 75 MG/1
TABLET ORAL
Qty: 30 TABLET | Refills: 1 | Status: SHIPPED | OUTPATIENT
Start: 2021-12-10 | End: 2022-01-10

## 2021-12-10 RX ORDER — DILTIAZEM HYDROCHLORIDE 180 MG/1
CAPSULE, EXTENDED RELEASE ORAL
Qty: 30 CAPSULE | Refills: 1 | Status: SHIPPED | OUTPATIENT
Start: 2021-12-10 | End: 2022-01-10

## 2021-12-10 NOTE — TELEPHONE ENCOUNTER
----- Message from Shilpa Smiley R.N. sent at 12/10/2021  9:37 AM PST -----  Pt overdue for f/u. Please contact to schedule appt.  Thank you!

## 2022-01-10 DIAGNOSIS — I65.23 ATHEROSCLEROSIS OF BOTH CAROTID ARTERIES: ICD-10-CM

## 2022-01-10 DIAGNOSIS — I47.10 PAROXYSMAL SUPRAVENTRICULAR TACHYCARDIA (HCC): ICD-10-CM

## 2022-01-12 ENCOUNTER — TELEPHONE (OUTPATIENT)
Dept: CARDIOLOGY | Facility: MEDICAL CENTER | Age: 87
End: 2022-01-12

## 2022-01-12 RX ORDER — CLOPIDOGREL BISULFATE 75 MG/1
75 TABLET ORAL DAILY
Qty: 30 TABLET | Refills: 1 | Status: ON HOLD | OUTPATIENT
Start: 2022-01-12 | End: 2022-04-07

## 2022-01-12 RX ORDER — DILTIAZEM HYDROCHLORIDE 180 MG/1
180 CAPSULE, EXTENDED RELEASE ORAL DAILY
Qty: 30 CAPSULE | Refills: 1 | Status: ON HOLD | OUTPATIENT
Start: 2022-01-12 | End: 2022-04-07

## 2022-01-12 NOTE — TELEPHONE ENCOUNTER
----- Message from Shilpa Smiley R.N. sent at 1/12/2022 10:32 AM PST -----  Pt overdue for f/u. Please contact to schedule appt.  Thank you!

## 2022-02-09 DIAGNOSIS — R64 EMACIATED (HCC): ICD-10-CM

## 2022-02-09 DIAGNOSIS — R62.7 FAILURE TO THRIVE IN ADULT: ICD-10-CM

## 2022-02-11 DIAGNOSIS — R62.7 FAILURE TO THRIVE IN ADULT: ICD-10-CM

## 2022-02-11 NOTE — PROGRESS NOTES
1. Caller Name: Antonia Stover                          Call Back Number: [phone] (home)         How would the patient prefer to be contacted with a response: Phone call OK to leave a detailed message    2. SPECIFIC Action To Be Taken: Referral pending, please sign.    3. Diagnosis/Clinical Reason for Request: failure to thrive/ caregiver burnout    4. Specialty & Provider Name/Lab/Imaging Location: [facility] work    5. Is appointment scheduled for requested order/referral: no    Patient was not informed they will receive a return phone call from the office ONLY if there are any questions before processing their request. Advised to call back if they haven't received a call from the referral department in 5 days.

## 2022-02-14 ENCOUNTER — PATIENT OUTREACH (OUTPATIENT)
Dept: HEALTH INFORMATION MANAGEMENT | Facility: OTHER | Age: 87
End: 2022-02-14

## 2022-02-14 NOTE — PROGRESS NOTES
Social Work Assessment  Community Care Management    Synopsis: Referral received from PCP- respite care/caregiver resources for spouse. SW able to complete assessment with Antonia's spouse, Seun. Antonia stated she does not like strangers in her home and did not want resources for caregivers. Seun informed NITHYA that he has been talking to Antonia and discussing further resources and Antonia is not open to receiving resources. However, Seun would like resources be mailed out to him and he will continue to discuss with Antonia.     Living Situation/Home Environment: Antonia lives in a mobile home with her spouse, Seun. They had a ramp built that goes all the way up to the front door.   Financial Situation/Sources of Income: Antonia receives $3,000 monthly income from social security and [employer] custod[employer]. And her spouse receives $2,000.   Transportation: Antonia does not drive due to being legally blind. Her spouse, Seun provides transportation when needed.   Support System: Antonia's main support system is her spouse, Seun. They do have family, but they live in [place] and [place].   Mental Health/Substance Abuse Hx: None reported.   Ability to Obtain Basic Resources: Antonia requires assistance from spouse, Seun to obtain basic resources.   Physical Functioning: Antonia has a walker available but it was reported that she does not use it because it is too heavy for her. Antonia is on service with [employer] for home O2.   Patient's Perception of Needs: Antonia is not open to receiving services. Her spouse, Seun is trying to talk to her and convince her to have services implemented. Seun would like assistance with grooming and bathing for her.     Plan: NITHYA to mail out resources for respite care/caregiver resources, NITHYA to follow up with Seun and Antonia on Friday to make sure they received resources     2/15/22 @9955  NITHYA called Seun to inform him that [employer] will be mailing out homemaker list. Seun  mentioned that when he last looked into caregivers a couple of months ago, they were charging him over $100 an hour. NITHYA explained that the list will have agencies that will be less than $100 out of pocket. Seun informed NITHYA that he is having someone from another agency come out to do a home visit today. Seun is not sure what program this person is from or what their name is. He stated that they were coming out to discuss caregivers resources as well. NITHYA informed Seun that [employer]ITHYA would mail out homemaker list and would close the referral. NITHYA provided Honey call back number in case there were to be any questions or concerns.

## 2022-02-15 ENCOUNTER — PATIENT OUTREACH (OUTPATIENT)
Dept: HEALTH INFORMATION MANAGEMENT | Facility: OTHER | Age: 87
End: 2022-02-15
Payer: COMMERCIAL

## 2022-03-20 ENCOUNTER — HOSPITAL ENCOUNTER (INPATIENT)
Facility: MEDICAL CENTER | Age: 87
LOS: 3 days | DRG: 190 | End: 2022-03-23
Attending: EMERGENCY MEDICINE | Admitting: INTERNAL MEDICINE
Payer: MEDICARE

## 2022-03-20 ENCOUNTER — APPOINTMENT (OUTPATIENT)
Dept: RADIOLOGY | Facility: MEDICAL CENTER | Age: 87
DRG: 190 | End: 2022-03-20
Attending: EMERGENCY MEDICINE
Payer: MEDICARE

## 2022-03-20 DIAGNOSIS — K59.00 CONSTIPATION, UNSPECIFIED CONSTIPATION TYPE: ICD-10-CM

## 2022-03-20 DIAGNOSIS — J44.1 COPD EXACERBATION (HCC): ICD-10-CM

## 2022-03-20 DIAGNOSIS — J44.1 ACUTE EXACERBATION OF CHRONIC OBSTRUCTIVE PULMONARY DISEASE (COPD) (HCC): ICD-10-CM

## 2022-03-20 DIAGNOSIS — G89.29 CHRONIC NECK PAIN: ICD-10-CM

## 2022-03-20 DIAGNOSIS — M54.2 CHRONIC NECK PAIN: ICD-10-CM

## 2022-03-20 DIAGNOSIS — N18.2 STAGE 2 CHRONIC KIDNEY DISEASE: ICD-10-CM

## 2022-03-20 DIAGNOSIS — R62.7 FAILURE TO THRIVE IN ADULT: ICD-10-CM

## 2022-03-20 DIAGNOSIS — J96.01 ACUTE HYPOXEMIC RESPIRATORY FAILURE (HCC): ICD-10-CM

## 2022-03-20 DIAGNOSIS — Z79.899 DVT PROPHYLAXIS: ICD-10-CM

## 2022-03-20 DIAGNOSIS — I27.20 PULMONARY HYPERTENSION (HCC): ICD-10-CM

## 2022-03-20 PROBLEM — E87.1 HYPONATREMIA: Status: ACTIVE | Noted: 2022-03-20

## 2022-03-20 PROBLEM — H91.90 HARD OF HEARING: Status: ACTIVE | Noted: 2022-03-20

## 2022-03-20 LAB
ALBUMIN SERPL BCP-MCNC: 3.6 G/DL (ref 3.2–4.9)
ALBUMIN/GLOB SERPL: 1.2 G/DL
ALP SERPL-CCNC: 144 U/L (ref 30–99)
ALT SERPL-CCNC: 8 U/L (ref 2–50)
ANION GAP SERPL CALC-SCNC: 14 MMOL/L (ref 7–16)
APPEARANCE UR: CLEAR
AST SERPL-CCNC: 24 U/L (ref 12–45)
BASOPHILS # BLD AUTO: 0.2 % (ref 0–1.8)
BASOPHILS # BLD: 0.04 K/UL (ref 0–0.12)
BILIRUB SERPL-MCNC: 0.5 MG/DL (ref 0.1–1.5)
BILIRUB UR QL STRIP.AUTO: NEGATIVE
BUN SERPL-MCNC: 12 MG/DL (ref 8–22)
CALCIUM SERPL-MCNC: 8.8 MG/DL (ref 8.5–10.5)
CHLORIDE SERPL-SCNC: 88 MMOL/L (ref 96–112)
CO2 SERPL-SCNC: 28 MMOL/L (ref 20–33)
COLOR UR: YELLOW
CREAT SERPL-MCNC: 0.75 MG/DL (ref 0.5–1.4)
EOSINOPHIL # BLD AUTO: 0.02 K/UL (ref 0–0.51)
EOSINOPHIL NFR BLD: 0.1 % (ref 0–6.9)
ERYTHROCYTE [DISTWIDTH] IN BLOOD BY AUTOMATED COUNT: 48.7 FL (ref 35.9–50)
FLUAV RNA SPEC QL NAA+PROBE: NEGATIVE
FLUBV RNA SPEC QL NAA+PROBE: NEGATIVE
GFR SERPLBLD CREATININE-BSD FMLA CKD-EPI: 78 ML/MIN/1.73 M 2
GLOBULIN SER CALC-MCNC: 3 G/DL (ref 1.9–3.5)
GLUCOSE SERPL-MCNC: 105 MG/DL (ref 65–99)
GLUCOSE UR STRIP.AUTO-MCNC: NEGATIVE MG/DL
HCT VFR BLD AUTO: 40.2 % (ref 37–47)
HGB BLD-MCNC: 13.3 G/DL (ref 12–16)
IMM GRANULOCYTES # BLD AUTO: 0.09 K/UL (ref 0–0.11)
IMM GRANULOCYTES NFR BLD AUTO: 0.5 % (ref 0–0.9)
KETONES UR STRIP.AUTO-MCNC: 15 MG/DL
LACTATE BLD-SCNC: 1.2 MMOL/L (ref 0.5–2)
LACTATE BLD-SCNC: 2.7 MMOL/L (ref 0.5–2)
LEUKOCYTE ESTERASE UR QL STRIP.AUTO: NEGATIVE
LYMPHOCYTES # BLD AUTO: 1.46 K/UL (ref 1–4.8)
LYMPHOCYTES NFR BLD: 7.7 % (ref 22–41)
MCH RBC QN AUTO: 30.7 PG (ref 27–33)
MCHC RBC AUTO-ENTMCNC: 33.1 G/DL (ref 33.6–35)
MCV RBC AUTO: 92.8 FL (ref 81.4–97.8)
MICRO URNS: ABNORMAL
MONOCYTES # BLD AUTO: 1.07 K/UL (ref 0–0.85)
MONOCYTES NFR BLD AUTO: 5.6 % (ref 0–13.4)
NEUTROPHILS # BLD AUTO: 16.33 K/UL (ref 2–7.15)
NEUTROPHILS NFR BLD: 85.9 % (ref 44–72)
NITRITE UR QL STRIP.AUTO: NEGATIVE
NRBC # BLD AUTO: 0 K/UL
NRBC BLD-RTO: 0 /100 WBC
NT-PROBNP SERPL IA-MCNC: 679 PG/ML (ref 0–125)
PH UR STRIP.AUTO: 5.5 [PH] (ref 5–8)
PLATELET # BLD AUTO: 352 K/UL (ref 164–446)
PMV BLD AUTO: 11.2 FL (ref 9–12.9)
POTASSIUM SERPL-SCNC: 3.8 MMOL/L (ref 3.6–5.5)
PROCALCITONIN SERPL-MCNC: 0.16 NG/ML
PROT SERPL-MCNC: 6.6 G/DL (ref 6–8.2)
PROT UR QL STRIP: NEGATIVE MG/DL
RBC # BLD AUTO: 4.33 M/UL (ref 4.2–5.4)
RBC UR QL AUTO: NEGATIVE
RSV RNA SPEC QL NAA+PROBE: NEGATIVE
SARS-COV-2 RNA RESP QL NAA+PROBE: NOTDETECTED
SODIUM SERPL-SCNC: 130 MMOL/L (ref 135–145)
SP GR UR STRIP.AUTO: 1.01
SPECIMEN SOURCE: NORMAL
TROPONIN T SERPL-MCNC: 16 NG/L (ref 6–19)
UROBILINOGEN UR STRIP.AUTO-MCNC: 1 MG/DL
WBC # BLD AUTO: 19 K/UL (ref 4.8–10.8)

## 2022-03-20 PROCEDURE — 99285 EMERGENCY DEPT VISIT HI MDM: CPT

## 2022-03-20 PROCEDURE — C9803 HOPD COVID-19 SPEC COLLECT: HCPCS | Performed by: INTERNAL MEDICINE

## 2022-03-20 PROCEDURE — 94644 CONT INHLJ TX 1ST HOUR: CPT

## 2022-03-20 PROCEDURE — 700101 HCHG RX REV CODE 250: Performed by: INTERNAL MEDICINE

## 2022-03-20 PROCEDURE — 36415 COLL VENOUS BLD VENIPUNCTURE: CPT

## 2022-03-20 PROCEDURE — 96375 TX/PRO/DX INJ NEW DRUG ADDON: CPT

## 2022-03-20 PROCEDURE — 80053 COMPREHEN METABOLIC PANEL: CPT

## 2022-03-20 PROCEDURE — 700111 HCHG RX REV CODE 636 W/ 250 OVERRIDE (IP): Performed by: INTERNAL MEDICINE

## 2022-03-20 PROCEDURE — 700101 HCHG RX REV CODE 250

## 2022-03-20 PROCEDURE — 51798 US URINE CAPACITY MEASURE: CPT

## 2022-03-20 PROCEDURE — 99497 ADVNCD CARE PLAN 30 MIN: CPT | Performed by: INTERNAL MEDICINE

## 2022-03-20 PROCEDURE — 84484 ASSAY OF TROPONIN QUANT: CPT

## 2022-03-20 PROCEDURE — 99223 1ST HOSP IP/OBS HIGH 75: CPT | Mod: 25 | Performed by: INTERNAL MEDICINE

## 2022-03-20 PROCEDURE — 94640 AIRWAY INHALATION TREATMENT: CPT

## 2022-03-20 PROCEDURE — 84145 PROCALCITONIN (PCT): CPT

## 2022-03-20 PROCEDURE — 83880 ASSAY OF NATRIURETIC PEPTIDE: CPT

## 2022-03-20 PROCEDURE — 700111 HCHG RX REV CODE 636 W/ 250 OVERRIDE (IP): Performed by: EMERGENCY MEDICINE

## 2022-03-20 PROCEDURE — 71045 X-RAY EXAM CHEST 1 VIEW: CPT

## 2022-03-20 PROCEDURE — 83605 ASSAY OF LACTIC ACID: CPT | Mod: 91

## 2022-03-20 PROCEDURE — 94760 N-INVAS EAR/PLS OXIMETRY 1: CPT

## 2022-03-20 PROCEDURE — 700101 HCHG RX REV CODE 250: Performed by: EMERGENCY MEDICINE

## 2022-03-20 PROCEDURE — 87040 BLOOD CULTURE FOR BACTERIA: CPT

## 2022-03-20 PROCEDURE — 96365 THER/PROPH/DIAG IV INF INIT: CPT

## 2022-03-20 PROCEDURE — 770001 HCHG ROOM/CARE - MED/SURG/GYN PRIV*

## 2022-03-20 PROCEDURE — 85025 COMPLETE CBC W/AUTO DIFF WBC: CPT

## 2022-03-20 PROCEDURE — 0241U HCHG SARS-COV-2 COVID-19 NFCT DS RESP RNA 4 TRGT MIC: CPT

## 2022-03-20 PROCEDURE — 700102 HCHG RX REV CODE 250 W/ 637 OVERRIDE(OP): Performed by: INTERNAL MEDICINE

## 2022-03-20 PROCEDURE — 81003 URINALYSIS AUTO W/O SCOPE: CPT

## 2022-03-20 PROCEDURE — A9270 NON-COVERED ITEM OR SERVICE: HCPCS | Performed by: INTERNAL MEDICINE

## 2022-03-20 RX ORDER — AZITHROMYCIN 250 MG/1
500 TABLET, FILM COATED ORAL DAILY
Status: DISCONTINUED | OUTPATIENT
Start: 2022-03-21 | End: 2022-03-21

## 2022-03-20 RX ORDER — ACETAMINOPHEN 325 MG/1
650 TABLET ORAL EVERY 6 HOURS PRN
Status: DISCONTINUED | OUTPATIENT
Start: 2022-03-20 | End: 2022-03-23 | Stop reason: HOSPADM

## 2022-03-20 RX ORDER — BISACODYL 10 MG
10 SUPPOSITORY, RECTAL RECTAL
Status: DISCONTINUED | OUTPATIENT
Start: 2022-03-20 | End: 2022-03-23 | Stop reason: HOSPADM

## 2022-03-20 RX ORDER — POLYETHYLENE GLYCOL 3350 17 G/17G
1 POWDER, FOR SOLUTION ORAL
Status: DISCONTINUED | OUTPATIENT
Start: 2022-03-20 | End: 2022-03-23 | Stop reason: HOSPADM

## 2022-03-20 RX ORDER — IPRATROPIUM BROMIDE AND ALBUTEROL SULFATE 2.5; .5 MG/3ML; MG/3ML
3 SOLUTION RESPIRATORY (INHALATION)
Status: DISCONTINUED | OUTPATIENT
Start: 2022-03-20 | End: 2022-03-23 | Stop reason: HOSPADM

## 2022-03-20 RX ORDER — AMOXICILLIN 250 MG
2 CAPSULE ORAL 2 TIMES DAILY
Status: DISCONTINUED | OUTPATIENT
Start: 2022-03-20 | End: 2022-03-23 | Stop reason: HOSPADM

## 2022-03-20 RX ORDER — PRAVASTATIN SODIUM 20 MG
40 TABLET ORAL EVERY EVENING
Status: DISCONTINUED | OUTPATIENT
Start: 2022-03-20 | End: 2022-03-23 | Stop reason: HOSPADM

## 2022-03-20 RX ORDER — PREDNISONE 20 MG/1
40 TABLET ORAL DAILY
Status: DISCONTINUED | OUTPATIENT
Start: 2022-03-21 | End: 2022-03-23 | Stop reason: HOSPADM

## 2022-03-20 RX ORDER — LEVOFLOXACIN 5 MG/ML
750 INJECTION, SOLUTION INTRAVENOUS ONCE
Status: COMPLETED | OUTPATIENT
Start: 2022-03-20 | End: 2022-03-20

## 2022-03-20 RX ORDER — LEVOTHYROXINE SODIUM 0.07 MG/1
75 TABLET ORAL
Status: DISCONTINUED | OUTPATIENT
Start: 2022-03-20 | End: 2022-03-23 | Stop reason: HOSPADM

## 2022-03-20 RX ORDER — HEPARIN SODIUM 5000 [USP'U]/ML
5000 INJECTION, SOLUTION INTRAVENOUS; SUBCUTANEOUS EVERY 8 HOURS
Status: DISCONTINUED | OUTPATIENT
Start: 2022-03-20 | End: 2022-03-23 | Stop reason: HOSPADM

## 2022-03-20 RX ORDER — AZITHROMYCIN 250 MG/1
500 TABLET, FILM COATED ORAL DAILY
Status: DISCONTINUED | OUTPATIENT
Start: 2022-03-20 | End: 2022-03-20

## 2022-03-20 RX ORDER — METHYLPREDNISOLONE SODIUM SUCCINATE 40 MG/ML
40 INJECTION, POWDER, LYOPHILIZED, FOR SOLUTION INTRAMUSCULAR; INTRAVENOUS ONCE
Status: COMPLETED | OUTPATIENT
Start: 2022-03-20 | End: 2022-03-20

## 2022-03-20 RX ORDER — IPRATROPIUM BROMIDE AND ALBUTEROL SULFATE 2.5; .5 MG/3ML; MG/3ML
3 SOLUTION RESPIRATORY (INHALATION)
Status: DISCONTINUED | OUTPATIENT
Start: 2022-03-20 | End: 2022-03-21

## 2022-03-20 RX ORDER — CLOPIDOGREL BISULFATE 75 MG/1
75 TABLET ORAL DAILY
Status: DISCONTINUED | OUTPATIENT
Start: 2022-03-20 | End: 2022-03-23 | Stop reason: HOSPADM

## 2022-03-20 RX ADMIN — CLOPIDOGREL BISULFATE 75 MG: 75 TABLET ORAL at 13:10

## 2022-03-20 RX ADMIN — ALBUTEROL SULFATE 15 MG/HR: 2.5 SOLUTION RESPIRATORY (INHALATION) at 07:16

## 2022-03-20 RX ADMIN — LEVOTHYROXINE SODIUM 75 MCG: 0.07 TABLET ORAL at 13:11

## 2022-03-20 RX ADMIN — Medication 15 MG/HR: at 07:16

## 2022-03-20 RX ADMIN — CEFTRIAXONE SODIUM 1 G: 10 INJECTION, POWDER, FOR SOLUTION INTRAVENOUS at 09:15

## 2022-03-20 RX ADMIN — IPRATROPIUM BROMIDE AND ALBUTEROL SULFATE 3 ML: 2.5; .5 SOLUTION RESPIRATORY (INHALATION) at 09:46

## 2022-03-20 RX ADMIN — HEPARIN SODIUM 5000 UNITS: 5000 INJECTION, SOLUTION INTRAVENOUS; SUBCUTANEOUS at 20:41

## 2022-03-20 RX ADMIN — PRAVASTATIN SODIUM 40 MG: 20 TABLET ORAL at 17:57

## 2022-03-20 RX ADMIN — METHYLPREDNISOLONE SODIUM SUCCINATE 40 MG: 40 INJECTION, POWDER, FOR SOLUTION INTRAMUSCULAR; INTRAVENOUS at 07:47

## 2022-03-20 RX ADMIN — HEPARIN SODIUM 5000 UNITS: 5000 INJECTION, SOLUTION INTRAVENOUS; SUBCUTANEOUS at 13:10

## 2022-03-20 RX ADMIN — IPRATROPIUM BROMIDE 0.5 MG: 0.5 SOLUTION RESPIRATORY (INHALATION) at 07:17

## 2022-03-20 RX ADMIN — LEVOFLOXACIN 750 MG: 750 INJECTION, SOLUTION INTRAVENOUS at 08:55

## 2022-03-20 ASSESSMENT — COGNITIVE AND FUNCTIONAL STATUS - GENERAL
MOVING FROM LYING ON BACK TO SITTING ON SIDE OF FLAT BED: A LOT
WALKING IN HOSPITAL ROOM: A LOT
SUGGESTED CMS G CODE MODIFIER DAILY ACTIVITY: CL
DRESSING REGULAR UPPER BODY CLOTHING: A LOT
EATING MEALS: A LITTLE
DAILY ACTIVITIY SCORE: 13
HELP NEEDED FOR BATHING: A LOT
MOVING TO AND FROM BED TO CHAIR: A LOT
TURNING FROM BACK TO SIDE WHILE IN FLAT BAD: A LOT
SUGGESTED CMS G CODE MODIFIER MOBILITY: CL
CLIMB 3 TO 5 STEPS WITH RAILING: TOTAL
PERSONAL GROOMING: A LOT
DRESSING REGULAR LOWER BODY CLOTHING: A LOT
STANDING UP FROM CHAIR USING ARMS: A LOT
TOILETING: A LOT
MOBILITY SCORE: 11

## 2022-03-20 ASSESSMENT — LIFESTYLE VARIABLES
EVER HAD A DRINK FIRST THING IN THE MORNING TO STEADY YOUR NERVES TO GET RID OF A HANGOVER: NO
SUBSTANCE_ABUSE: 0
TOTAL SCORE: 0
DO YOU DRINK ALCOHOL: NO
HOW MANY TIMES IN THE PAST YEAR HAVE YOU HAD 5 OR MORE DRINKS IN A DAY: 0
ALCOHOL_USE: NO
HAVE PEOPLE ANNOYED YOU BY CRITICIZING YOUR DRINKING: NO
TOTAL SCORE: 0
HAVE YOU EVER FELT YOU SHOULD CUT DOWN ON YOUR DRINKING: NO
PACK_YEARS: 60+
ON A TYPICAL DAY WHEN YOU DRINK ALCOHOL HOW MANY DRINKS DO YOU HAVE: 0
AVERAGE NUMBER OF DAYS PER WEEK YOU HAVE A DRINK CONTAINING ALCOHOL: 0
EVER FELT BAD OR GUILTY ABOUT YOUR DRINKING: NO
TOTAL SCORE: 0
CONSUMPTION TOTAL: NEGATIVE
EVER_SMOKED: YES

## 2022-03-20 ASSESSMENT — COPD QUESTIONNAIRES
DURING THE PAST 4 WEEKS HOW MUCH DID YOU FEEL SHORT OF BREATH: MOST  OR ALL OF THE TIME
HAVE YOU SMOKED AT LEAST 100 CIGARETTES IN YOUR ENTIRE LIFE: YES
DO YOU EVER COUGH UP ANY MUCUS OR PHLEGM?: NO/ONLY WITH OCCASIONAL COLDS OR INFECTIONS
COPD SCREENING SCORE: 6

## 2022-03-20 ASSESSMENT — ENCOUNTER SYMPTOMS
NERVOUS/ANXIOUS: 0
CHILLS: 0
ABDOMINAL PAIN: 0
FALLS: 0
HEADACHES: 0
FEVER: 0
DIZZINESS: 0
HEARTBURN: 0
SHORTNESS OF BREATH: 1
PALPITATIONS: 0
BACK PAIN: 0
VOMITING: 0
DOUBLE VISION: 0
COUGH: 1
BLURRED VISION: 0

## 2022-03-20 ASSESSMENT — PATIENT HEALTH QUESTIONNAIRE - PHQ9
1. LITTLE INTEREST OR PLEASURE IN DOING THINGS: NOT AT ALL
SUM OF ALL RESPONSES TO PHQ9 QUESTIONS 1 AND 2: 0
2. FEELING DOWN, DEPRESSED, IRRITABLE, OR HOPELESS: NOT AT ALL

## 2022-03-20 ASSESSMENT — FIBROSIS 4 INDEX: FIB4 SCORE: 2.71

## 2022-03-20 NOTE — ED NOTES
Med rec completed per patient and spouse at bedside.  Allergies reviewed. Patient unsure of specific reactions to most allergies listed.  No outpatient antibiotics in the last 30 days.  [facility]'s [facility].

## 2022-03-20 NOTE — DIETARY
"Nutrition services: Day 0 of admit.  Antonia Stover is a 86 y.o. female with admitting DX of COPD exacerbation.  Consult received for Failure to thrive. Pt with BMI <19.     Assessment:  Height: 157.5 cm (5' 2\")  Weight: 36.3 kg (80 lb)  Body mass index is 14.63 kg/m²., BMI classification: Underweight  Diet/Intake: Regular    Evaluation:   1. Consult for Failure to thrive. Per MD note, pt is cachectic.  2. Pt states she does not eat much at home. She thinks she eats about 2 meals per day, but unable to clarify. Meal tray at bedside, but pt states she can't eat. She states she does not tolerate Boost or Ensure.  3. Current weight is stated. Requested measured weight when feasible, but per RN, bed scale weight not working and pt unable to stand. Per chart review, weight has been low for some time. 9/29/21 = 38.9 kg.  4. Observed severe muscle loss with prominent clavicle, squared shoulders, and scooping at temples. Observed severe fat loss at triceps and hollowing at orbitals.  5. Noted pt does have POLST on file stating no artificial nutrition or feeding tube desired.    Malnutrition Risk: Severe malnutrition in context of chronic illness as evidenced by severe muscle loss at temples, clavicle, shoulder, and severe fat loss at orbitals and triceps.    Recommendations/Plan:  1. Chobani smoothie with breakfast, ready care shake with dinner.  2. Encourage intake of meals >25-50%  3. Document intake of all meals as % taken in ADL's to provide interdisciplinary communication across all shifts.   4. Monitor weight.  5. Nutrition rep will continue to see patient for ongoing meal and snack preferences.     RD following        "

## 2022-03-20 NOTE — ASSESSMENT & PLAN NOTE
"Patient's blood pressure on the lower side, will hold home medications for now.  Monitor, make changes accordingly.\"    Vitals:    03/22/22 0739   BP: 134/73   Pulse: 82   Resp: 18   Temp: 36.2 °C (97.2 °F)   SpO2: 98%     Stable.  "

## 2022-03-20 NOTE — ASSESSMENT & PLAN NOTE
"Most likely due to COPD exacerbation, however there is also concern for pneumonia.  We have started the patient on ceftriaxone and azithromycin, as well as duo nebs and steroids.  Monitor, supplemental oxygen as needed.    We are also pending Covid, influenza testing.\"    CoVID NEGATIVE x 1  Flu negative  RSV negative  Try to wean her off O2.     "

## 2022-03-20 NOTE — CARE PLAN
Problem: Nutritional:  Goal: Achieve adequate nutritional intake  Description: Patient will consume >25-50% of meals  Outcome: Not Met

## 2022-03-20 NOTE — ED NOTES
Report to Blaine IZQUIERDO, pt placed on transport list.    Salbador Sites called today. Her ankle is still bothering her. She is interested in following up with Ortho. Please advise.

## 2022-03-20 NOTE — H&P
"Hospital Medicine History & Physical Note    Date of Service  3/20/2022    Primary Care Physician  Hardik Merino M.D.    Consultants  None for now    Code Status  DNAR/DNI    Chief Complaint  Chief Complaint   Patient presents with   • Shortness of Breath   • COPD       History of Presenting Illness  Antonia Stover is a 86 y.o. female with a past medical history of hypertension, hypothyroidism, COPD who presented 3/20/2022 with shortness of breath and increased cough.    The patient history was taken by both the patient and the patient's  who is at bedside.  The patient is very hard of hearing and does not wear hearing aids.  As per the patient she has been having shortness of breath and increased cough for the past couple of days.  Due to the fact that they needed to go up on her oxygen at baseline at home they decided to come to the ER for further assessment and evaluation.  In the ER the patient was found to have a white blood cell count of 19, a sodium level of 130, creatinine of 0.75.    The patient had a x-ray which reported: \"Bibasilar opacities likely represent a combination of small pleural effusions with adjacent atelectasis. Pneumonia cannot be excluded.\"  The patient will be admitted for COPD exacerbation and has also been started on antibiotics, pending procalcitonin, pending Covid testing.  The patient is very frail and cachectic we have ordered nutrition evaluation as well as PT/OT.    I discussed the plan of care with patient, family and ER Physician.    Review of Systems  Review of Systems   Constitutional: Positive for malaise/fatigue. Negative for chills and fever.   HENT: Negative for hearing loss and nosebleeds.    Eyes: Negative for blurred vision and double vision.   Respiratory: Positive for cough and shortness of breath.    Cardiovascular: Negative for chest pain and palpitations.   Gastrointestinal: Negative for abdominal pain, heartburn and vomiting.   Genitourinary: " Negative for dysuria and urgency.   Musculoskeletal: Negative for back pain and falls.   Skin: Negative for itching and rash.   Neurological: Negative for dizziness and headaches.   Psychiatric/Behavioral: Negative for substance abuse. The patient is not nervous/anxious.    All other systems reviewed and are negative.      Past Medical History   has a past medical history of Anemia, Arthritis, Carotid artery plaque (08/2018), COPD, Dizziness ( ), Dyslipidemia ( ), Fatigue (7/3/2014), Hypothyroidism, Insomnia ( ), Nocturnal hypoxia ( ), PSVT (paroxysmal supraventricular tachycardia) ([facility]) (02/2013), Sickle cell disease ([facility]), and Tobacco use.    Surgical History   has a past surgical history that includes hysterectomy, total abdominal; knee replacement, total (2005); cataract extraction with iol; cataract extraction with iol; appendectomy; and abdominal hysterectomy total.     Family History  family history includes Heart Attack (age of onset: 70) in her brother.       Social History   reports that she has been smoking cigars. She has a 13.75 pack-year smoking history. She has never used smokeless tobacco. She reports that she does not drink alcohol and does not use drugs.    Allergies  Allergies   Allergen Reactions   • Hydrocodone Hives   • Iodine Anaphylaxis     RXN=>20 years ago  Heavy metal dyes and preservatives   • Nsaids Hives and Shortness of Breath   • Penicillins Anaphylaxis     RXN=>20 years ago   • Asa [Aspirin] Hives     Hives   • Codeine      Pt reports that she falls to the ground, she passes out   • Erythromycin      Pt reports that she falls to the ground, she passes out   • Morphine      Pt reports that she falls to the ground, she passes out   • Sulfa Drugs      Pt reports that she falls to the ground, she passes out       Medications  Prior to Admission Medications   Prescriptions Last Dose Informant Patient Reported? Taking?   Probiotic Product (ALIGN PO) 2~3 DAYS AGO at AM Significant Other Yes  No   Sig: Take 1 Capsule by mouth every morning.   clopidogrel (PLAVIX) 75 MG Tab 3/19/2022 at 1400 Significant Other No No   Sig: Take 1 Tablet by mouth every day. Please call [phone] to schedule follow up for further refills   diltiazem (TIAZAC) 180 MG SR capsule 3/19/2022 at 2100 Significant Other No No   Sig: Take 1 Capsule by mouth every day. Please call [phone] to schedule follow up for further refills   levothyroxine (SYNTHROID) 75 MCG Tab 3/19/2022 at 0630 Significant Other No No   Sig: TAKE 1 TABLET BY MOUTH EVERY MORNING ON AN EMPTY STOMACH   pravastatin (PRAVACHOL) 40 MG tablet 3/19/2022 at 1800 Significant Other No No   Sig: TAKE 1 TABLET BY MOUTH EVERY EVENING      Facility-Administered Medications: None       Physical Exam  Pulse:  [66] 66  Resp:  [20-22] 20  BP: (114)/(65) 114/65  SpO2:  [95 %-97 %] 97 %  Blood Pressure : 114/65       Pulse: 66   Respiration: 20   Pulse Oximetry: 97 %       Physical Exam  Vitals and nursing note reviewed.   Constitutional:       General: She is not in acute distress.     Appearance: She is ill-appearing.      Comments: Patient looks cachectic, frail.   HENT:      Head: Normocephalic and atraumatic.      Right Ear: External ear normal.      Left Ear: External ear normal.      Mouth/Throat:      Pharynx: No oropharyngeal exudate or posterior oropharyngeal erythema.   Eyes:      General:         Right eye: No discharge.         Left eye: No discharge.   Cardiovascular:      Rate and Rhythm: Normal rate and regular rhythm.      Pulses: Normal pulses.      Heart sounds: No murmur heard.    No gallop.   Pulmonary:      Effort: Pulmonary effort is normal.      Breath sounds: Rhonchi present.      Comments: On supplemental oxygen  Abdominal:      General: Bowel sounds are normal. There is no distension.      Palpations: Abdomen is soft.      Tenderness: There is no abdominal tenderness. There is no guarding.   Musculoskeletal:         General: No swelling or  tenderness. Normal range of motion.      Cervical back: Normal range of motion and neck supple. No tenderness.   Skin:     General: Skin is warm and dry.   Neurological:      General: No focal deficit present.      Mental Status: She is alert and oriented to person, place, and time. Mental status is at baseline.      Motor: No weakness.   Psychiatric:         Mood and Affect: Mood normal.         Behavior: Behavior normal.         Thought Content: Thought content normal.         Judgment: Judgment normal.         Laboratory:  Recent Labs     03/20/22  0640   WBC 19.0*   RBC 4.33   HEMOGLOBIN 13.3   HEMATOCRIT 40.2   MCV 92.8   MCH 30.7   MCHC 33.1*   RDW 48.7   PLATELETCT 352   MPV 11.2     Recent Labs     03/20/22  0640   SODIUM 130*   POTASSIUM 3.8   CHLORIDE 88*   CO2 28   GLUCOSE 105*   BUN 12   CREATININE 0.75   CALCIUM 8.8     Recent Labs     03/20/22  0640   ALTSGPT 8   ASTSGOT 24   ALKPHOSPHAT 144*   TBILIRUBIN 0.5   GLUCOSE 105*         Recent Labs     03/20/22  0640   NTPROBNP 679*         Recent Labs     03/20/22  0640   TROPONINT 16       Imaging:  DX-CHEST-PORTABLE (1 VIEW)   Final Result      Bibasilar opacities likely represent a combination of small pleural effusions with adjacent atelectasis. Pneumonia cannot be excluded.          X-Ray:  I have personally reviewed the images and compared with prior images.    Assessment/Plan:  I anticipate this patient will require at least two midnights for appropriate medical management, necessitating inpatient admission.    * COPD exacerbation (HCC)- (present on admission)  Assessment & Plan  We will start the patient on COPD exacerbation order set including duo nebs and p.o. steroids.  We have also started patient on azithromycin and ceftriaxone.  Monitor, make changes accordingly.    Acute hypoxemic respiratory failure (HCC)  Assessment & Plan  Most likely due to COPD exacerbation, however there is also concern for pneumonia.  We have started the patient on  ceftriaxone and azithromycin, as well as duo nebs and steroids.  Monitor, supplemental oxygen as needed.    We are also pending Covid, influenza testing.    Failure to thrive in adult  Assessment & Plan  The patient's  mentions that the patient has been losing weight for the past year, she is very cachectic.  We have ordered nutrition consult, we appreciate further conditions.  PT/OT.    Hyponatremia  Assessment & Plan  Mild  Monitor, repeat BMP    Hard of hearing  Assessment & Plan  The patient is hard of hearing, as per the patient's  she does not wear hearing aids and she has not seen a doctor as an outpatient.  The patient will require further outpatient follow-up    Advanced care planning/counseling discussion- (present on admission)  Assessment & Plan  I had a conversation with both the patient and the patient's  for at least 20 minutes regarding CODE STATUS.  They are adamant that the patient would like to be DNR/DNI.  The patient and the patient's  understand, and they mentioned that they had signed a form in the past and it is at their house.    Essential hypertension- (present on admission)  Assessment & Plan  Patient's blood pressure on the lower side, will hold home medications for now.  Monitor, make changes accordingly.    Hypothyroidism- (present on admission)  Assessment & Plan  Resume home medication.    Dyslipidemia- (present on admission)  Assessment & Plan  We will resume home medication, however we will order lipid panel to see the patient will actually warrant continued statin therapy.      VTE prophylaxis: enoxaparin ppx

## 2022-03-20 NOTE — ASSESSMENT & PLAN NOTE
We will resume home medication, however we will order lipid panel to see the patient will actually warrant continued statin therapy.

## 2022-03-20 NOTE — ED TRIAGE NOTES
EMS was called out due to SOB for the last 2 days. Pt has a HX of COPD. Pt is usually on 3L NC at home. EMS found her at 84% on 3L NC. EMS turned her up to 6L NC pt stat at 95%. EMS started a 20g in the RFA.

## 2022-03-20 NOTE — ASSESSMENT & PLAN NOTE
"The patient is hard of hearing, as per the patient's  she does not wear hearing aids and she has not seen a doctor as an outpatient.  The patient will require further outpatient follow-up\"    ENT with Audiology outpatient follow up  "

## 2022-03-20 NOTE — PROGRESS NOTES
4 Eyes Skin Assessment Completed by Blaine RN and FELECIA Henry.    Head WDL   Ears WDL  Nose WDL  Mouth WDL  Neck WDL  Breast/Chest WDL  Shoulder Blades WDL  Spine WDL  (R) Arm/Elbow/Hand WDL  (L) Arm/Elbow/Hand WDL  Abdomen WDL  Groin WDL  Scrotum/Coccyx/Buttocks Redness and Blanching  (R) Leg Edema  (L) Leg Edema  (R) Heel/Foot/Toe Redness and Blanching  (L) Heel/Foot/Toe Redness and Blanching          Devices In Places Oxy Mask      Interventions In Place Waffle Overlay, Pillows and Q2 Turns    Possible Skin Injury Yes    Pictures Uploaded Into Epic N/A  Wound Consult Placed N/A  RN Wound Prevention Protocol Ordered No

## 2022-03-20 NOTE — ASSESSMENT & PLAN NOTE
"We will start the patient on COPD exacerbation order set including duo nebs and p.o. steroids.  We have also started patient on azithromycin and ceftriaxone.  Monitor, make changes accordingly.\"    Stable.  On a 5d course of prednisone.  Follow up with Pulmonology  "

## 2022-03-20 NOTE — ASSESSMENT & PLAN NOTE
The patient's  mentions that the patient has been losing weight for the past year, she is very cachectic.  We have ordered nutrition consult, we appreciate further conditions.  PT/OT.

## 2022-03-20 NOTE — ED NOTES
at bedside updated on POC.  RN discussed need for UA, pt attempted to void on Bedpan, unable to void, brief removed from pt, soaked in urine.

## 2022-03-20 NOTE — ED PROVIDER NOTES
Treated forED Provider Note    CHIEF COMPLAINT  Chief Complaint   Patient presents with   • Shortness of Breath   • COPD       HPI  Antonia Stover is a 86 y.o. female who presents to the emergency room with shortness of breath.  Patient has a history of COPD.  Started feeling short of breath 2 days ago.  Gradually worsening.  Feels better when she lays down.  Worse with sitting up and exertion.  She denies a cough or fever.  Her legs have been swollen.  No new swelling.  She denies chest pain or pleuritic pain.  No hemoptysis.  Patient arrives by paramedics.  She required increased oxygen from her baseline 3 L to maintain oxygen saturation    REVIEW OF SYSTEMS  As per HPI, otherwise a 10 point review of systems is negative    PAST MEDICAL HISTORY  Past Medical History:   Diagnosis Date   • Anemia    • Arthritis    • Carotid artery plaque 08/2018    Carotid US with <50% stenosis bilaterally.   • COPD    • Dizziness     • Dyslipidemia     • Fatigue 7/3/2014   • Hypothyroidism    • Insomnia     • Nocturnal hypoxia      Uses oxygen QHS.   • PSVT (paroxysmal supraventricular tachycardia) ([facility]) 02/2013    Echocardiogram with normal LV size, LVEF 65-70%.   • Sickle cell disease ([facility])    • Tobacco use        SOCIAL HISTORY  Social History     Tobacco Use   • Smoking status: Current Every Day Smoker     Packs/day: 0.25     Years: 55.00     Pack years: 13.75     Types: Cigars   • Smokeless tobacco: Never Used   • Tobacco comment: does not inhale, small filtered cigars, 4 cigs/ day    Vaping Use   • Vaping Use: Never used   Substance Use Topics   • Alcohol use: No   • Drug use: No       SURGICAL HISTORY  Past Surgical History:   Procedure Laterality Date   • KNEE REPLACEMENT, TOTAL  2005    Right   • ABDOMINAL HYSTERECTOMY TOTAL     • APPENDECTOMY     • CATARACT EXTRACTION WITH IOL      Bilateral   • CATARACT EXTRACTION WITH IOL      Bilateral   • HYSTERECTOMY, TOTAL ABDOMINAL         CURRENT MEDICATIONS  Home  "Medications    **Home medications have not yet been reviewed for this encounter**         ALLERGIES  Allergies   Allergen Reactions   • Hydrocodone Hives   • Iodine Anaphylaxis     RXN=>20 years ago  Heavy metal dyes and preservatives   • Nsaids Hives and Shortness of Breath   • Penicillins Anaphylaxis     RXN=>20 years ago   • Asa [Aspirin] Hives     Hives   • Codeine      Pt reports that she falls to the ground, she passes out   • Erythromycin      Pt reports that she falls to the ground, she passes out   • Morphine      Pt reports that she falls to the ground, she passes out   • Sulfa Drugs      Pt reports that she falls to the ground, she passes out       PHYSICAL EXAM  VITAL SIGNS: /65   Pulse 66   Resp 20   Ht 1.575 m (5' 2\")   Wt 36.3 kg (80 lb)   SpO2 97%   BMI 14.63 kg/m²    Constitutional: Awake and alert.  Frail appearing elderly female  HENT: Normal inspection  Eyes: Normal inspection  Neck: Grossly normal range of motion.  Cardiovascular: Normal heart rate, Normal rhythm.  Symmetric peripheral pulses.   Thorax & Lungs: No respiratory distress.  Barrel chest.  Decreased breath sounds on the left versus the right  Abdomen: Bowel sounds normal, soft, non-distended, nontender, no mass  Skin: No obvious rash.  Back: No tenderness, No CVA tenderness.   Extremities: Mild symmetric lower extremity edema  Neurologic: Grossly normal   Psychiatric: Normal for situation    RADIOLOGY/PROCEDURES  DX-CHEST-PORTABLE (1 VIEW)   Final Result      Bibasilar opacities likely represent a combination of small pleural effusions with adjacent atelectasis. Pneumonia cannot be excluded.           Imaging is interpreted by radiologist    Labs:  Results for orders placed or performed during the hospital encounter of 03/20/22   CBC w/ Differential   Result Value Ref Range    WBC 19.0 (H) 4.8 - 10.8 K/uL    RBC 4.33 4.20 - 5.40 M/uL    Hemoglobin 13.3 12.0 - 16.0 g/dL    Hematocrit 40.2 37.0 - 47.0 %    MCV 92.8 81.4 - " 97.8 fL    MCH 30.7 27.0 - 33.0 pg    MCHC 33.1 (L) 33.6 - 35.0 g/dL    RDW 48.7 35.9 - 50.0 fL    Platelet Count 352 164 - 446 K/uL    MPV 11.2 9.0 - 12.9 fL    Neutrophils-Polys 85.90 (H) 44.00 - 72.00 %    Lymphocytes 7.70 (L) 22.00 - 41.00 %    Monocytes 5.60 0.00 - 13.40 %    Eosinophils 0.10 0.00 - 6.90 %    Basophils 0.20 0.00 - 1.80 %    Immature Granulocytes 0.50 0.00 - 0.90 %    Nucleated RBC 0.00 /100 WBC    Neutrophils (Absolute) 16.33 (H) 2.00 - 7.15 K/uL    Lymphs (Absolute) 1.46 1.00 - 4.80 K/uL    Monos (Absolute) 1.07 (H) 0.00 - 0.85 K/uL    Eos (Absolute) 0.02 0.00 - 0.51 K/uL    Baso (Absolute) 0.04 0.00 - 0.12 K/uL    Immature Granulocytes (abs) 0.09 0.00 - 0.11 K/uL    NRBC (Absolute) 0.00 K/uL   Complete Metabolic Panel (CMP)   Result Value Ref Range    Sodium 130 (L) 135 - 145 mmol/L    Potassium 3.8 3.6 - 5.5 mmol/L    Chloride 88 (L) 96 - 112 mmol/L    Co2 28 20 - 33 mmol/L    Anion Gap 14.0 7.0 - 16.0    Glucose 105 (H) 65 - 99 mg/dL    Bun 12 8 - 22 mg/dL    Creatinine 0.75 0.50 - 1.40 mg/dL    Calcium 8.8 8.5 - 10.5 mg/dL    AST(SGOT) 24 12 - 45 U/L    ALT(SGPT) 8 2 - 50 U/L    Alkaline Phosphatase 144 (H) 30 - 99 U/L    Total Bilirubin 0.5 0.1 - 1.5 mg/dL    Albumin 3.6 3.2 - 4.9 g/dL    Total Protein 6.6 6.0 - 8.2 g/dL    Globulin 3.0 1.9 - 3.5 g/dL    A-G Ratio 1.2 g/dL   proBrain Natriuretic Peptide, NT   Result Value Ref Range    NT-proBNP 679 (H) 0 - 125 pg/mL   Troponin STAT   Result Value Ref Range    Troponin T 16 6 - 19 ng/L   Lactic Acid   Result Value Ref Range    Lactic Acid 1.2 0.5 - 2.0 mmol/L   ESTIMATED GFR   Result Value Ref Range    GFR (CKD-EPI) 78 >60 mL/min/1.73 m 2       Medications   levoFLOXacin in D5W (LEVAQUIN) IVPB 750 mg (has no administration in time range)   methylPREDNISolone (SOLU-MEDROL) 40 MG injection 40 mg (40 mg Intravenous Given 3/20/22 6249)   albuterol (PROVENTIL) 2.5mg/0.5ml nebulizer solution (15 mg/hr Nebulization Given 3/20/22 9480)    ipratropium (ATROVENT) 0.02 % nebulizer solution 0.5 mg (0.5 mg Nebulization Given 3/20/22 0717)         COURSE & MEDICAL DECISION MAKING  Presents with dyspnea.  She has increased oxygen requirement.  She is well-appearing clinically.  Exacerbation with Solu-Medrol and duo nebs.  Chest x-ray is obtained.  Effusion versus infiltrate.  WBC count is elevated at 19.  This could be pneumonia.  Ordered Levaquin because of her allergies.  Sepsis protocol was initiated.  Patient will need to be admitted to the hospital for further treatment.  Dr. Romo is consulted.    FINAL IMPRESSION  1.  COPD with acute exacerbation  2.  Pneumonia      This dictation was created using voice recognition software. The accuracy of the dictation is limited to the abilities of the software.  The nursing notes were reviewed and certain aspects of this information were incorporated into this note.      Electronically signed by: Sebastien West M.D., 3/20/2022 6:54 AM

## 2022-03-20 NOTE — ASSESSMENT & PLAN NOTE
I had a conversation with both the patient and the patient's  for at least 20 minutes regarding CODE STATUS.  They are adamant that the patient would like to be DNR/DNI.  The patient and the patient's  understand, and they mentioned that they had signed a form in the past and it is at their house.

## 2022-03-20 NOTE — FLOWSHEET NOTE
03/20/22 0722   Inhalation Therapy Treatment   Continuous Nebulizer Setup Yes   $ Continuous Nebulizer Q 1 Hour Yes

## 2022-03-21 LAB
ALBUMIN SERPL BCP-MCNC: 3.5 G/DL (ref 3.2–4.9)
ALBUMIN/GLOB SERPL: 1.2 G/DL
ALP SERPL-CCNC: 119 U/L (ref 30–99)
ALT SERPL-CCNC: 6 U/L (ref 2–50)
ANION GAP SERPL CALC-SCNC: 12 MMOL/L (ref 7–16)
AST SERPL-CCNC: 24 U/L (ref 12–45)
BASOPHILS # BLD AUTO: 0.1 % (ref 0–1.8)
BASOPHILS # BLD: 0.02 K/UL (ref 0–0.12)
BILIRUB SERPL-MCNC: 0.4 MG/DL (ref 0.1–1.5)
BUN SERPL-MCNC: 11 MG/DL (ref 8–22)
CALCIUM SERPL-MCNC: 8.8 MG/DL (ref 8.5–10.5)
CHLORIDE SERPL-SCNC: 87 MMOL/L (ref 96–112)
CHOLEST SERPL-MCNC: 159 MG/DL (ref 100–199)
CO2 SERPL-SCNC: 28 MMOL/L (ref 20–33)
CREAT SERPL-MCNC: 0.69 MG/DL (ref 0.5–1.4)
EOSINOPHIL # BLD AUTO: 0 K/UL (ref 0–0.51)
EOSINOPHIL NFR BLD: 0 % (ref 0–6.9)
ERYTHROCYTE [DISTWIDTH] IN BLOOD BY AUTOMATED COUNT: 48 FL (ref 35.9–50)
GFR SERPLBLD CREATININE-BSD FMLA CKD-EPI: 85 ML/MIN/1.73 M 2
GLOBULIN SER CALC-MCNC: 3 G/DL (ref 1.9–3.5)
GLUCOSE SERPL-MCNC: 104 MG/DL (ref 65–99)
HCT VFR BLD AUTO: 37.6 % (ref 37–47)
HDLC SERPL-MCNC: 81 MG/DL
HGB BLD-MCNC: 12.5 G/DL (ref 12–16)
IMM GRANULOCYTES # BLD AUTO: 0.08 K/UL (ref 0–0.11)
IMM GRANULOCYTES NFR BLD AUTO: 0.5 % (ref 0–0.9)
LACTATE BLD-SCNC: 1.1 MMOL/L (ref 0.5–2)
LDLC SERPL CALC-MCNC: 66 MG/DL
LYMPHOCYTES # BLD AUTO: 0.73 K/UL (ref 1–4.8)
LYMPHOCYTES NFR BLD: 4.8 % (ref 22–41)
MAGNESIUM SERPL-MCNC: 1.6 MG/DL (ref 1.5–2.5)
MCH RBC QN AUTO: 30.9 PG (ref 27–33)
MCHC RBC AUTO-ENTMCNC: 33.2 G/DL (ref 33.6–35)
MCV RBC AUTO: 93.1 FL (ref 81.4–97.8)
MONOCYTES # BLD AUTO: 0.86 K/UL (ref 0–0.85)
MONOCYTES NFR BLD AUTO: 5.6 % (ref 0–13.4)
NEUTROPHILS # BLD AUTO: 13.55 K/UL (ref 2–7.15)
NEUTROPHILS NFR BLD: 89 % (ref 44–72)
NRBC # BLD AUTO: 0 K/UL
NRBC BLD-RTO: 0 /100 WBC
PLATELET # BLD AUTO: 325 K/UL (ref 164–446)
PMV BLD AUTO: 11 FL (ref 9–12.9)
POTASSIUM SERPL-SCNC: 3.6 MMOL/L (ref 3.6–5.5)
PROT SERPL-MCNC: 6.5 G/DL (ref 6–8.2)
RBC # BLD AUTO: 4.04 M/UL (ref 4.2–5.4)
SODIUM SERPL-SCNC: 127 MMOL/L (ref 135–145)
TRIGL SERPL-MCNC: 60 MG/DL (ref 0–149)
WBC # BLD AUTO: 15.2 K/UL (ref 4.8–10.8)

## 2022-03-21 PROCEDURE — 83735 ASSAY OF MAGNESIUM: CPT

## 2022-03-21 PROCEDURE — 80061 LIPID PANEL: CPT

## 2022-03-21 PROCEDURE — 97162 PT EVAL MOD COMPLEX 30 MIN: CPT

## 2022-03-21 PROCEDURE — 94669 MECHANICAL CHEST WALL OSCILL: CPT

## 2022-03-21 PROCEDURE — 85025 COMPLETE CBC W/AUTO DIFF WBC: CPT

## 2022-03-21 PROCEDURE — 80053 COMPREHEN METABOLIC PANEL: CPT

## 2022-03-21 PROCEDURE — 36415 COLL VENOUS BLD VENIPUNCTURE: CPT

## 2022-03-21 PROCEDURE — A9270 NON-COVERED ITEM OR SERVICE: HCPCS | Performed by: INTERNAL MEDICINE

## 2022-03-21 PROCEDURE — 99232 SBSQ HOSP IP/OBS MODERATE 35: CPT | Performed by: STUDENT IN AN ORGANIZED HEALTH CARE EDUCATION/TRAINING PROGRAM

## 2022-03-21 PROCEDURE — 700102 HCHG RX REV CODE 250 W/ 637 OVERRIDE(OP): Performed by: INTERNAL MEDICINE

## 2022-03-21 PROCEDURE — 94760 N-INVAS EAR/PLS OXIMETRY 1: CPT

## 2022-03-21 PROCEDURE — 770001 HCHG ROOM/CARE - MED/SURG/GYN PRIV*

## 2022-03-21 PROCEDURE — 700101 HCHG RX REV CODE 250: Performed by: INTERNAL MEDICINE

## 2022-03-21 PROCEDURE — 700111 HCHG RX REV CODE 636 W/ 250 OVERRIDE (IP): Performed by: INTERNAL MEDICINE

## 2022-03-21 PROCEDURE — 83605 ASSAY OF LACTIC ACID: CPT

## 2022-03-21 PROCEDURE — 97166 OT EVAL MOD COMPLEX 45 MIN: CPT

## 2022-03-21 PROCEDURE — 99221 1ST HOSP IP/OBS SF/LOW 40: CPT | Mod: GC | Performed by: STUDENT IN AN ORGANIZED HEALTH CARE EDUCATION/TRAINING PROGRAM

## 2022-03-21 RX ORDER — AZITHROMYCIN 250 MG/1
250 TABLET, FILM COATED ORAL DAILY
Status: DISCONTINUED | OUTPATIENT
Start: 2022-03-22 | End: 2022-03-23 | Stop reason: HOSPADM

## 2022-03-21 RX ADMIN — SENNOSIDES AND DOCUSATE SODIUM 2 TABLET: 50; 8.6 TABLET ORAL at 05:08

## 2022-03-21 RX ADMIN — PREDNISONE 40 MG: 20 TABLET ORAL at 05:09

## 2022-03-21 RX ADMIN — HEPARIN SODIUM 5000 UNITS: 5000 INJECTION, SOLUTION INTRAVENOUS; SUBCUTANEOUS at 05:08

## 2022-03-21 RX ADMIN — LEVOTHYROXINE SODIUM 75 MCG: 0.07 TABLET ORAL at 05:08

## 2022-03-21 RX ADMIN — PRAVASTATIN SODIUM 40 MG: 20 TABLET ORAL at 18:17

## 2022-03-21 RX ADMIN — CEFTRIAXONE SODIUM 1 G: 10 INJECTION, POWDER, FOR SOLUTION INTRAVENOUS at 05:10

## 2022-03-21 RX ADMIN — CLOPIDOGREL BISULFATE 75 MG: 75 TABLET ORAL at 05:09

## 2022-03-21 RX ADMIN — HEPARIN SODIUM 5000 UNITS: 5000 INJECTION, SOLUTION INTRAVENOUS; SUBCUTANEOUS at 21:10

## 2022-03-21 RX ADMIN — AZITHROMYCIN 500 MG: 250 TABLET, FILM COATED ORAL at 05:10

## 2022-03-21 RX ADMIN — HEPARIN SODIUM 5000 UNITS: 5000 INJECTION, SOLUTION INTRAVENOUS; SUBCUTANEOUS at 14:34

## 2022-03-21 ASSESSMENT — ACTIVITIES OF DAILY LIVING (ADL): TOILETING: INDEPENDENT

## 2022-03-21 ASSESSMENT — ENCOUNTER SYMPTOMS
CHILLS: 0
CHILLS: 1
SPUTUM PRODUCTION: 0
VOMITING: 0
NAUSEA: 0
WEAKNESS: 1
SORE THROAT: 0
MYALGIAS: 0
WEIGHT LOSS: 0
ABDOMINAL PAIN: 0
EYES NEGATIVE: 1
SHORTNESS OF BREATH: 1
FEVER: 0
COUGH: 1
PALPITATIONS: 0

## 2022-03-21 ASSESSMENT — COGNITIVE AND FUNCTIONAL STATUS - GENERAL
TOILETING: A LOT
MOVING TO AND FROM BED TO CHAIR: UNABLE
DRESSING REGULAR LOWER BODY CLOTHING: A LOT
WALKING IN HOSPITAL ROOM: A LOT
STANDING UP FROM CHAIR USING ARMS: A LOT
SUGGESTED CMS G CODE MODIFIER MOBILITY: CM
TURNING FROM BACK TO SIDE WHILE IN FLAT BAD: A LOT
CLIMB 3 TO 5 STEPS WITH RAILING: TOTAL
HELP NEEDED FOR BATHING: A LOT
SUGGESTED CMS G CODE MODIFIER DAILY ACTIVITY: CK
MOVING FROM LYING ON BACK TO SITTING ON SIDE OF FLAT BED: UNABLE
PERSONAL GROOMING: A LITTLE
EATING MEALS: A LITTLE
DRESSING REGULAR UPPER BODY CLOTHING: A LOT
DAILY ACTIVITIY SCORE: 14
MOBILITY SCORE: 9

## 2022-03-21 ASSESSMENT — GAIT ASSESSMENTS: GAIT LEVEL OF ASSIST: UNABLE TO PARTICIPATE

## 2022-03-21 NOTE — ASSESSMENT & PLAN NOTE
Likely COPD exacerbation, due to not being on maintenance treatment at home.  -5 day course of prednisone 40mg  -5 days azithromycin  -Start LAMA I.e spiriva once not on scheduled duonebs  -Patient declines follow-up and does not want PFT's  -Procal negative and without any focal consolidations, defer any additional abx to primary  -Wean oxygen goal sats 89-92%

## 2022-03-21 NOTE — DISCHARGE PLANNING
Care Transition Team Assessment    LSW completed assessment with patient's spouse Seun via phone. 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 Per spouse, patient normally needs a walker but may need a wheelchair. Patient on service with preferred for O2, baseline is 3L. Patient stays in chair or bed most of the day. Seun assists with IADLs. Patient is on service with storm [place].    LSW spoke to patient about dc plan for SNF. Spouse thinks that it would be a good idea for her to go to SNF but is unsure if patient would be agreeable. LSW to meet with patient and spouse at bedside to discuss dc home w/ SNF vs. HH. Spouse reported that if she declines SNF he can take her home. LSW informed him WC can be ordered if needed.    Information Source  Orientation Level: Oriented to place,Oriented to person  Information Given By: Spouse  Who is responsible for making decisions for patient? : Patient    Readmission Evaluation  Is this a readmission?: No    Elopement Risk  Legal Hold: No    Interdisciplinary Discharge Planning  Lives with - Patient's Self Care Capacity: Spouse  Patient or legal guardian wants to designate a caregiver: No  Housing / Facility: Mobile Home  Prior Services: Intermittent Physical Support for ADL Per Family,Skilled Home Health Services (has HHPT, spouse assists with mobility and ADLs as needed)    Discharge Preparedness  What is your plan after discharge?: Uncertain - pending medical team collaboration  What are your discharge supports?: Spouse  Prior Functional Level: Independent with Medication Management,Independent with Activities of Daily Living  Difficulity with ADLs: Walking  Difficulty with ADLs Comment: walker  Difficulity with IADLs: Cooking,Driving,Keeping track of finances,Laundry,Shopping,Using the telephone or computer,Managing medication  Difficulity with IADL Comments: spouse assists    Functional Assesment  Prior Functional Level: Independent with Medication Management,Independent  "with Activities of Daily Living    Finances  Financial Barriers to Discharge: No  Prescription Coverage: Yes    Vision / Hearing Impairment  Right Eye Vision: Impaired  Left Eye Vision: Impaired  Hearing Impairment: Both Ears         Advance Directive  Advance Directive?: POLST    Domestic Abuse  Have you ever been the victim of abuse or violence?: Not Sure (pt says \"some time ago but not now\")  Physical Abuse or Sexual Abuse: No  Verbal Abuse or Emotional Abuse: No  Possible Abuse/Neglect Reported to:: Not Applicable    Psychological Assessment  History of Substance Abuse: None  History of Psychiatric Problems: No  Non-compliant with Treatment: No  Newly Diagnosed Illness: No    Discharge Risks or Barriers  Discharge risks or barriers?: No  Patient risk factors: Vulnerable adult    Anticipated Discharge Information  Discharge Disposition: D/T to SNF with Medicare cert in anticipation of skilled care (03)        "

## 2022-03-21 NOTE — THERAPY
"Occupational Therapy   Initial Evaluation     Patient Name: Antonia Stover  Age:  86 y.o., Sex:  female  Medical Record #: [id number]  Today's Date: 3/21/2022    Precautions: Fall Risk  Comments: Pt very fearful & anxious with movement    Assessment  Patient is 86 y.o. female with h/o HTN, hypothyroidism, COPD presented with shortness of breath and increased cough for couple days.  Today pt seen with  present who is very supportive but reports pt has been declining at home & requiring more assist & has been eating very little.  Today pt required Mod/Max A with basic ADL's & was very fearful with all mobility & required extra time for all tasks.  Pt will benefit from Post Acute OT services prior to D/C home.    Plan    Recommend Occupational Therapy 3 times per week until therapy goals are met for the following treatments:  Adaptive Equipment, Neuro Re-Education / Balance, Self Care/Activities of Daily Living, Therapeutic Activities, and Therapeutic Exercises.    DC Equipment Recommendations: Unable to determine at this time  Discharge Recommendations: Recommend post-acute placement for additional occupational therapy services prior to discharge home     Subjective    \"I'm so cold in here\"     Objective       03/21/22 1155   Prior Living Situation   Prior Services Intermittent Physical Support for ADL Per Family;Skilled Home Health Services   Housing / Facility Mobile Home   Steps Into Home   (ramp)   Bathroom Set up Bathtub / Shower Combination   Equipment Owned Front-Wheel Walker;Oxygen;Ramp   Lives with - Patient's Self Care Capacity Spouse  (\"Skip\")   Comments Pt's  present & very supportive.   reports pt ahs been requiring more assist lately   Prior Level of ADL Function   Self Feeding Independent  (poor PO intake)   Grooming / Hygiene Requires Assist   Bathing Requires Assist   Dressing Independent   Toileting Independent   Cognition    Cognition / Consciousness X   Speech/ " "Communication Delayed Responses;Hard of Hearing   Level of Consciousness Alert   Ability To Follow Commands 1 Step   New Learning Impaired   Attention Impaired   Comments Pt is anxious, [place] & fearful of movement   Strength Upper Body   Gross Strength Generalized Weakness, Equal Bilaterally.    Comments Pt appears very frail & has generalized weakness   Neurological Concerns   Standing Posture During ADL's Posterior Lean   Balance Assessment   Sitting Balance (Static) Fair   Sitting Balance (Dynamic) Fair -   Standing Balance (Static) Poor -   Standing Balance (Dynamic) Trace +   Weight Shift Sitting Fair   Weight Shift Standing Poor   Bed Mobility    Supine to Sit Minimal Assist   Sit to Supine   (pt left up in chair)   Scooting Supervised   Rolling Supervised   Comments pt moves very slowly   ADL Assessment   Eating Supervision  (needs encouragement to improve PO intake)   Grooming Minimal Assist;Seated   Bathing Moderate Assist   Upper Body Dressing Moderate Assist   Lower Body Dressing Maximal Assist   Toileting Maximal Assist   Functional Mobility   Sit to Stand Moderate Assist   Bed, Chair, Wheelchair Transfer Moderate Assist   Patient / Family Goals   Patient / Family Goal #1 \"I need some time to rest\"   Short Term Goals   Short Term Goal # 1 Pt will be supervised with ADL transfers   Short Term Goal # 2 Pt will sit up in chair for 2 meals/day   Short Term Goal # 3 Pt will amb to bathroom with FWW & CGA             "

## 2022-03-21 NOTE — CARE PLAN
The patient is Stable - Low risk of patient condition declining or worsening    Shift Goals  Clinical Goals: oxygen needs  Patient Goals: sleep    Progress made toward(s) clinical / shift goals: yes    Problem: Knowledge Deficit - Standard  Goal: Patient and family/care givers will demonstrate understanding of plan of care, disease process/condition, diagnostic tests and medications  Outcome: Progressing     Problem: Knowledge Deficit - COPD  Goal: Patient/significant other demonstrates understanding of disease process, utilization of the Action Plan, medications and discharge instruction  Outcome: Progressing     Problem: Risk for Infection - COPD  Goal: Patient will remain free from signs and symptoms of infection  Outcome: Progressing     Problem: Nutrition - Advanced  Goal: Patient will display progressive weight gain toward goal have adequate food and fluid intake  Outcome: Progressing     Problem: Ineffective Airway Clearance  Goal: Patient will maintain patent airway with clear/clearing breath sounds  Outcome: Progressing     Problem: Impaired Gas Exchange  Goal: Patient will demonstrate improved ventilation and adequate oxygenation and participate in treatment regimen within the level of ability/situation.  Outcome: Progressing     Problem: Risk for Aspiration  Goal: Patient's risk for aspiration will be absent or decrease  Outcome: Progressing     Problem: Self Care  Goal: Patient will have the ability to perform ADLs independently or with assistance (bathe, groom, dress, toilet and feed)  Outcome: Progressing     Problem: Skin Integrity  Goal: Skin integrity is maintained or improved  Outcome: Progressing     Problem: Fall Risk  Goal: Patient will remain free from falls  Outcome: Progressing

## 2022-03-21 NOTE — CARE PLAN
The patient is Stable - Low risk of patient condition declining or worsening    Patient goals: breathe better  Clinical goals: maintain adequate spO2, increase nutritional intake  Family goals:na    Progress made toward(s) clinical / shift goals:  pt ate some of her dinner tray      Problem: Knowledge Deficit - Standard  Goal: Patient and family/care givers will demonstrate understanding of plan of care, disease process/condition, diagnostic tests and medications  Outcome: Progressing     Problem: Knowledge Deficit - COPD  Goal: Patient/significant other demonstrates understanding of disease process, utilization of the Action Plan, medications and discharge instruction  Outcome: Progressing     Problem: Risk for Infection - COPD  Goal: Patient will remain free from signs and symptoms of infection  Outcome: Progressing     Problem: Nutrition - Advanced  Goal: Patient will display progressive weight gain toward goal have adequate food and fluid intake  Outcome: Progressing     Problem: Ineffective Airway Clearance  Goal: Patient will maintain patent airway with clear/clearing breath sounds  Outcome: Progressing     Problem: Impaired Gas Exchange  Goal: Patient will demonstrate improved ventilation and adequate oxygenation and participate in treatment regimen within the level of ability/situation.  Outcome: Progressing     Problem: Risk for Aspiration  Goal: Patient's risk for aspiration will be absent or decrease  Outcome: Progressing     Problem: Self Care  Goal: Patient will have the ability to perform ADLs independently or with assistance (bathe, groom, dress, toilet and feed)  Outcome: Progressing     Problem: Skin Integrity  Goal: Skin integrity is maintained or improved  Outcome: Progressing       Patient is not progressing towards the following goals:

## 2022-03-21 NOTE — CONSULTS
Pulmonary Consult Note    Date of consult: 3/21/2022  Resident: Markel Valdez M.D.  Attending:  Dr. Poon    Referring Physician  Mirza Jurado M.D.    Reason for Consultation  Shortness of Breath and COPD      History of Present Illness  Antonia Stover is a 86 y.o. female with a PMHx significant for presumed COPD on home 3L who presented on 3/20/2022 with worsening shortness of breath and cough. Pulm consulted for COPD exacerbation.    Patient states that she had been feeling poorly for the last 2 days and had to go up on oxygen. She denied fever, chills, but did have a cough. She has quit smoking in the last year, previously smoked on and off for 50-60 years. She does not use any inhalers at home, does have a albuterol which she never uses. She has low functional status due to debility (BMI 14) and does not do much although she is able to ambulate with a walker.    CXR shows hyperexpanded lungs with obscured costophrenic angles    Code Status  DNR    Past Medical History   has a past medical history of Anemia, Arthritis, Carotid artery plaque (08/2018), COPD, Dizziness ( ), Dyslipidemia ( ), Fatigue (7/3/2014), Hypothyroidism, Insomnia ( ), Nocturnal hypoxia ( ), PSVT (paroxysmal supraventricular tachycardia) (HCC) (02/2013), Sickle cell disease ([facility]), and Tobacco use.    Past Surgical History   has a past surgical history that includes hysterectomy, total abdominal; knee replacement, total (2005); cataract extraction with iol; cataract extraction with iol; appendectomy; and abdominal hysterectomy total.    Medications  Home Medications     Reviewed by Yuan Jimenez (Pharmacy Tech) on 03/20/22 at 0856  Med List Status: Complete   Medication Last Dose Status   clopidogrel (PLAVIX) 75 MG Tab 3/19/2022 Active   diltiazem (TIAZAC) 180 MG SR capsule 3/19/2022 Active   levothyroxine (SYNTHROID) 75 MCG Tab 3/19/2022 Active   pravastatin (PRAVACHOL) 40 MG tablet 3/19/2022 Active   Probiotic Product  (ALIGN PO) 2~3 DAYS AGO Active                Allergies  Allergies   Allergen Reactions   • Hydrocodone Hives   • Iodine Anaphylaxis     RXN=>20 years ago  Heavy metal dyes and preservatives   • Nsaids Hives and Shortness of Breath   • Penicillins Anaphylaxis     RXN=>20 years ago   • Asa [Aspirin] Hives     Hives   • Codeine      Pt reports that she falls to the ground, she passes out   • Erythromycin      Pt reports that she falls to the ground, she passes out   • Morphine      Pt reports that she falls to the ground, she passes out   • Sulfa Drugs      Pt reports that she falls to the ground, she passes out       Social History   reports that she has been smoking cigars. She has a 13.75 pack-year smoking history. She has never used smokeless tobacco. She reports that she does not drink alcohol and does not use drugs.     Family History  family history includes Heart Attack (age of onset: 70) in her brother.    Review of Systems  Review of Systems   Constitutional: Negative for chills and fever.   HENT: Negative for congestion and sore throat.    Respiratory: Positive for cough and shortness of breath. Negative for sputum production.    Cardiovascular: Negative for chest pain, palpitations and leg swelling.       Vital Signs last 24 hours  Temp:  [36.3 °C (97.3 °F)-37.1 °C (98.7 °F)] 36.3 °C (97.3 °F)  Pulse:  [70-94] 76  Resp:  [14-20] 14  BP: ()/(64-86) 134/82  SpO2:  [91 %-94 %] 91 %  O2 therapy: Pulse Oximetry: 91 %, O2 (LPM): 4, O2 Delivery Device: Nasal Cannula    Fluids    Intake/Output Summary (Last 24 hours) at 3/21/2022 1153  Last data filed at 3/20/2022 2100  Gross per 24 hour   Intake 120 ml   Output --   Net 120 ml       Physical Exam  Physical Exam  Constitutional:       Appearance: She is ill-appearing.   HENT:      Head: Normocephalic and atraumatic.      Right Ear: External ear normal.      Left Ear: External ear normal.      Mouth/Throat:      Mouth: Mucous membranes are moist.       Pharynx: Oropharynx is clear.   Cardiovascular:      Rate and Rhythm: Normal rate and regular rhythm.   Pulmonary:      Effort: No respiratory distress.      Breath sounds: No wheezing or rales.      Comments: Diminished breath sounds bilaterally  Musculoskeletal:      Right lower leg: No edema.      Left lower leg: No edema.   Skin:     Capillary Refill: Capillary refill takes less than 2 seconds.   Neurological:      General: No focal deficit present.      Mental Status: She is alert. Mental status is at baseline.         Laboratory  Recent Labs     03/20/22  0640 03/21/22  0240   WBC 19.0* 15.2*   NEUTSPOLYS 85.90* 89.00*   LYMPHOCYTES 7.70* 4.80*   MONOCYTES 5.60 5.60   EOSINOPHILS 0.10 0.00   BASOPHILS 0.20 0.10   ASTSGOT 24 24   ALTSGPT 8 6   ALKPHOSPHAT 144* 119*   TBILIRUBIN 0.5 0.4     Recent Labs     03/20/22  0640 03/21/22  0240   SODIUM 130* 127*   POTASSIUM 3.8 3.6   CHLORIDE 88* 87*   CO2 28 28   BUN 12 11   CREATININE 0.75 0.69   MAGNESIUM  --  1.6   CALCIUM 8.8 8.8     Recent Labs     03/20/22  0640 03/21/22  0240   ALTSGPT 8 6   ASTSGOT 24 24   ALKPHOSPHAT 144* 119*   TBILIRUBIN 0.5 0.4   GLUCOSE 105* 104*           Imaging  DX-CHEST-PORTABLE (1 VIEW)   Final Result      Bibasilar opacities likely represent a combination of small pleural effusions with adjacent atelectasis. Pneumonia cannot be excluded.          Problem List  Principal Problem:    COPD exacerbation (HCC) POA: Yes  Active Problems:    Dyslipidemia POA: Yes    Hypothyroidism POA: Yes    Essential hypertension POA: Yes    Advanced care planning/counseling discussion POA: Yes    Failure to thrive in adult POA: Unknown    Acute hypoxemic respiratory failure (HCC) POA: Unknown    Hard of hearing POA: Unknown    Hyponatremia POA: Unknown  Resolved Problems:    * No resolved hospital problems. *      Assessment and Plan  * COPD exacerbation (HCC)- (present on admission)  Assessment & Plan  Likely COPD exacerbation, due to not being on  maintenance treatment at home.  -5 day course of prednisone 40mg  -5 days azithromycin  -Start LAMA I.e spiriva once not on scheduled duonebs  -Patient declines follow-up and does not want PFT's  -Procal negative and without any focal consolidations, defer any additional abx to primary  -Wean oxygen goal sats 89-92%

## 2022-03-21 NOTE — THERAPY
Physical Therapy   Initial Evaluation     Patient Name: Antonia Stover  Age:  86 y.o., Sex:  female  Medical Record #: [id number]  Today's Date: 3/21/2022    Precautions: Fall Risk    Assessment  Patient is a 86 y.o. female admitted with COPD exacerbation. Pt seen for PT evaluation at this time. Spouse present and supportive. Reports pt has required incr assistance lately. Pt reports fearful of mobility and reports feeling anxious. Pt with incr WOB and requires encouragement to attempt mobility. Pt required mod A for STS and SPT to chair. Pt will be most limited by anxiousness, may progress to functional level with WC to return home, however at this time recommend placement if spouse unable to provide assist for all nobility and ADLs. Will follow.     Plan  Recommend Physical Therapy 4 times per week until therapy goals are met for the following treatments:  Bed Mobility, Gait Training, Neuro Re-Education / Balance, Self Care/Home Evaluation, Stair Training, Therapeutic Activities, and Therapeutic Exercises  DC Equipment Recommendations: Unable to determine at this time  Discharge Recommendations: Recommend post-acute placement for additional physical therapy services prior to discharge home (vs home with spouse and incr CG assistance, WC?)          03/21/22 1121   Prior Living Situation   Prior Services Intermittent Physical Support for ADL Per Family;Skilled Home Health Services  (has HHPT, spouse assists with mobility and ADLs as needed)   Housing / Facility Mobile Home   Steps Into Home ramp   Steps In Home 0   Bathroom Set up Bathtub / Shower Combination   Equipment Owned Front-Wheel Walker   Lives with -  Spouse   Comments spouse present and supportive, reports pt has needed incr assistance from spouse lately. states walks with PT and has begun to walk some with spouse.   Prior Level of Functional Mobility   Bed Mobility Independent   Transfer Status Independent   Ambulation Independent   Distance  "Ambulation (Feet) household   Assistive Devices Used Front-Wheel Walker   Comments more recently has required some assist for mobility   Cognition    Speech/ Communication Hard of Hearing   Level of Consciousness Alert   Comments pt very anxious and admits to anxiety. pt aware that she is feeling anxious and it is limiting her, needs incr encouragement   Balance Assessment   Sitting Balance (Static) Fair   Sitting Balance (Dynamic) Fair -   Standing Balance (Static) Poor -   Standing Balance (Dynamic) Poor -   Weight Shift Sitting Poor   Weight Shift Standing Poor   Comments improved with HHA vs FWW   Gait Analysis   Gait Level Of Assist Unable to Participate   Weight Bearing Status no restrictions   Comments mostly limited d/t anxiousness   Bed Mobility    Supine to Sit Minimal Assist   Scooting Supervised   Comments incr time/effort   Functional Mobility   Sit to Stand Moderate Assist   Bed, Chair, WC Transfer Moderate Assist   Transfer Method Stand Pivot   Mobility EOB > chair with HHA   Comments attempted STS to FWW with mod A, pt anxious and yelling \"let me down.\" HHA for SPT following cues for breath control and encouragement with improved tolerance   Short Term Goals    Short Term Goal # 1 pt will perform supine <> sit with SPV in 6 visits for improved independence   Short Term Goal # 2 pt will perform STS with SPV in 6 visits for improved independence   Short Term Goal # 3 pt will perform SPT with CGA in 6 visits for improved indep and decr CG burden   Short Term Goal # 4 pt will ambulate 20ft with FWW and CGA to access home     "

## 2022-03-21 NOTE — HOSPITAL COURSE
An 86-year-old woman with h/o HTN, hypothyroidism, COPD presented with shortness of breath and increased cough for couple days.  The patient is very hard of hearing and does not wear hearing aids. They needed to go up on her oxygen at baseline at home.  In the ER the patient was found to have a white blood cell count of 19, a sodium level of 130, creatinine of 0.75.  CXR showed bibasilar opacities likely represent a combination of small pleural effusions with adjacent atelectasis. Pneumonia cannot be excluded.  The patient admitted for COPD exacerbation and started on antibiotics, pending procalcitonin, pending Covid testing.

## 2022-03-21 NOTE — PROGRESS NOTES
[facility] Medicine Daily Progress Note    Date of Service  3/21/2022    Chief Complaint  Antonia Stover is a 86 y.o. female admitted 3/20/2022 with SOB    Hospital Course  An 86-year-old woman with h/o HTN, hypothyroidism, COPD presented with shortness of breath and increased cough for couple days.  The patient is very hard of hearing and does not wear hearing aids. They needed to go up on her oxygen at baseline at home.  In the ER the patient was found to have a white blood cell count of 19, a sodium level of 130, creatinine of 0.75.  CXR showed bibasilar opacities likely represent a combination of small pleural effusions with adjacent atelectasis. Pneumonia cannot be excluded.  The patient admitted for COPD exacerbation and started on antibiotics, pending procalcitonin, pending Covid testing.       Interval Problem Update  Patient reports SOB, cough, feeling cold, freezing. Denies fever, chest pain, nausea, diarrhea.   Afebrile, hemodynamically stable. On 4 L NC oxygen (baseline 3 L NC at home).  WBC 15.2  Na 127. fluid restriction 1.2L.  Blood cultures from 3/20 negative.    I have personally seen and examined the patient at bedside. I discussed the plan of care with patient, bedside RN, charge RN,  and pharmacy.    Consultants/Specialty  None    Code Status  DNAR/DNI    Disposition  Patient is not medically cleared for discharge.   Anticipate discharge to TBD.  I have placed the appropriate orders for post-discharge needs.    Review of Systems  Review of Systems   Constitutional: Positive for chills and malaise/fatigue. Negative for fever and weight loss.   HENT: Positive for hearing loss.    Eyes: Negative.    Respiratory: Positive for cough and shortness of breath. Negative for sputum production.    Cardiovascular: Negative for chest pain and leg swelling.   Gastrointestinal: Negative for abdominal pain, nausea and vomiting.   Genitourinary: Negative for dysuria.   Musculoskeletal: Negative  for myalgias.   Skin: Negative for rash.   Neurological: Positive for weakness.        Physical Exam  Temp:  [36.3 °C (97.3 °F)-37.1 °C (98.7 °F)] 36.3 °C (97.3 °F)  Pulse:  [70-94] 76  Resp:  [14-20] 14  BP: ()/(58-86) 134/82  SpO2:  [91 %-94 %] 91 %    Physical Exam  Vitals and nursing note reviewed.   Constitutional:       Appearance: She is cachectic. She is ill-appearing.   HENT:      Head: Normocephalic and atraumatic.      Ears:      Comments: Hard of hearing     Nose: Nose normal.      Mouth/Throat:      Mouth: Mucous membranes are moist.      Pharynx: Oropharynx is clear.   Eyes:      Conjunctiva/sclera: Conjunctivae normal.      Pupils: Pupils are equal, round, and reactive to light.   Cardiovascular:      Rate and Rhythm: Normal rate and regular rhythm.   Pulmonary:      Effort: Pulmonary effort is normal. No respiratory distress.      Breath sounds: No wheezing or rales.   Abdominal:      General: Abdomen is flat. Bowel sounds are normal. There is no distension.      Tenderness: There is no abdominal tenderness. There is no guarding.   Musculoskeletal:         General: Normal range of motion.      Cervical back: Normal range of motion.   Skin:     General: Skin is warm.   Neurological:      General: No focal deficit present.      Mental Status: She is alert and oriented to person, place, and time.         Fluids    Intake/Output Summary (Last 24 hours) at 3/21/2022 1032  Last data filed at 3/20/2022 2100  Gross per 24 hour   Intake 120 ml   Output --   Net 120 ml       Laboratory  Recent Labs     03/20/22  0640 03/21/22  0240   WBC 19.0* 15.2*   RBC 4.33 4.04*   HEMOGLOBIN 13.3 12.5   HEMATOCRIT 40.2 37.6   MCV 92.8 93.1   MCH 30.7 30.9   MCHC 33.1* 33.2*   RDW 48.7 48.0   PLATELETCT 352 325   MPV 11.2 11.0     Recent Labs     03/20/22  0640 03/21/22  0240   SODIUM 130* 127*   POTASSIUM 3.8 3.6   CHLORIDE 88* 87*   CO2 28 28   GLUCOSE 105* 104*   BUN 12 11   CREATININE 0.75 0.69   CALCIUM 8.8 8.8              Recent Labs     03/21/22  0240   TRIGLYCERIDE 60   HDL 81   LDL 66       Imaging  DX-CHEST-PORTABLE (1 VIEW)   Final Result      Bibasilar opacities likely represent a combination of small pleural effusions with adjacent atelectasis. Pneumonia cannot be excluded.           Assessment/Plan  * COPD exacerbation (HCC)- (present on admission)  Assessment & Plan  We will start the patient on COPD exacerbation order set including duo nebs and p.o. steroids.  We have also started patient on azithromycin and ceftriaxone.  Monitor, make changes accordingly.    Hyponatremia  Assessment & Plan  Mild  Monitor, repeat BMP    Hard of hearing  Assessment & Plan  The patient is hard of hearing, as per the patient's  she does not wear hearing aids and she has not seen a doctor as an outpatient.  The patient will require further outpatient follow-up    Acute hypoxemic respiratory failure (HCC)  Assessment & Plan  Most likely due to COPD exacerbation, however there is also concern for pneumonia.  We have started the patient on ceftriaxone and azithromycin, as well as duo nebs and steroids.  Monitor, supplemental oxygen as needed.    We are also pending Covid, influenza testing.    Failure to thrive in adult  Assessment & Plan  The patient's  mentions that the patient has been losing weight for the past year, she is very cachectic.  We have ordered nutrition consult, we appreciate further conditions.  PT/OT.    Advanced care planning/counseling discussion- (present on admission)  Assessment & Plan  I had a conversation with both the patient and the patient's  for at least 20 minutes regarding CODE STATUS.  They are adamant that the patient would like to be DNR/DNI.  The patient and the patient's  understand, and they mentioned that they had signed a form in the past and it is at their house.    Essential hypertension- (present on admission)  Assessment & Plan  Patient's blood pressure on the  lower side, will hold home medications for now.  Monitor, make changes accordingly.    Hypothyroidism- (present on admission)  Assessment & Plan  Resume home medication.    Dyslipidemia- (present on admission)  Assessment & Plan  We will resume home medication, however we will order lipid panel to see the patient will actually warrant continued statin therapy.       VTE prophylaxis: heparin ppx    I have performed a physical exam and reviewed and updated ROS and Plan today (3/21/2022). In review of yesterday's note (3/20/2022), there are no changes except as documented above.

## 2022-03-21 NOTE — CARE PLAN
The patient is Stable - Low risk of patient condition declining or worsening    Shift Goals  Clinical Goals: oxygen needs  Patient Goals: sleep    Progress made toward(s) clinical / shift goals:  pt has stable spO2 on 4L NC. Pt decreased to 88% on RA at rest.       Problem: Knowledge Deficit - Standard  Goal: Patient and family/care givers will demonstrate understanding of plan of care, disease process/condition, diagnostic tests and medications  Outcome: Progressing     Problem: Knowledge Deficit - COPD  Goal: Patient/significant other demonstrates understanding of disease process, utilization of the Action Plan, medications and discharge instruction  Outcome: Progressing     Problem: Risk for Infection - COPD  Goal: Patient will remain free from signs and symptoms of infection  Outcome: Progressing     Problem: Nutrition - Advanced  Goal: Patient will display progressive weight gain toward goal have adequate food and fluid intake  Outcome: Progressing     Problem: Ineffective Airway Clearance  Goal: Patient will maintain patent airway with clear/clearing breath sounds  Outcome: Progressing     Problem: Impaired Gas Exchange  Goal: Patient will demonstrate improved ventilation and adequate oxygenation and participate in treatment regimen within the level of ability/situation.  Outcome: Progressing     Problem: Risk for Aspiration  Goal: Patient's risk for aspiration will be absent or decrease  Outcome: Progressing     Problem: Self Care  Goal: Patient will have the ability to perform ADLs independently or with assistance (bathe, groom, dress, toilet and feed)  Outcome: Progressing     Problem: Skin Integrity  Goal: Skin integrity is maintained or improved  Outcome: Progressing     Problem: Fall Risk  Goal: Patient will remain free from falls  Outcome: Progressing       Patient is not progressing towards the following goals:

## 2022-03-22 LAB
ALBUMIN SERPL BCP-MCNC: 3.5 G/DL (ref 3.2–4.9)
ALBUMIN/GLOB SERPL: 1.3 G/DL
ALP SERPL-CCNC: 119 U/L (ref 30–99)
ALT SERPL-CCNC: 7 U/L (ref 2–50)
ANION GAP SERPL CALC-SCNC: 12 MMOL/L (ref 7–16)
AST SERPL-CCNC: 25 U/L (ref 12–45)
BASOPHILS # BLD AUTO: 0.1 % (ref 0–1.8)
BASOPHILS # BLD: 0.02 K/UL (ref 0–0.12)
BILIRUB SERPL-MCNC: 0.3 MG/DL (ref 0.1–1.5)
BUN SERPL-MCNC: 16 MG/DL (ref 8–22)
CALCIUM SERPL-MCNC: 8.8 MG/DL (ref 8.5–10.5)
CHLORIDE SERPL-SCNC: 89 MMOL/L (ref 96–112)
CO2 SERPL-SCNC: 28 MMOL/L (ref 20–33)
CREAT SERPL-MCNC: 0.74 MG/DL (ref 0.5–1.4)
EOSINOPHIL # BLD AUTO: 0 K/UL (ref 0–0.51)
EOSINOPHIL NFR BLD: 0 % (ref 0–6.9)
ERYTHROCYTE [DISTWIDTH] IN BLOOD BY AUTOMATED COUNT: 47.9 FL (ref 35.9–50)
GFR SERPLBLD CREATININE-BSD FMLA CKD-EPI: 79 ML/MIN/1.73 M 2
GLOBULIN SER CALC-MCNC: 2.8 G/DL (ref 1.9–3.5)
GLUCOSE SERPL-MCNC: 107 MG/DL (ref 65–99)
HCT VFR BLD AUTO: 37.8 % (ref 37–47)
HGB BLD-MCNC: 12.6 G/DL (ref 12–16)
IMM GRANULOCYTES # BLD AUTO: 0.16 K/UL (ref 0–0.11)
IMM GRANULOCYTES NFR BLD AUTO: 0.8 % (ref 0–0.9)
LYMPHOCYTES # BLD AUTO: 0.99 K/UL (ref 1–4.8)
LYMPHOCYTES NFR BLD: 5 % (ref 22–41)
MCH RBC QN AUTO: 30.7 PG (ref 27–33)
MCHC RBC AUTO-ENTMCNC: 33.3 G/DL (ref 33.6–35)
MCV RBC AUTO: 92 FL (ref 81.4–97.8)
MONOCYTES # BLD AUTO: 1.51 K/UL (ref 0–0.85)
MONOCYTES NFR BLD AUTO: 7.6 % (ref 0–13.4)
NEUTROPHILS # BLD AUTO: 17.16 K/UL (ref 2–7.15)
NEUTROPHILS NFR BLD: 86.5 % (ref 44–72)
NRBC # BLD AUTO: 0 K/UL
NRBC BLD-RTO: 0 /100 WBC
PLATELET # BLD AUTO: 344 K/UL (ref 164–446)
PMV BLD AUTO: 11.4 FL (ref 9–12.9)
POTASSIUM SERPL-SCNC: 3.6 MMOL/L (ref 3.6–5.5)
PROT SERPL-MCNC: 6.3 G/DL (ref 6–8.2)
RBC # BLD AUTO: 4.11 M/UL (ref 4.2–5.4)
SODIUM SERPL-SCNC: 129 MMOL/L (ref 135–145)
TSH SERPL DL<=0.005 MIU/L-ACNC: 4.48 UIU/ML (ref 0.38–5.33)
WBC # BLD AUTO: 19.8 K/UL (ref 4.8–10.8)

## 2022-03-22 PROCEDURE — 80053 COMPREHEN METABOLIC PANEL: CPT

## 2022-03-22 PROCEDURE — 36415 COLL VENOUS BLD VENIPUNCTURE: CPT

## 2022-03-22 PROCEDURE — 700101 HCHG RX REV CODE 250: Performed by: INTERNAL MEDICINE

## 2022-03-22 PROCEDURE — 85025 COMPLETE CBC W/AUTO DIFF WBC: CPT

## 2022-03-22 PROCEDURE — 700111 HCHG RX REV CODE 636 W/ 250 OVERRIDE (IP): Performed by: INTERNAL MEDICINE

## 2022-03-22 PROCEDURE — 97530 THERAPEUTIC ACTIVITIES: CPT

## 2022-03-22 PROCEDURE — 700102 HCHG RX REV CODE 250 W/ 637 OVERRIDE(OP): Performed by: STUDENT IN AN ORGANIZED HEALTH CARE EDUCATION/TRAINING PROGRAM

## 2022-03-22 PROCEDURE — 770001 HCHG ROOM/CARE - MED/SURG/GYN PRIV*

## 2022-03-22 PROCEDURE — 94664 DEMO&/EVAL PT USE INHALER: CPT

## 2022-03-22 PROCEDURE — A9270 NON-COVERED ITEM OR SERVICE: HCPCS | Performed by: INTERNAL MEDICINE

## 2022-03-22 PROCEDURE — 84443 ASSAY THYROID STIM HORMONE: CPT

## 2022-03-22 PROCEDURE — A9270 NON-COVERED ITEM OR SERVICE: HCPCS | Performed by: STUDENT IN AN ORGANIZED HEALTH CARE EDUCATION/TRAINING PROGRAM

## 2022-03-22 PROCEDURE — 700102 HCHG RX REV CODE 250 W/ 637 OVERRIDE(OP): Performed by: INTERNAL MEDICINE

## 2022-03-22 PROCEDURE — 99233 SBSQ HOSP IP/OBS HIGH 50: CPT | Performed by: INTERNAL MEDICINE

## 2022-03-22 RX ADMIN — SENNOSIDES AND DOCUSATE SODIUM 2 TABLET: 50; 8.6 TABLET ORAL at 06:02

## 2022-03-22 RX ADMIN — SENNOSIDES AND DOCUSATE SODIUM 2 TABLET: 50; 8.6 TABLET ORAL at 17:27

## 2022-03-22 RX ADMIN — LEVOTHYROXINE SODIUM 75 MCG: 0.07 TABLET ORAL at 06:03

## 2022-03-22 RX ADMIN — HEPARIN SODIUM 5000 UNITS: 5000 INJECTION, SOLUTION INTRAVENOUS; SUBCUTANEOUS at 20:59

## 2022-03-22 RX ADMIN — CEFTRIAXONE SODIUM 1 G: 10 INJECTION, POWDER, FOR SOLUTION INTRAVENOUS at 06:03

## 2022-03-22 RX ADMIN — AZITHROMYCIN 250 MG: 250 TABLET, FILM COATED ORAL at 06:02

## 2022-03-22 RX ADMIN — HEPARIN SODIUM 5000 UNITS: 5000 INJECTION, SOLUTION INTRAVENOUS; SUBCUTANEOUS at 06:02

## 2022-03-22 RX ADMIN — HEPARIN SODIUM 5000 UNITS: 5000 INJECTION, SOLUTION INTRAVENOUS; SUBCUTANEOUS at 15:25

## 2022-03-22 RX ADMIN — CLOPIDOGREL BISULFATE 75 MG: 75 TABLET ORAL at 06:02

## 2022-03-22 RX ADMIN — PREDNISONE 40 MG: 20 TABLET ORAL at 06:02

## 2022-03-22 RX ADMIN — PRAVASTATIN SODIUM 40 MG: 20 TABLET ORAL at 17:27

## 2022-03-22 ASSESSMENT — ENCOUNTER SYMPTOMS
SPUTUM PRODUCTION: 0
FEVER: 0
NAUSEA: 0
WEAKNESS: 1
ABDOMINAL PAIN: 0
MYALGIAS: 0
EYES NEGATIVE: 1
COUGH: 1
CHILLS: 1
WEIGHT LOSS: 0
VOMITING: 0
SHORTNESS OF BREATH: 1

## 2022-03-22 ASSESSMENT — COGNITIVE AND FUNCTIONAL STATUS - GENERAL
MOBILITY SCORE: 11
MOVING FROM LYING ON BACK TO SITTING ON SIDE OF FLAT BED: UNABLE
SUGGESTED CMS G CODE MODIFIER MOBILITY: CL
STANDING UP FROM CHAIR USING ARMS: A LITTLE
TURNING FROM BACK TO SIDE WHILE IN FLAT BAD: A LOT
CLIMB 3 TO 5 STEPS WITH RAILING: TOTAL
MOVING TO AND FROM BED TO CHAIR: UNABLE
WALKING IN HOSPITAL ROOM: A LITTLE

## 2022-03-22 ASSESSMENT — PAIN DESCRIPTION - PAIN TYPE: TYPE: CHRONIC PAIN

## 2022-03-22 ASSESSMENT — GAIT ASSESSMENTS
DEVIATION: BRADYKINETIC;DECREASED BASE OF SUPPORT;STEP TO
ASSISTIVE DEVICE: FRONT WHEEL WALKER
DISTANCE (FEET): 3
GAIT LEVEL OF ASSIST: MINIMAL ASSIST

## 2022-03-22 ASSESSMENT — PATIENT HEALTH QUESTIONNAIRE - PHQ9
2. FEELING DOWN, DEPRESSED, IRRITABLE, OR HOPELESS: NOT AT ALL
SUM OF ALL RESPONSES TO PHQ9 QUESTIONS 1 AND 2: 0
1. LITTLE INTEREST OR PLEASURE IN DOING THINGS: NOT AT ALL

## 2022-03-22 NOTE — THERAPY
Physical Therapy   Daily Treatment     Patient Name: Antonia Stover  Age:  86 y.o., Sex:  female  Medical Record #: [id number]  Today's Date: 3/22/2022    Precautions: Fall Risk    Assessment  Pt with improved participation today. Demos decreased anxiousness and incr motivation. Pt required min A for supine > sit and STS to FWW. Practiced STS x3 with good carryover of hand placement and sequencing. Pt able to take steps over to chair with FWW. Instructed in seated LE exercises and encouraged sitting up in chair for all meals. Pt appears functionally capable of return home at  level at this time with continued HHPT if spouse able to assist with transfers. Otherwise, pt may require placement. PT will follow.     Plan  Continue current treatment plan.  DC Equipment Recommendations: Unable to determine at this time  Discharge Recommendations: home with WC and spouse assist with HHPT vs SNF if unable to have family support. Defer to OT for DC recs regarding ADLs and self-care ability.          03/22/22 1053   Cognition    Speech/ Communication Hard of Hearing   Level of Consciousness Alert   Ability To Follow Commands 1 Step   Comments reports still feeling anxious, though demos improved breath control today and was able to focus and follow instruction with a more calm demeanor   Sitting Lower Body Exercises   Comments instructed in seated marches and LAQs with focus on slow and controlled movements. pt demos and verblizes understanding to complete in chair.   Balance   Sitting Balance (Static) Fair   Sitting Balance (Dynamic) Fair -   Standing Balance (Static) Poor +   Standing Balance (Dynamic) Poor   Weight Shift Sitting Fair   Weight Shift Standing Fair   Skilled Intervention Verbal Cuing;Tactile Cuing;Postural Facilitation   Comments standing with FWW   Gait Analysis   Gait Level Of Assist Minimal Assist   Assistive Device Front Wheel Walker   Distance (Feet) 3   Deviation Bradykinetic;Decreased Base Of  Support;Step To   Weight Bearing Status no restrictions   Skilled Intervention Verbal Cuing;Sequencing;Postural Facilitation   Bed Mobility    Supine to Sit Minimal Assist   Scooting Supervised   Skilled Intervention Verbal Cuing;Sequencing   Comments improved initiation   Functional Mobility   Sit to Stand Minimal Assist   Bed, Chair, Wheelchair Transfer Minimal Assist   Transfer Method Stand Step   Skilled Intervention Verbal Cuing;Tactile Cuing;Sequencing;Postural Facilitation   Comments performed STS x3 to FWW with min A and progression from static standing to weight shifting and marching to stepping. pt requires step-by-step directions d/t anxiousness   Short Term Goals    Short Term Goal # 1 pt will perform supine <> sit with SPV in 6 visits for improved independence   Goal Outcome # 1 Progressing as expected   Short Term Goal # 2 pt will perform STS with SPV in 6 visits for improved independence   Goal Outcome # 2 Progressing as expected   Short Term Goal # 3 pt will perform SPT with CGA in 6 visits for improved indep and decr CG burden   Goal Outcome # 3 Progressing as expected   Short Term Goal # 4 pt will ambulate 20ft with FWW and CGA to access home   Goal Outcome # 4 Progressing as expected

## 2022-03-22 NOTE — CARE PLAN
Problem: Bronchopulmonary Hygiene  Goal: Increase mobilization of retained secretions  Description: Target End Date:  2 to 3 days    1.  Perform bronchopulmonary therapy as indicated by assessment  2.  Perform airway suctioning  3.  Perform actions to maintain patient airway  Outcome: Progressing  Flowsheets (Taken 3/20/2022 0946 by Patricia Soto, HUNTER)  Bronchopulmonary Hygiene Indications:   Difficulty with Secretion Clearance w/ Sputum Production greater than 25 ml / day (3tbsp) / day   Ineffective cough and inability to clear secretions

## 2022-03-22 NOTE — PROGRESS NOTES
SBAR report received, taking over patient cares at this time. Patient in bed with bed alarm in place. Bed in lowest locked position. Patient denies needs at this time. Call light within reach.

## 2022-03-22 NOTE — CARE PLAN
Problem: Knowledge Deficit - Standard  Goal: Patient and family/care givers will demonstrate understanding of plan of care, disease process/condition, diagnostic tests and medications  Outcome: Progressing     Problem: Ineffective Airway Clearance  Goal: Patient will maintain patent airway with clear/clearing breath sounds  Outcome: Progressing   The patient is Stable - Low risk of patient condition declining or worsening    Shift Goals  Clinical Goals: titrate O2, reposition  Patient Goals: rest, comfort.  Family Goals: NA    Progress made toward(s) clinical / shift goals: titrate O2 back to home settings, reposition throughout the shift and encourage mobility, consolidate cares to allow for periods of rest.     Patient is not progressing towards the following goals:

## 2022-03-22 NOTE — DISCHARGE PLANNING
Anticipated Discharge Disposition: RIRF vs. SNF vs. HH    Action: LSW met with patient and spouse at bedside. Patient unsure about post acute placement. LSW informed her that there is also a rehab consult and that HH can also be reordered. LSW to await rehab consult. LSW left SNF choice at bedside. LSW to f/u with them tomorrow regarding dc plan.     Barriers to Discharge: choice needed, family decision on dc plan    Plan: LSW to f/u with patient about dc plan

## 2022-03-22 NOTE — CARE PLAN
The patient is Stable - Low risk of patient condition declining or worsening    Shift Goals  Clinical Goals: o2 sats  Patient Goals: sleep    Progress made toward(s) clinical / shift goals:   Problem: Knowledge Deficit - Standard  Goal: Patient and family/care givers will demonstrate understanding of plan of care, disease process/condition, diagnostic tests and medications  Outcome: Progressing     Problem: Knowledge Deficit - COPD  Goal: Patient/significant other demonstrates understanding of disease process, utilization of the Action Plan, medications and discharge instruction  Outcome: Progressing     Problem: Self Care  Goal: Patient will have the ability to perform ADLs independently or with assistance (bathe, groom, dress, toilet and feed)  Outcome: Progressing     Problem: Skin Integrity  Goal: Skin integrity is maintained or improved  Outcome: Progressing     Problem: Fall Risk  Goal: Patient will remain free from falls  Outcome: Progressing

## 2022-03-22 NOTE — DISCHARGE PLANNING
[facility]    Referral from:  Dr Burns  Insurance Provider on Facesheet: Medicare  Potential Rehab Diagnosis: Debility    Chart review indicates patient may need on going medical management and may have therapy needs to possibly meet inpatient rehab facility criteria with the goal of returning to community.    D/C support: Spouse     Physiatry consultation forwarded per protocol.     Last Covid test:  3/20 not detected    Debility, COPD - physiatry to consult, TCC will follow.     Thank you for the referral.

## 2022-03-22 NOTE — DOCUMENTATION QUERY
[facility]                                                                       Query Response Note      PATIENT:               KYRIE NELSON  ACCT #:                  [id number]  MRN:                     [id number]  :                      1935  ADMIT DATE:       3/20/2022 6:35 AM  DISCH DATE:          RESPONDING  PROVIDER #:        [id number]           QUERY TEXT:    Cachectic patient with BMI 14.63. Doesn't eat much at home and unable to eat meal tray. Severe malnutrition in context of chronic illness as evidenced by severe muscle loss at temples, clavicle, shoulder, and severe fat loss at orbitals and triceps. is documented in the Registered Dietician evaluation on 3/20.  Please specify if you:    NOTE:  If an appropriate response is not listed below, please respond with a new note.      The patient's Clinical Indicators include:  3/20 Dietary PN - Body mass index is 14.63 kg/m²., BMI classification: Underweight; Doesn't eat much at home. Eats about 2 meals per day, but unable to clarify. States she can't eat meal tray. Does not tolerate Boost or Ensure; Malnutrition Risk: Severe malnutrition in context of chronic illness as evidenced by severe muscle loss at temples, clavicle, shoulder, and severe fat loss at orbitals and triceps.    Treatment - Chobani smoothie w/ breakfast, ready care shake w/ dinner, encourage intake of meals, document intake of meals as % taken, monitor weight; nutrition rep to continue to see patient    Risk factors - Cachectic, failure to thrive, BMI 14.63    Thank you,  Sintia AGUILAR  Clinical   Connect via Kab[employer]  Options provided:   -- Agree with dietician  assessment of  Severe malnutrition in context of chronic illness   -- Disagree with dietician assessment of Severe malnutrition in context of chronic illness   -- Other explanation   -- Unable to  determine      Query created by: Sintia Tipton on 3/21/2022 3:59 PM    RESPONSE TEXT:    Agree with dietician assessment of Severe malnutrition in context of chronic illness          Electronically signed by:  GYPSY GONCALVES MD 3/22/2022 6:28 AM

## 2022-03-22 NOTE — RESPIRATORY CARE
COPD EDUCATION by COPD CLINICAL EDUCATOR  3/22/2022  at  2:12 PM by Annalise Oneill RRT     Patient interviewed by COPD education team.  Patient unable to participate in full program.  A short intervention has been conducted.  A comprehensive packet including information about COPD, types of treatments to manage their disease and safe home Oxygen usage was provided and reviewed with patient at the bedside.    COPD Screen  COPD Risk Screening  Do you have a history of COPD?: Yes  Do you have a Pulmonologist?: No  COPD Population Screener  During the past 4 weeks, how much did you feel short of breath?: Most  or all of the time  Do you ever cough up any mucus or phlegm?: No/only with occasional colds or infections  In the past 12 months, you do less than you used to because of your breathing problems: Disagree/unsure  Have you smoked at least 100 cigarettes in your entire life?: Yes  How old are you?: 60+  COPD Screening Score: 6    COPD Assessment  COPD Clinical Specialists ONLY  COPD Education Initiated: Yes--Short Intervention  DME Company: Preferred  DME Equipment Type: oxygen concentrator  Physician Name: Dr Hardik Merino  Pulmonologist Name: pulmonary clinic list given  Referrals Initiated: Yes  Pulmonary Rehab: Declined (md ordered, pt declined)  Smoking Cessation: Declined (she quit 3weeks go)  Smoking Pack Years: 60+  Palliative Care:  (not ordered, not seen on previous admits)  Home Health Care:  (TBD)  [facility] Outreach: N/A  [facility]: N/A  [facility]: Yes (place on ref list)  Private In-Home Care Agency: N/A  Is this a COPD exacerbation patient?: Yes (per IP Pulmonary)  $ Demo/Eval of SVN's, MDI's and Aerosols: Yes (spacer and flutter given)    PFT Results    No results found for: PFT    Meds to Beds  Would the patient like to opt in for Bedside Medication Delivery at Discharge?: Yes, interested     MY COPD ACTION PLAN     It is recommended that patients and physicians  "/healthcare providers complete this action plan together. This plan should be discussed at each physician visit and updated as needed.    The green, yellow and red zones show groups of symptoms of COPD. This list of symptoms is not comprehensive, and you may experience other symptoms. In the \"Actions\" column, your healthcare provider has recommended actions for you to take based on your symptoms.    Patient Name: Antonia Stover   YOB: 1935   Last Updated on: 3/22/2022  2:12 PM   Green Zone:  I am doing well today Actions   •  Usual activitiy and exercise level •  Take daily medications   •  Usual amounts of cough and phlegm/mucus •  Use oxygen as prescribed   •  Sleep well at night •  Continue regular exercise/diet plan   •  Appetite is good •  At all times avoid cigarette smoke, inhaled irritants     Daily Medications (these medications are taken every day):                Yellow Zone:  I am having a bad day or a COPD flare Actions   •  More breathless than usual •  Continue daily medications   •  I have less energy for my daily activities •  Use quick relief inhaler as ordered   •  Increased or thicker phlegm/mucus •  Use oxygen as prescribed   •  Using quick relief inhaler/nebulizer more often •  Get plenty of rest   •  Swelling of ankles more than usual •  Use pursed lip breathing   •  More coughing than usual •  At all times avoid cigarette smoke, inhaled irritants   •  I feel like I have a \"chest cold\"   •  Poor sleep and my symptoms woke me up   •  My appetite is not good   •  My medicine is not helping    •  Call provider immediately if symptoms don’t improve     Continue daily medications, add rescue medications:               Medications to be used during a flare up, (as Discussed with Provider):              Red Zone:  I need urgent medical care Actions   •  Severe shortness of breath even at rest •  Call 911 or seek medical care immediately   •  Not able to do any activity because " of breathing    •  Fever or shaking chills    •  Feeling confused or very drowsy     •  Chest pains    •  Coughing up blood

## 2022-03-22 NOTE — PROGRESS NOTES
"Hospital Medicine Daily Progress Note    Date of Service  3/22/2022    Chief Complaint  Antonia Stover is a 86 y.o. female admitted 3/20/2022 with SOB    Hospital Course  An 86-year-old woman with h/o HTN, hypothyroidism, COPD presented with shortness of breath and increased cough for couple days.  The patient is very hard of hearing and does not wear hearing aids. They needed to go up on her oxygen at baseline at home.  In the ER the patient was found to have a white blood cell count of 19, a sodium level of 130, creatinine of 0.75.  CXR showed bibasilar opacities likely represent a combination of small pleural effusions with adjacent atelectasis. Pneumonia cannot be excluded.  The patient admitted for COPD exacerbation and started on antibiotics, pending procalcitonin, pending Covid testing.       Interval Problem Update  Patient reports SOB, cough, feeling cold, freezing. Denies fever, chest pain, nausea, diarrhea.   Afebrile, hemodynamically stable. On 4 L NC oxygen (baseline 3 L NC at home).  WBC 15.2  Na 127. fluid restriction 1.2L.  Blood cultures from 3/20 negative.\"    3/22. Sodium levels improving.  /family at bedside.  She has had [facility] before.     I have personally seen and examined the patient at bedside. I discussed the plan of care with patient, bedside RN, charge RN,  and pharmacy.    Consultants/Specialty  None    Code Status  DNAR/DNI    Disposition  Patient is not medically cleared for discharge.   Anticipate discharge to TBD. SNF/rehab; if not accepted, HHC with O2 eval  I have placed the appropriate orders for post-discharge needs.    Review of Systems  Review of Systems   Constitutional: Positive for chills and malaise/fatigue. Negative for fever and weight loss.   HENT: Positive for hearing loss.    Eyes: Negative.    Respiratory: Positive for cough and shortness of breath. Negative for sputum production.    Cardiovascular: Negative for chest pain and leg swelling. "   Gastrointestinal: Negative for abdominal pain, nausea and vomiting.   Genitourinary: Negative for dysuria.   Musculoskeletal: Negative for myalgias.   Skin: Negative for rash.   Neurological: Positive for weakness.        Physical Exam  Temp:  [36.2 °C (97.2 °F)-36.3 °C (97.4 °F)] 36.2 °C (97.2 °F)  Pulse:  [68-82] 82  Resp:  [16-18] 18  BP: (118-134)/(73-86) 134/73  SpO2:  [91 %-98 %] 98 %    Physical Exam  Vitals and nursing note reviewed.   Constitutional:       Appearance: She is cachectic. She is ill-appearing.   HENT:      Head: Normocephalic and atraumatic.      Ears:      Comments: Hard of hearing     Nose: Nose normal.      Mouth/Throat:      Mouth: Mucous membranes are moist.      Pharynx: Oropharynx is clear.   Eyes:      Conjunctiva/sclera: Conjunctivae normal.      Pupils: Pupils are equal, round, and reactive to light.   Cardiovascular:      Rate and Rhythm: Normal rate and regular rhythm.   Pulmonary:      Effort: Pulmonary effort is normal. No respiratory distress.      Breath sounds: No wheezing or rales.   Abdominal:      General: Abdomen is flat. Bowel sounds are normal. There is no distension.      Tenderness: There is no abdominal tenderness. There is no guarding.   Musculoskeletal:         General: Normal range of motion.      Cervical back: Normal range of motion.   Skin:     General: Skin is warm.   Neurological:      General: No focal deficit present.      Mental Status: She is alert and oriented to person, place, and time.      Motor: Weakness present.         Fluids    Intake/Output Summary (Last 24 hours) at 3/22/2022 0812  Last data filed at 3/22/2022 0603  Gross per 24 hour   Intake 125 ml   Output --   Net 125 ml       Laboratory  Recent Labs     03/20/22  0640 03/21/22  0240 03/22/22  0030   WBC 19.0* 15.2* 19.8*   RBC 4.33 4.04* 4.11*   HEMOGLOBIN 13.3 12.5 12.6   HEMATOCRIT 40.2 37.6 37.8   MCV 92.8 93.1 92.0   MCH 30.7 30.9 30.7   MCHC 33.1* 33.2* 33.3*   RDW 48.7 48.0 47.9  "  PLATELETCT 352 325 344   MPV 11.2 11.0 11.4     Recent Labs     03/20/22  0640 03/21/22  0240 03/22/22  0030   SODIUM 130* 127* 129*   POTASSIUM 3.8 3.6 3.6   CHLORIDE 88* 87* 89*   CO2 28 28 28   GLUCOSE 105* 104* 107*   BUN 12 11 16   CREATININE 0.75 0.69 0.74   CALCIUM 8.8 8.8 8.8             Recent Labs     03/21/22  0240   TRIGLYCERIDE 60   HDL 81   LDL 66       Imaging  DX-CHEST-PORTABLE (1 VIEW)   Final Result      Bibasilar opacities likely represent a combination of small pleural effusions with adjacent atelectasis. Pneumonia cannot be excluded.           Assessment/Plan  * COPD exacerbation, FTT/severe malnutrition (HCC)- (present on admission)  Assessment & Plan  We will start the patient on COPD exacerbation order set including duo nebs and p.o. steroids.  We have also started patient on azithromycin and ceftriaxone.  Monitor, make changes accordingly.\"    Stable.  She passed her exercise oximetry study on 3/21 and does NOT need O2.  On a 5d course of prednisone.  Follow up with Pulmonology    Hyponatremia  Assessment & Plan  Mild  Monitor, repeat BMP\"    Order TSH    Hard of hearing  Assessment & Plan  The patient is hard of hearing, as per the patient's  she does not wear hearing aids and she has not seen a doctor as an outpatient.  The patient will require further outpatient follow-up\"    ENT with Audiology outpatient follow up    Acute hypoxemic respiratory failure (HCC)  Assessment & Plan  Most likely due to COPD exacerbation, however there is also concern for pneumonia.  We have started the patient on ceftriaxone and azithromycin, as well as duo nebs and steroids.  Monitor, supplemental oxygen as needed.    We are also pending Covid, influenza testing.\"    CoVID NEGATIVE x 1  Flu negative  RSV negative  Try to wean her off O2.       Failure to thrive in adult  Assessment & Plan  The patient's  mentions that the patient has been losing weight for the past year, she is very " "cachectic.  We have ordered nutrition consult, we appreciate further conditions.  PT/OT.    Advanced care planning/counseling discussion- (present on admission)  Assessment & Plan  I had a conversation with both the patient and the patient's  for at least 20 minutes regarding CODE STATUS.  They are adamant that the patient would like to be DNR/DNI.  The patient and the patient's  understand, and they mentioned that they had signed a form in the past and it is at their house.    Essential hypertension- (present on admission)  Assessment & Plan  Patient's blood pressure on the lower side, will hold home medications for now.  Monitor, make changes accordingly.\"    Vitals:    03/22/22 0739   BP: 134/73   Pulse: 82   Resp: 18   Temp: 36.2 °C (97.2 °F)   SpO2: 98%     Stable.    Hypothyroidism- (present on admission)  Assessment & Plan  Resume home medication.    Dyslipidemia- (present on admission)  Assessment & Plan  We will resume home medication, however we will order lipid panel to see the patient will actually warrant continued statin therapy.       VTE prophylaxis: heparin ppx    I have performed a physical exam and reviewed and updated ROS and Plan today (3/22/2022). In review of yesterday's note (3/21/2022), there are no changes except as documented above.      "

## 2022-03-23 ENCOUNTER — HOSPITAL ENCOUNTER (INPATIENT)
Facility: REHABILITATION | Age: 87
LOS: 5 days | DRG: 947 | End: 2022-03-28
Attending: PHYSICAL MEDICINE & REHABILITATION | Admitting: PHYSICAL MEDICINE & REHABILITATION
Payer: MEDICARE

## 2022-03-23 VITALS
OXYGEN SATURATION: 90 % | WEIGHT: 80 LBS | HEART RATE: 81 BPM | TEMPERATURE: 97.7 F | DIASTOLIC BLOOD PRESSURE: 74 MMHG | BODY MASS INDEX: 14.72 KG/M2 | HEIGHT: 62 IN | SYSTOLIC BLOOD PRESSURE: 135 MMHG | RESPIRATION RATE: 18 BRPM

## 2022-03-23 DIAGNOSIS — R64 CACHEXIA (HCC): ICD-10-CM

## 2022-03-23 DIAGNOSIS — J90 PLEURAL EFFUSION ON LEFT: ICD-10-CM

## 2022-03-23 DIAGNOSIS — R54 FRAIL ELDERLY: ICD-10-CM

## 2022-03-23 DIAGNOSIS — I27.20 PULMONARY HYPERTENSION (HCC): ICD-10-CM

## 2022-03-23 DIAGNOSIS — J44.1 COPD EXACERBATION (HCC): ICD-10-CM

## 2022-03-23 DIAGNOSIS — J44.9 CHRONIC OBSTRUCTIVE PULMONARY DISEASE, UNSPECIFIED COPD TYPE (HCC): ICD-10-CM

## 2022-03-23 PROBLEM — E43 SEVERE PROTEIN-CALORIE MALNUTRITION (HCC): Status: ACTIVE | Noted: 2022-03-23

## 2022-03-23 PROBLEM — R53.81 DEBILITY: Status: ACTIVE | Noted: 2022-03-23

## 2022-03-23 PROCEDURE — 700101 HCHG RX REV CODE 250: Performed by: INTERNAL MEDICINE

## 2022-03-23 PROCEDURE — 700102 HCHG RX REV CODE 250 W/ 637 OVERRIDE(OP): Performed by: STUDENT IN AN ORGANIZED HEALTH CARE EDUCATION/TRAINING PROGRAM

## 2022-03-23 PROCEDURE — 770010 HCHG ROOM/CARE - REHAB SEMI PRIVAT*

## 2022-03-23 PROCEDURE — 700111 HCHG RX REV CODE 636 W/ 250 OVERRIDE (IP): Performed by: INTERNAL MEDICINE

## 2022-03-23 PROCEDURE — 99239 HOSP IP/OBS DSCHRG MGMT >30: CPT | Performed by: INTERNAL MEDICINE

## 2022-03-23 PROCEDURE — 99223 1ST HOSP IP/OBS HIGH 75: CPT | Performed by: PHYSICAL MEDICINE & REHABILITATION

## 2022-03-23 PROCEDURE — 700102 HCHG RX REV CODE 250 W/ 637 OVERRIDE(OP): Performed by: PHYSICAL MEDICINE & REHABILITATION

## 2022-03-23 PROCEDURE — 0240U HCHG SARS-COV-2 COVID-19 NFCT DS RESP RNA 3 TRGT MIC: CPT

## 2022-03-23 PROCEDURE — A9270 NON-COVERED ITEM OR SERVICE: HCPCS | Performed by: PHYSICAL MEDICINE & REHABILITATION

## 2022-03-23 PROCEDURE — A9270 NON-COVERED ITEM OR SERVICE: HCPCS | Performed by: STUDENT IN AN ORGANIZED HEALTH CARE EDUCATION/TRAINING PROGRAM

## 2022-03-23 PROCEDURE — 700102 HCHG RX REV CODE 250 W/ 637 OVERRIDE(OP): Performed by: INTERNAL MEDICINE

## 2022-03-23 PROCEDURE — 94760 N-INVAS EAR/PLS OXIMETRY 1: CPT

## 2022-03-23 PROCEDURE — A9270 NON-COVERED ITEM OR SERVICE: HCPCS | Performed by: INTERNAL MEDICINE

## 2022-03-23 PROCEDURE — 700111 HCHG RX REV CODE 636 W/ 250 OVERRIDE (IP): Performed by: PHYSICAL MEDICINE & REHABILITATION

## 2022-03-23 RX ORDER — ACETAMINOPHEN 325 MG/1
650 TABLET ORAL EVERY 6 HOURS PRN
Qty: 30 TABLET | Refills: 0 | Status: ON HOLD
Start: 2022-03-23 | End: 2022-04-07

## 2022-03-23 RX ORDER — IPRATROPIUM BROMIDE AND ALBUTEROL SULFATE 2.5; .5 MG/3ML; MG/3ML
3 SOLUTION RESPIRATORY (INHALATION)
Status: CANCELLED | OUTPATIENT
Start: 2022-03-23

## 2022-03-23 RX ORDER — PRAVASTATIN SODIUM 20 MG
40 TABLET ORAL EVERY EVENING
Status: CANCELLED | OUTPATIENT
Start: 2022-03-23

## 2022-03-23 RX ORDER — POLYVINYL ALCOHOL 14 MG/ML
1 SOLUTION/ DROPS OPHTHALMIC PRN
Status: DISCONTINUED | OUTPATIENT
Start: 2022-03-23 | End: 2022-03-29 | Stop reason: HOSPADM

## 2022-03-23 RX ORDER — ACETAMINOPHEN 325 MG/1
650 TABLET ORAL EVERY 4 HOURS PRN
Status: DISCONTINUED | OUTPATIENT
Start: 2022-03-23 | End: 2022-03-29 | Stop reason: HOSPADM

## 2022-03-23 RX ORDER — AMOXICILLIN 250 MG
2 CAPSULE ORAL 2 TIMES DAILY
Status: CANCELLED | OUTPATIENT
Start: 2022-03-23

## 2022-03-23 RX ORDER — AMOXICILLIN 250 MG
2 CAPSULE ORAL 2 TIMES DAILY
Qty: 30 TABLET | Refills: 0 | Status: ON HOLD
Start: 2022-03-23 | End: 2022-04-07

## 2022-03-23 RX ORDER — LEVOTHYROXINE SODIUM 0.07 MG/1
75 TABLET ORAL
Status: DISCONTINUED | OUTPATIENT
Start: 2022-03-24 | End: 2022-03-29 | Stop reason: HOSPADM

## 2022-03-23 RX ORDER — CLOPIDOGREL BISULFATE 75 MG/1
75 TABLET ORAL DAILY
Status: DISCONTINUED | OUTPATIENT
Start: 2022-03-24 | End: 2022-03-29 | Stop reason: HOSPADM

## 2022-03-23 RX ORDER — IPRATROPIUM BROMIDE AND ALBUTEROL SULFATE 2.5; .5 MG/3ML; MG/3ML
3 SOLUTION RESPIRATORY (INHALATION)
Status: DISCONTINUED | OUTPATIENT
Start: 2022-03-23 | End: 2022-03-29 | Stop reason: HOSPADM

## 2022-03-23 RX ORDER — IPRATROPIUM BROMIDE AND ALBUTEROL SULFATE 2.5; .5 MG/3ML; MG/3ML
3 SOLUTION RESPIRATORY (INHALATION) EVERY 4 HOURS PRN
Status: ON HOLD
Start: 2022-03-23 | End: 2022-04-07

## 2022-03-23 RX ORDER — POLYETHYLENE GLYCOL 3350 17 G/17G
1 POWDER, FOR SOLUTION ORAL
Status: DISCONTINUED | OUTPATIENT
Start: 2022-03-23 | End: 2022-03-29 | Stop reason: HOSPADM

## 2022-03-23 RX ORDER — BISACODYL 10 MG
10 SUPPOSITORY, RECTAL RECTAL
Status: DISCONTINUED | OUTPATIENT
Start: 2022-03-23 | End: 2022-03-29 | Stop reason: HOSPADM

## 2022-03-23 RX ORDER — PREDNISONE 20 MG/1
40 TABLET ORAL DAILY
Qty: 2 TABLET | Refills: 0 | Status: SHIPPED | OUTPATIENT
Start: 2022-03-23 | End: 2022-03-24

## 2022-03-23 RX ORDER — PRAVASTATIN SODIUM 20 MG
40 TABLET ORAL EVERY EVENING
Status: DISCONTINUED | OUTPATIENT
Start: 2022-03-23 | End: 2022-03-29 | Stop reason: HOSPADM

## 2022-03-23 RX ORDER — HYDROXYZINE HYDROCHLORIDE 25 MG/1
50 TABLET, FILM COATED ORAL EVERY 6 HOURS PRN
Status: DISCONTINUED | OUTPATIENT
Start: 2022-03-23 | End: 2022-03-29 | Stop reason: HOSPADM

## 2022-03-23 RX ORDER — POLYETHYLENE GLYCOL 3350 17 G/17G
17 POWDER, FOR SOLUTION ORAL
Refills: 3 | Status: ON HOLD
Start: 2022-03-23 | End: 2022-04-07

## 2022-03-23 RX ORDER — HEPARIN SODIUM 5000 [USP'U]/ML
5000 INJECTION, SOLUTION INTRAVENOUS; SUBCUTANEOUS EVERY 8 HOURS
Status: CANCELLED | OUTPATIENT
Start: 2022-03-23

## 2022-03-23 RX ORDER — PREDNISONE 20 MG/1
40 TABLET ORAL DAILY
Status: COMPLETED | OUTPATIENT
Start: 2022-03-24 | End: 2022-03-24

## 2022-03-23 RX ORDER — ONDANSETRON 2 MG/ML
4 INJECTION INTRAMUSCULAR; INTRAVENOUS 4 TIMES DAILY PRN
Status: DISCONTINUED | OUTPATIENT
Start: 2022-03-23 | End: 2022-03-29 | Stop reason: HOSPADM

## 2022-03-23 RX ORDER — POLYETHYLENE GLYCOL 3350 17 G/17G
1 POWDER, FOR SOLUTION ORAL
Status: CANCELLED | OUTPATIENT
Start: 2022-03-23

## 2022-03-23 RX ORDER — AMOXICILLIN 250 MG
2 CAPSULE ORAL 2 TIMES DAILY
Status: DISCONTINUED | OUTPATIENT
Start: 2022-03-23 | End: 2022-03-29 | Stop reason: HOSPADM

## 2022-03-23 RX ORDER — BUDESONIDE AND FORMOTEROL FUMARATE DIHYDRATE 160; 4.5 UG/1; UG/1
2 AEROSOL RESPIRATORY (INHALATION)
Status: DISCONTINUED | OUTPATIENT
Start: 2022-03-23 | End: 2022-03-29 | Stop reason: HOSPADM

## 2022-03-23 RX ORDER — AZITHROMYCIN 250 MG/1
250 TABLET, FILM COATED ORAL DAILY
Status: COMPLETED | OUTPATIENT
Start: 2022-03-24 | End: 2022-03-25

## 2022-03-23 RX ORDER — HEPARIN SODIUM 5000 [USP'U]/ML
5000 INJECTION, SOLUTION INTRAVENOUS; SUBCUTANEOUS EVERY 8 HOURS
Status: DISCONTINUED | OUTPATIENT
Start: 2022-03-23 | End: 2022-03-23

## 2022-03-23 RX ORDER — HYDRALAZINE HYDROCHLORIDE 10 MG/1
10 TABLET, FILM COATED ORAL EVERY 8 HOURS PRN
Status: DISCONTINUED | OUTPATIENT
Start: 2022-03-23 | End: 2022-03-29 | Stop reason: HOSPADM

## 2022-03-23 RX ORDER — LACTULOSE 20 G/30ML
30 SOLUTION ORAL
Status: DISCONTINUED | OUTPATIENT
Start: 2022-03-23 | End: 2022-03-29 | Stop reason: HOSPADM

## 2022-03-23 RX ORDER — AZITHROMYCIN 250 MG/1
250 TABLET, FILM COATED ORAL DAILY
Status: CANCELLED | OUTPATIENT
Start: 2022-03-24 | End: 2022-03-26

## 2022-03-23 RX ORDER — CLOPIDOGREL BISULFATE 75 MG/1
75 TABLET ORAL DAILY
Status: CANCELLED | OUTPATIENT
Start: 2022-03-24

## 2022-03-23 RX ORDER — PREDNISONE 20 MG/1
40 TABLET ORAL DAILY
Status: CANCELLED | OUTPATIENT
Start: 2022-03-24 | End: 2022-03-25

## 2022-03-23 RX ORDER — LEVOTHYROXINE SODIUM 0.07 MG/1
75 TABLET ORAL
Status: CANCELLED | OUTPATIENT
Start: 2022-03-24

## 2022-03-23 RX ORDER — HEPARIN SODIUM 5000 [USP'U]/ML
5000 INJECTION, SOLUTION INTRAVENOUS; SUBCUTANEOUS EVERY 8 HOURS
Refills: 0 | Status: ON HOLD
Start: 2022-03-23 | End: 2022-04-07

## 2022-03-23 RX ORDER — ALUMINA, MAGNESIA, AND SIMETHICONE 2400; 2400; 240 MG/30ML; MG/30ML; MG/30ML
20 SUSPENSION ORAL
Status: DISCONTINUED | OUTPATIENT
Start: 2022-03-23 | End: 2022-03-29 | Stop reason: HOSPADM

## 2022-03-23 RX ORDER — CEFUROXIME AXETIL 500 MG/1
500 TABLET ORAL 2 TIMES DAILY
Qty: 4 TABLET | Refills: 0 | Status: SHIPPED
Start: 2022-03-23 | End: 2022-03-25

## 2022-03-23 RX ORDER — NICOTINE 14MG/24HR
1 PATCH, TRANSDERMAL 24 HOURS TRANSDERMAL 2 TIMES DAILY WITH MEALS
Qty: 14 CAPSULE | Refills: 0 | Status: ON HOLD
Start: 2022-03-23 | End: 2022-04-07

## 2022-03-23 RX ORDER — BISACODYL 10 MG
10 SUPPOSITORY, RECTAL RECTAL
Status: CANCELLED | OUTPATIENT
Start: 2022-03-23

## 2022-03-23 RX ORDER — ECHINACEA PURPUREA EXTRACT 125 MG
2 TABLET ORAL PRN
Status: DISCONTINUED | OUTPATIENT
Start: 2022-03-23 | End: 2022-03-29 | Stop reason: HOSPADM

## 2022-03-23 RX ORDER — ONDANSETRON 4 MG/1
4 TABLET, ORALLY DISINTEGRATING ORAL 4 TIMES DAILY PRN
Status: DISCONTINUED | OUTPATIENT
Start: 2022-03-23 | End: 2022-03-29 | Stop reason: HOSPADM

## 2022-03-23 RX ORDER — TRAZODONE HYDROCHLORIDE 50 MG/1
50 TABLET ORAL
Status: DISCONTINUED | OUTPATIENT
Start: 2022-03-23 | End: 2022-03-25

## 2022-03-23 RX ORDER — AZITHROMYCIN 250 MG/1
250 TABLET, FILM COATED ORAL DAILY
Qty: 2 TABLET | Refills: 0 | Status: SHIPPED
Start: 2022-03-23 | End: 2022-03-25

## 2022-03-23 RX ORDER — LANOLIN ALCOHOL/MO/W.PET/CERES
3 CREAM (GRAM) TOPICAL NIGHTLY PRN
Status: DISCONTINUED | OUTPATIENT
Start: 2022-03-23 | End: 2022-03-29 | Stop reason: HOSPADM

## 2022-03-23 RX ORDER — OMEPRAZOLE 20 MG/1
20 CAPSULE, DELAYED RELEASE ORAL
Status: DISCONTINUED | OUTPATIENT
Start: 2022-03-24 | End: 2022-03-29 | Stop reason: HOSPADM

## 2022-03-23 RX ORDER — CEFUROXIME AXETIL 500 MG/1
500 TABLET ORAL EVERY 12 HOURS
Status: COMPLETED | OUTPATIENT
Start: 2022-03-23 | End: 2022-03-25

## 2022-03-23 RX ORDER — DILTIAZEM HYDROCHLORIDE 120 MG/1
120 CAPSULE, COATED, EXTENDED RELEASE ORAL
Status: DISCONTINUED | OUTPATIENT
Start: 2022-03-24 | End: 2022-03-24

## 2022-03-23 RX ADMIN — PRAVASTATIN SODIUM 40 MG: 20 TABLET ORAL at 21:13

## 2022-03-23 RX ADMIN — SENNOSIDES AND DOCUSATE SODIUM 2 TABLET: 50; 8.6 TABLET ORAL at 21:13

## 2022-03-23 RX ADMIN — HEPARIN SODIUM 5000 UNITS: 5000 INJECTION, SOLUTION INTRAVENOUS; SUBCUTANEOUS at 06:32

## 2022-03-23 RX ADMIN — HEPARIN SODIUM 5000 UNITS: 5000 INJECTION, SOLUTION INTRAVENOUS; SUBCUTANEOUS at 15:27

## 2022-03-23 RX ADMIN — ENOXAPARIN SODIUM 40 MG: 40 INJECTION SUBCUTANEOUS at 21:13

## 2022-03-23 RX ADMIN — CLOPIDOGREL BISULFATE 75 MG: 75 TABLET ORAL at 06:31

## 2022-03-23 RX ADMIN — PREDNISONE 40 MG: 20 TABLET ORAL at 06:31

## 2022-03-23 RX ADMIN — AZITHROMYCIN 250 MG: 250 TABLET, FILM COATED ORAL at 06:32

## 2022-03-23 RX ADMIN — CEFUROXIME AXETIL 500 MG: 500 TABLET ORAL at 21:13

## 2022-03-23 RX ADMIN — LEVOTHYROXINE SODIUM 75 MCG: 0.07 TABLET ORAL at 06:31

## 2022-03-23 RX ADMIN — SENNOSIDES AND DOCUSATE SODIUM 2 TABLET: 50; 8.6 TABLET ORAL at 06:33

## 2022-03-23 RX ADMIN — CEFTRIAXONE SODIUM 1 G: 10 INJECTION, POWDER, FOR SOLUTION INTRAVENOUS at 06:32

## 2022-03-23 RX ADMIN — BUDESONIDE AND FORMOTEROL FUMARATE DIHYDRATE 2 PUFF: 160; 4.5 AEROSOL RESPIRATORY (INHALATION) at 21:24

## 2022-03-23 ASSESSMENT — PATIENT HEALTH QUESTIONNAIRE - PHQ9
3. TROUBLE FALLING OR STAYING ASLEEP OR SLEEPING TOO MUCH: SEVERAL DAYS
8. MOVING OR SPEAKING SO SLOWLY THAT OTHER PEOPLE COULD HAVE NOTICED. OR THE OPPOSITE, BEING SO FIGETY OR RESTLESS THAT YOU HAVE BEEN MOVING AROUND A LOT MORE THAN USUAL: SEVERAL DAYS
7. TROUBLE CONCENTRATING ON THINGS, SUCH AS READING THE NEWSPAPER OR WATCHING TELEVISION: SEVERAL DAYS
4. FEELING TIRED OR HAVING LITTLE ENERGY: SEVERAL DAYS
1. LITTLE INTEREST OR PLEASURE IN DOING THINGS: SEVERAL DAYS
5. POOR APPETITE OR OVEREATING: SEVERAL DAYS
9. THOUGHTS THAT YOU WOULD BE BETTER OFF DEAD, OR OF HURTING YOURSELF: SEVERAL DAYS
2. FEELING DOWN, DEPRESSED, IRRITABLE, OR HOPELESS: SEVERAL DAYS
SUM OF ALL RESPONSES TO PHQ QUESTIONS 1-9: 9
6. FEELING BAD ABOUT YOURSELF - OR THAT YOU ARE A FAILURE OR HAVE LET YOURSELF OR YOUR FAMILY DOWN: SEVERAL DAYS
SUM OF ALL RESPONSES TO PHQ9 QUESTIONS 1 AND 2: 2

## 2022-03-23 ASSESSMENT — LIFESTYLE VARIABLES
CONSUMPTION TOTAL: NEGATIVE
TOTAL SCORE: 0
TOTAL SCORE: 0
HAVE YOU EVER FELT YOU SHOULD CUT DOWN ON YOUR DRINKING: NO
HAVE PEOPLE ANNOYED YOU BY CRITICIZING YOUR DRINKING: NO
TOTAL SCORE: 0
AVERAGE NUMBER OF DAYS PER WEEK YOU HAVE A DRINK CONTAINING ALCOHOL: 0
HOW MANY TIMES IN THE PAST YEAR HAVE YOU HAD 5 OR MORE DRINKS IN A DAY: 0
EVER_SMOKED: YES
EVER FELT BAD OR GUILTY ABOUT YOUR DRINKING: NO
ON A TYPICAL DAY WHEN YOU DRINK ALCOHOL HOW MANY DRINKS DO YOU HAVE: 0
EVER HAD A DRINK FIRST THING IN THE MORNING TO STEADY YOUR NERVES TO GET RID OF A HANGOVER: NO
ALCOHOL_USE: NO

## 2022-03-23 ASSESSMENT — FIBROSIS 4 INDEX: FIB4 SCORE: 2.31

## 2022-03-23 ASSESSMENT — PAIN DESCRIPTION - PAIN TYPE: TYPE: ACUTE PAIN

## 2022-03-23 NOTE — DISCHARGE INSTRUCTIONS
Discharge Instructions    Discharged to [facility] by medical transportation with escort. Discharged via wheelchair, hospital escort: Yes.  Special equipment needed: Not Applicable    Be sure to schedule a follow-up appointment with your primary care doctor or any specialists as instructed.     Discharge Plan:   Diet Plan: Discussed  Activity Level: Discussed  Smoking Cessation Offered: Patient Counseled  Confirmed Follow up Appointment: Patient to Call and Schedule Appointment  Confirmed Symptoms Management: Discussed  Medication Reconciliation Updated: Yes  Influenza Vaccine Indication: Patient Refuses    I understand that a diet low in cholesterol, fat, and sodium is recommended for good health. Unless I have been given specific instructions below for another diet, I accept this instruction as my diet prescription.   Other diet: Regular diet as tolerated    Special Instructions: Chronic Obstructive Pulmonary Disease (COPD) is a long-term, progressive lung disease that makes it harder to breathe. It includes chronic bronchitis, emphysema, and refractory (non-reversible) asthma. With COPD, the airways in your lungs become inflamed and thicken, and the tissue where oxygen is exchanged is destroyed. The flow of air in and out of your lungs decreases. When that happens, less oxygen gets into your body tissues, and it becomes harder to get rid of the waste gas carbon dioxide. As the disease gets worse, shortness of breath makes it harder to remain active. There is no cure for COPD, but it is preventable and treatable.    COPD Patient Discharge Instructions    • Diet  o Follow a low fat, low cholesterol, low salt diet unless instructed otherwise by your Doctor. Read the labels on the back of food products and track your intake of fat, cholesterol and salt.  • No smoking  o Discontinuing smoking will have the biggest impact on preventing progression of disease.  o To participate in [facility]’s Quit Tobacco Program,  call [phone] or visit [email or web address]  • Oxygen  o If your doctor has order that you wear oxygen at home, it is important to wear it as ordered.  o Do not smoke, vape, or use e-cigarettes with oxygen on.  o Store in an appropriate location: upright in its casas or laid flat down, away from open flames and stoves.   o Do not use oil-based creams and moisturizers (ie: petroleum products, oil-based lip moisturizers) or aerosol sprays (ie: hair sprays or deodorants) when using your oxygen equipment.  o Be careful with tubing placement to prevent yourself and others from tripping.  • Medications  o Refer to your personalized Action Plan to manage your symptoms.  • Warning signs of an exacerbation  o Breathing fast and shallow, worsening shortness of breath (like you just finished exercising)  o Chest tightness  o Increases in sputum production  o Changes in sputum color  o Lower oxygen levels than baseline  • When to call your doctor  o If the warning signs of an exacerbation do not improve  o Refer to your personalized Action Plan   • Pulmonary Rehab  o Your doctor has ordered you a referral to Pulmonary Rehab. Call [phone] to schedule an appointment  • Attend your follow-up appointment with your PCP and/or Pulmonologist  • Remote Monitoring: At the direction of the remote monitoring on-call provider, you may increase your oxygen by 2 liters above your baseline.     See the educational handout provided by your COPD Navigator for more information. This also explains more about COPD, symptoms of an exacerbation, and some of the tests that you have undergone.    · Is patient discharged on Warfarin / Coumadin?   No     MY COPD ACTION PLAN     It is recommended that patients and physicians /healthcare providers complete this action plan together. This plan should be discussed at each physician visit and updated as needed.    The green, yellow and red zones show groups of symptoms of COPD. This list of symptoms is  "not comprehensive, and you may experience other symptoms. In the \"Actions\" column, your healthcare provider has recommended actions for you to take based on your symptoms.    Patient Name: Antonia Stover   YOB: 1935   Last Updated on: 3/22/2022  2:12 PM   Green Zone:  I am doing well today Actions   •  Usual activitiy and exercise level •  Take daily medications   •  Usual amounts of cough and phlegm/mucus •  Use oxygen as prescribed   •  Sleep well at night •  Continue regular exercise/diet plan   •  Appetite is good •  At all times avoid cigarette smoke, inhaled irritants     Daily Medications (these medications are taken every day):                Yellow Zone:  I am having a bad day or a COPD flare Actions   •  More breathless than usual •  Continue daily medications   •  I have less energy for my daily activities •  Use quick relief inhaler as ordered   •  Increased or thicker phlegm/mucus •  Use oxygen as prescribed   •  Using quick relief inhaler/nebulizer more often •  Get plenty of rest   •  Swelling of ankles more than usual •  Use pursed lip breathing   •  More coughing than usual •  At all times avoid cigarette smoke, inhaled irritants   •  I feel like I have a \"chest cold\"   •  Poor sleep and my symptoms woke me up   •  My appetite is not good   •  My medicine is not helping    •  Call provider immediately if symptoms don’t improve     Continue daily medications, add rescue medications:               Medications to be used during a flare up, (as Discussed with Provider):              Red Zone:  I need urgent medical care Actions   •  Severe shortness of breath even at rest •  Call 911 or seek medical care immediately   •  Not able to do any activity because of breathing    •  Fever or shaking chills    •  Feeling confused or very drowsy     •  Chest pains    •  Coughing up blood        Depression / Suicide Risk    As you are discharged from this [facility] facility, it is " important to learn how to keep safe from harming yourself.    Recognize the warning signs:  · Abrupt changes in personality, positive or negative- including increase in energy   · Giving away possessions  · Change in eating patterns- significant weight changes-  positive or negative  · Change in sleeping patterns- unable to sleep or sleeping all the time   · Unwillingness or inability to communicate  · Depression  · Unusual sadness, discouragement and loneliness  · Talk of wanting to die  · Neglect of personal appearance   · Rebelliousness- reckless behavior  · Withdrawal from people/activities they love  · Confusion- inability to concentrate     If you or a loved one observes any of these behaviors or has concerns about self-harm, here's what you can do:  · Talk about it- your feelings and reasons for harming yourself  · Remove any means that you might use to hurt yourself (examples: pills, rope, extension cords, firearm)  · Get professional help from the community (Mental Health, Substance Abuse, psychological counseling)  · Do not be alone:Call your Safe Contact- someone whom you trust who will be there for you.  · Call your local CRISIS HOTLINE 646-1334 or 242-541-8619  · Call your local Children's Mobile Crisis Response Team [place] (411) 315-8524 or www.goTenna  · Call the toll free National Suicide Prevention Hotlines   · National Suicide Prevention Lifeline 060-211-WJRD (0917)  · National Hope Line Network 800-SUICIDE (166-9454)

## 2022-03-23 NOTE — H&P
"REHABILITATION HISTORY AND PHYSICAL/POST ADMISSION EVALUATION    3/23/2022  12:44 PM  Antonia Stover  RH13/02  Admission  3/23/2022 11:50 AM  [facility] Code/Reason for admission: 0016 - Debility (Non-Cardiac, Non-Pulmonary)   Etiologic diagnosis/problem: Acute hypoxemic respiratory failure (HCC)   Chief Complaint: Cough, shortness of breath    HPI:  The patient is a 86 y.o. female with a past medical history of COPD on home oxygen 3 L, hypothyroidism, hypertension, chronic kidney disease, current tobacco use; now admitted for acute inpatient rehabilitation with severe functional debility after an acute hospitalization for COPD exacerbation as well as pneumonia.      On admission the patient and medical record report she presented to the hospital on 3/20 with complaints of shortness of breath for the previous 2 days.  Chest x-ray with bibasilar opacities concerning for small pleural effusions, atelectasis, and/or pneumonia.  She had elevated white blood cell count of 19, hyponatremia with a sodium of 130, low chloride 88, elevated alk phos at 144, BNP of 679.  Patient was admitted for pneumonia and a COPD with acute exacerbation.  She was treated with steroids, oxygen, and inhalers.  She was seen and evaluated by pulmonology.    In addition patient noted to have severe protein calorie malnutrition, ferritin 5, low BMI of 15, for which she was started on supplements.    Patient currently complains of cough and shortness of breath.  She reports she is on oxygen at home but only uses it intermittently initially noticed likely is not a good idea.  Patient current reports she usually does not like to use inhalers as they are \"chemicals\".  She confirms she quit smoking about 5 weeks ago.  She is willing to try some Symbicort to see if it helps her breathe better.  She reports about a 20 pound weight loss recently.  She currently denies any pain though reports she is very uncomfortable in this bed.  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    Patient was evaluated by Rehab Medicine physician and Physical Therapy and Occupational Therapy and determined to be appropriate for acute inpatient rehab and was transferred to [facility] on 3/23/2022 11:50 AM.    With this acute therapeutic intervention, this patient hopes to improve her functional status, and return to independent living with the supportive care of spouse.    REVIEW OF SYSTEMS:     A complete review of systems was performed and was negative in detail with the exception of items mentioned elsewhere in this document.    PMH:  Past Medical History:   Diagnosis Date   • Anemia    • Arthritis    • Carotid artery plaque 08/2018    Carotid US with <50% stenosis bilaterally.   • COPD    • Dizziness     • Dyslipidemia     • Fatigue 7/3/2014   • Hypothyroidism    • Insomnia     • Nocturnal hypoxia      Uses oxygen QHS.   • PSVT (paroxysmal supraventricular tachycardia) ([facility]) 02/2013    Echocardiogram with normal LV size, LVEF 65-70%.   • Sickle cell disease ([facility])    • Tobacco use        PSH:  Past Surgical History:   Procedure Laterality Date   • KNEE REPLACEMENT, TOTAL  2005    Right   • ABDOMINAL HYSTERECTOMY TOTAL     • APPENDECTOMY     • CATARACT EXTRACTION WITH IOL      Bilateral   • CATARACT EXTRACTION WITH IOL      Bilateral   • HYSTERECTOMY, TOTAL ABDOMINAL         Family History   Problem Relation Age of Onset   • Heart Attack Brother 70        MEDICATIONS:  Current Facility-Administered Medications   Medication Dose   • hydrOXYzine HCl (ATARAX) tablet 50 mg  50 mg   • melatonin tablet 3 mg  3 mg   • Respiratory Therapy Consult     • Pharmacy Consult Request ...Pain Management Review 1 Each  1 Each   • hydrALAZINE (APRESOLINE) tablet 10 mg  10 mg   • acetaminophen (Tylenol) tablet 650 mg  650 mg   • lactulose 20 GM/30ML solution 30 mL  30 mL   • [START ON 3/24/2022] omeprazole (PRILOSEC) capsule 20 mg  20 mg   • artificial tears  ophthalmic solution 1 Drop  1 Drop   • benzocaine-menthol (Cepacol) lozenge 1 Lozenge  1 Lozenge   • mag hydrox-al hydrox-simeth (MAALOX PLUS ES or MYLANTA DS) suspension 20 mL  20 mL   • ondansetron (ZOFRAN ODT) dispertab 4 mg  4 mg    Or   • ondansetron (ZOFRAN) syringe/vial injection 4 mg  4 mg   • traZODone (DESYREL) tablet 50 mg  50 mg   • sodium chloride (OCEAN) 0.65 % nasal spray 2 Spray  2 Spray   • [START ON 3/24/2022] azithromycin (ZITHROMAX) tablet 250 mg  250 mg   • [START ON 3/24/2022] clopidogrel (PLAVIX) tablet 75 mg  75 mg   • heparin injection 5,000 Units  5,000 Units   • ipratropium-albuterol (DUONEB) nebulizer solution  3 mL   • [START ON 3/24/2022] levothyroxine (SYNTHROID) tablet 75 mcg  75 mcg   • pravastatin (PRAVACHOL) tablet 40 mg  40 mg   • [START ON 3/24/2022] predniSONE (DELTASONE) tablet 40 mg  40 mg   • senna-docusate (PERICOLACE or SENOKOT S) 8.6-50 MG per tablet 2 Tablet  2 Tablet    And   • polyethylene glycol/lytes (MIRALAX) PACKET 1 Packet  1 Packet    And   • magnesium hydroxide (MILK OF MAGNESIA) suspension 30 mL  30 mL    And   • bisacodyl (DULCOLAX) suppository 10 mg  10 mg       ALLERGIES:  Hydrocodone, Iodine, Nsaids, Penicillins, Asa [aspirin], Codeine, Erythromycin, Morphine, and Sulfa drugs    PSYCHOSOCIAL HISTORY:  Pre-mobidly, the patient lived in a single level mobile home with ramp to enter, in [place] with spouse.  She reports she has been  for 13 years.  She is a retired nurse.  She has 1 daughter.    She has a history of heavy smoking, reports quit about 5 weeks ago.    LEVEL OF FUNCTION PRIOR TO DISABILTY:  Independent with FWW, does not drive due to her impaired vision    LEVEL OF FUNCTION PRIOR TO ADMISSION to [facility]:  PT:    Gait Level Of Assist: Minimal Assist  Assistive Device: Front Wheel Walker  Distance (Feet): 3  Deviation: Bradykinetic,Decreased Base Of Support,Step To  Weight Bearing Status: no restrictions  Skilled  "Intervention: Verbal Cuing,Sequencing,Postural Facilitation  Supine to Sit: Minimal Assist  Sit to Supine:  (pt left up in chair)  Scooting: Supervised  Rolling: Supervised  Skilled Intervention: Verbal Cuing,Sequencing  Comments: improved initiation  Sit to Stand: Minimal Assist  Bed, Chair, Wheelchair Transfer: Minimal Assist  Transfer Method: Stand Step  Skilled Intervention: Verbal Cuing,Tactile Cuing,Sequencing,Postural Facilitation  Sitting in Chair: > 5 min post  Sitting Edge of Bed: 15 min  Standin min total    OT:    Eating: Supervision (needs encouragement to improve PO intake)  Bathing: Moderate Assist  Upper Body Dressing: Moderate Assist  Lower Body Dressing: Maximal Assist  Toileting: Maximal Assist    SLP:    Not assessed    CURRENT LEVEL OF FUNCTION:   Same as level of function prior to admission to [facility]    PHYSICAL EXAM:     VITAL SIGNS:   height is 1.575 m (5' 2\") and weight is 38.5 kg (84 lb 14 oz). Her temporal temperature is 37.1 °C (98.7 °F). Her blood pressure is 147/83 and her pulse is 72. Her respiration is 17 and oxygen saturation is 98%.     GENERAL: No apparent distress, severely cachetic  HEENT: Normocephalic/atraumatic, moist mucous membranes, severe bitemporal wasting  CARDIAC: Regular rate and rhythm, normal S1, S2, no murmurs, no peripheral edema   LUNGS: Loose productive cough, no wheezing, normal respiratory effort, on 5 L  ABDOMINAL: bowel sounds present, soft, nontender and nondistended    EXTREMITIES: 2+ bilateral DP/PT pulses  MSK: Diffuse muscular atrophy    NEURO:    Mental status: alert  Hard of hearing    Motor:  Shoulder flexors:  Right -  5/5, Left -  5/5  Elbow flexors:  Right -  5/5, Left -  5/5  Elbow extensors:  Right -  5/5, Left -  5/5  Symmetrical   Hip flexors:  Right -  5/5, Left -  5/5  Dorsiflexors:  Right -  5/5, Left -  5/5  Plantar flexors:  Right -  5/5, Left -  5/5     RADIOLOGY:    3/20/2022 6:55 AM     HISTORY/REASON " FOR EXAM:  Chest Pain        TECHNIQUE/EXAM DESCRIPTION AND NUMBER OF VIEWS:  Single AP view of the chest.     COMPARISON: 10/19/2021     FINDINGS:     Heart: The cardiac silhouette is normal in size.     Mediastinum: Calcification in the aortic knob.     Lungs: Bibasilar opacities likely represent a combination of small pleural effusions adjacent airspace opacification. No pneumothorax is identified. Interstitial prominence is similar to prior exams.     Bones: Spinal curvature with degenerative change.     Lines/tubes: None.           IMPRESSION:     Bibasilar opacities likely represent a combination of small pleural effusions with adjacent atelectasis. Pneumonia cannot be excluded.                         LABS:  Recent Labs     03/21/22  0240 03/22/22  0030   SODIUM 127* 129*   POTASSIUM 3.6 3.6   CHLORIDE 87* 89*   CO2 28 28   GLUCOSE 104* 107*   BUN 11 16   CREATININE 0.69 0.74   CALCIUM 8.8 8.8     Recent Labs     03/21/22  0240 03/22/22  0030   WBC 15.2* 19.8*   RBC 4.04* 4.11*   HEMOGLOBIN 12.5 12.6   HEMATOCRIT 37.6 37.8   MCV 93.1 92.0   MCH 30.9 30.7   MCHC 33.2* 33.3*   RDW 48.0 47.9   PLATELETCT 325 344   MPV 11.0 11.4       PRIMARY REHAB DIAGNOSIS:    This patient is a 86 y.o. female admitted for acute inpatient rehabilitation with Debility after hospital stay for COPD exacerbation as well as pneumonia.    IMPAIRMENTS:   Cognitive  ADLs/IADLs  Mobility    SECONDARY DIAGNOSIS/MEDICAL CO-MORBIDITIES AFFECTING FUNCTION:    COPD exacerbation  Community-acquired pneumonia  Acute hypoxic respiratory failure  Hypothyroidism  Hyponatremia  Severe protein calorie malnutrition   Failure to thrive  Hypertension  Leukocytosis    RELEVANT CHANGES SINCE PREADMISSION EVALUATION:    Status unchanged    The patient's rehabilitation potential is Fair  The patient's medical prognosis is fair    PLAN:   Discussion and Recommendations, discussed with the patient and/or family:   1. The patient requires an acute inpatient  rehabilitation program with a coordinated program of care at an intensity and frequency not available at a lower level of care. This recommendation is substantiated by the patient's medical physicians who recommend that the patient's intervention and assessment of medical issues needs to be done at an acute level of care for patient's safety and maximum outcome.     2. A coordinated program of care will be supplied by an interdisciplinary team of physical therapy, occupational therapy, rehab physician, rehab nursing, and, if needed, speech therapy and rehab psychology. Rehab team presents a patient-specific rehabilitation and education program concentrating on prevention of future problems related to accessibility, mobility, skin, bowel, bladder, sexuality, and psychosocial and medical/surgical problems.     3. Need for Rehabilitation Physician: The rehab physician will be evaluating the patient on a multi-weekly basis to help coordinate the program of care. The rehab physician communicates between medical physicians, therapists, and nurses to maximize the patient's potential outcome. Specific areas in which the rehab physician will be providing daily assessment include the following:   A. Assessing the patient's heart rate and blood pressure response (vitals monitoring) to activity and making adjustments in medications or conservative measures as needed.   B. The rehab physician will be assessing the frequency at which the program can be increased to allow the patient to reach optimal functional outcome.   C. The rehab physician will also provide assessments in daily skin care, especially in light of patient's impairments in mobility.   D. The rehab physician will provide special expertise in understanding how to work with functional impairment and recommend appropriate interventions, compensatory techniques, and education that will facilitate the patient's outcome.     4. Rehab RPROSPER.   The rehab RN will be working  with patient to carry over in room mobility and activities of daily living when the patient is not in 3 hours of skilled therapy. Rehab nursing will be working in conjunction with rehab physician to address all the medical issues above and continue to assess laboratory work and discuss abnormalities with the treating physicians, assess vitals, and response to activity, and discuss and report abnormalities with the rehab physician. Rehab RN will also continue daily skin care, supervise bladder/bowel program, instruct in medication administration, and ensure patient safety.     5. Therapies to treat at intensity and frequency of (may change after completion of evaluation by all therapeutic disciplines):       PT:  Physical therapy to address mobility, transfer, gait training and evaluation for adaptive equipment needs 1hour/day at least 5 days/week for the duration of the ELOS (see below)       OT:  Occupational therapy to address ADLs, self-care, home management training, functional mobility/transfers and assistive device evaluation, and community re-integration 1hour/day at least 5 days/week for the duration of the ELOS (see below).        ST/Dysphagia:  Speech therapy to address speech, language, and cognitive deficits as well as swallowing difficulties with retraining/dysphagia management and community re-integration with comprehension, expression, cognitive training 1hour/day at least 5 days/week for the duration of the ELOS (see below).     6. Medical management / Rehabilitation Issues/Adverse Potential affecting function as part of rehabilitation plan.    COPD exacerbation  Continue prednisone  Trial of Symbicort    Community-acquired pneumonia  Continue Zithromax  Continue cefuroxime    Acute hypoxic respiratory failure  Titrate oxygen  Respiratory therapy    Hypothyroidism  Levothyroxine    Hyponatremia  Continue fluid restrictions  Check urine sodium and osmoles    Severe protein calorie  malnutrition  Failure to thrive  Dietitian consult  Add supplements    Hypertension  Restart home diltiazem    Leukocytosis  Likely from steroids  Check urinalysis    I performed a complete drug regimen review and did not identify any potential clinically significant medication issues.    The patient's CODE STATUS was confirmed as DNR/DNI on admission, with the patient and/or family at bedside.    REHABILITATION ISSUES/ADVERSE POTENTIAL:  1.  Debility:. Patient demonstrates functional deficits in strength, balance, coordination, and ADL's. Patient is admitted to [facility] for comprehensive rehabilitation therapy as described below.   Rehabilitation nursing monitors bowel and bladder control, educates on medication administration, co-morbidities and monitors patient safety.    2.  DVT prophylaxis:  Patient is on heparin for anticoagulation upon transfer.  Changed to Lovenox as kidney function is normal.  Encourage OOB. Monitor daily for signs and symptoms of DVT including but not limited to swelling and pain to prevent the development of DVT that may interfere with therapies.    3.  Pain: No issues with pain currently / Controlled with as needed oral analgesics.    4.  Nutrition/Dysphagia: Dietician monitors nutrient intake, recommend supplements prn and provide nutrition education to pt/family to promote optimal nutrition for wound healing/recovery.     5.  Bladder/bowel:  Start bowel and bladder program, to prevent constipation, urinary retention (which may lead to UTI), and urinary incontinence (which will impact upon pt's functional independence).   - TV Q3h while awake with post void bladder scans, I&O cath for PVRs >400  - up to commode after meal     6.  Skin/dermal ulcer prophylaxis: Monitor for new skin conditions with q.2 h. turns as required to prevent the development of skin breakdown.     7.  GI prophylaxis: Omeprazole to prevent gastritis or GI bleed that would interfere with  therapies.    8. Respiratory therapy: RT performs O2 management prn, breathing retraining, pulmonary hygiene and bronchospasm management prn to optimize participation in therapies.    Pt was seen today for 72 min, and entire time spent in face-to-face contact was >50% in counseling and coordination of care as detailed in A/P above.        GOALS/EXPECTED LEVEL OF FUNCTION BASED ON CURRENT MEDICAL AND FUNCTIONAL STATUS (may change based on patient's medical status and rate of impairment recovery):  Transfers:   Supervision  Mobility/Gait:   Supervision  ADL's:   Minimal Assistance  Cognition: Least verbal cues    DISPOSITION: Discharge to pre-morbid independent living setting with the supportive care of patient's spouse.      ELOS: 14 days    Maggie Joel M.D.  Physical Medicine and Rehabilitation

## 2022-03-23 NOTE — CARE PLAN
Problem: Underweight  Goal: Patient to consume greater than or equal to 50% of meals  Description: Pt will consume ~50% of meals, or 25-50% of meals and supplements during admit.  Outcome: Not Met     See RD note.

## 2022-03-23 NOTE — CARE PLAN
Problem: Skin Integrity  Goal: Skin integrity is maintained or improved  Note: New admit, skin very fragile applied Mepilex to coccyx st 2 decub, air loss mattress applied. Encouraged to reposition to sides.     Problem: Fall Risk - Rehab  Goal: Patient will remain free from falls  Note: Oriented to use of call light for assist with needs and transfers.   The patient is Watcher - Medium risk of patient condition declining or worsening         P

## 2022-03-23 NOTE — FLOWSHEET NOTE
03/23/22 1310   Events/Summary/Plan   Events/Summary/Plan RT assessment.   Vital Signs   Pulse 77   Respiration 18   Pulse Oximetry 93 %   $ Pulse Oximetry (Spot Check) Yes   Respiratory Assessment   Level of Consciousness Alert   Chest Exam   Work Of Breathing / Effort Within Normal Limits   Breath Sounds   RUL Breath Sounds Diminished   RML Breath Sounds Diminished   RLL Breath Sounds Diminished   DIONNE Breath Sounds Diminished   LLL Breath Sounds Diminished   Secretions   Cough Congested;Productive   How Sputum Obtained Spontaneous   Sputum Amount Unable to Evaluate   Sputum Color Unable to Evaluate   Sputum Consistency Unable to Evaluate   Oxygen   O2 (LPM) 5   O2 Delivery Device Silicone Nasal Cannula   Smoking History   Have you ever smoked Yes   Have you smoked in the last 12 months Yes   Have you quit smoking No  (states she stopped a few weeks ago)   Smoking Cessation Offered Patient Counseled

## 2022-03-23 NOTE — PROGRESS NOTES
Pt picked up by [facility] to [facility]. Report attempted twice to Petros IZQUIERDO with no success. Sharita is aware of pt transfer. All belongings with . All questions, comments and concerns addressed

## 2022-03-23 NOTE — CARE PLAN
The patient is Stable - Low risk of patient condition declining or worsening    Shift Goals  Clinical Goals: titrate, skin integrity  Patient Goals: sleep  Family Goals: NA    Progress made toward(s) clinical / shift goals: yes    Problem: Knowledge Deficit - Standard  Goal: Patient and family/care givers will demonstrate understanding of plan of care, disease process/condition, diagnostic tests and medications  Outcome: Progressing     Problem: Knowledge Deficit - COPD  Goal: Patient/significant other demonstrates understanding of disease process, utilization of the Action Plan, medications and discharge instruction  Outcome: Progressing     Problem: Nutrition - Advanced  Goal: Patient will display progressive weight gain toward goal have adequate food and fluid intake  Outcome: Progressing     Problem: Ineffective Airway Clearance  Goal: Patient will maintain patent airway with clear/clearing breath sounds  Outcome: Progressing     Problem: Impaired Gas Exchange  Goal: Patient will demonstrate improved ventilation and adequate oxygenation and participate in treatment regimen within the level of ability/situation.  Outcome: Progressing     Problem: Risk for Aspiration  Goal: Patient's risk for aspiration will be absent or decrease  Outcome: Progressing     Problem: Self Care  Goal: Patient will have the ability to perform ADLs independently or with assistance (bathe, groom, dress, toilet and feed)  Outcome: Progressing     Problem: Skin Integrity  Goal: Skin integrity is maintained or improved  Outcome: Progressing     Problem: Fall Risk  Goal: Patient will remain free from falls  Outcome: Progressing

## 2022-03-23 NOTE — DISCHARGE PLANNING
"Msg placed to Dr. Burns-seeking medical clearance.  Spoke with Seun, spouse regarding [facility] and D/C resources/support.  He is hopeful for an admission.  They live in a mobile home that is ramped.  He will provide 24/7.  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  Seun is looking forward to family training.  Will discuss this case with the Admissions Team this morning. Seun cell. [phone].    0835-Dr. Burns has medically cleared.     0940-Case is under review by Dr. Joel.     0953-Dr. Joel has accepted.  [facility] is looking into transport.     1005-Transport has been arranged via [facility] between 5919-3823.  Hodan Alanis BSN & Seun spouse are aware.  Msg placed to [facility].    1015-[facility] is aware.   "

## 2022-03-23 NOTE — CARE PLAN
Problem: Impaired Gas Exchange  Goal: Patient will demonstrate improved ventilation and adequate oxygenation and participate in treatment regimen within the level of ability/situation.  Outcome: Progressing   Attempting to wean pt. She is doing her IS independently   Problem: Self Care  Goal: Patient will have the ability to perform ADLs independently or with assistance (bathe, groom, dress, toilet and feed)  Outcome: Progressing  Pt desires to be as independent with her care as possible    The patient is Stable - Low risk of patient condition declining or worsening    Shift Goals  Clinical Goals: Dc planning, increase mobility, decrease o2 needs  Patient Goals: Rest  Family Goals: communicatoin about dc    Progress made toward(s) clinical / shift goals:  Progressing    Patient is not progressing towards the following goals:

## 2022-03-23 NOTE — DIETARY
"Nutrition services: Day 0 of admit.  Antonia Stover is a 86 y.o. female with admitting DX of Debility.    Consult received for BMI<19, MST score 4 for 24-33 lb wt loss in 3 months with decreased PO intake. Pt currently on isolation for COVID-19 rule out infection: Due to department policy regarding COVID+ patients/droplet isolation pre-cautions, RD unable to interview patient at bedside. Attempted to call pt; no answer to phone. Per chart review, pt interviewed by [facility] RD at [facility] on 3/20/22 admit. Diagnosed w/ severe malnutrition by RD w/ interventions put in place.    Assessment:  Height: 157.5 cm (5' 2\")  Weight: 38.5 kg (84 lb 14 oz)  Body mass index is 15.52 kg/m²., BMI classification: Underweight  Diet/Intake: Regular, 1200 ml fluid restriction    Evaluation:   1. PMHx includes HTN, hypothyroidism, COPD, arthritis, DLD, fatigue, dizziness, sickle cell disease, anemia. Admitted to [facility] from [facility] following admit for COPD exacerbation.  2. Boost/Ensure supplements not tolerated by pt per RD note 3/20/22. Previous RD intervention included chobani yogurt smoothies and ReadyCare shakes.  3. Per chart review, pt eating 25-50% of meals consistently over past few days at [facility].  4. Labs: Na 129, glu 107, alk phos 119  5. MAR includes prednisone, PRN bowel meds (last given 3/23,  6. No noted wounds/edema per flowsheets.  7. Last BM: 3/23    Malnutrition Risk: Per RD note 3/20/22 @ [facility]: Severe malnutrition in context of chronic illness as evidenced by severe muscle loss at temples, clavicle, shoulder, and severe fat loss at orbitals and triceps.    Recommendations/Plan:  1. Due to fluid restriction in place, will trial Magic Cup supplements TID w/ meals per Poor PO Intake Protocol (pt w/ BMI<18.5).   2. Encourage intake of meals and supplements.  3. Document intake of all PO as % taken in ADL's to provide interdisciplinary communication across all shifts.   4. Monitor weight.  5. Nutrition rep will continue " to see patient for ongoing meal and snack preferences.     RD following.

## 2022-03-23 NOTE — DISCHARGE SUMMARY
Discharge Summary    CHIEF COMPLAINT ON ADMISSION  Chief Complaint   Patient presents with   • Shortness of Breath   • COPD       Reason for Admission  EMS     CODE STATUS  DNAR/DNI    HPI & HOSPITAL COURSE  This is a 86 y.o. female here with Shortness of Breath and COPD    Please review Dr. Seun Torres M.D. notes for further details of history of present illness, past medical/social/family histories, allergies and medications. Please review Dr. Poon, Pulmonary consultation notes.  She has a history of hypertension, hypothyroidism, former heavy smoker, COPD on 2LO2 who presented 3/20/2022 with shortness of breath and increased cough.  She was feeling malaised for the past 2d. She increased her O2.She also felt weak and she uses a walker.  At the ED, afebrile, normotenisve, was put on 4-5LO2.  CXR showed bibasilar opacities with small pleural effusion  Leukocytosis.   CoVID x 1 NEGATIVE  RSV and flu NEGATIVE  Procalcitonin low at 0.16  Dr. Poon, Pulmonary consulted. [place] this is COPD exacerbation and recommended a short course of prednisone, antibiotics and breathing treatments/inhalers. She improved. Rehab evaluated and accepted her.    We have ordered supplements for her malnutrition related to COPD. Her sodium levels improved, in the mild category of hyponatremia.    At discharge date, Antonia Stover afebrile and hemodynamically stable.  Antonia Stover wanted to be discharged today.    Discharge Physical Exam  Vitals and nursing note reviewed.   Constitutional:       Appearance: She is cachectic. She is ill-appearing.   HENT:      Head: Normocephalic and atraumatic.      Ears:      Comments: Hard of hearing     Nose: Nose normal.      Mouth/Throat:      Mouth: Mucous membranes are moist.      Pharynx: Oropharynx is clear.   Eyes:      Conjunctiva/sclera: Conjunctivae normal.      Pupils: Pupils are equal, round, and reactive to light.   Cardiovascular:      Rate and Rhythm:  Normal rate and regular rhythm.   Pulmonary:      Effort: Pulmonary effort is normal. No respiratory distress.      Breath sounds: No wheezing or rales.   Abdominal:      General: Abdomen is flat. Bowel sounds are normal. There is no distension.      Tenderness: There is no abdominal tenderness. There is no guarding.   Musculoskeletal:         General: Normal range of motion.      Cervical back: Normal range of motion.   Skin:     General: Skin is warm.   Neurological:      General: No focal deficit present.      Mental Status: She is alert and oriented to person, place, and time.      Motor: Weakness present.     Imaging  DX-CHEST-PORTABLE (1 VIEW)   Final Result      Bibasilar opacities likely represent a combination of small pleural effusions with adjacent atelectasis. Pneumonia cannot be excluded.            Therefore, she is discharged in fair and stable condition to an inpatient rehabilitation hospital.    The patient met 2-midnight criteria for an inpatient stay at the time of discharge.      FOLLOW UP ITEMS POST DISCHARGE      DISCHARGE DIAGNOSES  Principal Problem:    COPD exacerbation, FTT/severe malnutrition (HCC) POA: Yes  Active Problems:    Dyslipidemia POA: Yes    Hypothyroidism POA: Yes    Essential hypertension POA: Yes    Advanced care planning/counseling discussion POA: Yes    Failure to thrive in adult POA: Unknown    Acute hypoxemic respiratory failure (HCC) POA: Unknown    Hard of hearing POA: Unknown    Hyponatremia POA: Unknown  Protein calorie malnutrition, severe      FOLLOW UP  No future appointments.  Hardik Merino M.D.  [address] [place] [address] [address] [place] [id number] [phone]    Call  As needed  Follow up Hardik Merino M.D. or attending physician at rehab upon receipt. Monitor sodium levels. Dietitian should follow for malnutrition  Follow up with [facility] Pulmonology in 1 week or as needed for hypoxia and COPD.  Return to ER in the event of new or worsening symptoms. Please note importance of  compliance and the patient has agreed to proceed with all medical recommendations and follow up plan indicated above. All medications come with benefits and risks. Risks may include permanent injury or death and these risks can be minimized with close reassessment and monitoring. Please make it to your scheduled follow ups with your primary provider, and/or specialists clinic.      MEDICATIONS ON DISCHARGE     Medication List      START taking these medications      Instructions   acetaminophen 325 MG Tabs  Commonly known as: Tylenol   Take 2 Tablets by mouth every 6 hours as needed.  Dose: 650 mg     azithromycin 250 MG Tabs  Commonly known as: ZITHROMAX   Take 1 Tablet by mouth every day for 2 days.  Dose: 250 mg     cefUROXime 500 MG Tabs  Commonly known as: CEFTIN   Take 1 Tablet by mouth 2 times a day for 2 days.  Dose: 500 mg     heparin 5000 UNIT/ML Mariel   Doctor's comments: STOP when more ambulatory  Inject 1 mL under the skin every 8 hours.  Dose: 5,000 Units     ipratropium-albuterol 0.5-2.5 (3) MG/3ML nebulizer solution  Commonly known as: DUONEB   Take 3 mL by nebulization every four hours as needed for Shortness of Breath.  Dose: 3 mL     polyethylene glycol/lytes 17 g Pack  Commonly known as: MIRALAX   Take 1 Packet by mouth 1 time a day as needed (if sennosides and docusate ineffective after 24 hours).  Dose: 17 g     predniSONE 20 MG Tabs  Commonly known as: DELTASONE   Take 2 Tablets by mouth every day for 1 day.  Dose: 40 mg     Probiotic 250 MG Caps   Take 1 Capsule by mouth 2 times a day with meals.  Dose: 1 Capsule     senna-docusate 8.6-50 MG Tabs  Commonly known as: PERICOLACE or SENOKOT S   Take 2 Tablets by mouth 2 times a day.  Dose: 2 Tablet        CONTINUE taking these medications      Instructions   ALIGN PO   Take 1 Capsule by mouth every morning.  Dose: 1 Capsule     clopidogrel 75 MG Tabs  Commonly known as: PLAVIX   Take 1 Tablet by mouth every day. Please call [phone] to  schedule follow up for further refills  Dose: 75 mg     diltiazem 180 MG SR capsule  Commonly known as: TIAZAC   Take 1 Capsule by mouth every day. Please call [phone] to schedule follow up for further refills  Dose: 180 mg     levothyroxine 75 MCG Tabs  Commonly known as: SYNTHROID   TAKE 1 TABLET BY MOUTH EVERY MORNING ON AN EMPTY STOMACH     pravastatin 40 MG tablet  Commonly known as: PRAVACHOL   TAKE 1 TABLET BY MOUTH EVERY EVENING            Allergies  Allergies   Allergen Reactions   • Hydrocodone Hives   • Iodine Anaphylaxis     RXN=>20 years ago  Heavy metal dyes and preservatives   • Nsaids Hives and Shortness of Breath   • Penicillins Anaphylaxis     RXN=>20 years ago   • Asa [Aspirin] Hives     Hives   • Codeine      Pt reports that she falls to the ground, she passes out   • Erythromycin      Pt reports that she falls to the ground, she passes out   • Morphine      Pt reports that she falls to the ground, she passes out   • Sulfa Drugs      Pt reports that she falls to the ground, she passes out       DIET  Orders Placed This Encounter   Procedures   • Diet Order Diet: Regular; Fluid modifications: (optional): 1200 ml Fluid Restriction     Standing Status:   Standing     Number of Occurrences:   1     Order Specific Question:   Diet:     Answer:   Regular [1]     Order Specific Question:   Fluid modifications: (optional)     Answer:   1200 ml Fluid Restriction [8]       ACTIVITY  As per rehab    LINES, DRAINS, AND WOUNDS  This is an automated list. Peripheral IVs will be removed prior to discharge.  Peripheral IV 03/20/22 20 G Right Antecubital (Active)   Site Assessment Clean;Dry;Intact 03/23/22 0919   Dressing Type Transparent 03/23/22 0919   Line Status Scrubbed the hub prior to access;Flushed;Saline locked 03/23/22 0919   Dressing Status Clean;Dry;Intact 03/23/22 0919   Dressing Intervention N/A 03/23/22 0919          Peripheral IV 03/20/22 20 G Right Antecubital (Active)   Site Assessment  Clean;Dry;Intact 03/23/22 0919   Dressing Type Transparent 03/23/22 0919   Line Status Scrubbed the hub prior to access;Flushed;Saline locked 03/23/22 0919   Dressing Status Clean;Dry;Intact 03/23/22 0919   Dressing Intervention N/A 03/23/22 0919               MENTAL STATUS ON TRANSFER             CONSULTATIONS  Pulmonology    PROCEDURES  DX-CHEST-PORTABLE (1 VIEW)    Result Date: 3/20/2022  3/20/2022 6:55 AM HISTORY/REASON FOR EXAM:  Chest Pain TECHNIQUE/EXAM DESCRIPTION AND NUMBER OF VIEWS: Single AP view of the chest. COMPARISON: 10/19/2021 FINDINGS: Heart: The cardiac silhouette is normal in size. Mediastinum: Calcification in the aortic knob. Lungs: Bibasilar opacities likely represent a combination of small pleural effusions adjacent airspace opacification. No pneumothorax is identified. Interstitial prominence is similar to prior exams. Bones: Spinal curvature with degenerative change. Lines/tubes: None.     Bibasilar opacities likely represent a combination of small pleural effusions with adjacent atelectasis. Pneumonia cannot be excluded.      LABORATORY  Lab Results   Component Value Date    SODIUM 129 (L) 03/22/2022    POTASSIUM 3.6 03/22/2022    CHLORIDE 89 (L) 03/22/2022    CO2 28 03/22/2022    GLUCOSE 107 (H) 03/22/2022    BUN 16 03/22/2022    CREATININE 0.74 03/22/2022    CREATININE 1.3 01/19/2009        Lab Results   Component Value Date    WBC 19.8 (H) 03/22/2022    HEMOGLOBIN 12.6 03/22/2022    HEMATOCRIT 37.8 03/22/2022    PLATELETCT 344 03/22/2022        Total time of the discharge process exceeds 40 minutes.

## 2022-03-23 NOTE — CARE PLAN
Problem: Nutritional:  Goal: Achieve adequate nutritional intake  Description: Patient will consume >25-50% of meals  Outcome: Progressing   PO 25-50% of meals.

## 2022-03-23 NOTE — PROGRESS NOTES
2 RN skin check done with admitting RN Twyla and RN Autumn Face photo and skin photos documented in media. Appropriate LDAs opened.   Pt with Rod score of 17. RN wound protocol ordered on 3/23/22, orders current. Prevention measures in place including, Mepilex to coccyx, air loss mattress applied.

## 2022-03-23 NOTE — PROGRESS NOTES
Admitted per wheelchair a/o x3 with Dx- Resp. Failure- COPD on O2 5L per n/c, transferred with moderate assist.  Skin very fragile, with hematoma to left upper arm & st. 2 decub to coccyx area, Mepilex dressing in place.  Oriented to hospital routine and rehab process, admission care done.

## 2022-03-23 NOTE — FLOWSHEET NOTE
03/23/22 1304   Patient History   Pulmonary Diagnosis COPD, tobacco abuse   Procedures Relevant to Respiratory Status none   Home O2 Yes   Oxygen liter flow 4   Oxygen at night only Yes  (need to verify: pt states PRN during the day)   Nocturnal CPAP No   Home Treatments/Frequency No   Sleep Apnea Screening   Have you had a sleep study? No   Have you been diagnosed with sleep apnea? No   COPD Risk Screening   Do you have a history of COPD? Yes   Protocol Pathways   Protocol Pathways Bronchodilator Protocol   Bronchodilator Protocol   Med Order With RCP No   Is this an exacerbation of COPD/Asthma? No   Bronchodilator Indications History / Diagnosis   Breath Sounds Criteria 1    Benefit Criteria 1    Pulse Criteria 0    Respiratory Rate Criteria 0    S.O.B. Criteria 1    Criteria Total 3   Point Values 0-3 - PRN   Bronchodilator Goals/Outcome Diminished Wheezing and Volume of Air Movement Increased

## 2022-03-24 PROBLEM — E55.9 VITAMIN D DEFICIENCY: Status: ACTIVE | Noted: 2022-03-24

## 2022-03-24 PROBLEM — E87.6 HYPOKALEMIA: Status: ACTIVE | Noted: 2022-03-24

## 2022-03-24 PROBLEM — J18.9 PNEUMONIA: Status: ACTIVE | Noted: 2022-03-24

## 2022-03-24 LAB
25(OH)D3 SERPL-MCNC: 9 NG/ML (ref 30–100)
ALBUMIN SERPL BCP-MCNC: 3.5 G/DL (ref 3.2–4.9)
ALBUMIN/GLOB SERPL: 1.2 G/DL
ALP SERPL-CCNC: 121 U/L (ref 30–99)
ALT SERPL-CCNC: 13 U/L (ref 2–50)
ANION GAP SERPL CALC-SCNC: 10 MMOL/L (ref 7–16)
AST SERPL-CCNC: 36 U/L (ref 12–45)
BASOPHILS # BLD AUTO: 0.1 % (ref 0–1.8)
BASOPHILS # BLD: 0.02 K/UL (ref 0–0.12)
BILIRUB SERPL-MCNC: 0.4 MG/DL (ref 0.1–1.5)
BUN SERPL-MCNC: 13 MG/DL (ref 8–22)
CALCIUM SERPL-MCNC: 9 MG/DL (ref 8.5–10.5)
CHLORIDE SERPL-SCNC: 89 MMOL/L (ref 96–112)
CO2 SERPL-SCNC: 33 MMOL/L (ref 20–33)
CREAT SERPL-MCNC: 0.56 MG/DL (ref 0.5–1.4)
EOSINOPHIL # BLD AUTO: 0.01 K/UL (ref 0–0.51)
EOSINOPHIL NFR BLD: 0.1 % (ref 0–6.9)
ERYTHROCYTE [DISTWIDTH] IN BLOOD BY AUTOMATED COUNT: 48.7 FL (ref 35.9–50)
FLUAV RNA SPEC QL NAA+PROBE: NEGATIVE
FLUBV RNA SPEC QL NAA+PROBE: NEGATIVE
GFR SERPLBLD CREATININE-BSD FMLA CKD-EPI: 90 ML/MIN/1.73 M 2
GLOBULIN SER CALC-MCNC: 2.9 G/DL (ref 1.9–3.5)
GLUCOSE SERPL-MCNC: 81 MG/DL (ref 65–99)
HCT VFR BLD AUTO: 40 % (ref 37–47)
HGB BLD-MCNC: 13.2 G/DL (ref 12–16)
IMM GRANULOCYTES # BLD AUTO: 0.13 K/UL (ref 0–0.11)
IMM GRANULOCYTES NFR BLD AUTO: 0.7 % (ref 0–0.9)
LYMPHOCYTES # BLD AUTO: 1.46 K/UL (ref 1–4.8)
LYMPHOCYTES NFR BLD: 7.6 % (ref 22–41)
MAGNESIUM SERPL-MCNC: 1.6 MG/DL (ref 1.5–2.5)
MCH RBC QN AUTO: 30.4 PG (ref 27–33)
MCHC RBC AUTO-ENTMCNC: 33 G/DL (ref 33.6–35)
MCV RBC AUTO: 92.2 FL (ref 81.4–97.8)
MONOCYTES # BLD AUTO: 1.52 K/UL (ref 0–0.85)
MONOCYTES NFR BLD AUTO: 7.9 % (ref 0–13.4)
NEUTROPHILS # BLD AUTO: 16.1 K/UL (ref 2–7.15)
NEUTROPHILS NFR BLD: 83.6 % (ref 44–72)
NRBC # BLD AUTO: 0 K/UL
NRBC BLD-RTO: 0 /100 WBC
PLATELET # BLD AUTO: 300 K/UL (ref 164–446)
PMV BLD AUTO: 11.5 FL (ref 9–12.9)
POTASSIUM SERPL-SCNC: 3.1 MMOL/L (ref 3.6–5.5)
PROT SERPL-MCNC: 6.4 G/DL (ref 6–8.2)
RBC # BLD AUTO: 4.34 M/UL (ref 4.2–5.4)
SARS-COV-2 RNA RESP QL NAA+PROBE: NOTDETECTED
SODIUM SERPL-SCNC: 132 MMOL/L (ref 135–145)
SPECIMEN SOURCE: NORMAL
WBC # BLD AUTO: 19.2 K/UL (ref 4.8–10.8)

## 2022-03-24 PROCEDURE — 85025 COMPLETE CBC W/AUTO DIFF WBC: CPT

## 2022-03-24 PROCEDURE — 83735 ASSAY OF MAGNESIUM: CPT

## 2022-03-24 PROCEDURE — 82306 VITAMIN D 25 HYDROXY: CPT

## 2022-03-24 PROCEDURE — 770010 HCHG ROOM/CARE - REHAB SEMI PRIVAT*

## 2022-03-24 PROCEDURE — 36415 COLL VENOUS BLD VENIPUNCTURE: CPT

## 2022-03-24 PROCEDURE — 94760 N-INVAS EAR/PLS OXIMETRY 1: CPT

## 2022-03-24 PROCEDURE — A9270 NON-COVERED ITEM OR SERVICE: HCPCS | Performed by: HOSPITALIST

## 2022-03-24 PROCEDURE — 84300 ASSAY OF URINE SODIUM: CPT

## 2022-03-24 PROCEDURE — 700102 HCHG RX REV CODE 250 W/ 637 OVERRIDE(OP): Performed by: PHYSICAL MEDICINE & REHABILITATION

## 2022-03-24 PROCEDURE — 700101 HCHG RX REV CODE 250: Performed by: PHYSICAL MEDICINE & REHABILITATION

## 2022-03-24 PROCEDURE — 97535 SELF CARE MNGMENT TRAINING: CPT

## 2022-03-24 PROCEDURE — 94640 AIRWAY INHALATION TREATMENT: CPT

## 2022-03-24 PROCEDURE — 99223 1ST HOSP IP/OBS HIGH 75: CPT | Performed by: HOSPITALIST

## 2022-03-24 PROCEDURE — 302146: Performed by: PHYSICAL MEDICINE & REHABILITATION

## 2022-03-24 PROCEDURE — 700102 HCHG RX REV CODE 250 W/ 637 OVERRIDE(OP): Performed by: HOSPITALIST

## 2022-03-24 PROCEDURE — 81001 URINALYSIS AUTO W/SCOPE: CPT

## 2022-03-24 PROCEDURE — 83935 ASSAY OF URINE OSMOLALITY: CPT

## 2022-03-24 PROCEDURE — 97161 PT EVAL LOW COMPLEX 20 MIN: CPT

## 2022-03-24 PROCEDURE — A9270 NON-COVERED ITEM OR SERVICE: HCPCS | Performed by: PHYSICAL MEDICINE & REHABILITATION

## 2022-03-24 PROCEDURE — 97166 OT EVAL MOD COMPLEX 45 MIN: CPT

## 2022-03-24 PROCEDURE — 80053 COMPREHEN METABOLIC PANEL: CPT

## 2022-03-24 PROCEDURE — 92523 SPEECH SOUND LANG COMPREHEN: CPT

## 2022-03-24 PROCEDURE — 97530 THERAPEUTIC ACTIVITIES: CPT

## 2022-03-24 PROCEDURE — 99233 SBSQ HOSP IP/OBS HIGH 50: CPT | Performed by: PHYSICAL MEDICINE & REHABILITATION

## 2022-03-24 PROCEDURE — 700111 HCHG RX REV CODE 636 W/ 250 OVERRIDE (IP): Performed by: PHYSICAL MEDICINE & REHABILITATION

## 2022-03-24 RX ORDER — DILTIAZEM HYDROCHLORIDE 180 MG/1
180 CAPSULE, COATED, EXTENDED RELEASE ORAL
Status: DISCONTINUED | OUTPATIENT
Start: 2022-03-25 | End: 2022-03-29 | Stop reason: HOSPADM

## 2022-03-24 RX ORDER — ERGOCALCIFEROL 1.25 MG/1
50000 CAPSULE ORAL
Status: DISCONTINUED | OUTPATIENT
Start: 2022-03-24 | End: 2022-03-29 | Stop reason: HOSPADM

## 2022-03-24 RX ORDER — POTASSIUM CHLORIDE 20 MEQ/1
20 TABLET, EXTENDED RELEASE ORAL DAILY
Status: DISCONTINUED | OUTPATIENT
Start: 2022-03-25 | End: 2022-03-29 | Stop reason: HOSPADM

## 2022-03-24 RX ORDER — POTASSIUM CHLORIDE 20 MEQ/1
40 TABLET, EXTENDED RELEASE ORAL ONCE
Status: COMPLETED | OUTPATIENT
Start: 2022-03-24 | End: 2022-03-24

## 2022-03-24 RX ADMIN — BUDESONIDE AND FORMOTEROL FUMARATE DIHYDRATE 2 PUFF: 160; 4.5 AEROSOL RESPIRATORY (INHALATION) at 07:24

## 2022-03-24 RX ADMIN — SENNOSIDES AND DOCUSATE SODIUM 2 TABLET: 50; 8.6 TABLET ORAL at 22:10

## 2022-03-24 RX ADMIN — OMEPRAZOLE 20 MG: 20 CAPSULE, DELAYED RELEASE ORAL at 09:41

## 2022-03-24 RX ADMIN — PREDNISONE 40 MG: 20 TABLET ORAL at 09:41

## 2022-03-24 RX ADMIN — DILTIAZEM HYDROCHLORIDE 120 MG: 120 CAPSULE, COATED, EXTENDED RELEASE ORAL at 05:52

## 2022-03-24 RX ADMIN — SENNOSIDES AND DOCUSATE SODIUM 2 TABLET: 50; 8.6 TABLET ORAL at 09:41

## 2022-03-24 RX ADMIN — CEFUROXIME AXETIL 500 MG: 500 TABLET ORAL at 22:10

## 2022-03-24 RX ADMIN — LEVOTHYROXINE SODIUM 75 MCG: 75 TABLET ORAL at 05:52

## 2022-03-24 RX ADMIN — BUDESONIDE AND FORMOTEROL FUMARATE DIHYDRATE 2 PUFF: 160; 4.5 AEROSOL RESPIRATORY (INHALATION) at 22:17

## 2022-03-24 RX ADMIN — POTASSIUM CHLORIDE 40 MEQ: 1500 TABLET, EXTENDED RELEASE ORAL at 11:13

## 2022-03-24 RX ADMIN — CEFUROXIME AXETIL 500 MG: 500 TABLET ORAL at 09:41

## 2022-03-24 RX ADMIN — AZITHROMYCIN MONOHYDRATE 250 MG: 250 TABLET ORAL at 09:42

## 2022-03-24 RX ADMIN — PRAVASTATIN SODIUM 40 MG: 20 TABLET ORAL at 22:10

## 2022-03-24 RX ADMIN — IPRATROPIUM BROMIDE AND ALBUTEROL SULFATE 3 ML: 2.5; .5 SOLUTION RESPIRATORY (INHALATION) at 07:24

## 2022-03-24 RX ADMIN — ERGOCALCIFEROL 50000 UNITS: 1.25 CAPSULE ORAL at 11:13

## 2022-03-24 RX ADMIN — THERA TABS 1 TABLET: TAB at 17:06

## 2022-03-24 RX ADMIN — CLOPIDOGREL BISULFATE 75 MG: 75 TABLET ORAL at 09:42

## 2022-03-24 RX ADMIN — ENOXAPARIN SODIUM 40 MG: 40 INJECTION SUBCUTANEOUS at 22:17

## 2022-03-24 ASSESSMENT — BRIEF INTERVIEW FOR MENTAL STATUS (BIMS)
BIMS SUMMARY SCORE: 10
ASKED TO RECALL SOCK: YES, AFTER CUEING (SOMETHING TO WEAR")"
WHAT MONTH IS IT: ACCURATE WITHIN 5 DAYS
ASKED TO RECALL BED: NO, COULD NOT RECALL
ASKED TO RECALL BLUE: YES, AFTER CUEING (A COLOR")"
WHAT YEAR IS IT: CORRECT
ASKED TO RECALL SOCK: YES, AFTER CUEING (SOMETHING TO WEAR")"
BIMS SUMMARY SCORE: 11
WHAT DAY OF THE WEEK IS IT: INCORRECT
WHAT DAY OF THE WEEK IS IT: INCORRECT
WHAT YEAR IS IT: CORRECT
WHAT MONTH IS IT: ACCURATE WITHIN 5 DAYS
ASKED TO RECALL BLUE: YES, AFTER CUEING (A COLOR")"
ASKED TO RECALL BED: YES, AFTER CUEING (A PIECE OF FURNITURE")"
INITIAL REPETITION OF BED BLUE SOCK - FIRST ATTEMPT: 3
INITIAL REPETITION OF BED BLUE SOCK - FIRST ATTEMPT: 3

## 2022-03-24 ASSESSMENT — ACTIVITIES OF DAILY LIVING (ADL)
BED_CHAIR_WHEELCHAIR_TRANSFER_DESCRIPTION: ADAPTIVE EQUIPMENT;ASSIST WITH TWO LIMBS;INCREASED TIME;SET-UP OF EQUIPMENT;SUPERVISION FOR SAFETY;VERBAL CUEING
TOILET_TRANSFER_DESCRIPTION: GRAB BAR;INCREASED TIME;INITIAL PREPARATION FOR TASK;REQUIRES LIFT;SET-UP OF EQUIPMENT;SUPERVISION FOR SAFETY;VERBAL CUEING
TOILETING: INDEPENDENT
TOILETING_LEVEL_OF_ASSIST_DESCRIPTION: ASSIST TO PULL PANTS UP;ASSIST TO PULL PANTS DOWN;ASSIST FOR STANDING BALANCE;GRAB BAR;INCREASED TIME;SET-UP OF EQUIPMENT;SUPERVISION FOR SAFETY;VERBAL CUEING
BED_CHAIR_WHEELCHAIR_TRANSFER_DESCRIPTION: INCREASED TIME;REQUIRES LIFT;SET-UP OF EQUIPMENT;SUPERVISION FOR SAFETY;VERBAL CUEING
TOILET_TRANSFER_DESCRIPTION: ADAPTIVE EQUIPMENT;GRAB BAR;INCREASED TIME;SUPERVISION FOR SAFETY;VERBAL CUEING

## 2022-03-24 ASSESSMENT — GAIT ASSESSMENTS: GAIT LEVEL OF ASSIST: UNABLE TO PARTICIPATE

## 2022-03-24 NOTE — ASSESSMENT & PLAN NOTE
Resolved  K+: 4.8 (3/28)  S/P KCL 40 meq x 1  On daily K+ supplements (since pt not eating well)  Monitor

## 2022-03-24 NOTE — CONSULTS
DATE OF SERVICE:  3/24/2021    REQUESTING PHYSICIAN:  Maggie Joel MD    CHIEF COMPLAINT / REASON FOR CONSULTATION:   Hypertension  Leukocytosis  Electrolyte imbalances    HISTORY OF PRESENT ILLNESS:  This is an 85 y/o female with a PMH significant for hypertension, CKD, COPD on home oxygen 3 liters, hypothyroidism, and current tobacco use who presented to [facility] on 3/20 with complaints of shortness of breath for the 2 days.  Workup included a CXR showing bibasilar opacities and possible small pleural effusions, atelectasis, and/or pneumonia.  She had an elevated white blood cell count of 19.  Pt was admitted and treated for COPD exacerbation with steroids, O2 supplementation and resp treatments.  She was treated for her pneumonia with abx of which she is still on.  She was evaluated by Pulmonary.  She was also noticed to have hyponatremia and a BNP of 679.    Because of the patient's weakness and debility, Rehab was consulted, evaluated the patient, and was deemed a good Rehab candidate.  The patient was transferred over to the Rehab facility on 3/23/2021.      The patient denies fever, chills, nausea, vomiting, headaches, blurry vision, or chest pain.  Pt does have a cough.    REVIEW OF SYSTEMS: All review of systems are negative pre AMA and CMS criteria except for that stated in the HPI.    PAST MEDICAL HISTORY:  Past Medical History:   Diagnosis Date   • Anemia    • Arthritis    • Carotid artery plaque 08/2018    Carotid US with <50% stenosis bilaterally.   • COPD    • Dizziness     • Dyslipidemia     • Fatigue 7/3/2014   • Hypothyroidism    • Insomnia     • Nocturnal hypoxia      Uses oxygen QHS.   • PSVT (paroxysmal supraventricular tachycardia) ([facility]) 02/2013    Echocardiogram with normal LV size, LVEF 65-70%.   • Sickle cell disease ([facility])    • Tobacco use        PAST SURGICAL HISTORY:  Past Surgical History:   Procedure Laterality Date   • KNEE REPLACEMENT, TOTAL  2005    Right   • ABDOMINAL HYSTERECTOMY  TOTAL     • APPENDECTOMY     • CATARACT EXTRACTION WITH IOL      Bilateral   • CATARACT EXTRACTION WITH IOL      Bilateral   • HYSTERECTOMY, TOTAL ABDOMINAL         Allergies   Allergen Reactions   • Hydrocodone Hives   • Iodine Anaphylaxis     RXN=>20 years ago  Heavy metal dyes and preservatives   • Nsaids Hives and Shortness of Breath   • Penicillins Anaphylaxis     RXN=>20 years ago   • Asa [Aspirin] Hives     Hives   • Codeine      Pt reports that she falls to the ground, she passes out   • Erythromycin      Pt reports that she falls to the ground, she passes out   • Morphine      Pt reports that she falls to the ground, she passes out   • Sulfa Drugs      Pt reports that she falls to the ground, she passes out       CURRENT MEDICATIONS:    Current Facility-Administered Medications:   •  vitamin D2 (Ergocalciferol)  •  hydrOXYzine HCl  •  melatonin  •  Respiratory Therapy Consult  •  Pharmacy Consult Request  •  hydrALAZINE  •  acetaminophen  •  lactulose  •  omeprazole  •  artificial tears  •  benzocaine-menthol  •  mag hydrox-al hydrox-simeth  •  ondansetron **OR** ondansetron  •  traZODone  •  sodium chloride  •  azithromycin  •  clopidogrel  •  ipratropium-albuterol  •  levothyroxine  •  pravastatin  •  senna-docusate **AND** polyethylene glycol/lytes **AND** magnesium hydroxide **AND** bisacodyl  •  budesonide-formoterol  •  cefUROXime  •  DILTIAZem CD  •  enoxaparin    Social History     Socioeconomic History   • Marital status:    Tobacco Use   • Smoking status: Current Every Day Smoker     Packs/day: 0.25     Years: 55.00     Pack years: 13.75     Types: Cigars   • Smokeless tobacco: Never Used   • Tobacco comment: does not inhale, small filtered cigars, 4 cigs/ day    Vaping Use   • Vaping Use: Never used   Substance and Sexual Activity   • Alcohol use: No   • Drug use: No   • Sexual activity: Not Currently       FAMILY HISTORY:  was reviewed and is not pertinent to this  consultation.    PHYSICAL EXAMINATION:  VITAL SIGNS:  Temp is 98.2, blood pressure is 148/87, heart rate is 82, respiratory rate is 18.  GENERAL:  Patient was lying in bed in no distress.  Pt is blind.  HEENT:  Pupils were equal, round and reactive to light and accomodation.  Oral mucosa was pink and moist.  NECK:  Soft.  Supple.  No JVD.  HEART:  Regular rate and rhythm.  Normal S1 and S2.  No murmurs were appreciated.  LUNGS:  Are clear to auscultation bilaterally.  ABDOMEN:  Soft, non tender, non distended.  Bowels sound were positive in all four quadrants.  EXTREMITIES:  No clubbing, cyanosis.  There was no lower extremity edema.  NEUROLOGIC:  Cranial nerves two through twelve were grossly intact.    LABS:  Lab Results   Component Value Date/Time    SODIUM 132 (L) 03/24/2022 05:30 AM    POTASSIUM 3.1 (L) 03/24/2022 05:30 AM    CHLORIDE 89 (L) 03/24/2022 05:30 AM    CO2 33 03/24/2022 05:30 AM    GLUCOSE 81 03/24/2022 05:30 AM    BUN 13 03/24/2022 05:30 AM    CREATININE 0.56 03/24/2022 05:30 AM    CREATININE 1.3 01/19/2009 09:40 AM      Lab Results   Component Value Date/Time    WBC 19.2 (H) 03/24/2022 05:30 AM    RBC 4.34 03/24/2022 05:30 AM    HEMOGLOBIN 13.2 03/24/2022 05:30 AM    HEMATOCRIT 40.0 03/24/2022 05:30 AM    MCV 92.2 03/24/2022 05:30 AM    MCH 30.4 03/24/2022 05:30 AM    MCHC 33.0 (L) 03/24/2022 05:30 AM    MPV 11.5 03/24/2022 05:30 AM    NEUTSPOLYS 83.60 (H) 03/24/2022 05:30 AM    LYMPHOCYTES 7.60 (L) 03/24/2022 05:30 AM    MONOCYTES 7.90 03/24/2022 05:30 AM    EOSINOPHILS 0.10 03/24/2022 05:30 AM    BASOPHILS 0.10 03/24/2022 05:30 AM      Lab Results   Component Value Date/Time    PROTHROMBTM 12.7 09/01/2015 01:05 PM    INR 0.95 09/01/2015 01:05 PM        Chronic obstructive pulmonary disease (HCC)  Has hx  S/P exacerbation (from [facility])  S/P steroids and abx  On chronic O2 supplementation of 3 liters at home -- but pt only was using sometimes  Note: long time smoker but states she quit 5 weeks  ago    Essential hypertension  BP a little elevated  On Cardizem  mg daily --> will increase to 180 mg daily (starting 3/25)  Note: home meds include Cardizem  mg daily  Cont to monitor    Hypokalemia  K+: 3.1 (3/24)  S/P KCL 40 meq x 1  Will start scheduled K+ & MVI supplements since pt not eating well  Monitor    Hyponatremia  Na: 129 --> 132 (3/24)  Likely 2nd to poor appetite and oral intake  Monitor for now    Hypothyroidism  TSH: wnl (3/22)  On Synthroid    Pneumonia  Diagnosed at [facility]  Has a cough  Afebrile  WBC's: 19.0 --> 15.2 --> 19.8 --> 19.2 (3/24)  CXR (3/20): showed bibasilar opacities  U/A (-)  BC (-)  On Ceftin and Zithro (thru 3/25)  Note: was on steroids at [facility] and just stopped 3/23  Monitor    Vitamin D deficiency  Vit D: 9  On supplements      This case has been discussed with the attending Physiatrist.    Thank you for the consultation.  Will follow the patient with you.

## 2022-03-24 NOTE — THERAPY
Occupational Therapy   Initial Evaluation     Patient Name: Antonia Stover  Age:  86 y.o., Sex:  female  Medical Record #: [id number]  Today's Date: 3/24/2022     Subjective    Pt resting in bed, agreeable to OT evaluation. Per CNA, pt's O2 sats at 85% on 5L while resting in bed. RT consulted and assessed pt during eval, requested that therapy increase O2 to 6-8L with activity as needed.      Objective       03/24/22 0701   Prior Living Situation   Prior Services Intermittent Physical Support for ADL Per Family   Housing / Facility Mobile Home   Steps Into Home   (ramp)   Steps In Home 0   Rail Unable To Determine At This Time   Elevator No   Bathroom Set up Bathtub / Shower Combination;Grab Bars   Equipment Owned Front-Wheel Walker;Oxygen;Grab Bar(s) In Tub / Shower   Lives with - Patient's Self Care Capacity Spouse   Comments pt very lethargic during evaluation; information should be verified with pt's family.   Prior Level of ADL Function   Self Feeding Independent   Grooming / Hygiene Independent   Bathing Independent   Dressing Independent   Toileting Independent   Comments pt reports independent PLOF for ADLs, chart indicates that pt was requiring increasing assistance leading up to hospitalization.   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   Cognition    Level of Consciousness Alert   Cognitive Pattern Assessment   Cognitive Pattern Assessment Used BIMS   Brief Interview for Mental Status (BIMS)   Repetition of Three Words (First Attempt) 3   Temporal Orientation: Year Correct   Temporal Orientation: Month Accurate within 5 days   Temporal Orientation: Day Incorrect   Recall: \"Sock\" Yes, after cueing (\"something to wear\")   Recall: \"Blue\" Yes, after cueing (\"a color\")   Recall: \"Bed\" No, could not recall   BIMS Summary Score 10   Vision Screen   Vision Not tested  (per chart review pt is legally blind)   Passive ROM Upper Body   Passive ROM Upper Body WDL   Active ROM Upper Body   Active ROM Upper Body  WDL   Dominant Hand Right   Strength Upper Body   Upper Body Strength  X   Gross Strength Generalized Weakness, Equal Bilaterally.    Sensation Upper Body   Upper Extremity Sensation  WDL   Upper Body Muscle Tone   Upper Body Muscle Tone  WDL   Balance Assessment   Sitting Balance (Static) Fair   Sitting Balance (Dynamic) Fair -   Standing Balance (Static) Poor   Standing Balance (Dynamic) Poor -   Weight Shift Sitting Fair   Weight Shift Standing Poor   Bed Mobility    Supine to Sit Moderate Assist   Sit to Stand Moderate Assist   Scooting Moderate Assist   Rolling Minimal Assist to Rt.   Coordination Upper Body   Coordination WDL   Eating   Assistance Needed Set-up / clean-up;Supervision   Physical Assistance Level No physical assistance   CARE Score - Eating 4   Eating Discharge Goal   Discharge Goal 6   Oral Hygiene   Assistance Needed Set-up / clean-up;Supervision   Physical Assistance Level No physical assistance   CARE Score - Oral Hygiene 4   Oral Hygiene Discharge Goal "   Discharge Goal 6   Shower/Bathe Self   Reason if not Attempted Medical concerns  (low O2 sats on 5L)   CARE Score - Shower/Bathe Self 88   Shower/Bathe Self Discharge Goal   Discharge Goal 4   Upper Body Dressing   Assistance Needed Physical assistance   Physical Assistance Level 26%-50%   CARE Score - Upper Body Dressing 3   Upper Body Dressing Discharge Goal   Discharge Goal 6   Lower Body Dressing   Assistance Needed Physical assistance   Physical Assistance Level Total assistance   CARE Score - Lower Body Dressing 1   Lower Body Dressing Discharge Goal   Discharge Goal 4   Putting On/Taking Off Footwear   Assistance Needed Physical assistance   Physical Assistance Level Total assistance   CARE Score - Putting On/Taking Off Footwear 1   Putting On/Taking Off Footwear Discharge Goal   Discharge Goal 4   Toileting Hygiene   Assistance Needed Physical assistance   Physical Assistance Level 51%-75%   CARE Score - Toileting Hygiene 2   Toileting Hygiene Discharge Goal   Discharge Goal 4   Toilet Transfer   Assistance Needed Physical assistance   Physical Assistance Level 26%-50%   CARE Score - Toilet Transfer 3   Toilet Transfer Discharge Goal   Discharge Goal 4   Hearing, Speech, and Vision   Expression of Ideas and Wants Some difficulty   Understanding Verbal and Non-Verbal Content Usually understands   Functional Level of Assist   Eating Stand by Assist   Eating Description Increased time;Set-up of equipment or meal/tube feeding;Supervision for safety   Grooming Moderate Assist   Grooming Description Increased time;Set-up of equipment;Supervision for safety  (set up for oral care, assist to brush hair)   Bathing   (NT-lethargic, O2 sats on 5L @85%-notified nursing and RT)   Upper Body Dressing Moderate Assist   Upper Body Dressing Description Assit with threading arms through sleeves;Assist with pulling shirt over head;Initial preparation for task;Increased time;Supervision for safety;Set-up of equipment   Lower  Body Dressing Total Assist   Lower Body Dressing Description Assist with threading into pant leg;Increased time;Initial preparation for task;Set-up of equipment;Supervision for safety;Verbal cueing   Toileting Maximal Assist   Toileting Description Assist to pull pants up;Assist to pull pants down;Assist for standing balance;Grab bar;Increased time;Set-up of equipment;Supervision for safety;Verbal cueing   Bed, Chair, Wheelchair Transfer Moderate Assist   Bed Chair Wheelchair Transfer Description Increased time;Requires lift;Set-up of equipment;Supervision for safety;Verbal cueing   Toilet Transfers Moderate Assist   Toilet Transfer Description Grab bar;Increased time;Initial preparation for task;Requires lift;Set-up of equipment;Supervision for safety;Verbal cueing  (w/c<>toilet stand step via GB)   Tub / Shower Transfers Unable to Participate  (NT-lethargic, O2 sats on 5L @85%-notified nursing and RT)   Problem List   Problem List Decreased Active Daily Living Skills;Decreased Homemaking Skills;Decreased Upper Extremity Strength Right;Decreased Upper Extremity Strength Left;Decreased Functional Mobility;Decreased Activity Tolerance;Impaired Cognitive Function;Impaired Vision;Impaired Postural Control / Balance;Other (Comments)  (supplemental O2)   Precautions   Precautions Fall Risk;Other (See Comments)   Comments High flow O2-5L O2-increase to 6-8L with activity as needed, legally blind, 1200mL fluid restriction, covid r/o   Current Discharge Plan   Current Discharge Plan Return to Prior Living Situation   Benefit    Therapy Benefit Patient Would Benefit from Inpatient Rehab Occupational Therapy to Maximize [place] with ADLs, IADLs and Functional Mobility.   Interdisciplinary Plan of Care Collaboration   IDT Collaboration with  Certified Nursing Assistant;Nursing;Physical Therapist;Respiratory Therapist;Speech Therapist   Patient Position at End of Therapy Seated;Self Releasing Lap Belt Applied;Chair Alarm  On;Call Light within Reach;Tray Table within Reach   Collaboration Comments CLOF, POC, vitals, 02 needs   Equipment Needs   Assistive Device / DME Parallel Bars;Front-Wheel Walker;Shower Chair;Tub Transfer Bench;Grab Bars In Shower / Tub;Grab Bars By Toilet;Raised Toilet Seat   Adaptive Equipment Reacher;Sock Aide;Dressing Stick;Long Handled Shoe Horn;Other (Comments)  (tbd)   Strengths & Barriers   Strengths Independent prior level of function;Pleasant and cooperative;Supportive family;Willingly participates in therapeutic activities   Barriers Decreased endurance;Fatigue;Generalized weakness;Impaired activity tolerance;Impaired appetite/intake;Impaired balance;Impaired functional cognition;Limited mobility  (supplemental O2, fear of falling)   OT Total Time Spent   OT Individual Total Time Spent (Mins) 60   OT Charge Group   Charges Yes   OT Self Care / ADL 1   OT Evaluation OT Evaluation Mod       Assessment  Patient is 86 y.o. female with a diagnosis of severe functional debility after an acute hospitalization for COPD exacerbation as well as pneumonia.  .  Additional factors influencing patient status / progress (ie: cognitive factors, co-morbidities, social support, etc): Per H&P pt has PMHx significant for COPD on home oxygen 3 L, hypothyroidism, hypertension, chronic kidney disease, current tobacco use.      Pt lives in a mobile home with ramp entry with her spouse. Per chart review pt was using a FWW for mobility, and was requiring increased assistance with ADLs leading up to hospitalization. Pt has a tub/shower with GB, no chair/bench. Pt is legally blind, and does not drive.     During OT evaluation pt required total A to SBA with ADLs, and mod A with bathroom transfers from w/c level. She is functioning below her baseline and presents with generalized weakness, decreased endurance, fatigue, poor standing tolerance/balance, shortness of breath with activity, and requires high flow supplemental O2. Pt O2  sats 85-91% on 5-6 L. RN/RT notified and RT assess pt during session.    Plan  Recommend Occupational Therapy  minutes per day 5-7 days per week for 2 weeks for the following treatments:  OT Group Therapy, OT Self Care/ADL, OT Cognitive Skill Dev, OT Community Reintegration, OT Manual Ther Technique, OT Neuro Re-Ed/Balance, OT Therapeutic Activity, OT Evaluation and OT Therapeutic Exercise.    Passport items to be completed:  Perform bathroom transfers, complete dressing, complete feeding, get ready for the day, prepare a simple meal, participate in household tasks, adapt home for safety needs, demonstrate home exercise program, complete caregiver training     Goals:  Long term and short term goals have been discussed with patient and they are in agreement.    Occupational Therapy Goals (Active)       Problem: Bathing       Dates: Start: 03/24/22         Goal: STG-Within one week, patient will bathe with mod A using AE as needed.        Dates: Start: 03/24/22               Problem: Dressing       Dates: Start: 03/24/22         Goal: STG-Within one week, patient will dress LB with mod A using AE as needed.        Dates: Start: 03/24/22               Problem: Functional Transfers       Dates: Start: 03/24/22         Goal: STG-Within one week, patient will transfer to toilet with min A using AE/DME as needed.        Dates: Start: 03/24/22               Problem: OT Long Term Goals       Dates: Start: 03/24/22         Goal: LTG-By discharge, patient will complete basic self care tasks at supervision to mod I level using AE as needed.        Dates: Start: 03/24/22            Goal: LTG-By discharge, patient will perform bathroom transfers at supervision to mod I level using AE/DME as needed.        Dates: Start: 03/24/22               Problem: Toileting       Dates: Start: 03/24/22         Goal: STG-Within one week, patient will complete toileting tasks with mod A using AE as needed.        Dates: Start: 03/24/22

## 2022-03-24 NOTE — CARE PLAN
Problem: Fall Risk - Rehab  Goal: Patient will remain free from falls  Outcome: Progressing  Note: Pt uses call light consistently and appropriately. Waits for assistance does not attempt self transfer this shift. Able to verbalize needs.    The patient is Stable - Low risk of patient condition declining or worsening    Shift Goals  Clinical Goals: safety  Patient Goals: free of falls    Progress made toward(s) clinical / shift goals:  good safety awareness    Patient is not progressing towards the following goals:

## 2022-03-24 NOTE — THERAPY
"Speech Language Pathology   Initial Assessment     Patient Name: Antonia Stover  AGE:  86 y.o., SEX:  female  Medical Record #: [id number]  Today's Date: 3/24/2022     Subjective    The patient is a 86 y.o. female with a past medical history of COPD on home oxygen 3 L, hypothyroidism, hypertension, chronic kidney disease, current tobacco use; now admitted for acute inpatient rehabilitation with severe functional debility after an acute hospitalization for COPD exacerbation as well as pneumonia.       On admission the patient and medical record report she presented to the hospital on 3/20 with complaints of shortness of breath for the previous 2 days.  Chest x-ray with bibasilar opacities concerning for small pleural effusions, atelectasis, and/or pneumonia.  She had elevated white blood cell count of 19, hyponatremia with a sodium of 130, low chloride 88, elevated alk phos at 144, BNP of 679.  Patient was admitted for pneumonia and a COPD with acute exacerbation.  She was treated with steroids, oxygen, and inhalers.  She was seen and evaluated by pulmonology.     In addition patient noted to have severe protein calorie malnutrition, ferritin 5, low BMI of 15, for which she was started on supplements.     Patient currently complains of cough and shortness of breath.  She reports she is on oxygen at home but only uses it intermittently initially noticed likely is not a good idea.  Patient current reports she usually does not like to use inhalers as they are \"chemicals\".  She confirms she quit smoking about 5 weeks ago.  She is willing to try some Symbicort to see if it helps her breathe better.  She reports about a 20 pound weight loss recently.  She currently denies any pain though reports she is very uncomfortable in this bed.  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     Objective       03/24/22 1003   Prior Living Situation   Prior Services Intermittent Physical Support for ADL Per " "Family   Housing / Facility Mobile Home   Lives with - Patient's Self Care Capacity Spouse   Prior Level Of Function   Communication Within Functional Limits   Hearing Impaired Both Ears   Vision   (legally blind)   Patient's Primary Language English   Education Completed College   Occupation (Pre-Hospital Vocational) Retired Due To Age   Cognition   Simple Attention Within Functional Limits (6-7)   Moderate Attention Minimal (4)   Complex Attention Severe (2)   Orientation  Moderate (3)   Verbal Short Term Memory 5 Minutes   Cognitive Pattern Assessment   Cognitive Pattern Assessment Used BIMS   Brief Interview for Mental Status (BIMS)   Repetition of Three Words (First Attempt) 3   Temporal Orientation: Year Correct   Temporal Orientation: Month Accurate within 5 days   Temporal Orientation: Day Incorrect   Recall: \"Sock\" Yes, after cueing (\"something to wear\")   Recall: \"Blue\" Yes, after cueing (\"a color\")   Recall: \"Bed\" Yes, after cueing (\"a piece of furniture\")   BIMS Summary Score 11   Functional Level of Assist   Comprehension Supervision   Comprehension Description Verbal cues  (additional time)   Expression Supervision  (additional time)   Social Interaction Modified Independent   Problem Solving Moderate Assist   Memory Moderate Assist   Current Discharge Plan   Current Discharge Plan Return to Prior Living Situation   Interdisciplinary Plan of Care Collaboration   IDT Collaboration with  Nursing;Physician;Family / Caregiver;Occupational Therapist   Patient Position at End of Therapy Seated;Chair Alarm On;Tray Table within Reach;Phone within Reach;Call Light within Reach   Collaboration Comments ENRICO BALLARD   SLP Total Time Spent   SLP Individual Total Time Spent (Mins) 60   Evaluation Charges   SLP Speech Language Evaluation Speech Sound Language Comprehension       Assessment    Patient is 86 y.o. female with a diagnosis of debility.  Additional factors influencing patient status/progress (ie: cognitive " "factors, co-morbidities, social support, etc): per discussion with pt and phone call with SO, due to pts vision SO manages all meds, finances and cooking as well as driving.    Due to pt being legally blind, non standardized assessment completed. Pt with moderate cognitive impairments overall. Difficulty noted with complex attention tasks, serial subtraction and required semantic cues for 5/5 with 5 min delay. Pt with slow processing benefiting from additional time. Due to current visual impairments and following discussion with pt and pts SO, no further cognitive intervention is recommended nor warranted at this time, rec: emphasis of rehab be on physical recovery with SO verbalizing goal for pt to be indep with use of walker. SO also reported deline in pts motivation and function approx one year prior when pt wanted to move to  however SO disagreed. Per SO pt remains in her room or the kitchen, will not come out into living room with him, is very particular \"like OCD\" and makes random requests. Per SO he sat in a recliner that was in her room, she had him take it out and disinfect and then replace. SO requesting phycology assessment for additional assistance and strategies.     Plan  Recommend Speech Therapy 30-60 minutes per day  1  days per week for 1days for the following treatments: cognitive evaluation     Goals:  Long term and short term goals have been discussed with patient and spouse and they are in agreement.    Speech Therapy Problems (Active)       There are no active problems.             "

## 2022-03-24 NOTE — PROGRESS NOTES
"Rehab Progress Note     Date of Service: 3/24/2022  Chief Complaint: Follow-up debility    Interval Events (Subjective)    Patient seen and examined today in her room.  She reports she slept well last night and denies any pain.  Per speech therapy appears to be at cognitive baseline.  Hospitalist consulted for patient's multiple medical comorbidities.  Patient denies any depression or sadness.  Patient has no complaints today.      ROS: No changes to bowel, bladder, pain, mood, or sleep.       Objective:  VITAL SIGNS: /61   Pulse 89   Temp 37 °C (98.6 °F) (Temporal)   Resp 16   Ht 1.575 m (5' 2\")   Wt 38.5 kg (84 lb 14 oz)   SpO2 94%   BMI 15.52 kg/m²   Gen: alert, no apparent distress, cachectic  CV: Regular rate and rhythm, no peripheral edema  Respiratory: Clear to auscultation bilaterally with intermittent loose productive cough  MSK: Severe diffuse muscle atrophy  Neuro: notable for very hard of hearing, delayed responses      Recent Results (from the past 72 hour(s))   CBC WITH DIFFERENTIAL    Collection Time: 03/22/22 12:30 AM   Result Value Ref Range    WBC 19.8 (H) 4.8 - 10.8 K/uL    RBC 4.11 (L) 4.20 - 5.40 M/uL    Hemoglobin 12.6 12.0 - 16.0 g/dL    Hematocrit 37.8 37.0 - 47.0 %    MCV 92.0 81.4 - 97.8 fL    MCH 30.7 27.0 - 33.0 pg    MCHC 33.3 (L) 33.6 - 35.0 g/dL    RDW 47.9 35.9 - 50.0 fL    Platelet Count 344 164 - 446 K/uL    MPV 11.4 9.0 - 12.9 fL    Neutrophils-Polys 86.50 (H) 44.00 - 72.00 %    Lymphocytes 5.00 (L) 22.00 - 41.00 %    Monocytes 7.60 0.00 - 13.40 %    Eosinophils 0.00 0.00 - 6.90 %    Basophils 0.10 0.00 - 1.80 %    Immature Granulocytes 0.80 0.00 - 0.90 %    Nucleated RBC 0.00 /100 WBC    Neutrophils (Absolute) 17.16 (H) 2.00 - 7.15 K/uL    Lymphs (Absolute) 0.99 (L) 1.00 - 4.80 K/uL    Monos (Absolute) 1.51 (H) 0.00 - 0.85 K/uL    Eos (Absolute) 0.00 0.00 - 0.51 K/uL    Baso (Absolute) 0.02 0.00 - 0.12 K/uL    Immature Granulocytes (abs) 0.16 (H) 0.00 - 0.11 K/uL    " NRBC (Absolute) 0.00 K/uL   Comp Metabolic Panel    Collection Time: 03/22/22 12:30 AM   Result Value Ref Range    Sodium 129 (L) 135 - 145 mmol/L    Potassium 3.6 3.6 - 5.5 mmol/L    Chloride 89 (L) 96 - 112 mmol/L    Co2 28 20 - 33 mmol/L    Anion Gap 12.0 7.0 - 16.0    Glucose 107 (H) 65 - 99 mg/dL    Bun 16 8 - 22 mg/dL    Creatinine 0.74 0.50 - 1.40 mg/dL    Calcium 8.8 8.5 - 10.5 mg/dL    AST(SGOT) 25 12 - 45 U/L    ALT(SGPT) 7 2 - 50 U/L    Alkaline Phosphatase 119 (H) 30 - 99 U/L    Total Bilirubin 0.3 0.1 - 1.5 mg/dL    Albumin 3.5 3.2 - 4.9 g/dL    Total Protein 6.3 6.0 - 8.2 g/dL    Globulin 2.8 1.9 - 3.5 g/dL    A-G Ratio 1.3 g/dL   ESTIMATED GFR    Collection Time: 03/22/22 12:30 AM   Result Value Ref Range    GFR (CKD-EPI) 79 >60 mL/min/1.73 m 2   TSH WITH REFLEX TO FT4    Collection Time: 03/22/22  3:57 PM   Result Value Ref Range    TSH 4.480 0.380 - 5.330 uIU/mL   CoV-2 and Flu A/B by PCR (24 hour In-House): Collect NP swab in [facility]    Collection Time: 03/23/22 12:17 PM    Specimen: Nasopharyngeal; Respirate   Result Value Ref Range    SARS-CoV-2 Source NP Swab    CBC with Differential    Collection Time: 03/24/22  5:30 AM   Result Value Ref Range    WBC 19.2 (H) 4.8 - 10.8 K/uL    RBC 4.34 4.20 - 5.40 M/uL    Hemoglobin 13.2 12.0 - 16.0 g/dL    Hematocrit 40.0 37.0 - 47.0 %    MCV 92.2 81.4 - 97.8 fL    MCH 30.4 27.0 - 33.0 pg    MCHC 33.0 (L) 33.6 - 35.0 g/dL    RDW 48.7 35.9 - 50.0 fL    Platelet Count 300 164 - 446 K/uL    MPV 11.5 9.0 - 12.9 fL    Neutrophils-Polys 83.60 (H) 44.00 - 72.00 %    Lymphocytes 7.60 (L) 22.00 - 41.00 %    Monocytes 7.90 0.00 - 13.40 %    Eosinophils 0.10 0.00 - 6.90 %    Basophils 0.10 0.00 - 1.80 %    Immature Granulocytes 0.70 0.00 - 0.90 %    Nucleated RBC 0.00 /100 WBC    Neutrophils (Absolute) 16.10 (H) 2.00 - 7.15 K/uL    Lymphs (Absolute) 1.46 1.00 - 4.80 K/uL    Monos (Absolute) 1.52 (H) 0.00 - 0.85 K/uL    Eos (Absolute) 0.01 0.00 - 0.51 K/uL    Baso  (Absolute) 0.02 0.00 - 0.12 K/uL    Immature Granulocytes (abs) 0.13 (H) 0.00 - 0.11 K/uL    NRBC (Absolute) 0.00 K/uL   Comp Metabolic Panel (CMP)    Collection Time: 03/24/22  5:30 AM   Result Value Ref Range    Sodium 132 (L) 135 - 145 mmol/L    Potassium 3.1 (L) 3.6 - 5.5 mmol/L    Chloride 89 (L) 96 - 112 mmol/L    Co2 33 20 - 33 mmol/L    Anion Gap 10.0 7.0 - 16.0    Glucose 81 65 - 99 mg/dL    Bun 13 8 - 22 mg/dL    Creatinine 0.56 0.50 - 1.40 mg/dL    Calcium 9.0 8.5 - 10.5 mg/dL    AST(SGOT) 36 12 - 45 U/L    ALT(SGPT) 13 2 - 50 U/L    Alkaline Phosphatase 121 (H) 30 - 99 U/L    Total Bilirubin 0.4 0.1 - 1.5 mg/dL    Albumin 3.5 3.2 - 4.9 g/dL    Total Protein 6.4 6.0 - 8.2 g/dL    Globulin 2.9 1.9 - 3.5 g/dL    A-G Ratio 1.2 g/dL   Magnesium    Collection Time: 03/24/22  5:30 AM   Result Value Ref Range    Magnesium 1.6 1.5 - 2.5 mg/dL   Vitamin D, 25-hydroxy (blood)    Collection Time: 03/24/22  5:30 AM   Result Value Ref Range    25-Hydroxy   Vitamin D 25 9 (L) 30 - 100 ng/mL   ESTIMATED GFR    Collection Time: 03/24/22  5:30 AM   Result Value Ref Range    GFR (CKD-EPI) 90 >60 mL/min/1.73 m 2       Current Facility-Administered Medications   Medication Frequency   • vitamin D2 (Ergocalciferol) (Drisdol) capsule 50,000 Units Q7 DAYS   • [START ON 3/25/2022] potassium chloride SA (Kdur) tablet 20 mEq DAILY   • [START ON 3/25/2022] DILTIAZem CD (CARDIZEM CD) capsule 180 mg Q DAY   • hydrOXYzine HCl (ATARAX) tablet 50 mg Q6HRS PRN   • melatonin tablet 3 mg HS PRN   • Respiratory Therapy Consult Continuous RT   • Pharmacy Consult Request ...Pain Management Review 1 Each PHARMACY TO DOSE   • hydrALAZINE (APRESOLINE) tablet 10 mg Q8HRS PRN   • acetaminophen (Tylenol) tablet 650 mg Q4HRS PRN   • lactulose 20 GM/30ML solution 30 mL QDAY PRN   • omeprazole (PRILOSEC) capsule 20 mg QAM AC   • artificial tears ophthalmic solution 1 Drop PRN   • benzocaine-menthol (Cepacol) lozenge 1 Lozenge Q2HRS PRN   • mag  hydrox-al hydrox-simeth (MAALOX PLUS ES or MYLANTA DS) suspension 20 mL Q2HRS PRN   • ondansetron (ZOFRAN ODT) dispertab 4 mg 4X/DAY PRN    Or   • ondansetron (ZOFRAN) syringe/vial injection 4 mg 4X/DAY PRN   • traZODone (DESYREL) tablet 50 mg QHS PRN   • sodium chloride (OCEAN) 0.65 % nasal spray 2 Spray PRN   • azithromycin (ZITHROMAX) tablet 250 mg DAILY   • clopidogrel (PLAVIX) tablet 75 mg DAILY   • ipratropium-albuterol (DUONEB) nebulizer solution Q2HRS PRN (RT)   • levothyroxine (SYNTHROID) tablet 75 mcg AM ES   • pravastatin (PRAVACHOL) tablet 40 mg Q EVENING   • senna-docusate (PERICOLACE or SENOKOT S) 8.6-50 MG per tablet 2 Tablet BID    And   • polyethylene glycol/lytes (MIRALAX) PACKET 1 Packet QDAY PRN    And   • magnesium hydroxide (MILK OF MAGNESIA) suspension 30 mL QDAY PRN    And   • bisacodyl (DULCOLAX) suppository 10 mg QDAY PRN   • budesonide-formoterol (SYMBICORT) 160-4.5 MCG/ACT inhaler 2 Puff BID (RT)   • cefUROXime (CEFTIN) tablet 500 mg Q12HRS   • enoxaparin (LOVENOX) inj 40 mg DAILY       Orders Placed This Encounter   Procedures   • Diet Order Diet: Regular; Fluid modifications: (optional): 1200 ml Fluid Restriction     Standing Status:   Standing     Number of Occurrences:   1     Order Specific Question:   Diet:     Answer:   Regular [1]     Order Specific Question:   Fluid modifications: (optional)     Answer:   1200 ml Fluid Restriction [8]       Assessment:  Active Hospital Problems    Diagnosis    • Hypokalemia    • Vitamin D deficiency    • Pneumonia    • Debility    • Severe protein-calorie malnutrition (HCC)    • Hyponatremia    • Failure to thrive in adult    • Acute hypoxemic respiratory failure (HCC)    • Hard of hearing    • Essential hypertension    • AMD (age-related macular degeneration), bilateral    • Chronic obstructive pulmonary disease (HCC)    • Tobacco dependence    • Hypothyroidism      This patient is a 86 y.o. female admitted for acute inpatient rehabilitation  with Debility after hospital stay for COPD exacerbation as well as pneumonia.    Medical Decision Making and Plan:    Debility  PT/OT, 1.5 hr each discipline, 5 days per week    COPD exacerbation  Continue prednisone  Trial of Symbicort  Appreciate hospitalist assistance     Community-acquired pneumonia  Continue Zithromax  Continue cefuroxime  Appreciate hospitalist assistance     Acute hypoxic respiratory failure  Titrate oxygen  Respiratory therapy, appreciate assistance     Hypothyroidism  Levothyroxine     Hyponatremia, slightly improved  Continue fluid restrictions  Urine sodium and osmoles pending  Likely from malnutrition    Hypokalemia  Replete    Severe protein calorie malnutrition  Failure to thrive  Dietitian consult  Added supplements     Hypertension  Restart home diltiazem  Appreciate hospitalist assistance    Vitamin D deficiency, 9  Start high-dose supplementation     Leukocytosis  Likely from steroids  Check urinalysis, pending    GI prophylaxis  Omeprazole    Bowel program  Continue bowel medications  Last BM 3/23    Bladder program  Check PVRs -125  Bladder scan for no voids  ICP for over 400 cc  Scheduled toileting    DVT prophylaxis  Lovenox    Total time:  35 minutes.  I spent greater than 50% of the time for patient care, counseling, and coordination on this date, including patient face-to face time, unit/floor time with review of records/pertinent lab data and studies, as well as discussing diagnostic evaluation/work up, planned therapeutic interventions, and future disposition of care, as per the interval events/subjective and the assessment and plan as noted above.      Maggie Joel M.D.   Physical Medicine and Rehabilitation

## 2022-03-24 NOTE — ASSESSMENT & PLAN NOTE
Diagnosed at [facility]  Has a residual cough  No diarrhea  No abd discomfort  Has been afebrile  WBC's: 19.0 --> 15.2 --> 19.8 --> 19.2 (3/24) --> 21.9 (3/28)  CXR (3/20): showed bibasilar opacities  CXR (3/28): small right pleural effusion and moderate left pleural effusion which has increased since prior exam; underlying atelectasis or consolidation  U/A (-)  BC (-)  Procal: 0.16 (3/20) --> 0.49 (3/28)  S/P Ceftin and Zithro (3/25)  F/U repeat U/A   Note: was on steroids at [facility] and just stopped 3/24  Monitor

## 2022-03-24 NOTE — FLOWSHEET NOTE
03/24/22 0727   Events/Summary/Plan   Events/Summary/Plan SVN PRN Given and MDI   Vital Signs   Pulse 82   Respiration 16   Pulse Oximetry 91 %   $ Pulse Oximetry (Spot Check) Yes   Respiratory Assessment   Respiratory Pattern Within Normal Limits   Level of Consciousness Alert   Chest Exam   Work Of Breathing / Effort Within Normal Limits   Breath Sounds   RUL Breath Sounds Clear   RML Breath Sounds Clear   RLL Breath Sounds Clear   DIONNE Breath Sounds Clear   LLL Breath Sounds Clear   Secretions   Cough Moist;Non Productive   How Sputum Obtained Cough on Request   Oxygen   O2 (LPM) 5   O2 Delivery Device Silicone Nasal Cannula

## 2022-03-24 NOTE — CARE PLAN
The patient is Watcher - Medium risk of patient condition declining or worsening    Shift Goals  Clinical Goals: safety  Patient Goals: free of falls    Progress made toward(s) clinical / shift goals:      Problem: Pain - Standard  Goal: Alleviation of pain or a reduction in pain to the patient’s comfort goal  Outcome: Progressing  Note: No reports of pain this evening.

## 2022-03-24 NOTE — ASSESSMENT & PLAN NOTE
Na: 129 --> 132 (3/24) --> 130 (3/28)  Likely 2nd to poor appetite and oral intake  Monitor for now

## 2022-03-24 NOTE — THERAPY
03/24/22 1359   Prior Living Situation   Prior Services Intermittent Physical Support for ADL Per Family   Housing / Facility Mobile Home   Steps Into Home 0  (Ramp)   Steps In Home 0   Rail Unable To Determine At This Time   Elevator No   Bathroom Set up Bathtub / Shower Combination;Grab Bars   Equipment Owned Front-Wheel Walker;Grab Bar(s) In Tub / Shower;Oxygen   Lives with - Patient's Self Care Capacity Spouse   Prior Functioning: Everyday Activities   Self Care Independent   Indoor Mobility (Ambulation) Independent   Stairs Unknown   Functional Cognition Unknown   Prior Device Use Walker   Vitals   O2 (LPM) 5   O2 Delivery Device Nasal Cannula   Pain 0 - 10 Group   Therapist Pain Assessment 0   Cognition    Level of Consciousness Alert   ABS (Agitated Behavior Scale)   Agitated Behavior Scale Performed No   Passive ROM Lower Body   Passive ROM Lower Body WDL   Active ROM Lower Body    Active ROM Lower Body  WDL   Strength Lower Body   Lower Body Strength  X   Gross Strength Generalized Weakness, Equal Bilaterally   Sensation Lower Body   Lower Extremity Sensation   WDL   Lower Body Muscle Tone   Lower Body Muscle Tone  Not Tested   Balance Assessment   Sitting Balance (Static) Fair   Sitting Balance (Dynamic) Fair -   Standing Balance (Static) Poor   Standing Balance (Dynamic) Poor -   Weight Shift Sitting Fair   Weight Shift Standing Poor   Bed Mobility    Supine to Sit Minimal Assist   Sit to Supine Minimal Assist   Sit to Stand Moderate Assist   Scooting Moderate Assist   Rolling Minimal Assist to Rt.;Minimum Assist to Lt.   Neurological Concerns   Neurological Concerns No   Coordination Lower Body    Coordination Lower Body  Not Tested   Roll Left and Right   Assistance Needed Physical assistance;Verbal cues;Adaptive equipment   Physical Assistance Level 25% or less   CARE Score - Roll Left and Right 3   Roll Left and Right Discharge Goal   Discharge Goal 6   Sit to Lying   Assistance Needed Physical  assistance;Verbal cues;Adaptive equipment   Physical Assistance Level 25% or less   CARE Score - Sit to Lying 3   Sit to Lying Discharge Goal   Discharge Goal 6   Lying to Sitting on Side of Bed   Assistance Needed Physical assistance;Verbal cues   Physical Assistance Level 25% or less   CARE Score - Lying to Sitting on Side of Bed 3   Lying to Sitting on Side of Bed Discharge Goal   Discharge Goal 6   Sit to Stand   Assistance Needed Physical assistance;Verbal cues;Adaptive equipment   Physical Assistance Level 25% or less   CARE Score - Sit to Stand 3   Sit to Stand Discharge Goal   Discharge Goal 6   Chair/Bed-to-Chair Transfer   Assistance Needed Physical assistance;Verbal cues;Supervision;Adaptive equipment   Physical Assistance Level 25% or less   CARE Score - Chair/Bed-to-Chair Transfer 3   Chair/Bed-to-Chair Transfer Discharge Goal   Discharge Goal 6   Toilet Transfer   Assistance Needed Physical assistance;Verbal cues   Physical Assistance Level 25% or less   CARE Score - Toilet Transfer 3   Toilet Transfer Discharge Goal   Discharge Goal 4   Car Transfer   Reason if not Attempted Safety concerns   CARE Score - Car Transfer 88   Car Transfer Discharge Goal   Discharge Goal 5   Walk 10 Feet   Reason if not Attempted Safety concerns   CARE Score - Walk 10 Feet 88   Walk 10 Feet Discharge Goal   Discharge Goal 5   Walk 50 Feet with Two Turns   Reason if not Attempted Safety concerns   CARE Score - Walk 50 Feet with Two Turns 88   Walk 50 Feet with Two Turns Discharge Goal   Discharge Goal 5   Walk 150 Feet   Reason if not Attempted Safety concerns   CARE Score - Walk 150 Feet 88   Walk 150 Feet Discharge Goal   Discharge Goal 5   Walking 10 Feet on Uneven Surfaces   Reason if not Attempted Safety concerns   CARE Score - Walking 10 Feet on Uneven Surfaces 88   Walking 10 Feet on Uneven Surfaces Discharge Goal   Discharge Goal 5   1 Step (Curb)   Reason if not Attempted Safety concerns   CARE Score - 1 Step  (Curb) 88   1 Step (Curb) Discharge Goal   Discharge Goal 4   4 Steps   Reason if not Attempted Activity not applicable   CARE Score - 4 Steps 9   4 Steps Discharge Goal   Discharge Goal 9   12 Steps   Reason if not Attempted Activity not applicable   CARE Score - 12 Steps 9   12 Steps Discharge Goal   Discharge Goal 9   Picking Up Object   Reason if not Attempted Safety concerns   CARE Score - Picking Up Object 88   Picking Up Object Discharge Goal   Discharge Goal 6   Wheel 50 Feet with Two Turns   Reason if not Attempted Activity not applicable   CARE Score - Wheel 50 Feet with Two Turns 9   Wheel 50 Feet with Two Turns Discharge Goal   Discharge Goal 9   Wheel 150 Feet   Reason if not Attempted Activity not applicable   CARE Score - Wheel 150 Feet 9   Wheel 150 Feet Discharge Goal   Discharge Goal 9   Gait Functional Level of Assist    Gait Level Of Assist Unable to Participate   Wheelchair Functional Level of Assist   Wheelchair Assist Refused   Distance Wheelchair (Feet or Distance) 0   Stairs Functional Level of Assist   Level of Assist with Stairs Unable to Participate   Transfer Functional Level of Assist   Bed, Chair, Wheelchair Transfer Moderate Assist   Bed Chair Wheelchair Transfer Description Adaptive equipment;Assist with two limbs;Increased time;Set-up of equipment;Supervision for safety;Verbal cueing   Toilet Transfers Moderate Assist   Toilet Transfer Description Adaptive equipment;Grab bar;Increased time;Supervision for safety;Verbal cueing   Problem List    Problems Impaired Bed Mobility;Impaired Transfers;Impaired Ambulation;Functional ROM Deficit;Functional Strength Deficit;Impaired Balance;Impaired Vision;Decreased Activity Tolerance;Motor Planning / Sequencing   Precautions   Precautions Fall Risk   Comments High flow O2 5 L/min increased to 6 to 8 L with activity as needed, legally blind, 1200 mL fluid restriction   Current Discharge Plan   Current Discharge Plan Return to Prior Living  Situation   Interdisciplinary Plan of Care Collaboration   IDT Collaboration with  Occupational Therapist;Respiratory Therapist   Collaboration Comments CLOF, high demand O2   Benefit   Therapy Benefit Patient Would Benefit from Inpatient Rehabilitation Physical Therapy to Maximize Functional [place] with ADLs, IADLs and Mobility.   PT Total Time Spent   PT Individual Total Time Spent (Mins) 60   PT Charge Group   Charges Yes   PT Therapeutic Activities 1   PT Evaluation PT Evaluation Low   Physical Therapy   Initial Evaluation     Patient Name: Antonia Stover  Age:  86 y.o., Sex:  female  Medical Record #: [id number]  Today's Date: 3/24/2022     Subjective    The patient agreed to the PT evaluation.     Objective    The patient participated in the PT evaluation.  She was limited by COVID-19 protocol, debility, high demand O2 at 5 L/min when at rest and 6 to 8 L/min with activity.  The patient was willing to participate in the evaluation.  She agreed to ambulation with a walker which was attempted twice.  However she was unable to walk when she stood up with a walker.  She and her  left hand a mobile home with a ramp access.  They live in [place].    Assessment  Patient is 86 y.o. female with a diagnosis of debility, COPD exacerbation with pneumonia.  Additional factors influencing patient status / progress (ie: cognitive factors, co-morbidities, social support, etc): Severely impaired tolerance for activity, high oxygen demand at 5 L/min at resting and 6 to 8 L with activity, impaired bed mobility, transfers, gait, balance, vision legally blind, fall risk.      Plan  Recommend Physical Therapy  minutes per day 5-7 days per week for 3 weeks for the following treatments:  PT Gait Training, PT Therapeutic Exercises, PT Therapeutic Activity, and PT Evaluation.    Passport items to be completed:  [unfilled]    Goals:  Long term and short term goals have been discussed with patient and they are in  agreement.    Physical Therapy Problems (Active)       Problem: Mobility       Dates: Start: 03/24/22         Goal: STG-Within one week, patient will ambulate fww Lyn 30 FT       Dates: Start: 03/24/22               Problem: Mobility Transfers       Dates: Start: 03/24/22         Goal: STG-Within one week, patient will transfer bed to chair fww CGA       Dates: Start: 03/24/22            Goal: STG-Within one week, patient will move supine to/from sit SBA       Dates: Start: 03/24/22               Problem: PT-Long Term Goals       Dates: Start: 03/24/22         Goal: LTG-By discharge, patient will ambulate fww 150 FT supervision       Dates: Start: 03/24/22            Goal: LTG-By discharge, patient will transfer one surface to another fww supervision       Dates: Start: 03/24/22            Goal: LTG-By discharge, patient will transfer in/out of a car fww supervision       Dates: Start: 03/24/22            Goal: LTG-By discharge, patient will ambulate fww up/down ramp to access home       Dates: Start: 03/24/22

## 2022-03-24 NOTE — ASSESSMENT & PLAN NOTE
Has hx  S/P exacerbation (from [facility])  S/P steroids and abx  On chronic O2 supplementation of 3 liters at home -- but pt only was using sometimes  Note: long time smoker but states she quit 5 weeks ago

## 2022-03-25 LAB
AMORPH CRY #/AREA URNS HPF: PRESENT /HPF
APPEARANCE UR: ABNORMAL
BACTERIA #/AREA URNS HPF: ABNORMAL /HPF
BACTERIA BLD CULT: NORMAL
BACTERIA BLD CULT: NORMAL
BILIRUB UR QL STRIP.AUTO: NEGATIVE
COLOR UR: YELLOW
EPI CELLS #/AREA URNS HPF: ABNORMAL /HPF
GLUCOSE UR STRIP.AUTO-MCNC: NEGATIVE MG/DL
HYALINE CASTS #/AREA URNS LPF: ABNORMAL /LPF
KETONES UR STRIP.AUTO-MCNC: ABNORMAL MG/DL
LEUKOCYTE ESTERASE UR QL STRIP.AUTO: NEGATIVE
MICRO URNS: ABNORMAL
NITRITE UR QL STRIP.AUTO: NEGATIVE
OSMOLALITY UR: 613 MOSM/KG H2O (ref 300–900)
PH UR STRIP.AUTO: 6 [PH] (ref 5–8)
PROT UR QL STRIP: NEGATIVE MG/DL
RBC # URNS HPF: ABNORMAL /HPF
RBC UR QL AUTO: NEGATIVE
SIGNIFICANT IND 70042: NORMAL
SIGNIFICANT IND 70042: NORMAL
SITE SITE: NORMAL
SITE SITE: NORMAL
SODIUM UR-SCNC: 72 MMOL/L
SOURCE SOURCE: NORMAL
SOURCE SOURCE: NORMAL
SP GR UR STRIP.AUTO: 1.02
UROBILINOGEN UR STRIP.AUTO-MCNC: 0.2 MG/DL
WBC #/AREA URNS HPF: ABNORMAL /HPF

## 2022-03-25 PROCEDURE — 99233 SBSQ HOSP IP/OBS HIGH 50: CPT | Performed by: PHYSICAL MEDICINE & REHABILITATION

## 2022-03-25 PROCEDURE — 97110 THERAPEUTIC EXERCISES: CPT

## 2022-03-25 PROCEDURE — 700111 HCHG RX REV CODE 636 W/ 250 OVERRIDE (IP): Performed by: PHYSICAL MEDICINE & REHABILITATION

## 2022-03-25 PROCEDURE — 90791 PSYCH DIAGNOSTIC EVALUATION: CPT | Performed by: PSYCHOLOGIST

## 2022-03-25 PROCEDURE — 94640 AIRWAY INHALATION TREATMENT: CPT

## 2022-03-25 PROCEDURE — A9270 NON-COVERED ITEM OR SERVICE: HCPCS | Performed by: PHYSICAL MEDICINE & REHABILITATION

## 2022-03-25 PROCEDURE — 700102 HCHG RX REV CODE 250 W/ 637 OVERRIDE(OP): Performed by: PHYSICAL MEDICINE & REHABILITATION

## 2022-03-25 PROCEDURE — 770010 HCHG ROOM/CARE - REHAB SEMI PRIVAT*

## 2022-03-25 PROCEDURE — 94760 N-INVAS EAR/PLS OXIMETRY 1: CPT

## 2022-03-25 PROCEDURE — 97535 SELF CARE MNGMENT TRAINING: CPT

## 2022-03-25 PROCEDURE — 700102 HCHG RX REV CODE 250 W/ 637 OVERRIDE(OP): Performed by: HOSPITALIST

## 2022-03-25 PROCEDURE — 99232 SBSQ HOSP IP/OBS MODERATE 35: CPT | Performed by: HOSPITALIST

## 2022-03-25 PROCEDURE — A9270 NON-COVERED ITEM OR SERVICE: HCPCS | Performed by: HOSPITALIST

## 2022-03-25 RX ORDER — MIRTAZAPINE 15 MG/1
15 TABLET, ORALLY DISINTEGRATING ORAL
Status: DISCONTINUED | OUTPATIENT
Start: 2022-03-25 | End: 2022-03-29 | Stop reason: HOSPADM

## 2022-03-25 RX ADMIN — AZITHROMYCIN MONOHYDRATE 250 MG: 250 TABLET ORAL at 08:29

## 2022-03-25 RX ADMIN — MIRTAZAPINE 15 MG: 15 TABLET, ORALLY DISINTEGRATING ORAL at 21:33

## 2022-03-25 RX ADMIN — OMEPRAZOLE 20 MG: 20 CAPSULE, DELAYED RELEASE ORAL at 08:28

## 2022-03-25 RX ADMIN — THERA TABS 1 TABLET: TAB at 08:29

## 2022-03-25 RX ADMIN — POTASSIUM CHLORIDE 20 MEQ: 1500 TABLET, EXTENDED RELEASE ORAL at 08:29

## 2022-03-25 RX ADMIN — ENOXAPARIN SODIUM 40 MG: 40 INJECTION SUBCUTANEOUS at 21:33

## 2022-03-25 RX ADMIN — CLOPIDOGREL BISULFATE 75 MG: 75 TABLET ORAL at 08:29

## 2022-03-25 RX ADMIN — PRAVASTATIN SODIUM 40 MG: 20 TABLET ORAL at 21:33

## 2022-03-25 RX ADMIN — DILTIAZEM HYDROCHLORIDE 180 MG: 180 CAPSULE, EXTENDED RELEASE ORAL at 05:18

## 2022-03-25 RX ADMIN — BUDESONIDE AND FORMOTEROL FUMARATE DIHYDRATE 2 PUFF: 160; 4.5 AEROSOL RESPIRATORY (INHALATION) at 21:40

## 2022-03-25 RX ADMIN — LEVOTHYROXINE SODIUM 75 MCG: 75 TABLET ORAL at 05:18

## 2022-03-25 RX ADMIN — CEFUROXIME AXETIL 500 MG: 500 TABLET ORAL at 08:29

## 2022-03-25 RX ADMIN — ONDANSETRON 4 MG: 4 TABLET, ORALLY DISINTEGRATING ORAL at 23:35

## 2022-03-25 RX ADMIN — SENNOSIDES AND DOCUSATE SODIUM 2 TABLET: 50; 8.6 TABLET ORAL at 21:32

## 2022-03-25 RX ADMIN — BUDESONIDE AND FORMOTEROL FUMARATE DIHYDRATE 2 PUFF: 160; 4.5 AEROSOL RESPIRATORY (INHALATION) at 08:34

## 2022-03-25 ASSESSMENT — PAIN DESCRIPTION - PAIN TYPE: TYPE: ACUTE PAIN

## 2022-03-25 ASSESSMENT — ENCOUNTER SYMPTOMS
SHORTNESS OF BREATH: 0
NAUSEA: 0
ABDOMINAL PAIN: 0
VOMITING: 0
NERVOUS/ANXIOUS: 0
DIARRHEA: 0
CHILLS: 0
FEVER: 0

## 2022-03-25 ASSESSMENT — ACTIVITIES OF DAILY LIVING (ADL)
BED_CHAIR_WHEELCHAIR_TRANSFER_DESCRIPTION: ADAPTIVE EQUIPMENT;INCREASED TIME;SET-UP OF EQUIPMENT;SUPERVISION FOR SAFETY;VERBAL CUEING
TOILETING_LEVEL_OF_ASSIST_DESCRIPTION: ASSIST TO PULL PANTS UP;ASSIST TO PULL PANTS DOWN;ASSIST FOR STANDING BALANCE;GRAB BAR;INCREASED TIME
TOILETING_LEVEL_OF_ASSIST_DESCRIPTION: ASSIST TO PULL PANTS UP;ASSIST TO PULL PANTS DOWN;ASSIST FOR STANDING BALANCE;GRAB BAR;INCREASED TIME;SUPERVISION FOR SAFETY;VERBAL CUEING
TOILET_TRANSFER_DESCRIPTION: GRAB BAR;INCREASED TIME;INITIAL PREPARATION FOR TASK;SET-UP OF EQUIPMENT;SUPERVISION FOR SAFETY

## 2022-03-25 NOTE — DOCUMENTATION QUERY
DOCUMENTATION QUERY    PROVIDERS: Please select “Cosign w/ note”to reply to query.    To better represent the severity of illness of your patient, please review the following information and exercise your independent professional judgment in responding to this query.         There is conflicting documentation in the medical record regarding if the patient is actively smoking. Please confirm if the patient has quit smoking or is still an active smoker.      The medical record reflects the following:   Clinical Findings    Socioeconomic History   • Marital status:    Tobacco Use   • Smoking status: Current Every Day Smoker       Packs/day: 0.25       Years: 55.00       Pack years: 13.75       Types: Cigars   • Smokeless tobacco: Never Used   • Tobacco comment: does not inhale, small filtered cigars, 4 cigs/ day    Vaping Use   • Vaping Use: Never used   Substance and Sexual Activity   • Alcohol use: No   • Drug use: No   • Sexual activity: Not Currently           Chronic obstructive pulmonary disease (HCC)  Has hx  S/P exacerbation (from [facility])  S/P steroids and abx  On chronic O2 supplementation of 3 liters at home -- but pt only was using sometimes  Note: long time smoker but states she quit 5 weeks ago   Treatment On chronic O2 supplementation of 3 liters at home -- but pt only was using sometimes   Risk Factors COPD, PNA, O2 dependent, nocturnal hypoxia   Location within medical record Both statements in same pn 3/24.     Thank You,   Lisa Gonzalez, [email or web address]

## 2022-03-25 NOTE — THERAPY
"Occupational Therapy  Daily Treatment     Patient Name: Antonia Stover  Age:  86 y.o., Sex:  female  Medical Record #: [id number]  Today's Date: 3/25/2022     Precautions  Precautions: (P) Fall Risk  Comments: (P) High flow O2 5 L/min increase to 6 to 8 L with activity as needed, legally blind, 1200 mL fluid restriction         Subjective    \"No! Stop! I'm going to fall\"     Objective       03/25/22 1301   Precautions   Precautions Fall Risk   Comments High flow O2 5 L/min increase to 6 to 8 L with activity as needed, legally blind, 1200 mL fluid restriction   Vitals   Pulse Oximetry 93 %   O2 (LPM) 5   O2 Delivery Device Nasal Cannula   Vitals Comments pt returned to wall O2 at end of session   Functional Level of Assist   Toileting Maximal Assist   Toileting Description Assist to pull pants up;Assist to pull pants down;Assist for standing balance;Grab bar;Increased time   Bed, Chair, Wheelchair Transfer Moderate Assist   Bed Chair Wheelchair Transfer Description Adaptive equipment;Increased time;Set-up of equipment;Supervision for safety;Verbal cueing  (FWW, significant fear of falling, cues for sequencing and safety)   Toilet Transfers Moderate Assist   Toilet Transfer Description Increased time;Grab bar;Supervision for safety;Set-up of equipment;Verbal cueing  (lowering assistance, cues for sequencing and safety)   Bed Mobility    Sit to Supine Moderate Assist   Scooting Minimal Assist   Rolling Minimal Assist to Rt.   Skilled Intervention Sequencing;Verbal Cuing   Interdisciplinary Plan of Care Collaboration   IDT Collaboration with  Family / Caregiver   Patient Position at End of Therapy In Bed;Bed Alarm On;Call Light within Reach;Tray Table within Reach;Family / Friend in Room   Collaboration Comments spouse arrived at end of session   OT Total Time Spent   OT Individual Total Time Spent (Mins) 30   OT Charge Group   OT Self Care / ADL 2       Assessment    Pt with significant fear of falling and requires " consistent cues for reassurance, sequencing and set up for transfers. Placed commode over toilet for increased safety with toileting and toilet txfrs, and spouse reports that pt has this set up in her bathroom at home.     Strengths: Independent prior level of function,Pleasant and cooperative,Supportive family,Willingly participates in therapeutic activities  Barriers: Decreased endurance,Fatigue,Generalized weakness,Impaired activity tolerance,Impaired appetite/intake,Impaired balance,Impaired functional cognition,Limited mobility (supplemental O2, fear of falling)    Plan    ADLs, functional mobility, standing tolerance/balance, pacing/energy conservation, strength/endurance building.    Occupational Therapy Goals (Active)       Problem: Bathing       Dates: Start: 03/24/22         Goal: STG-Within one week, patient will bathe with mod A using AE as needed.        Dates: Start: 03/24/22               Problem: Dressing       Dates: Start: 03/24/22         Goal: STG-Within one week, patient will dress LB with mod A using AE as needed.        Dates: Start: 03/24/22               Problem: Functional Transfers       Dates: Start: 03/24/22         Goal: STG-Within one week, patient will transfer to toilet with min A using AE/DME as needed.        Dates: Start: 03/24/22               Problem: OT Long Term Goals       Dates: Start: 03/24/22         Goal: LTG-By discharge, patient will complete basic self care tasks at supervision to mod I level using AE as needed.        Dates: Start: 03/24/22            Goal: LTG-By discharge, patient will perform bathroom transfers at supervision to mod I level using AE/DME as needed.        Dates: Start: 03/24/22               Problem: Toileting       Dates: Start: 03/24/22         Goal: STG-Within one week, patient will complete toileting tasks with mod A using AE as needed.        Dates: Start: 03/24/22

## 2022-03-25 NOTE — CARE PLAN
Problem: Underweight  Goal: Patient to consume greater than or equal to 50% of meals  Description: Pt will consume ~50% of meals, or 25-50% of meals and supplements during admit.  Outcome: Not Progressing     Intake documented at 0% and refused of 2 meals this admit.    RD met w/ pt at breakfast in dining room. Pt touched very little of food, none of the magic cup. Usual routine only drinks coffee in the morning. RD will trial [facility] BID w/ meals in place of magic cups. Pt snacks throughout day; agreeable to chicken salad snacks. Enjoys fruit, plain pasta. Declines yogurt, cottage cheese, ice cream.    RD continues to follow.

## 2022-03-25 NOTE — THERAPY
03/25/22 1029   Precautions   Precautions Fall Risk   Comments High flow O2 5 L/min increase to 6 to 8 L with activity as needed, legally blind, 1200 mL fluid restriction   Pain 0 - 10 Group   Therapist Pain Assessment 0   Cognition    Cognition / Consciousness X   Level of Consciousness Alert   Comments Delayed processing, [place]   ABS (Agitated Behavior Scale)   Agitated Behavior Scale Performed No   Sitting Lower Body Exercises   Ankle Pumps Bilateral   Hip Abduction 2 sets of 10;Bilateral   Hip Adduction 2 sets of 10;Bilateral   Long Arc Quad 2 sets of 10;Bilateral   Hamstring Curl 2 sets of 10;Bilateral   PT Total Time Spent   PT Individual Total Time Spent (Mins) 30   PT Charge Group   PT Therapeutic Exercise 2   Physical Therapy   Daily Treatment     Patient Name: Antonia Stover  Age:  86 y.o., Sex:  female  Medical Record #: [id number]  Today's Date: 3/25/2022     Precautions  Precautions: Fall Risk  Comments: High flow O2 5 L/min increase to 6 to 8 L with activity as needed, legally blind, 1200 mL fluid restriction    Subjective    The patient was up in her chair.  She agreed to PT.     Objective    The patient required total assist in the wheelchair from her room to the gym.  She participated in seated bilateral lower extremity therapeutic exercise 2 sets of 10 with verbal and tactile cues.    Assessment    The patient was able to participate in the therapeutic exercises requiring verbal and tactile cues but she was able to complete 2 sets of 10 bilaterally and accurately count the repetitions.  The patient is on high flow oxygen and needs to have 2 oxygen cylinders with her out of the room.  She is on 5 L/min of O2 which can be increased to 6 to 8 L/min with activity.  The patient is hard of hearing in the speech therapist provided her with a hearing device but she is only able to use low ear piece in the left ear.  During the evaluation for PT yesterday, the patient stood up twice with a walker to  attempt walking.  However, she was unable to tolerate standing more than a few seconds.       Plan    Therapeutic exercise, bed mobility and transfers, standing tolerance and progress to gait training as tolerated    Passport items to be completed:  [unfilled]    Physical Therapy Problems (Active)       Problem: Mobility       Dates: Start: 03/24/22         Goal: STG-Within one week, patient will ambulate fww Lyn 30 FT       Dates: Start: 03/24/22               Problem: Mobility Transfers       Dates: Start: 03/24/22         Goal: STG-Within one week, patient will transfer bed to chair fww CGA       Dates: Start: 03/24/22            Goal: STG-Within one week, patient will move supine to/from sit SBA       Dates: Start: 03/24/22               Problem: PT-Long Term Goals       Dates: Start: 03/24/22         Goal: LTG-By discharge, patient will ambulate fww 150 FT supervision       Dates: Start: 03/24/22            Goal: LTG-By discharge, patient will transfer one surface to another fww supervision       Dates: Start: 03/24/22            Goal: LTG-By discharge, patient will transfer in/out of a car fww supervision       Dates: Start: 03/24/22            Goal: LTG-By discharge, patient will ambulate fww up/down ramp to access home       Dates: Start: 03/24/22

## 2022-03-25 NOTE — CARE PLAN
"The patient is Watcher - Medium risk of patient condition declining or worsening    Shift Goals  Clinical Goals: safety  Patient Goals: free of falls    Progress made toward(s) clinical / shift goals:      Problem: Fall Risk - Rehab  Goal: Patient will remain free from falls  Outcome: Progressing  Note: Susan Blount Fall risk Assessment Score: 13  Moderate fall risk Interventions  - Bed and strip alarm   - Yellow sign by the door   - Yellow wrist band \"Fall risk\"  - Room near to the nurse station  - Do not leave patient unattended in the bathroom  - Fall risk education provided      Problem: Pain - Standard  Goal: Alleviation of pain or a reduction in pain to the patient’s comfort goal  Outcome: Progressing  Note: Pt denies pain or discomfort tonight. Sleeps good. Will continue to monitor.        Patient is not progressing towards the following goals:      "

## 2022-03-25 NOTE — THERAPY
Occupational Therapy  Daily Treatment     Patient Name: Antonia Stover  Age:  86 y.o., Sex:  female  Medical Record #: [id number]  Today's Date: 3/25/2022     Precautions  Precautions: (P) Fall Risk  Comments: (P) High flow O2 5 L/min increased to 6 to 8 L with activity as needed, legally blind, 1200 mL fluid restriction         Subjective    Pt received from t-dine, finishing breakfast.     Objective       03/25/22 0901   Precautions   Precautions Fall Risk   Comments High flow O2 5 L/min increased to 6 to 8 L with activity as needed, legally blind, 1200 mL fluid restriction   Vitals   Pulse 77   Pulse Oximetry 91 %  (O2 sats monitored and ranged from 91-94% on 6L)   O2 (LPM) 6   O2 Delivery Device Nasal Cannula   Vitals Comments dual tank used during session; replaced one tank that was low and returned pt to wall O2 when back in room.   Cognition    Level of Consciousness Alert   Comments delayed processing   Functional Level of Assist   Eating Stand by Assist   Eating Description Increased time;Supervision for safety;Set-up of equipment or meal/tube feeding;Verbal cueing  (cues to encourage intake, initiation, increased time)   Grooming Minimal Assist   Grooming Description Increased time;Seated in wheelchair at sink;Set-up of equipment;Supervision for safety;Verbal cueing   Toileting Maximal Assist   Toileting Description Assist to pull pants up;Assist to pull pants down;Assist for standing balance;Grab bar;Increased time;Supervision for safety;Verbal cueing   Toilet Transfers Minimal Assist   Toilet Transfer Description Grab bar;Increased time;Initial preparation for task;Set-up of equipment;Supervision for safety  (w/c<>toilet stand step via GB)   Sitting Upper Body Exercises   Internal Shoulder Rotation 3 sets of 10;Bilateral;Weight (See Comments for lbs)   External Shoulder Rotation 3 sets of 10;Bilateral;Weight (See Comments for lbs)   Bicep Curls 3 sets of 10;Bilateral;Weight (See Comments for lbs)    Pronation / Supination 3 sets of 10;Bilateral;Weight (See Comments for lbs)   Upper Extremity Bike Level 2 Resistance  (BUE [employer] 5 min x1, 1 RB)   Comments 1# weights used for UB exercises.   Interdisciplinary Plan of Care Collaboration   Patient Position at End of Therapy Seated;Chair Alarm On;Self Releasing Lap Belt Applied;Call Light within Reach;Tray Table within Reach   OT Total Time Spent   OT Individual Total Time Spent (Mins) 60   OT Charge Group   OT Self Care / ADL 2   OT Therapeutic Exercise  2       Assessment    Pt tolerated OT session fair. Her O2 sats were monitored and ranged from 91-94% on 5L. Pt requires cues for breathing technique during exercises. She con't to present with fear of falling, generalized weakness, fatigue, and low endurance.   Strengths: Independent prior level of function,Pleasant and cooperative,Supportive family,Willingly participates in therapeutic activities  Barriers: Decreased endurance,Fatigue,Generalized weakness,Impaired activity tolerance,Impaired appetite/intake,Impaired balance,Impaired functional cognition,Limited mobility (supplemental O2, fear of falling)    Plan    ADLs, functional mobility, standing tolerance/balance, pacing/energy conservation, strength/endurance building.      Occupational Therapy Goals (Active)       Problem: Bathing       Dates: Start: 03/24/22         Goal: STG-Within one week, patient will bathe with mod A using AE as needed.        Dates: Start: 03/24/22               Problem: Dressing       Dates: Start: 03/24/22         Goal: STG-Within one week, patient will dress LB with mod A using AE as needed.        Dates: Start: 03/24/22               Problem: Functional Transfers       Dates: Start: 03/24/22         Goal: STG-Within one week, patient will transfer to toilet with min A using AE/DME as needed.        Dates: Start: 03/24/22               Problem: OT Long Term Goals       Dates: Start: 03/24/22         Goal: LTG-By discharge,  patient will complete basic self care tasks at supervision to mod I level using AE as needed.        Dates: Start: 03/24/22            Goal: LTG-By discharge, patient will perform bathroom transfers at supervision to mod I level using AE/DME as needed.        Dates: Start: 03/24/22               Problem: Toileting       Dates: Start: 03/24/22         Goal: STG-Within one week, patient will complete toileting tasks with mod A using AE as needed.        Dates: Start: 03/24/22

## 2022-03-25 NOTE — PROGRESS NOTES
"Rehab Progress Note     Date of Service: 3/25/2022  Chief Complaint: Follow-up debility    Interval Events (Subjective)    Patient seen and examined today in the therapy gym.  She is working with physical therapy.  She is too deconditioned and weak to do any standing.  She denies any pain.  She cannot remember if she slept well last night or not.  Psychologist was consulted to see the patient who is agreeable to start Remeron for her mood as well as to improve her appetite.  Patient has very poor oral intake.    ROS: No changes to bowel, bladder, pain, mood, or sleep.         Objective:  VITAL SIGNS: /61   Pulse 77   Temp 36.8 °C (98.2 °F) (Temporal)   Resp 18   Ht 1.575 m (5' 2\")   Wt 38.5 kg (84 lb 14 oz)   SpO2 91% Comment: O2 sats monitored and ranged from 91-94% on 6L  BMI 15.52 kg/m²   Gen: alert, no apparent distress, severely cachectic  CV: Regular rate and rhythm, mild edema around a bruise in the right lower extremity  Respiratory: Clear to auscultation with intermittent loose productive cough, on 6 L  Neuro: notable for hard of hearing        Recent Results (from the past 72 hour(s))   TSH WITH REFLEX TO FT4    Collection Time: 03/22/22  3:57 PM   Result Value Ref Range    TSH 4.480 0.380 - 5.330 uIU/mL   CoV-2 and Flu A/B by PCR (24 hour In-House): Collect NP swab in [facility]    Collection Time: 03/23/22 12:17 PM    Specimen: Nasopharyngeal; Respirate   Result Value Ref Range    Influenza virus A RNA Negative Negative    Influenza virus B, PCR Negative Negative    SARS-CoV-2 by PCR NotDetected     SARS-CoV-2 Source NP Swab    CBC with Differential    Collection Time: 03/24/22  5:30 AM   Result Value Ref Range    WBC 19.2 (H) 4.8 - 10.8 K/uL    RBC 4.34 4.20 - 5.40 M/uL    Hemoglobin 13.2 12.0 - 16.0 g/dL    Hematocrit 40.0 37.0 - 47.0 %    MCV 92.2 81.4 - 97.8 fL    MCH 30.4 27.0 - 33.0 pg    MCHC 33.0 (L) 33.6 - 35.0 g/dL    RDW 48.7 35.9 - 50.0 fL    Platelet Count 300 164 - 446 K/uL    MPV " 11.5 9.0 - 12.9 fL    Neutrophils-Polys 83.60 (H) 44.00 - 72.00 %    Lymphocytes 7.60 (L) 22.00 - 41.00 %    Monocytes 7.90 0.00 - 13.40 %    Eosinophils 0.10 0.00 - 6.90 %    Basophils 0.10 0.00 - 1.80 %    Immature Granulocytes 0.70 0.00 - 0.90 %    Nucleated RBC 0.00 /100 WBC    Neutrophils (Absolute) 16.10 (H) 2.00 - 7.15 K/uL    Lymphs (Absolute) 1.46 1.00 - 4.80 K/uL    Monos (Absolute) 1.52 (H) 0.00 - 0.85 K/uL    Eos (Absolute) 0.01 0.00 - 0.51 K/uL    Baso (Absolute) 0.02 0.00 - 0.12 K/uL    Immature Granulocytes (abs) 0.13 (H) 0.00 - 0.11 K/uL    NRBC (Absolute) 0.00 K/uL   Comp Metabolic Panel (CMP)    Collection Time: 03/24/22  5:30 AM   Result Value Ref Range    Sodium 132 (L) 135 - 145 mmol/L    Potassium 3.1 (L) 3.6 - 5.5 mmol/L    Chloride 89 (L) 96 - 112 mmol/L    Co2 33 20 - 33 mmol/L    Anion Gap 10.0 7.0 - 16.0    Glucose 81 65 - 99 mg/dL    Bun 13 8 - 22 mg/dL    Creatinine 0.56 0.50 - 1.40 mg/dL    Calcium 9.0 8.5 - 10.5 mg/dL    AST(SGOT) 36 12 - 45 U/L    ALT(SGPT) 13 2 - 50 U/L    Alkaline Phosphatase 121 (H) 30 - 99 U/L    Total Bilirubin 0.4 0.1 - 1.5 mg/dL    Albumin 3.5 3.2 - 4.9 g/dL    Total Protein 6.4 6.0 - 8.2 g/dL    Globulin 2.9 1.9 - 3.5 g/dL    A-G Ratio 1.2 g/dL   Magnesium    Collection Time: 03/24/22  5:30 AM   Result Value Ref Range    Magnesium 1.6 1.5 - 2.5 mg/dL   Vitamin D, 25-hydroxy (blood)    Collection Time: 03/24/22  5:30 AM   Result Value Ref Range    25-Hydroxy   Vitamin D 25 9 (L) 30 - 100 ng/mL   ESTIMATED GFR    Collection Time: 03/24/22  5:30 AM   Result Value Ref Range    GFR (CKD-EPI) 90 >60 mL/min/1.73 m 2   OSMOLALITY URINE    Collection Time: 03/24/22 10:30 PM   Result Value Ref Range    Osmolality Urine 613 300 - 900 mOsm/kg H2O   URINE SODIUM RANDOM    Collection Time: 03/24/22 10:30 PM   Result Value Ref Range    Sodium, Urine -per volume 72 mmol/L   URINALYSIS    Collection Time: 03/24/22 10:30 PM    Specimen: Urine, Clean Catch   Result Value Ref  Range    Color Yellow     Character Hazy (A)     Specific Gravity 1.025 <1.035    Ph 6.0 5.0 - 8.0    Glucose Negative Negative mg/dL    Ketones Trace (A) Negative mg/dL    Protein Negative Negative mg/dL    Bilirubin Negative Negative    Urobilinogen, Urine 0.2 Negative    Nitrite Negative Negative    Leukocyte Esterase Negative Negative    Occult Blood Negative Negative    Micro Urine Req Microscopic    URINE MICROSCOPIC (W/UA)    Collection Time: 03/24/22 10:30 PM   Result Value Ref Range    WBC 0-2 /hpf    RBC 0-2 /hpf    Bacteria Rare (A) None /hpf    Epithelial Cells Few /hpf    Amorphous Crystal Present /hpf    Hyaline Cast 3-5 (A) /lpf       Current Facility-Administered Medications   Medication Frequency   • mirtazapine (Remeron) orally disintegrating tab 15 mg QHS   • vitamin D2 (Ergocalciferol) (Drisdol) capsule 50,000 Units Q7 DAYS   • potassium chloride SA (Kdur) tablet 20 mEq DAILY   • DILTIAZem CD (CARDIZEM CD) capsule 180 mg Q DAY   • multivitamin (THERAGRAN) tablet 1 Tablet DAILY   • hydrOXYzine HCl (ATARAX) tablet 50 mg Q6HRS PRN   • melatonin tablet 3 mg HS PRN   • Respiratory Therapy Consult Continuous RT   • Pharmacy Consult Request ...Pain Management Review 1 Each PHARMACY TO DOSE   • hydrALAZINE (APRESOLINE) tablet 10 mg Q8HRS PRN   • acetaminophen (Tylenol) tablet 650 mg Q4HRS PRN   • lactulose 20 GM/30ML solution 30 mL QDAY PRN   • omeprazole (PRILOSEC) capsule 20 mg QAM AC   • artificial tears ophthalmic solution 1 Drop PRN   • benzocaine-menthol (Cepacol) lozenge 1 Lozenge Q2HRS PRN   • mag hydrox-al hydrox-simeth (MAALOX PLUS ES or MYLANTA DS) suspension 20 mL Q2HRS PRN   • ondansetron (ZOFRAN ODT) dispertab 4 mg 4X/DAY PRN    Or   • ondansetron (ZOFRAN) syringe/vial injection 4 mg 4X/DAY PRN   • sodium chloride (OCEAN) 0.65 % nasal spray 2 Spray PRN   • clopidogrel (PLAVIX) tablet 75 mg DAILY   • ipratropium-albuterol (DUONEB) nebulizer solution Q2HRS PRN (RT)   • levothyroxine  (SYNTHROID) tablet 75 mcg AM ES   • pravastatin (PRAVACHOL) tablet 40 mg Q EVENING   • senna-docusate (PERICOLACE or SENOKOT S) 8.6-50 MG per tablet 2 Tablet BID    And   • polyethylene glycol/lytes (MIRALAX) PACKET 1 Packet QDAY PRN    And   • magnesium hydroxide (MILK OF MAGNESIA) suspension 30 mL QDAY PRN    And   • bisacodyl (DULCOLAX) suppository 10 mg QDAY PRN   • budesonide-formoterol (SYMBICORT) 160-4.5 MCG/ACT inhaler 2 Puff BID (RT)   • enoxaparin (LOVENOX) inj 40 mg DAILY       Orders Placed This Encounter   Procedures   • Diet Order Diet: Regular; Fluid modifications: (optional): 1200 ml Fluid Restriction     Standing Status:   Standing     Number of Occurrences:   1     Order Specific Question:   Diet:     Answer:   Regular [1]     Order Specific Question:   Fluid modifications: (optional)     Answer:   1200 ml Fluid Restriction [8]       Assessment:  Active Hospital Problems    Diagnosis    • Hypokalemia    • Vitamin D deficiency    • Pneumonia    • Debility    • Severe protein-calorie malnutrition (HCC)    • Hyponatremia    • Failure to thrive in adult    • Acute hypoxemic respiratory failure (HCC)    • Hard of hearing    • Essential hypertension    • AMD (age-related macular degeneration), bilateral    • Chronic obstructive pulmonary disease (HCC)    • Tobacco dependence    • Hypothyroidism      This patient is a 86 y.o. female admitted for acute inpatient rehabilitation with Debility after hospital stay for COPD exacerbation as well as pneumonia.    Medical Decision Making and Plan:    Debility  PT/OT, 1.5 hr each discipline, 5 days per week  No cognitive impairment so not being seen by speech therapy    COPD exacerbation  Status post oral prednisone  Symbicort  Appreciate hospitalist assistance     Community-acquired pneumonia  Completed Zithromax  Completed cefuroxime  Appreciate hospitalist assistance     Acute hypoxic respiratory failure  Titrate oxygen, currently on 5 to 6 L, was on 3-4 at  home  Respiratory therapy, appreciate assistance     Hypothyroidism  Levothyroxine     Hyponatremia, slightly improved  Continue fluid restrictions  Likely from malnutrition  Appreciate hospitalist assistance    Hypokalemia  Replete    Hyperlipidemia  Statin    Severe protein calorie malnutrition  Failure to thrive  Poor appetite  Dietitian consult  Added supplements  Multivitamin  Started on Remeron     Hypertension  Diltiazem  Appreciate hospitalist assistance    Carotid artery atherosclerosis  Plavix  Statin    Vitamin D deficiency, 9  Continue high-dose supplementation     Leukocytosis  Likely from steroids  Negative urinalysis    Depression  Started on Remeron    GI prophylaxis  Omeprazole    Bowel program  Continue bowel medications  Last BM 3/25    Bladder program  Check PVRs -125, 65  Bladder scan for no voids  ICP for over 400 cc  Scheduled toileting    DVT prophylaxis  Lovenox    Total time:  36 minutes.  I spent greater than 50% of the time for patient care, counseling, and coordination on this date, including patient face-to face time, unit/floor time with review of records/pertinent lab data and studies, as well as discussing diagnostic evaluation/work up, planned therapeutic interventions, and future disposition of care, as per the interval events/subjective and the assessment and plan as noted above.    I have performed a physical exam, reviewed and updated ROS, as well as the assessment and plan today 3/25/2022. In review of note from 3/24/2022 there are no new changes except as documented above.            Maggie Joel M.D.   Physical Medicine and Rehabilitation

## 2022-03-25 NOTE — PROGRESS NOTES
PSYCHOLOGICAL CONSULTATION:  Reason for admission: Debility [R53.81]  Reason for consult: cognitive concerns, depressive sxs concerns  Requesting Physician: Marycruz    Legal status: Legal Status: Voluntary    Chief Complaint: Debility    HPI: Antonia Stover is a 86 y.o. female with a past medical history of COPD on home oxygen 3 L, hypothyroidism, hypertension, chronic kidney disease, current tobacco use; now admitted for acute inpatient rehabilitation with severe functional debility after an acute hospitalization for COPD exacerbation as well as pneumonia. In addition patient noted to have severe protein calorie malnutrition, ferritin 5, low BMI of 15, for which she was started on supplements.    Psychology was consulted due to pt's lack of initiation for self-care behaviors such as feeding as well as apparent cognitive processing difficulties as evidenced by the need to pause for significant lengths of time to get a response from the pt. Additionally, pt presents with a flat affect and depressed mood.    Risk Assessment: current symptoms as reported by pt.  Suicidal Ideation: Denies    Homicidal Ideation: Denies      Psychiatric Review of Systems:current symptoms as reported by pt.  Depression: Endorses Reports anhedonia and sadness, decreased appetite  Hollie: Denies   Anxiety/Panic Attacks: Denies   PTSD symptom: Denies   Psychosis: Denies   Other:        Medical Review of Systems: as reported by pt. All systems reviewed. Only those found to be + are noted below. All others are negative.   Neurological:    TBIs: Denies   SZs:Denies   Strokes:Denies   Other:   Other medical symptoms:   Thyroid:Endorses   Diabetes: Denies   Cardiovascular disease: Endorses    Past Psychiatric Hx: None reported    Family Psychiatric Hx: None reported    Social Hx:   Social History     Socioeconomic History   • Marital status:    Tobacco Use   • Smoking status: Current Every Day Smoker     Packs/day: 0.25     Years:  55.00     Pack years: 13.75     Types: Cigars   • Smokeless tobacco: Never Used   • Tobacco comment: does not inhale, small filtered cigars, 4 cigs/ day    Vaping Use   • Vaping Use: Never used   Substance and Sexual Activity   • Alcohol use: No   • Drug use: No   • Sexual activity: Not Currently       Medical Hx: Chart reviewed. Only those findings of potential interest to psychiatry are noted below:  Past Medical History:   Diagnosis Date   • Anemia    • Arthritis    • Carotid artery plaque 08/2018    Carotid US with <50% stenosis bilaterally.   • COPD    • Dizziness     • Dyslipidemia     • Fatigue 7/3/2014   • Hypothyroidism    • Insomnia     • Nocturnal hypoxia      Uses oxygen QHS.   • PSVT (paroxysmal supraventricular tachycardia) ([facility]) 02/2013    Echocardiogram with normal LV size, LVEF 65-70%.   • Sickle cell disease (Formerly [facility])    • Tobacco use      Medical Conditions:     Allergies: Hydrocodone, Iodine, Nsaids, Penicillins, Asa [aspirin], Codeine, Erythromycin, Morphine, and Sulfa drugs  Medications (currently prescribed at [facility]):    Current Facility-Administered Medications:   •  mirtazapine (Remeron) orally disintegrating tab 15 mg, 15 mg, Oral, QHS, Maggie Joel M.D.  •  vitamin D2 (Ergocalciferol) (Drisdol) capsule 50,000 Units, 50,000 Units, Oral, Q7 DAYS, Maggie Joel M.D., 50,000 Units at 03/24/22 1113  •  potassium chloride SA (Kdur) tablet 20 mEq, 20 mEq, Oral, DAILY, Rc Ang M.D., 20 mEq at 03/25/22 0829  •  DILTIAZem CD (CARDIZEM CD) capsule 180 mg, 180 mg, Oral, Q DAY, Rc Ang M.D., 180 mg at 03/25/22 0518  •  multivitamin (THERAGRAN) tablet 1 Tablet, 1 Tablet, Oral, DAILY, Rc Ang M.D., 1 Tablet at 03/25/22 0829  •  hydrOXYzine HCl (ATARAX) tablet 50 mg, 50 mg, Oral, Q6HRS PRN, Maggie Joel M.D.  •  melatonin tablet 3 mg, 3 mg, Oral, HS PRN, Maggie J. Marycruz, M.D.  •  Respiratory Therapy Consult, , Nebulization, Continuous RT, Maggie Joel M.D.  •   Pharmacy Consult Request ...Pain Management Review 1 Each, 1 Each, Other, PHARMACY TO DOSE, Maggie Joel M.D.  •  hydrALAZINE (APRESOLINE) tablet 10 mg, 10 mg, Oral, Q8HRS PRN, Maggie Joel M.D.  •  acetaminophen (Tylenol) tablet 650 mg, 650 mg, Oral, Q4HRS PRN, Maggie Joel M.D.  •  lactulose 20 GM/30ML solution 30 mL, 30 mL, Oral, QDAY PRN, Maggie Joel M.D.  •  omeprazole (PRILOSEC) capsule 20 mg, 20 mg, Oral, QAM AC, Maggie Joel M.D., 20 mg at 03/25/22 0828  •  artificial tears ophthalmic solution 1 Drop, 1 Drop, Both Eyes, PRN, Maggie Joel M.D.  •  benzocaine-menthol (Cepacol) lozenge 1 Lozenge, 1 Lozenge, Mouth/Throat, Q2HRS PRN, Maggie Joel M.D.  •  mag hydrox-al hydrox-simeth (MAALOX PLUS ES or MYLANTA DS) suspension 20 mL, 20 mL, Oral, Q2HRS PRN, Maggie Joel M.D.  •  ondansetron (ZOFRAN ODT) dispertab 4 mg, 4 mg, Oral, 4X/DAY PRN **OR** ondansetron (ZOFRAN) syringe/vial injection 4 mg, 4 mg, Intramuscular, 4X/DAY PRN, Maggie Joel M.D.  •  sodium chloride (OCEAN) 0.65 % nasal spray 2 Spray, 2 Spray, Nasal, PRN, Maggie Joel M.D.  •  clopidogrel (PLAVIX) tablet 75 mg, 75 mg, Oral, DAILY, Maggie Joel M.D., 75 mg at 03/25/22 0829  •  ipratropium-albuterol (DUONEB) nebulizer solution, 3 mL, Nebulization, Q2HRS PRN (RT), Maggie Joel M.D., 3 mL at 03/24/22 0724  •  levothyroxine (SYNTHROID) tablet 75 mcg, 75 mcg, Oral, AM ES, Maggie Joel M.D., 75 mcg at 03/25/22 0518  •  pravastatin (PRAVACHOL) tablet 40 mg, 40 mg, Oral, Q EVENING, Maggie Joel M.D., 40 mg at 03/24/22 2210  •  senna-docusate (PERICOLACE or SENOKOT S) 8.6-50 MG per tablet 2 Tablet, 2 Tablet, Oral, BID, 2 Tablet at 03/24/22 2210 **AND** polyethylene glycol/lytes (MIRALAX) PACKET 1 Packet, 1 Packet, Oral, QDAY PRN **AND** magnesium hydroxide (MILK OF MAGNESIA) suspension 30 mL, 30 mL, Oral, QDAY PRN **AND** bisacodyl (DULCOLAX) suppository 10 mg, 10 mg,  "Rectal, QDAY PRN, Maggie Joel M.D.  •  budesonide-formoterol (SYMBICORT) 160-4.5 MCG/ACT inhaler 2 Puff, 2 Puff, Inhalation, BID (RT), Maggie Joel M.D., 2 Puff at 03/25/22 0834  •  enoxaparin (LOVENOX) inj 40 mg, 40 mg, Subcutaneous, DAILY, Maggie Joel M.D., 40 mg at 03/24/22 2217  Labs:  Recent Results (from the past 24 hour(s))   OSMOLALITY URINE    Collection Time: 03/24/22 10:30 PM   Result Value Ref Range    Osmolality Urine 613 300 - 900 mOsm/kg H2O   URINE SODIUM RANDOM    Collection Time: 03/24/22 10:30 PM   Result Value Ref Range    Sodium, Urine -per volume 72 mmol/L   URINALYSIS    Collection Time: 03/24/22 10:30 PM    Specimen: Urine, Clean Catch   Result Value Ref Range    Color Yellow     Character Hazy (A)     Specific Gravity 1.025 <1.035    Ph 6.0 5.0 - 8.0    Glucose Negative Negative mg/dL    Ketones Trace (A) Negative mg/dL    Protein Negative Negative mg/dL    Bilirubin Negative Negative    Urobilinogen, Urine 0.2 Negative    Nitrite Negative Negative    Leukocyte Esterase Negative Negative    Occult Blood Negative Negative    Micro Urine Req Microscopic    URINE MICROSCOPIC (W/UA)    Collection Time: 03/24/22 10:30 PM   Result Value Ref Range    WBC 0-2 /hpf    RBC 0-2 /hpf    Bacteria Rare (A) None /hpf    Epithelial Cells Few /hpf    Amorphous Crystal Present /hpf    Hyaline Cast 3-5 (A) /lpf          Cranial Imaging Hx: Most recent cranial imagining from 09/2020 and was demonstrated cerebral atrophy that is expected for age.  Other:    Psychiatric Examination:  Vitals: Blood pressure 109/73, pulse 75, temperature 36.7 °C (98.1 °F), temperature source Temporal, resp. rate 18, height 1.575 m (5' 2\"), weight 38.5 kg (84 lb 14 oz), SpO2 97 %, not currently breastfeeding.  Musculoskeletal: Pt is very slow to speak or react when prompted. Very little movement.  Appearance and Eye Contact: Easily established rapport, malnourished and exceptionally thin, appropriate dress and " "grooming. Behavior is calm, cooperative,  appropriate eye contact  Attention/Alertness: Alert  Thought Process: Linear, Logical and Goal Directed    Thought Content: No psychotic processes noted  Speech: Slow  Mood: \"okay\"            Affect: flat         SI/HI: Denies    Memory: Recent and remote memory appear intact     Orientation: alert, oriented to person, place and time  Insight into symptoms: unable to assess  Judgement into symptoms:unable to assess    Neuropsychological Testing:   Not formally tested.          ASSESSMENT: Antonia Stover is a 86 y.o. female with a past medical history of COPD on home oxygen 3 L, hypothyroidism, hypertension, chronic kidney disease, current tobacco use; now admitted for acute inpatient rehabilitation with severe functional debility after an acute hospitalization for COPD exacerbation as well as pneumonia. In addition patient noted to have severe protein calorie malnutrition, ferritin 5, low BMI of 15, for which she was started on supplements.    Psychology was consulted due to pt's lack of initiation for self-care behaviors such as feeding as well as apparent cognitive processing difficulties as evidenced by the need to pause for significant lengths of time to get a response from the pt. Additionally, pt presents with a flat affect and depressed mood.    Psychology discussed concerned related to nutrition and appetite. Pt stated that she shared concerns regarding these issues, and is agreeable to beginning a medication to help with appetite stimulation and depressive symptoms. Attending physician consulted. Will continue to follow.     DSM5 Diagnostic Considerations: Major Depressive Disorder, unspecified      PLAN:  Records reviewed: Yes  Discussed patient with other provider: Marycruz  Will continue to follow  Thank you for the consult.    Alicia Chacko, Ph.D. PhD  "

## 2022-03-25 NOTE — FLOWSHEET NOTE
03/25/22 0834   Events/Summary/Plan   Events/Summary/Plan SpO2 check, Symbicort given   Vital Signs   Pulse 72   Respiration 18   Pulse Oximetry 92 %   $ Pulse Oximetry (Spot Check) Yes   Oxygen   O2 (LPM) 6   O2 Delivery Device Nasal Cannula

## 2022-03-25 NOTE — PROGRESS NOTES
[facility] Medicine Daily Progress Note    Date of Service  3/25/2022    Chief Complaint:  Hypertension  Leukocytosis  Electrolyte imbalances    Interval History:  No complaints.  Doing ok.    Review of Systems  Review of Systems   Constitutional: Negative for chills and fever.   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    Cardiovascular: Negative for chest pain.   Gastrointestinal: Negative for abdominal pain, diarrhea, nausea and vomiting.   Psychiatric/Behavioral: The patient is not nervous/anxious.         Physical Exam  Temp:  [36.7 °C (98.1 °F)-37 °C (98.6 °F)] 36.8 °C (98.2 °F)  Pulse:  [64-89] 72  Resp:  [16-18] 18  BP: (101-130)/(61-85) 129/61  SpO2:  [92 %-94 %] 92 %    Physical Exam  Vitals and nursing note reviewed.   Constitutional:       Appearance: Normal appearance.   HENT:      Head: Atraumatic.   Eyes:      Conjunctiva/sclera: Conjunctivae normal.      Pupils: Pupils are equal, round, and reactive to light.   Cardiovascular:      Rate and Rhythm: Normal rate and regular rhythm.   Pulmonary:      Effort: Pulmonary effort is normal.      Breath sounds: Normal breath sounds.   Abdominal:      General: Bowel sounds are normal.      Palpations: Abdomen is soft.   Musculoskeletal:      Cervical back: Normal range of motion and neck supple.      Right lower leg: No edema.      Left lower leg: No edema.   Skin:     General: Skin is warm and dry.   Neurological:      Mental Status: She is alert and oriented to person, place, and time.   Psychiatric:         Mood and Affect: Mood normal.         Behavior: Behavior normal.         Fluids    Intake/Output Summary (Last 24 hours) at 3/25/2022 0922  Last data filed at 3/25/2022 0524  Gross per 24 hour   Intake 270 ml   Output 150 ml   Net 120 ml       Laboratory  Recent Labs     03/24/22  0530   WBC 19.2*   RBC 4.34   HEMOGLOBIN 13.2   HEMATOCRIT 40.0   MCV 92.2   MCH 30.4   MCHC 33.0*   RDW 48.7    PLATELETCT 300   MPV 11.5     Recent Labs     03/24/22  0530   SODIUM 132*   POTASSIUM 3.1*   CHLORIDE 89*   CO2 33   GLUCOSE 81   BUN 13   CREATININE 0.56   CALCIUM 9.0                   Imaging    Assessment/Plan  Pneumonia- (present on admission)  Assessment & Plan  [place] [facility]  Has a cough  Afebrile  WBC's: 19.0 --> 15.2 --> 19.8 --> 19.2 (3/24)  CXR (3/20): showed bibasilar opacities  U/A (-)  BC (-)  On Ceftin and Zithro (thru 3/25)  Note: was on steroids at [facility] and just stopped 3/23  Monitor    Vitamin D deficiency- (present on admission)  Assessment & Plan  Vit D: 9  On supplements    Hypokalemia- (present on admission)  Assessment & Plan  K+: 3.1 (3/24)  S/P KCL 40 meq x 1  On daily K+ supplements (since pt not eating well)  Monitor    Hyponatremia- (present on admission)  Assessment & Plan  Na: 129 --> 132 (3/24)  Likely 2nd to poor appetite and oral intake  Monitor for now    Essential hypertension- (present on admission)  Assessment & Plan  On Cardizem  mg daily --> 180 mg daily (3/25)  Note: home meds include Cardizem  mg daily  Cont to monitor    Chronic obstructive pulmonary disease (HCC)- (present on admission)  Assessment & Plan  Has hx  S/P exacerbation (from [facility]  S/P steroids and abx  On chronic O2 supplementation of 3 liters at home -- but pt only was using sometimes  Note: long time smoker but states she quit 5 weeks ago    Hypothyroidism- (present on admission)  Assessment & Plan  TSH: wnl (3/22)  On Synthroid

## 2022-03-26 PROCEDURE — 700102 HCHG RX REV CODE 250 W/ 637 OVERRIDE(OP): Performed by: HOSPITALIST

## 2022-03-26 PROCEDURE — 97110 THERAPEUTIC EXERCISES: CPT

## 2022-03-26 PROCEDURE — 97535 SELF CARE MNGMENT TRAINING: CPT

## 2022-03-26 PROCEDURE — 99232 SBSQ HOSP IP/OBS MODERATE 35: CPT | Performed by: HOSPITALIST

## 2022-03-26 PROCEDURE — 94640 AIRWAY INHALATION TREATMENT: CPT

## 2022-03-26 PROCEDURE — 700111 HCHG RX REV CODE 636 W/ 250 OVERRIDE (IP): Performed by: PHYSICAL MEDICINE & REHABILITATION

## 2022-03-26 PROCEDURE — A9270 NON-COVERED ITEM OR SERVICE: HCPCS | Performed by: HOSPITALIST

## 2022-03-26 PROCEDURE — 97530 THERAPEUTIC ACTIVITIES: CPT

## 2022-03-26 PROCEDURE — A9270 NON-COVERED ITEM OR SERVICE: HCPCS | Performed by: PHYSICAL MEDICINE & REHABILITATION

## 2022-03-26 PROCEDURE — 700102 HCHG RX REV CODE 250 W/ 637 OVERRIDE(OP): Performed by: PHYSICAL MEDICINE & REHABILITATION

## 2022-03-26 PROCEDURE — 94760 N-INVAS EAR/PLS OXIMETRY 1: CPT

## 2022-03-26 PROCEDURE — 770010 HCHG ROOM/CARE - REHAB SEMI PRIVAT*

## 2022-03-26 RX ADMIN — PRAVASTATIN SODIUM 40 MG: 20 TABLET ORAL at 20:55

## 2022-03-26 RX ADMIN — SENNOSIDES AND DOCUSATE SODIUM 2 TABLET: 50; 8.6 TABLET ORAL at 20:56

## 2022-03-26 RX ADMIN — BUDESONIDE AND FORMOTEROL FUMARATE DIHYDRATE 2 PUFF: 160; 4.5 AEROSOL RESPIRATORY (INHALATION) at 20:52

## 2022-03-26 RX ADMIN — LEVOTHYROXINE SODIUM 75 MCG: 75 TABLET ORAL at 05:27

## 2022-03-26 RX ADMIN — ENOXAPARIN SODIUM 40 MG: 40 INJECTION SUBCUTANEOUS at 20:53

## 2022-03-26 RX ADMIN — BUDESONIDE AND FORMOTEROL FUMARATE DIHYDRATE 2 PUFF: 160; 4.5 AEROSOL RESPIRATORY (INHALATION) at 08:16

## 2022-03-26 RX ADMIN — POTASSIUM CHLORIDE 20 MEQ: 1500 TABLET, EXTENDED RELEASE ORAL at 18:34

## 2022-03-26 RX ADMIN — DILTIAZEM HYDROCHLORIDE 180 MG: 180 CAPSULE, EXTENDED RELEASE ORAL at 05:27

## 2022-03-26 RX ADMIN — HYDROXYZINE HYDROCHLORIDE 50 MG: 25 TABLET, FILM COATED ORAL at 02:12

## 2022-03-26 RX ADMIN — MIRTAZAPINE 15 MG: 15 TABLET, ORALLY DISINTEGRATING ORAL at 20:56

## 2022-03-26 RX ADMIN — CLOPIDOGREL BISULFATE 75 MG: 75 TABLET ORAL at 15:30

## 2022-03-26 ASSESSMENT — ENCOUNTER SYMPTOMS
DIZZINESS: 0
BLURRED VISION: 0
HALLUCINATIONS: 0
NAUSEA: 0
HEADACHES: 0
PALPITATIONS: 0
SHORTNESS OF BREATH: 0
FEVER: 0
VOMITING: 0

## 2022-03-26 ASSESSMENT — ACTIVITIES OF DAILY LIVING (ADL): BED_CHAIR_WHEELCHAIR_TRANSFER_DESCRIPTION: INCREASED TIME;REQUIRES LIFT

## 2022-03-26 ASSESSMENT — GAIT ASSESSMENTS
GAIT LEVEL OF ASSIST: UNABLE TO PARTICIPATE
GAIT LEVEL OF ASSIST: REFUSED

## 2022-03-26 NOTE — FLOWSHEET NOTE
03/26/22 0800   Events/Summary/Plan   Events/Summary/Plan SpO2 check, Symbicort given   Vital Signs   Pulse 86   Respiration 18   Pulse Oximetry 92 %   $ Pulse Oximetry (Spot Check) Yes   Oxygen   O2 (LPM) 8   O2 Delivery Device Nasal Cannula

## 2022-03-26 NOTE — THERAPY
"Physical Therapy   Daily Treatment     Patient Name: Antonia Stover  Age:  86 y.o., Sex:  female  Medical Record #: [id number]  Today's Date: 3/26/2022     Precautions  Precautions: Fall Risk,Other (See Comments)  Comments: High flow O2 5 L/min increase to 6 to 8 L with activity as needed, legally blind, 1200 mL fluid restriction    Subjective    Pt received in bed resting. Upon being woken she states \"I'm tired.\" \"I just want to take a nap.\"     Objective       03/26/22 1031   Precautions   Precautions Fall Risk;Other (See Comments)   Comments High flow O2 5 L/min increase to 6 to 8 L with activity as needed, legally blind, 1200 mL fluid restriction   Vitals   O2 (LPM) 5   O2 Delivery Device Nasal Cannula   Pain 0 - 10 Group   Therapist Pain Assessment Prior to Activity;During Activity;Post Activity;0  (denies pain)   Cognition    Level of Consciousness Alert   Sleep/Wake Cycle   Sleep & Rest Asleep   Gait Functional Level of Assist    Gait Level Of Assist Refused   Wheelchair Functional Level of Assist   Wheelchair Assist Refused   Transfer Functional Level of Assist   Bed, Chair, Wheelchair Transfer Refused   Supine Lower Body Exercise   Comments LE PROM: hip/knee flex and gastroc stretch   Bed Mobility    Supine to Sit Refused   Interdisciplinary Plan of Care Collaboration   Patient Position at End of Therapy In Bed;Bed Alarm On;Call Light within Reach;Tray Table within Reach;Phone within Reach   Collaboration Comments on wall O2   Strengths & Barriers   Barriers Decreased endurance;Generalized weakness;Impaired activity tolerance;Uncooperative   PT Total Time Spent   PT Individual Total Time Spent (Mins) 15   PT Charge Group   PT Therapeutic Activities 1     Pt on 5L O2 on wall throughout session.    Assessment    Pt refused all OOB activities. PROM of LEs performed with pt supine in bed.    Barriers: Decreased endurance,Generalized weakness,Impaired activity tolerance,Uncooperative    Plan    Encourage OOB " activities, functional transfers, strength and endurance, monitor vitals      Physical Therapy Problems (Active)       Problem: Mobility       Dates: Start: 03/24/22         Goal: STG-Within one week, patient will ambulate fww Lyn 30 FT       Dates: Start: 03/24/22               Problem: Mobility Transfers       Dates: Start: 03/24/22         Goal: STG-Within one week, patient will transfer bed to chair fww CGA       Dates: Start: 03/24/22            Goal: STG-Within one week, patient will move supine to/from sit SBA       Dates: Start: 03/24/22               Problem: PT-Long Term Goals       Dates: Start: 03/24/22         Goal: LTG-By discharge, patient will ambulate fww 150 FT supervision       Dates: Start: 03/24/22            Goal: LTG-By discharge, patient will transfer one surface to another fww supervision       Dates: Start: 03/24/22            Goal: LTG-By discharge, patient will transfer in/out of a car fww supervision       Dates: Start: 03/24/22            Goal: LTG-By discharge, patient will ambulate fww up/down ramp to access home       Dates: Start: 03/24/22

## 2022-03-26 NOTE — THERAPY
"Physical Therapy   Daily Treatment     Patient Name: Antonia Stover  Age:  86 y.o., Sex:  female  Medical Record #: [id number]  Today's Date: 3/26/2022     Precautions  Precautions: (P) Fall Risk,Other (See Comments)  Comments: (P) High flow O2 5 L/min increase to 6 to 8 L with activity as needed, legally blind, 1200 mL fluid restriction    Subjective    Pt received in bed, upon being woken she states \"I just want to sleep.\" She appears confused during session: \"I live with my parents and my brothers and sisters.\" When PT attempted to assist pt to wc to bring her to the bathroom pt states \"go pester somebody else.\"     Objective       03/26/22 0701   Precautions   Precautions Fall Risk;Other (See Comments)   Comments High flow O2 5 L/min increase to 6 to 8 L with activity as needed, legally blind, 1200 mL fluid restriction   Vitals   Pulse 100  (at rest)   Pulse Oximetry (!) 86 %   O2 (LPM) 6   O2 Delivery Device Nasal Cannula   Vitals Comments at rest; inc to 8L O2, RT notified of vitals   Cognition    Level of Consciousness Alert   Comments confused   Sleep/Wake Cycle   Sleep & Rest Asleep   Gait Functional Level of Assist    Gait Level Of Assist Unable to Participate   Wheelchair Functional Level of Assist   Wheelchair Assist Total Assist   Transfer Functional Level of Assist   Bed, Chair, Wheelchair Transfer Maximal Assist   Bed Chair Wheelchair Transfer Description Requires lift;Verbal cueing   Toilet Transfers Maximal Assist   Toilet Transfer Description Increased time;Grab bar;Requires lift;Verbal cueing  (pt repeatdly stating \"I can't\")   Supine Lower Body Exercise   Comments LE ROM in supine   Bed Mobility    Supine to Sit Maximal Assist   Sit to Stand Moderate Assist  (varies between min-max A)   Rolling Moderate Assist to Lt.   Interdisciplinary Plan of Care Collaboration   IDT Collaboration with  Nursing;Respiratory Therapist   Patient Position at End of Therapy Seated;Chair Alarm On;Self Releasing " Lap Belt Applied;Call Light within Reach;Tray Table within Reach;Phone within Reach   Collaboration Comments re: vitals, CNA assisted with dressing after toileting   Strengths & Barriers   Barriers Decreased endurance;Generalized weakness;Difficulty following instructions;Impaired activity tolerance;Impaired balance;Impaired appetite/intake;Impaired carryover of learning;Impaired insight/denial of deficits;Uncooperative  (6-8L O2)   PT Total Time Spent   PT Individual Total Time Spent (Mins) 75   PT Charge Group   PT Therapeutic Exercise 1   PT Therapeutic Activities 4     Pt sat at EOB x20 min due to very slow to mobilize. Upper body dressing completed at EOB with Min A.     Pt continent of bladder.    Assessment    Pt agitated throughout session and uncooperative with treatment. Supplemental O2 increased to 8L due to SpO2 86% at rest on 5-6L, RT notified. Increased time required for all activities due to pt uncooperative, unable to follow single-step instructions consistently, and severely deconditioned.    Barriers: (P) Decreased endurance,Generalized weakness,Difficulty following instructions,Impaired activity tolerance,Impaired balance,Impaired appetite/intake,Impaired carryover of learning,Impaired insight/denial of deficits,Uncooperative (6-8L O2)    Plan    Therapeutic exercise, bed mobility and transfers, standing tolerance and progress to gait training as tolerated         Physical Therapy Problems (Active)       Problem: Mobility       Dates: Start: 03/24/22         Goal: STG-Within one week, patient will ambulate fww Lyn 30 FT       Dates: Start: 03/24/22               Problem: Mobility Transfers       Dates: Start: 03/24/22         Goal: STG-Within one week, patient will transfer bed to chair fww CGA       Dates: Start: 03/24/22            Goal: STG-Within one week, patient will move supine to/from sit SBA       Dates: Start: 03/24/22               Problem: PT-Long Term Goals       Dates: Start:  03/24/22         Goal: LTG-By discharge, patient will ambulate fww 150 FT supervision       Dates: Start: 03/24/22            Goal: LTG-By discharge, patient will transfer one surface to another fww supervision       Dates: Start: 03/24/22            Goal: LTG-By discharge, patient will transfer in/out of a car fww supervision       Dates: Start: 03/24/22            Goal: LTG-By discharge, patient will ambulate fww up/down ramp to access home       Dates: Start: 03/24/22

## 2022-03-26 NOTE — FLOWSHEET NOTE
03/26/22 1100   Events/Summary/Plan   Events/Summary/Plan SpO2 check   Vital Signs   Pulse 77   Respiration 18   Pulse Oximetry 94 %   $ Pulse Oximetry (Spot Check) Yes   Oxygen   O2 (LPM) 5   O2 Delivery Device Nasal Cannula

## 2022-03-26 NOTE — PROGRESS NOTES
[facility] Medicine Daily Progress Note    Date of Service  3/26/2022    Chief Complaint:  Hypertension  Leukocytosis  Electrolyte imbalances    Interval History:  No significant events over night.    Review of Systems  Review of Systems   Constitutional: Negative for fever.   Eyes: Negative for blurred vision.   Respiratory: Negative for shortness of breath.    Cardiovascular: Negative for palpitations.   Gastrointestinal: Negative for nausea and vomiting.   Neurological: Negative for dizziness and headaches.   Psychiatric/Behavioral: Negative for hallucinations.        Physical Exam  Temp:  [36.2 °C (97.1 °F)-36.7 °C (98.1 °F)] 36.2 °C (97.1 °F)  Pulse:  [] 86  Resp:  [16-18] 18  BP: (109-118)/(67-73) 110/67  SpO2:  [86 %-98 %] 92 %    Physical Exam  Vitals and nursing note reviewed.   Constitutional:       General: She is not in acute distress.  HENT:      Mouth/Throat:      Mouth: Mucous membranes are moist.      Pharynx: Oropharynx is clear.   Eyes:      General: No scleral icterus.  Cardiovascular:      Rate and Rhythm: Normal rate and regular rhythm.   Pulmonary:      Effort: Pulmonary effort is normal.      Breath sounds: No wheezing or rales.   Abdominal:      General: Bowel sounds are normal.      Palpations: Abdomen is soft.   Musculoskeletal:      Cervical back: No rigidity.      Right lower leg: No edema.      Left lower leg: No edema.   Skin:     General: Skin is warm and dry.   Neurological:      Mental Status: She is alert and oriented to person, place, and time.   Psychiatric:         Mood and Affect: Mood normal.         Behavior: Behavior normal.         Fluids    Intake/Output Summary (Last 24 hours) at 3/26/2022 0910  Last data filed at 3/26/2022 0537  Gross per 24 hour   Intake 350 ml   Output --   Net 350 ml       Laboratory  Recent Labs     03/24/22  0530   WBC 19.2*   RBC 4.34   HEMOGLOBIN 13.2   HEMATOCRIT 40.0   MCV 92.2   MCH 30.4   MCHC 33.0*   RDW 48.7   PLATELETCT 300   MPV 11.5      Recent Labs     03/24/22  0530   SODIUM 132*   POTASSIUM 3.1*   CHLORIDE 89*   CO2 33   GLUCOSE 81   BUN 13   CREATININE 0.56   CALCIUM 9.0                   Imaging    Assessment/Plan  Pneumonia- (present on admission)  Assessment & Plan  Diagnosed at [facility] [place], [place]  Has a cough  Afebrile  WBC's: 19.0 --> 15.2 --> 19.8 --> 19.2 (3/24)  CXR (3/20): showed bibasilar opacities  U/A (-)  BC (-)  S/P Ceftin and Zithro (3/25)  Note: was on steroids at [facility] [place], [place] and just stopped 3/23  Monitor    Vitamin D deficiency- (present on admission)  Assessment & Plan  Vit D: 9  On supplements    Hypokalemia- (present on admission)  Assessment & Plan  K+: 3.1 (3/24)  S/P KCL 40 meq x 1  On daily K+ supplements (since pt not eating well)  Monitor    Hyponatremia- (present on admission)  Assessment & Plan  Na: 129 --> 132 (3/24)  Likely 2nd to poor appetite and oral intake  Monitor for now    Essential hypertension- (present on admission)  Assessment & Plan  BP ok  On Cardizem  mg daily --> 180 mg daily (3/25)  Note: home meds include Cardizem  mg daily  Cont to monitor    Chronic obstructive pulmonary disease (HCC)- (present on admission)  Assessment & Plan  Has hx  S/P exacerbation (from [facility] [place], [place])  S/P steroids and abx  On chronic O2 supplementation of 3 liters at home -- but pt only was using sometimes  Note: long time smoker but states she quit 5 weeks ago    Hypothyroidism- (present on admission)  Assessment & Plan  TSH: wnl (3/22)  On Synthroid

## 2022-03-26 NOTE — THERAPY
Occupational Therapy  Daily Treatment     Patient Name: Antonia Stover  Age:  86 y.o., Sex:  female  Medical Record #: [id number]  Today's Date: 3/26/2022     Precautions  Precautions: Fall Risk,Other (See Comments)  Comments: High flow O2 5 L/min increase to 6 to 8 L with activity as needed, legally blind, 1200 mL fluid restriction         Subjective    Patient somnolent but able to complete 1 step directions without redirection.       Objective       03/26/22 0931   Precautions   Precautions Fall Risk;Other (See Comments)   Comments High flow O2 5 L/min increase to 6 to 8 L with activity as needed, legally blind, 1200 mL fluid restriction   Cognition    Cognition / Consciousness X   Ability To Follow Commands 1 Step   Comments Lethargic   Functional Level of Assist   Bed, Chair, Wheelchair Transfer Moderate Assist   Bed Chair Wheelchair Transfer Description Increased time;Requires lift   Sitting Upper Body Exercises   Sitting Upper Body Exercises Yes   Upper Extremity Bike Level 2 Resistance  (BUE [employer] forward/reverse 10 minutes)   Balance   Sitting Balance (Static) Fair   Sitting Balance (Dynamic) Fair -   Standing Balance (Static) Poor -   Standing Balance (Dynamic) Poor -   Weight Shift Sitting Fair   Weight Shift Standing Poor   Skilled Intervention Compensatory Strategies   Bed Mobility    Sit to Supine Moderate Assist   Scooting Minimal Assist   Interdisciplinary Plan of Care Collaboration   IDT Collaboration with  Physical Therapist;Certified Nursing Assistant   Patient Position at End of Therapy In Bed   Collaboration Comments Wall O2   OT Total Time Spent   OT Individual Total Time Spent (Mins) 30   OT Charge Group   Charges Yes   OT Self Care / ADL 1   OT Therapeutic Exercise  1       Assessment    Patient tolerated BUE therapeutic exercise from seated position with patient preferring to keep eyes closed throughout the majority of the session.  Patient O2 >98 throughout on 6L O2 during BUE  exercises.  Mod A required for STS and SPT from w/c to bed with cues to initiate standing.     Strengths: Independent prior level of function,Pleasant and cooperative,Supportive family,Willingly participates in therapeutic activities  Barriers: Decreased endurance,Fatigue,Generalized weakness,Impaired activity tolerance,Impaired appetite/intake,Impaired balance,Impaired functional cognition,Limited mobility (supplemental O2, fear of falling)    Plan    Continue generalized strengthening, endurance building, balance building, ADL retraining.     Passport items to be completed:  [unfilled]    Occupational Therapy Goals (Active)       Problem: Bathing       Dates: Start: 03/24/22         Goal: STG-Within one week, patient will bathe with mod A using AE as needed.        Dates: Start: 03/24/22               Problem: Dressing       Dates: Start: 03/24/22         Goal: STG-Within one week, patient will dress LB with mod A using AE as needed.        Dates: Start: 03/24/22               Problem: Functional Transfers       Dates: Start: 03/24/22         Goal: STG-Within one week, patient will transfer to toilet with min A using AE/DME as needed.        Dates: Start: 03/24/22               Problem: OT Long Term Goals       Dates: Start: 03/24/22         Goal: LTG-By discharge, patient will complete basic self care tasks at supervision to mod I level using AE as needed.        Dates: Start: 03/24/22            Goal: LTG-By discharge, patient will perform bathroom transfers at supervision to mod I level using AE/DME as needed.        Dates: Start: 03/24/22               Problem: Toileting       Dates: Start: 03/24/22         Goal: STG-Within one week, patient will complete toileting tasks with mod A using AE as needed.        Dates: Start: 03/24/22

## 2022-03-26 NOTE — CARE PLAN
"  Problem: Skin Integrity  Goal: Skin integrity is maintained or improved  Outcome: Progressing   Patient has a pressure injury, stage 1, on sacral area. Mepilex in placed and changed this shift. Patient is on a low air loss mattress; patient repositioned Q2 hours for off loading.         Problem: Fall Risk - Rehab  Goal: Patient will remain free from falls  Outcome: Progressing  Susan Blount Fall risk Assessment Score: 13    Moderate fall risk Interventions  - Bed and strip alarm   - Yellow sign by the door   - Yellow wrist band \"Fall risk\"  - Room near to the nurse station  - Do not leave patient unattended in the bathroom  - Fall risk education provided                   "

## 2022-03-27 PROCEDURE — A9270 NON-COVERED ITEM OR SERVICE: HCPCS | Performed by: PHYSICAL MEDICINE & REHABILITATION

## 2022-03-27 PROCEDURE — 770010 HCHG ROOM/CARE - REHAB SEMI PRIVAT*

## 2022-03-27 PROCEDURE — 97530 THERAPEUTIC ACTIVITIES: CPT

## 2022-03-27 PROCEDURE — 94640 AIRWAY INHALATION TREATMENT: CPT

## 2022-03-27 PROCEDURE — 700102 HCHG RX REV CODE 250 W/ 637 OVERRIDE(OP): Performed by: PHYSICAL MEDICINE & REHABILITATION

## 2022-03-27 PROCEDURE — A9270 NON-COVERED ITEM OR SERVICE: HCPCS | Performed by: HOSPITALIST

## 2022-03-27 PROCEDURE — 700111 HCHG RX REV CODE 636 W/ 250 OVERRIDE (IP): Performed by: PHYSICAL MEDICINE & REHABILITATION

## 2022-03-27 PROCEDURE — 99232 SBSQ HOSP IP/OBS MODERATE 35: CPT | Performed by: HOSPITALIST

## 2022-03-27 PROCEDURE — 700102 HCHG RX REV CODE 250 W/ 637 OVERRIDE(OP): Performed by: HOSPITALIST

## 2022-03-27 PROCEDURE — 94760 N-INVAS EAR/PLS OXIMETRY 1: CPT

## 2022-03-27 PROCEDURE — 97110 THERAPEUTIC EXERCISES: CPT

## 2022-03-27 RX ADMIN — LEVOTHYROXINE SODIUM 75 MCG: 75 TABLET ORAL at 05:24

## 2022-03-27 RX ADMIN — BUDESONIDE AND FORMOTEROL FUMARATE DIHYDRATE 2 PUFF: 160; 4.5 AEROSOL RESPIRATORY (INHALATION) at 08:55

## 2022-03-27 RX ADMIN — SENNOSIDES AND DOCUSATE SODIUM 2 TABLET: 50; 8.6 TABLET ORAL at 08:32

## 2022-03-27 RX ADMIN — THERA TABS 1 TABLET: TAB at 08:32

## 2022-03-27 RX ADMIN — MIRTAZAPINE 15 MG: 15 TABLET, ORALLY DISINTEGRATING ORAL at 19:28

## 2022-03-27 RX ADMIN — ACETAMINOPHEN 650 MG: 325 TABLET ORAL at 14:40

## 2022-03-27 RX ADMIN — CLOPIDOGREL BISULFATE 75 MG: 75 TABLET ORAL at 08:30

## 2022-03-27 RX ADMIN — OMEPRAZOLE 20 MG: 20 CAPSULE, DELAYED RELEASE ORAL at 08:32

## 2022-03-27 RX ADMIN — BUDESONIDE AND FORMOTEROL FUMARATE DIHYDRATE 2 PUFF: 160; 4.5 AEROSOL RESPIRATORY (INHALATION) at 19:27

## 2022-03-27 RX ADMIN — DILTIAZEM HYDROCHLORIDE 180 MG: 180 CAPSULE, EXTENDED RELEASE ORAL at 05:24

## 2022-03-27 RX ADMIN — ENOXAPARIN SODIUM 40 MG: 40 INJECTION SUBCUTANEOUS at 19:28

## 2022-03-27 RX ADMIN — POTASSIUM CHLORIDE 20 MEQ: 1500 TABLET, EXTENDED RELEASE ORAL at 08:32

## 2022-03-27 RX ADMIN — PRAVASTATIN SODIUM 40 MG: 20 TABLET ORAL at 19:28

## 2022-03-27 ASSESSMENT — ENCOUNTER SYMPTOMS
BLURRED VISION: 0
DIZZINESS: 0
FEVER: 0
COUGH: 0
NERVOUS/ANXIOUS: 0
DIARRHEA: 0

## 2022-03-27 ASSESSMENT — FIBROSIS 4 INDEX: FIB4 SCORE: 2.8

## 2022-03-27 NOTE — FLOWSHEET NOTE
03/27/22 0911   Events/Summary/Plan   Events/Summary/Plan Symbicort given, 02 spot check (not yet able to wean)   Vital Signs   Pulse 92   Respiration 18   Pulse Oximetry 90 %   $ Pulse Oximetry (Spot Check) Yes   Respiratory Assessment   Level of Consciousness Alert   Chest Exam   Work Of Breathing / Effort Within Normal Limits   Secretions   Cough Congested;Non Productive   Oxygen   O2 (LPM) 5   O2 Delivery Device Silicone Nasal Cannula

## 2022-03-27 NOTE — THERAPY
Occupational Therapy  Daily Treatment     Patient Name: Antonia Stover  Age:  86 y.o., Sex:  female  Medical Record #: [id number]  Today's Date: 3/27/2022     Precautions  Precautions: (P) Fall Risk,Other (See Comments)  Comments: (P) High flow O2 5 L/min increase to 6 to 8 L with activity as needed, legally blind, 1200 mL fluid restriction         Subjective    Pt agreeable to OT with encouragement.      Objective       03/27/22 1301   Precautions   Precautions Fall Risk;Other (See Comments)   Comments High flow O2 5 L/min increase to 6 to 8 L with activity as needed, legally blind, 1200 mL fluid restriction   Vitals   Pulse Oximetry 93 %   O2 (LPM) 5   O2 Delivery Device Nasal Cannula   Sitting Upper Body Exercises   Chest Press 3 sets of 10;Bilateral;Weight (See Comments for lbs)   Internal Shoulder Rotation 3 sets of 10;Bilateral;Weight (See Comments for lbs)   External Shoulder Rotation 3 sets of 10;Bilateral;Weight (See Comments for lbs)   Bicep Curls 3 sets of 10;Bilateral;Weight (See Comments for lbs)   Pronation / Supination 3 sets of 10;Bilateral;Weight (See Comments for lbs)   Comments 1# weights used for UB exercises.   Interdisciplinary Plan of Care Collaboration   IDT Collaboration with  Family / Caregiver   Patient Position at End of Therapy Seated;Chair Alarm On;Self Releasing Lap Belt Applied;Tray Table within Reach;Call Light within Reach;Phone within Reach   Collaboration Comments spouse present   OT Total Time Spent   OT Individual Total Time Spent (Mins) 30   OT Charge Group   OT Therapeutic Exercise  2       Assessment    Pt con't to be limited by fatigue, low endurance and severe deconditioning. Pt O2 sats >93% on 5L during seated UB exercises.   Strengths: Independent prior level of function,Pleasant and cooperative,Supportive family,Willingly participates in therapeutic activities  Barriers: Decreased endurance,Fatigue,Generalized weakness,Impaired activity tolerance,Impaired  appetite/intake,Impaired balance,Impaired functional cognition,Limited mobility (supplemental O2, fear of falling)    Plan    Continue generalized strengthening, endurance building, balance building, ADL retraining.     Occupational Therapy Goals (Active)       Problem: Bathing       Dates: Start: 03/24/22         Goal: STG-Within one week, patient will bathe with mod A using AE as needed.        Dates: Start: 03/24/22               Problem: Dressing       Dates: Start: 03/24/22         Goal: STG-Within one week, patient will dress LB with mod A using AE as needed.        Dates: Start: 03/24/22               Problem: Functional Transfers       Dates: Start: 03/24/22         Goal: STG-Within one week, patient will transfer to toilet with min A using AE/DME as needed.        Dates: Start: 03/24/22               Problem: OT Long Term Goals       Dates: Start: 03/24/22         Goal: LTG-By discharge, patient will complete basic self care tasks at supervision to mod I level using AE as needed.        Dates: Start: 03/24/22            Goal: LTG-By discharge, patient will perform bathroom transfers at supervision to mod I level using AE/DME as needed.        Dates: Start: 03/24/22               Problem: Toileting       Dates: Start: 03/24/22         Goal: STG-Within one week, patient will complete toileting tasks with mod A using AE as needed.        Dates: Start: 03/24/22

## 2022-03-27 NOTE — CARE PLAN
Problem: Skin Integrity  Goal: Skin integrity is maintained or improved  Outcome: Progressing  Patient on air loss mattress to prevent pressure areas on back, assisted to turn to sides.     Problem: Fall Risk - Rehab  Goal: Patient will remain free from falls  Outcome: Progressing  Patient reminded to use call light for assist with needs.   The patient is Stable - Low risk of patient condition declining or worsening    Shift Goals  Clinical Goals: S  Patient Goals: free of falls/ safety

## 2022-03-27 NOTE — PROGRESS NOTES
[facility] Medicine Daily Progress Note    Date of Service  3/27/2022    Chief Complaint:  Hypertension  Leukocytosis  Electrolyte imbalances    Interval History:  No complaints.  Doing ok.    Review of Systems  Review of Systems   Constitutional: Negative for fever.   Eyes: Negative for blurred vision.   Respiratory: Negative for cough.    Cardiovascular: Negative for chest pain.   Gastrointestinal: Negative for diarrhea.   Musculoskeletal: Negative for joint pain.   Neurological: Negative for dizziness.   Psychiatric/Behavioral: The patient is not nervous/anxious.         Physical Exam  Temp:  [36.6 °C (97.8 °F)-37.2 °C (98.9 °F)] 36.6 °C (97.8 °F)  Pulse:  [77-95] 84  Resp:  [16-18] 18  BP: (130-148)/(78-90) 132/78  SpO2:  [93 %-95 %] 93 %    Physical Exam  Vitals and nursing note reviewed.   Constitutional:       Appearance: She is not diaphoretic.   HENT:      Mouth/Throat:      Pharynx: No oropharyngeal exudate or posterior oropharyngeal erythema.   Eyes:      Extraocular Movements: Extraocular movements intact.   Neck:      Vascular: No carotid bruit.   Cardiovascular:      Rate and Rhythm: Normal rate and regular rhythm.   Pulmonary:      Effort: Pulmonary effort is normal.      Breath sounds: No wheezing or rales.   Abdominal:      General: There is no distension.      Palpations: Abdomen is soft.      Tenderness: There is no abdominal tenderness.   Musculoskeletal:      Right lower leg: No edema.      Left lower leg: No edema.   Skin:     General: Skin is warm and dry.   Neurological:      Mental Status: She is alert and oriented to person, place, and time.   Psychiatric:         Mood and Affect: Mood normal.         Behavior: Behavior normal.         Fluids    Intake/Output Summary (Last 24 hours) at 3/27/2022 0919  Last data filed at 3/27/2022 0830  Gross per 24 hour   Intake 80 ml   Output --   Net 80 ml       Laboratory                        Imaging    Assessment/Plan  Pneumonia- (present on  admission)  Assessment & Plan  Diagnosed at [facility] [place]  Has a cough  Afebrile  WBC's: 19.0 --> 15.2 --> 19.8 --> 19.2 (3/24)  CXR (3/20): showed bibasilar opacities  U/A (-)  BC (-)  S/P Ceftin and Zithro (3/25)  Note: was on steroids at [facility] [place] and just stopped 3/23  Monitor    Vitamin D deficiency- (present on admission)  Assessment & Plan  Vit D: 9  On supplements    Hypokalemia- (present on admission)  Assessment & Plan  K+: 3.1 (3/24)  S/P KCL 40 meq x 1  On daily K+ supplements (since pt not eating well)  Monitor    Hyponatremia- (present on admission)  Assessment & Plan  Na: 129 --> 132 (3/24)  Likely 2nd to poor appetite and oral intake  Monitor for now    Essential hypertension- (present on admission)  Assessment & Plan  BP generally ok  On Cardizem  mg daily --> 180 mg daily (3/25)  Note: home meds include Cardizem  mg daily  Cont to monitor    Chronic obstructive pulmonary disease (HCC)- (present on admission)  Assessment & Plan  Has hx  S/P exacerbation (from [facility] [place])  S/P steroids and abx  On chronic O2 supplementation of 3 liters at home -- but pt only was using sometimes  Note: long time smoker but states she quit 5 weeks ago    Hypothyroidism- (present on admission)  Assessment & Plan  TSH: wnl (3/22)  On Synthroid

## 2022-03-27 NOTE — CARE PLAN
Problem: Underweight  Goal: Patient to consume greater than or equal to 50% of meals  Outcome: Not Progressing   Didn't eat any meals today and only 120cc of fluid.

## 2022-03-27 NOTE — CARE PLAN
"  Problem: Fall Risk - Rehab  Goal: Patient will remain free from falls  Outcome: Progressing  Note: Susan Blount Fall risk Assessment Score: 13    Moderate fall risk Interventions  - Bed and strip alarm   - Yellow sign by the door   - Yellow wrist band \"Fall risk\"  - Room near to the nurse station  - Do not leave patient unattended in the bathroom  - Fall risk education provided       Problem: Pain - Standard  Goal: Alleviation of pain or a reduction in pain to the patient’s comfort goal  Outcome: Progressing  Note: Assessed for pain and discomfort , denies pain. Cont monitored.   The patient is Stable - Low risk of patient condition declining or worsening    Shift Goals  Clinical Goals: S  Patient Goals: free of falls/ safety    Progress made toward(s) clinical / shift goals:  Pt free from fall and injury,     Patient is not progressing towards the following goals:gen weakness.      "

## 2022-03-27 NOTE — THERAPY
"Physical Therapy   Daily Treatment     Patient Name: Antonia Stover  Age:  86 y.o., Sex:  female  Medical Record #: [id number]  Today's Date: 3/27/2022     Precautions  Precautions: Fall Risk,Other (See Comments)  Comments: High flow O2 5 L/min increase to 6 to 8 L with activity as needed, legally blind, 1200 mL fluid restriction    Subjective  Patient agreeable to session, reports fatigue.      Objective       03/27/22 1031   Vitals   Pulse 95   Pulse Oximetry 95 %   O2 (LPM) 5   Vitals Comments drops to 88 following standing trials, increased to 6L and remains 89-91%.   Bed Mobility    Supine to Sit Contact Guard Assist  (HOBE)   Sit to Supine Contact Guard Assist  (HOBE)   Sit to Stand Minimal Assist   Scooting Contact Guard Assist   Interdisciplinary Plan of Care Collaboration   Patient Position at End of Therapy In Bed;Bed Alarm On;Call Light within Reach;Tray Table within Reach;Phone within Reach   Collaboration Comments back to 5L on wall O2.   PT Total Time Spent   PT Individual Total Time Spent (Mins) 30   PT Charge Group   PT Therapeutic Activities 2   Standing tolerance 1:30 and 1 min,  attempted one more stand but requests to sit stating felt dizzy.  Sitting EOB x 10 minutes focusing on PLB \"in for 2, hold for 2, out for 4\" with verbal cues needed.      Requires min Assist for standing and orientation of COG over FABY.      Declines transfer to wheelchair for lunch.     Assessment    Patient very limited by her endurance, O2 saturation during session. Requires increased time for all active mobility due to debilitated status.  Pleasant during session with PT.     Barriers: Decreased endurance,Generalized weakness,Impaired activity tolerance,Uncooperative    Plan      Encourage OOB activities, functional transfers, strength and endurance, monitor vitals    Physical Therapy Problems (Active)       Problem: Mobility       Dates: Start: 03/24/22         Goal: STG-Within one week, patient will ambulate fww " yLn 30 FT       Dates: Start: 03/24/22               Problem: Mobility Transfers       Dates: Start: 03/24/22         Goal: STG-Within one week, patient will transfer bed to chair fww CGA       Dates: Start: 03/24/22            Goal: STG-Within one week, patient will move supine to/from sit SBA       Dates: Start: 03/24/22               Problem: PT-Long Term Goals       Dates: Start: 03/24/22         Goal: LTG-By discharge, patient will ambulate fww 150 FT supervision       Dates: Start: 03/24/22            Goal: LTG-By discharge, patient will transfer one surface to another fww supervision       Dates: Start: 03/24/22            Goal: LTG-By discharge, patient will transfer in/out of a car fww supervision       Dates: Start: 03/24/22            Goal: LTG-By discharge, patient will ambulate fww up/down ramp to access home       Dates: Start: 03/24/22

## 2022-03-28 ENCOUNTER — APPOINTMENT (OUTPATIENT)
Dept: RADIOLOGY | Facility: REHABILITATION | Age: 87
DRG: 947 | End: 2022-03-28
Attending: HOSPITALIST
Payer: MEDICARE

## 2022-03-28 ENCOUNTER — APPOINTMENT (OUTPATIENT)
Dept: RADIOLOGY | Facility: MEDICAL CENTER | Age: 87
DRG: 190 | End: 2022-03-28
Attending: EMERGENCY MEDICINE
Payer: MEDICARE

## 2022-03-28 ENCOUNTER — APPOINTMENT (OUTPATIENT)
Dept: RADIOLOGY | Facility: REHABILITATION | Age: 87
DRG: 947 | End: 2022-03-28
Attending: INTERNAL MEDICINE
Payer: MEDICARE

## 2022-03-28 ENCOUNTER — APPOINTMENT (OUTPATIENT)
Dept: RADIOLOGY | Facility: REHABILITATION | Age: 87
DRG: 947 | End: 2022-03-28
Attending: PHYSICAL MEDICINE & REHABILITATION
Payer: MEDICARE

## 2022-03-28 ENCOUNTER — HOSPITAL ENCOUNTER (INPATIENT)
Facility: MEDICAL CENTER | Age: 87
LOS: 9 days | DRG: 190 | End: 2022-04-07
Attending: EMERGENCY MEDICINE | Admitting: STUDENT IN AN ORGANIZED HEALTH CARE EDUCATION/TRAINING PROGRAM
Payer: MEDICARE

## 2022-03-28 VITALS
OXYGEN SATURATION: 86 % | SYSTOLIC BLOOD PRESSURE: 111 MMHG | TEMPERATURE: 97.9 F | RESPIRATION RATE: 24 BRPM | WEIGHT: 77.7 LBS | DIASTOLIC BLOOD PRESSURE: 69 MMHG | HEIGHT: 62 IN | HEART RATE: 64 BPM | BODY MASS INDEX: 14.3 KG/M2

## 2022-03-28 DIAGNOSIS — D72.829 LEUKOCYTOSIS, UNSPECIFIED TYPE: ICD-10-CM

## 2022-03-28 DIAGNOSIS — J18.9 PNEUMONIA DUE TO INFECTIOUS ORGANISM, UNSPECIFIED LATERALITY, UNSPECIFIED PART OF LUNG: ICD-10-CM

## 2022-03-28 DIAGNOSIS — I47.10 PAROXYSMAL SUPRAVENTRICULAR TACHYCARDIA (HCC): ICD-10-CM

## 2022-03-28 DIAGNOSIS — R09.02 HYPOXIA: ICD-10-CM

## 2022-03-28 DIAGNOSIS — J90 PLEURAL EFFUSION: ICD-10-CM

## 2022-03-28 DIAGNOSIS — R06.03 ACUTE RESPIRATORY DISTRESS: Primary | ICD-10-CM

## 2022-03-28 LAB
ALBUMIN FLD-MCNC: 2.5 G/DL
ALBUMIN SERPL BCP-MCNC: 3.6 G/DL (ref 3.2–4.9)
ALBUMIN SERPL BCP-MCNC: 3.9 G/DL (ref 3.2–4.9)
ALBUMIN/GLOB SERPL: 1.3 G/DL
ALP SERPL-CCNC: 107 U/L (ref 30–99)
ALT SERPL-CCNC: 15 U/L (ref 2–50)
AMYLASE FLD-CCNC: 28 U/L
ANION GAP SERPL CALC-SCNC: 15 MMOL/L (ref 7–16)
ANION GAP SERPL CALC-SCNC: 17 MMOL/L (ref 7–16)
APPEARANCE FLD: NORMAL
AST SERPL-CCNC: 39 U/L (ref 12–45)
BASE EXCESS BLDV CALC-SCNC: 3 MMOL/L
BASOPHILS # BLD AUTO: 0.1 % (ref 0–1.8)
BASOPHILS # BLD AUTO: 0.2 % (ref 0–1.8)
BASOPHILS # BLD: 0.03 K/UL (ref 0–0.12)
BASOPHILS # BLD: 0.03 K/UL (ref 0–0.12)
BILIRUB SERPL-MCNC: 0.4 MG/DL (ref 0.1–1.5)
BODY FLD TYPE: NORMAL
BODY TEMPERATURE: NORMAL CENTIGRADE
BUN SERPL-MCNC: 17 MG/DL (ref 8–22)
BUN SERPL-MCNC: 19 MG/DL (ref 8–22)
CALCIUM SERPL-MCNC: 9.2 MG/DL (ref 8.5–10.5)
CALCIUM SERPL-MCNC: 9.6 MG/DL (ref 8.5–10.5)
CHLORIDE SERPL-SCNC: 88 MMOL/L (ref 96–112)
CHLORIDE SERPL-SCNC: 88 MMOL/L (ref 96–112)
CO2 SERPL-SCNC: 25 MMOL/L (ref 20–33)
CO2 SERPL-SCNC: 27 MMOL/L (ref 20–33)
COLOR FLD: YELLOW
CREAT SERPL-MCNC: 0.7 MG/DL (ref 0.5–1.4)
CREAT SERPL-MCNC: 0.81 MG/DL (ref 0.5–1.4)
EKG IMPRESSION: NORMAL
EOSINOPHIL # BLD AUTO: 0.01 K/UL (ref 0–0.51)
EOSINOPHIL # BLD AUTO: 0.02 K/UL (ref 0–0.51)
EOSINOPHIL NFR BLD: 0.1 % (ref 0–6.9)
EOSINOPHIL NFR BLD: 0.1 % (ref 0–6.9)
ERYTHROCYTE [DISTWIDTH] IN BLOOD BY AUTOMATED COUNT: 49.4 FL (ref 35.9–50)
ERYTHROCYTE [DISTWIDTH] IN BLOOD BY AUTOMATED COUNT: 50.5 FL (ref 35.9–50)
GFR SERPLBLD CREATININE-BSD FMLA CKD-EPI: 71 ML/MIN/1.73 M 2
GFR SERPLBLD CREATININE-BSD FMLA CKD-EPI: 85 ML/MIN/1.73 M 2
GLOBULIN SER CALC-MCNC: 2.8 G/DL (ref 1.9–3.5)
GLUCOSE FLD-MCNC: 83 MG/DL
GLUCOSE SERPL-MCNC: 107 MG/DL (ref 65–99)
GLUCOSE SERPL-MCNC: 66 MG/DL (ref 65–99)
HCO3 BLDV-SCNC: 28 MMOL/L (ref 24–28)
HCT VFR BLD AUTO: 37 % (ref 37–47)
HCT VFR BLD AUTO: 43.7 % (ref 37–47)
HGB BLD-MCNC: 12.3 G/DL (ref 12–16)
HGB BLD-MCNC: 14.4 G/DL (ref 12–16)
HISTIOCYTES NFR FLD: 25 %
IMM GRANULOCYTES # BLD AUTO: 0.11 K/UL (ref 0–0.11)
IMM GRANULOCYTES # BLD AUTO: 0.14 K/UL (ref 0–0.11)
IMM GRANULOCYTES NFR BLD AUTO: 0.6 % (ref 0–0.9)
IMM GRANULOCYTES NFR BLD AUTO: 0.6 % (ref 0–0.9)
LACTATE BLD-SCNC: 1.5 MMOL/L (ref 0.5–2)
LDH FLD L TO P-CCNC: 303 U/L
LDH SERPL L TO P-CCNC: 1245 U/L (ref 107–266)
LYMPHOCYTES # BLD AUTO: 1.09 K/UL (ref 1–4.8)
LYMPHOCYTES # BLD AUTO: 1.32 K/UL (ref 1–4.8)
LYMPHOCYTES NFR BLD: 6 % (ref 22–41)
LYMPHOCYTES NFR BLD: 6 % (ref 22–41)
LYMPHOCYTES NFR FLD: 60 %
MAGNESIUM SERPL-MCNC: 1.9 MG/DL (ref 1.5–2.5)
MCH RBC QN AUTO: 30.8 PG (ref 27–33)
MCH RBC QN AUTO: 30.8 PG (ref 27–33)
MCHC RBC AUTO-ENTMCNC: 33 G/DL (ref 33.6–35)
MCHC RBC AUTO-ENTMCNC: 33.2 G/DL (ref 33.6–35)
MCV RBC AUTO: 92.5 FL (ref 81.4–97.8)
MCV RBC AUTO: 93.4 FL (ref 81.4–97.8)
MESOTHL CELL NFR FLD: 12 %
MONOCYTES # BLD AUTO: 1.12 K/UL (ref 0–0.85)
MONOCYTES # BLD AUTO: 1.27 K/UL (ref 0–0.85)
MONOCYTES NFR BLD AUTO: 5.8 % (ref 0–13.4)
MONOCYTES NFR BLD AUTO: 6.2 % (ref 0–13.4)
MONONUC CELLS NFR FLD: 1 %
NEUTROPHILS # BLD AUTO: 15.74 K/UL (ref 2–7.15)
NEUTROPHILS # BLD AUTO: 19.16 K/UL (ref 2–7.15)
NEUTROPHILS NFR BLD: 86.9 % (ref 44–72)
NEUTROPHILS NFR BLD: 87.4 % (ref 44–72)
NEUTROPHILS NFR FLD: 2 %
NRBC # BLD AUTO: 0 K/UL
NRBC # BLD AUTO: 0 K/UL
NRBC BLD-RTO: 0 /100 WBC
NRBC BLD-RTO: 0 /100 WBC
NT-PROBNP SERPL IA-MCNC: 1035 PG/ML (ref 0–125)
PCO2 BLDV: 47.2 MMHG (ref 41–51)
PH BLDV: 7.4 [PH] (ref 7.31–7.45)
PH FLD: 8 [PH]
PHOSPHATE SERPL-MCNC: 3.5 MG/DL (ref 2.5–4.5)
PLATELET # BLD AUTO: 272 K/UL (ref 164–446)
PLATELET # BLD AUTO: 299 K/UL (ref 164–446)
PMV BLD AUTO: 11.3 FL (ref 9–12.9)
PMV BLD AUTO: 11.4 FL (ref 9–12.9)
PO2 BLDV: 35.9 MMHG (ref 25–40)
POTASSIUM SERPL-SCNC: 4.8 MMOL/L (ref 3.6–5.5)
POTASSIUM SERPL-SCNC: 4.9 MMOL/L (ref 3.6–5.5)
PROCALCITONIN SERPL-MCNC: 0.49 NG/ML
PROT FLD-MCNC: 3.7 G/DL
PROT SERPL-MCNC: 6.4 G/DL (ref 6–8.2)
PROT SERPL-MCNC: 6.8 G/DL (ref 6–8.2)
RBC # BLD AUTO: 4 M/UL (ref 4.2–5.4)
RBC # BLD AUTO: 4.68 M/UL (ref 4.2–5.4)
RBC # FLD: <2000 CELLS/UL
SAO2 % BLDV: 61.9 %
SODIUM SERPL-SCNC: 130 MMOL/L (ref 135–145)
SODIUM SERPL-SCNC: 130 MMOL/L (ref 135–145)
TROPONIN T SERPL-MCNC: 25 NG/L (ref 6–19)
WBC # BLD AUTO: 18.1 K/UL (ref 4.8–10.8)
WBC # BLD AUTO: 21.9 K/UL (ref 4.8–10.8)
WBC # FLD: 749 CELLS/UL

## 2022-03-28 PROCEDURE — 88342 IMHCHEM/IMCYTCHM 1ST ANTB: CPT

## 2022-03-28 PROCEDURE — 83615 LACTATE (LD) (LDH) ENZYME: CPT

## 2022-03-28 PROCEDURE — 94640 AIRWAY INHALATION TREATMENT: CPT

## 2022-03-28 PROCEDURE — 83735 ASSAY OF MAGNESIUM: CPT

## 2022-03-28 PROCEDURE — 700101 HCHG RX REV CODE 250: Performed by: PHYSICAL MEDICINE & REHABILITATION

## 2022-03-28 PROCEDURE — 84145 PROCALCITONIN (PCT): CPT | Mod: 91

## 2022-03-28 PROCEDURE — A9270 NON-COVERED ITEM OR SERVICE: HCPCS | Performed by: PHYSICAL MEDICINE & REHABILITATION

## 2022-03-28 PROCEDURE — 96365 THER/PROPH/DIAG IV INF INIT: CPT

## 2022-03-28 PROCEDURE — 71045 X-RAY EXAM CHEST 1 VIEW: CPT

## 2022-03-28 PROCEDURE — 700102 HCHG RX REV CODE 250 W/ 637 OVERRIDE(OP): Performed by: PHYSICAL MEDICINE & REHABILITATION

## 2022-03-28 PROCEDURE — 80048 BASIC METABOLIC PNL TOTAL CA: CPT

## 2022-03-28 PROCEDURE — 82042 OTHER SOURCE ALBUMIN QUAN EA: CPT

## 2022-03-28 PROCEDURE — 36415 COLL VENOUS BLD VENIPUNCTURE: CPT

## 2022-03-28 PROCEDURE — 82945 GLUCOSE OTHER FLUID: CPT

## 2022-03-28 PROCEDURE — 82040 ASSAY OF SERUM ALBUMIN: CPT

## 2022-03-28 PROCEDURE — 84100 ASSAY OF PHOSPHORUS: CPT | Mod: 91

## 2022-03-28 PROCEDURE — 700102 HCHG RX REV CODE 250 W/ 637 OVERRIDE(OP): Performed by: HOSPITALIST

## 2022-03-28 PROCEDURE — 87040 BLOOD CULTURE FOR BACTERIA: CPT | Mod: 91

## 2022-03-28 PROCEDURE — 97535 SELF CARE MNGMENT TRAINING: CPT

## 2022-03-28 PROCEDURE — 84155 ASSAY OF PROTEIN SERUM: CPT

## 2022-03-28 PROCEDURE — 700111 HCHG RX REV CODE 636 W/ 250 OVERRIDE (IP): Performed by: PHYSICAL MEDICINE & REHABILITATION

## 2022-03-28 PROCEDURE — 700111 HCHG RX REV CODE 636 W/ 250 OVERRIDE (IP): Performed by: EMERGENCY MEDICINE

## 2022-03-28 PROCEDURE — 83605 ASSAY OF LACTIC ACID: CPT

## 2022-03-28 PROCEDURE — 97530 THERAPEUTIC ACTIVITIES: CPT

## 2022-03-28 PROCEDURE — 80053 COMPREHEN METABOLIC PANEL: CPT

## 2022-03-28 PROCEDURE — 97110 THERAPEUTIC EXERCISES: CPT

## 2022-03-28 PROCEDURE — 94760 N-INVAS EAR/PLS OXIMETRY 1: CPT

## 2022-03-28 PROCEDURE — 96375 TX/PRO/DX INJ NEW DRUG ADDON: CPT

## 2022-03-28 PROCEDURE — 88305 TISSUE EXAM BY PATHOLOGIST: CPT

## 2022-03-28 PROCEDURE — 83880 ASSAY OF NATRIURETIC PEPTIDE: CPT

## 2022-03-28 PROCEDURE — 84100 ASSAY OF PHOSPHORUS: CPT

## 2022-03-28 PROCEDURE — 99233 SBSQ HOSP IP/OBS HIGH 50: CPT | Performed by: HOSPITALIST

## 2022-03-28 PROCEDURE — 83986 ASSAY PH BODY FLUID NOS: CPT

## 2022-03-28 PROCEDURE — 0W9B3ZZ DRAINAGE OF LEFT PLEURAL CAVITY, PERCUTANEOUS APPROACH: ICD-10-PCS | Performed by: INTERNAL MEDICINE

## 2022-03-28 PROCEDURE — 88341 IMHCHEM/IMCYTCHM EA ADD ANTB: CPT | Mod: 91

## 2022-03-28 PROCEDURE — 770024 HCHG ROOM/CARE - REHAB LOA (180)

## 2022-03-28 PROCEDURE — A9270 NON-COVERED ITEM OR SERVICE: HCPCS | Performed by: HOSPITALIST

## 2022-03-28 PROCEDURE — 700101 HCHG RX REV CODE 250

## 2022-03-28 PROCEDURE — 83735 ASSAY OF MAGNESIUM: CPT | Mod: 91

## 2022-03-28 PROCEDURE — 85025 COMPLETE CBC W/AUTO DIFF WBC: CPT

## 2022-03-28 PROCEDURE — 87015 SPECIMEN INFECT AGNT CONCNTJ: CPT

## 2022-03-28 PROCEDURE — 99291 CRITICAL CARE FIRST HOUR: CPT

## 2022-03-28 PROCEDURE — 88112 CYTOPATH CELL ENHANCE TECH: CPT

## 2022-03-28 PROCEDURE — 82803 BLOOD GASES ANY COMBINATION: CPT

## 2022-03-28 PROCEDURE — 82150 ASSAY OF AMYLASE: CPT

## 2022-03-28 PROCEDURE — 84157 ASSAY OF PROTEIN OTHER: CPT

## 2022-03-28 PROCEDURE — 31500 INSERT EMERGENCY AIRWAY: CPT

## 2022-03-28 PROCEDURE — 80503 PATH CLIN CONSLTJ SF 5-20: CPT

## 2022-03-28 PROCEDURE — 84145 PROCALCITONIN (PCT): CPT

## 2022-03-28 PROCEDURE — 85025 COMPLETE CBC W/AUTO DIFF WBC: CPT | Mod: 91

## 2022-03-28 PROCEDURE — 93005 ELECTROCARDIOGRAM TRACING: CPT | Performed by: EMERGENCY MEDICINE

## 2022-03-28 PROCEDURE — 99239 HOSP IP/OBS DSCHRG MGMT >30: CPT | Performed by: PHYSICAL MEDICINE & REHABILITATION

## 2022-03-28 PROCEDURE — 84484 ASSAY OF TROPONIN QUANT: CPT

## 2022-03-28 PROCEDURE — 87205 SMEAR GRAM STAIN: CPT

## 2022-03-28 PROCEDURE — 81001 URINALYSIS AUTO W/SCOPE: CPT

## 2022-03-28 PROCEDURE — 32555 ASPIRATE PLEURA W/ IMAGING: CPT | Mod: LT | Performed by: INTERNAL MEDICINE

## 2022-03-28 PROCEDURE — 89051 BODY FLUID CELL COUNT: CPT

## 2022-03-28 PROCEDURE — 99223 1ST HOSP IP/OBS HIGH 75: CPT | Mod: 25 | Performed by: INTERNAL MEDICINE

## 2022-03-28 PROCEDURE — 87102 FUNGUS ISOLATION CULTURE: CPT

## 2022-03-28 PROCEDURE — 87070 CULTURE OTHR SPECIMN AEROBIC: CPT

## 2022-03-28 RX ORDER — LEVOFLOXACIN 5 MG/ML
750 INJECTION, SOLUTION INTRAVENOUS ONCE
Status: DISCONTINUED | OUTPATIENT
Start: 2022-03-28 | End: 2022-03-28

## 2022-03-28 RX ORDER — AZITHROMYCIN 500 MG/1
500 INJECTION, POWDER, LYOPHILIZED, FOR SOLUTION INTRAVENOUS ONCE
Status: COMPLETED | OUTPATIENT
Start: 2022-03-29 | End: 2022-03-29

## 2022-03-28 RX ORDER — CEFTRIAXONE 1 G/1
1 INJECTION, POWDER, FOR SOLUTION INTRAMUSCULAR; INTRAVENOUS ONCE
Status: COMPLETED | OUTPATIENT
Start: 2022-03-29 | End: 2022-03-28

## 2022-03-28 RX ORDER — LIDOCAINE HYDROCHLORIDE 10 MG/ML
INJECTION, SOLUTION EPIDURAL; INFILTRATION; INTRACAUDAL; PERINEURAL
Status: DISCONTINUED
Start: 2022-03-28 | End: 2022-03-29 | Stop reason: HOSPADM

## 2022-03-28 RX ORDER — IPRATROPIUM BROMIDE AND ALBUTEROL SULFATE 2.5; .5 MG/3ML; MG/3ML
SOLUTION RESPIRATORY (INHALATION)
Status: COMPLETED
Start: 2022-03-28 | End: 2022-03-28

## 2022-03-28 RX ADMIN — MIRTAZAPINE 15 MG: 15 TABLET, ORALLY DISINTEGRATING ORAL at 19:41

## 2022-03-28 RX ADMIN — ACETAMINOPHEN 650 MG: 325 TABLET ORAL at 19:48

## 2022-03-28 RX ADMIN — CLOPIDOGREL BISULFATE 75 MG: 75 TABLET ORAL at 10:13

## 2022-03-28 RX ADMIN — IPRATROPIUM BROMIDE AND ALBUTEROL SULFATE 3 ML: .5; 2.5 SOLUTION RESPIRATORY (INHALATION) at 21:41

## 2022-03-28 RX ADMIN — DILTIAZEM HYDROCHLORIDE 180 MG: 180 CAPSULE, EXTENDED RELEASE ORAL at 05:29

## 2022-03-28 RX ADMIN — OMEPRAZOLE 20 MG: 20 CAPSULE, DELAYED RELEASE ORAL at 07:51

## 2022-03-28 RX ADMIN — BUDESONIDE AND FORMOTEROL FUMARATE DIHYDRATE 2 PUFF: 160; 4.5 AEROSOL RESPIRATORY (INHALATION) at 07:19

## 2022-03-28 RX ADMIN — THERA TABS 1 TABLET: TAB at 10:13

## 2022-03-28 RX ADMIN — POTASSIUM CHLORIDE 20 MEQ: 1500 TABLET, EXTENDED RELEASE ORAL at 10:12

## 2022-03-28 RX ADMIN — IPRATROPIUM BROMIDE AND ALBUTEROL SULFATE 3 ML: 2.5; .5 SOLUTION RESPIRATORY (INHALATION) at 10:46

## 2022-03-28 RX ADMIN — CEFTRIAXONE SODIUM 1 G: 1 INJECTION, POWDER, FOR SOLUTION INTRAMUSCULAR; INTRAVENOUS at 23:50

## 2022-03-28 RX ADMIN — LEVOTHYROXINE SODIUM 75 MCG: 75 TABLET ORAL at 05:29

## 2022-03-28 RX ADMIN — PRAVASTATIN SODIUM 40 MG: 20 TABLET ORAL at 19:41

## 2022-03-28 RX ADMIN — SENNOSIDES AND DOCUSATE SODIUM 2 TABLET: 50; 8.6 TABLET ORAL at 10:13

## 2022-03-28 RX ADMIN — AZITHROMYCIN MONOHYDRATE 500 MG: 500 INJECTION, POWDER, LYOPHILIZED, FOR SOLUTION INTRAVENOUS at 23:50

## 2022-03-28 RX ADMIN — BUDESONIDE AND FORMOTEROL FUMARATE DIHYDRATE 2 PUFF: 160; 4.5 AEROSOL RESPIRATORY (INHALATION) at 20:08

## 2022-03-28 RX ADMIN — Medication 3 MG: at 19:48

## 2022-03-28 RX ADMIN — ENOXAPARIN SODIUM 40 MG: 40 INJECTION SUBCUTANEOUS at 19:51

## 2022-03-28 ASSESSMENT — ENCOUNTER SYMPTOMS
COUGH: 0
SORE THROAT: 0
PALPITATIONS: 0
CHILLS: 0
WHEEZING: 0
HEADACHES: 0
MYALGIAS: 0
SHORTNESS OF BREATH: 0
WEIGHT LOSS: 0
NAUSEA: 0
ABDOMINAL PAIN: 0
EYE REDNESS: 0
DIZZINESS: 0
BRUISES/BLEEDS EASILY: 0
FEVER: 0
MEMORY LOSS: 0
EYE DISCHARGE: 0
VOMITING: 0
NERVOUS/ANXIOUS: 0
DIARRHEA: 0

## 2022-03-28 ASSESSMENT — FIBROSIS 4 INDEX: FIB4 SCORE: 2.81

## 2022-03-28 ASSESSMENT — ACTIVITIES OF DAILY LIVING (ADL)
TOILET_TRANSFER_DESCRIPTION: ADAPTIVE EQUIPMENT;GRAB BAR;INCREASED TIME;SET-UP OF EQUIPMENT;SUPERVISION FOR SAFETY;VERBAL CUEING
TUB_SHOWER_TRANSFER_DESCRIPTION: GRAB BAR;SHOWER BENCH;INCREASED TIME;INITIAL PREPARATION FOR TASK;SET-UP OF EQUIPMENT;SUPERVISION FOR SAFETY;VERBAL CUEING

## 2022-03-28 NOTE — FLOWSHEET NOTE
03/28/22 1033   Events/Summary/Plan   Events/Summary/Plan Called to see pt for SOB and increasing 02 requirement.  Placed on oxymask at 6, then 8 and 10 to get SATS over 86%.  Duoneb given as well with no results.  Dr Joel notified and CXR ordered   Vital Signs   Pulse 74   Respiration 16   Pulse Oximetry 90 %   $ Pulse Oximetry (Spot Check) Yes   Respiratory Assessment   Level of Consciousness Alert   Chest Exam   Work Of Breathing / Effort Increased Work of Breathing   Breath Sounds   RML Breath Sounds Diminished   RLL Breath Sounds Diminished   LLL Breath Sounds Diminished   Oxygen   O2 (LPM) 6   O2 Delivery Device Oxymask

## 2022-03-28 NOTE — CONSULTS
Pulmonary Consultation    Date of consult: 3/28/2022    Referring Physician  Maggie Joel M.D.    Reason for Consultation  Acute hypoxic respiratory failure/abnormal CXR    History of Presenting Illness  86 y.o. female who presented 3/23/2022 with a hx of COPD on 3 lt NC at home, has a hx of hypothyroidism, CKD, current tobacco use and admitted to rehab s/p admission to the hospital for a CAP and subsequent COPD exacerbation.  Per notes, she has completed the antibiotics treatment but has noticed an increase in oxygen requirements for what CXR were ordered and found a worsening LEFT moderate size pleural effusion. Current requirements are about 10 lt.  Consult came for consideration of a thoracentesis for diagnostic and therapeutic purposes. Noted code status, age and comorbidities.  I discussed her case with her  Seun Trejo over the phone, including pro/cons/ indications/risks of thoracentesis. He and patient approved the procedure.    Code Status  DNAR/DNI    Review of Systems  Review of Systems   Constitutional: Negative for chills, fever and weight loss.   HENT: Negative for congestion, nosebleeds and sore throat.    Eyes: Negative for discharge and redness.   Respiratory: Negative for cough, shortness of breath and wheezing.    Cardiovascular: Negative for chest pain, palpitations and leg swelling.   Gastrointestinal: Negative for nausea and vomiting.   Genitourinary: Negative for dysuria.   Musculoskeletal: Negative for myalgias.   Skin: Negative for rash.   Neurological: Negative for dizziness and headaches.   Endo/Heme/Allergies: Does not bruise/bleed easily.   Psychiatric/Behavioral: Negative for memory loss.   All other systems reviewed and are negative.      Past Medical History   has a past medical history of Anemia, Arthritis, Carotid artery plaque (08/2018), COPD, Dizziness ( ), Dyslipidemia ( ), Fatigue (7/3/2014), Hypothyroidism, Insomnia ( ), Nocturnal hypoxia ( ), PSVT (paroxysmal  supraventricular tachycardia) (HCC) (02/2013), Sickle cell disease (HCC), and Tobacco use.    Surgical History   has a past surgical history that includes hysterectomy, total abdominal; knee replacement, total (2005); cataract extraction with iol; cataract extraction with iol; appendectomy; and abdominal hysterectomy total.    Family History  family history includes Heart Attack (age of onset: 70) in her brother.    Social History   reports that she has been smoking cigars. She has a 13.75 pack-year smoking history. She has never used smokeless tobacco. She reports that she does not drink alcohol and does not use drugs.    Medications  Home Medications     Reviewed by Moriah Herron R.N. (Registered Nurse) on 03/23/22 at 1215  Med List Status: Complete   Medication Last Dose Status   acetaminophen (TYLENOL) 325 MG Tab  Active   azithromycin (ZITHROMAX) 250 MG Tab  Active   cefUROXime (CEFTIN) 500 MG Tab  Active   clopidogrel (PLAVIX) 75 MG Tab  Active   diltiazem (TIAZAC) 180 MG SR capsule  Active   heparin 5000 UNIT/ML Solution  Active   ipratropium-albuterol (DUONEB) 0.5-2.5 (3) MG/3ML nebulizer solution  Active   levothyroxine (SYNTHROID) 75 MCG Tab  Active   polyethylene glycol/lytes (MIRALAX) 17 g Pack  Active   pravastatin (PRAVACHOL) 40 MG tablet  Active   predniSONE (DELTASONE) 20 MG Tab  Active   Probiotic Product (ALIGN PO)  Active   Saccharomyces boulardii (PROBIOTIC) 250 MG Cap  Active   senna-docusate (PERICOLACE OR SENOKOT S) 8.6-50 MG Tab  Active              Current Facility-Administered Medications   Medication Dose Route Frequency Provider Last Rate Last Admin   • LIDOCAINE HCL (PF) 1 % INJ SOLN            • mirtazapine (Remeron) orally disintegrating tab 15 mg  15 mg Oral QHS Maggie Joel M.D.   15 mg at 03/27/22 1928   • vitamin D2 (Ergocalciferol) (Drisdol) capsule 50,000 Units  50,000 Units Oral Q7 DAYS Maggie Joel M.D.   50,000 Units at 03/24/22 1113   • potassium chloride SA (Kdur)  tablet 20 mEq  20 mEq Oral DAILY Rc Ang M.D.   20 mEq at 03/28/22 1012   • DILTIAZem CD (CARDIZEM CD) capsule 180 mg  180 mg Oral Q DAY Rc Ang M.D.   180 mg at 03/28/22 0529   • multivitamin (THERAGRAN) tablet 1 Tablet  1 Tablet Oral DAILY Rc Ang M.D.   1 Tablet at 03/28/22 1013   • hydrOXYzine HCl (ATARAX) tablet 50 mg  50 mg Oral Q6HRS PRN Maggie Joel M.D.   50 mg at 03/26/22 0212   • melatonin tablet 3 mg  3 mg Oral HS PRN Maggie Joel M.D.       • Respiratory Therapy Consult   Nebulization Continuous RT Maggie Joel M.D.       • Pharmacy Consult Request ...Pain Management Review 1 Each  1 Each Other PHARMACY TO DOSE Maggie Joel M.D.       • hydrALAZINE (APRESOLINE) tablet 10 mg  10 mg Oral Q8HRS PRN Maggie Joel M.D.       • acetaminophen (Tylenol) tablet 650 mg  650 mg Oral Q4HRS PRN Maggie Joel M.D.   650 mg at 03/27/22 1440   • lactulose 20 GM/30ML solution 30 mL  30 mL Oral QDAY PRN Maggie Joel M.D.       • omeprazole (PRILOSEC) capsule 20 mg  20 mg Oral QAM AC Maggie Joel M.D.   20 mg at 03/28/22 0751   • artificial tears ophthalmic solution 1 Drop  1 Drop Both Eyes PRN Maggie Joel M.D.       • benzocaine-menthol (Cepacol) lozenge 1 Lozenge  1 Lozenge Mouth/Throat Q2HRS PRN Maggie Joel M.D.       • mag hydrox-al hydrox-simeth (MAALOX PLUS ES or MYLANTA DS) suspension 20 mL  20 mL Oral Q2HRS PRN Maggie Joel M.D.       • ondansetron (ZOFRAN ODT) dispertab 4 mg  4 mg Oral 4X/DAY PRN Maggie Joel M.D.   4 mg at 03/25/22 2335    Or   • ondansetron (ZOFRAN) syringe/vial injection 4 mg  4 mg Intramuscular 4X/DAY PRN Maggie Joel M.D.       • sodium chloride (OCEAN) 0.65 % nasal spray 2 Spray  2 Spray Nasal PRN Maggie Joel M.D.       • clopidogrel (PLAVIX) tablet 75 mg  75 mg Oral DAILY Maggie Joel M.D.   75 mg at 03/28/22 1013   • ipratropium-albuterol (DUONEB) nebulizer solution  3 mL  Nebulization Q2HRS PRN (RT) Maggie Joel M.D.   3 mL at 03/28/22 1046   • levothyroxine (SYNTHROID) tablet 75 mcg  75 mcg Oral AM ES Maggie Joel M.D.   75 mcg at 03/28/22 0529   • pravastatin (PRAVACHOL) tablet 40 mg  40 mg Oral Q EVENING Maggie Joel M.D.   40 mg at 03/27/22 1928   • senna-docusate (PERICOLACE or SENOKOT S) 8.6-50 MG per tablet 2 Tablet  2 Tablet Oral BID Maggie Joel M.D.   2 Tablet at 03/28/22 1013    And   • polyethylene glycol/lytes (MIRALAX) PACKET 1 Packet  1 Packet Oral QDAY PRN Maggie Joel M.D.        And   • magnesium hydroxide (MILK OF MAGNESIA) suspension 30 mL  30 mL Oral QDAY PRN Maggie Joel M.D.        And   • bisacodyl (DULCOLAX) suppository 10 mg  10 mg Rectal QDAY PRN Maggie Joel M.D.       • budesonide-formoterol (SYMBICORT) 160-4.5 MCG/ACT inhaler 2 Puff  2 Puff Inhalation BID (RT) Maggie Joel M.D.   2 Puff at 03/28/22 0719   • enoxaparin (LOVENOX) inj 40 mg  40 mg Subcutaneous DAILY Maggie Joel M.D.   40 mg at 03/27/22 1928       Allergies  Allergies   Allergen Reactions   • Hydrocodone Hives   • Iodine Anaphylaxis     RXN=>20 years ago  Heavy metal dyes and preservatives   • Nsaids Hives and Shortness of Breath   • Penicillins Anaphylaxis     RXN=>20 years ago   • Asa [Aspirin] Hives     Hives   • Codeine      Pt reports that she falls to the ground, she passes out   • Erythromycin      Pt reports that she falls to the ground, she passes out   • Morphine      Pt reports that she falls to the ground, she passes out   • Sulfa Drugs      Pt reports that she falls to the ground, she passes out       Vital Signs last 24 hours  Temp:  [36.6 °C (97.9 °F)-36.8 °C (98.2 °F)] 36.8 °C (98.2 °F)  Pulse:  [70-86] 70  Resp:  [16-18] 18  BP: (103-136)/(72-86) 103/72  SpO2:  [75 %-93 %] 88 %    Physical Exam  Physical Exam  Vitals and nursing note reviewed.   Constitutional:       General: She is not in acute distress.     Appearance:  Normal appearance. She is not toxic-appearing.   HENT:      Head: Normocephalic and atraumatic.      Nose: Nose normal.      Mouth/Throat:      Mouth: Mucous membranes are moist.   Eyes:      Extraocular Movements: Extraocular movements intact.   Cardiovascular:      Rate and Rhythm: Normal rate and regular rhythm.      Pulses: Normal pulses.      Heart sounds: Normal heart sounds. No murmur heard.    No gallop.   Pulmonary:      Effort: Pulmonary effort is normal. No respiratory distress.      Breath sounds: Normal breath sounds. No stridor. No wheezing or rales.   Abdominal:      General: Bowel sounds are normal.   Musculoskeletal:         General: No swelling. Normal range of motion.      Cervical back: Normal range of motion. No rigidity.      Right lower leg: No edema.      Left lower leg: No edema.   Skin:     General: Skin is warm.      Capillary Refill: Capillary refill takes less than 2 seconds.      Findings: No rash.   Neurological:      General: No focal deficit present.      Mental Status: She is alert and oriented to person, place, and time.   Psychiatric:         Mood and Affect: Mood normal.         Fluids    Intake/Output Summary (Last 24 hours) at 3/28/2022 1846  Last data filed at 3/28/2022 1400  Gross per 24 hour   Intake 280 ml   Output --   Net 280 ml       Laboratory  Recent Results (from the past 48 hour(s))   Basic Metabolic Panel    Collection Time: 03/28/22  5:49 AM   Result Value Ref Range    Sodium 130 (L) 135 - 145 mmol/L    Potassium 4.8 3.6 - 5.5 mmol/L    Chloride 88 (L) 96 - 112 mmol/L    Co2 27 20 - 33 mmol/L    Glucose 66 65 - 99 mg/dL    Bun 17 8 - 22 mg/dL    Creatinine 0.70 0.50 - 1.40 mg/dL    Calcium 9.6 8.5 - 10.5 mg/dL    Anion Gap 15.0 7.0 - 16.0   MAGNESIUM    Collection Time: 03/28/22  5:49 AM   Result Value Ref Range    Magnesium 1.9 1.5 - 2.5 mg/dL   PHOSPHORUS    Collection Time: 03/28/22  5:49 AM   Result Value Ref Range    Phosphorus 3.5 2.5 - 4.5 mg/dL   CBC WITH  DIFFERENTIAL    Collection Time: 03/28/22  5:49 AM   Result Value Ref Range    WBC 21.9 (H) 4.8 - 10.8 K/uL    RBC 4.68 4.20 - 5.40 M/uL    Hemoglobin 14.4 12.0 - 16.0 g/dL    Hematocrit 43.7 37.0 - 47.0 %    MCV 93.4 81.4 - 97.8 fL    MCH 30.8 27.0 - 33.0 pg    MCHC 33.0 (L) 33.6 - 35.0 g/dL    RDW 50.5 (H) 35.9 - 50.0 fL    Platelet Count 299 164 - 446 K/uL    MPV 11.4 9.0 - 12.9 fL    Neutrophils-Polys 87.40 (H) 44.00 - 72.00 %    Lymphocytes 6.00 (L) 22.00 - 41.00 %    Monocytes 5.80 0.00 - 13.40 %    Eosinophils 0.10 0.00 - 6.90 %    Basophils 0.10 0.00 - 1.80 %    Immature Granulocytes 0.60 0.00 - 0.90 %    Nucleated RBC 0.00 /100 WBC    Neutrophils (Absolute) 19.16 (H) 2.00 - 7.15 K/uL    Lymphs (Absolute) 1.32 1.00 - 4.80 K/uL    Monos (Absolute) 1.27 (H) 0.00 - 0.85 K/uL    Eos (Absolute) 0.02 0.00 - 0.51 K/uL    Baso (Absolute) 0.03 0.00 - 0.12 K/uL    Immature Granulocytes (abs) 0.14 (H) 0.00 - 0.11 K/uL    NRBC (Absolute) 0.00 K/uL   ESTIMATED GFR    Collection Time: 03/28/22  5:49 AM   Result Value Ref Range    GFR (CKD-EPI) 85 >60 mL/min/1.73 m 2   PROCALCITONIN    Collection Time: 03/28/22 11:25 AM   Result Value Ref Range    Procalcitonin 0.49 (H) <0.25 ng/mL       Imaging  DX-CHEST-PORTABLE (1 VIEW)   Final Result      1.  Emphysema.   2.  Small right pleural effusion and moderate left pleural effusion which has increased since prior exam. Underlying atelectasis or consolidation.   3.  Cervico-thoracic scoliosis.      DX-CHEST-PORTABLE (1 VIEW)    (Results Pending)       Assessment/Plan  Acute hypoxemic respiratory failure (HCC)- (present on admission)  Assessment & Plan  -Acute on chronic respiratory failure, she is at home on 3 lt HOT  -Continue with oxygen supplementation to keep sats 88-92%, at beginning of thoracentesis sats were about 85-88% and improved to 98% at the end of the procedure. I discussed this with her  and patient before and after thoracentesis.  -Given new cxr findings an  evaluation with US at the bedside took placed with moderate pleural effusion findings, s/p thoracentesis at bedside on 3/28  -Samples of pleural fluid were sent for chemistry, cytology and cultures. We will follow  -the fluid looked a bit cloudy, 500 cc were removed. A CXR follow up was ordered.  -Continue to hold antibiotics for now, although low threshold if develops a fever, leukocytosis or clinical picture indicates  -Discussed risk/pros/cons of the procedure with the patient,  and also post procedure   -Noticed lovenox prophylaxis and also plavix use, this risk was also discussed.  -continue tritration of oxygen to keep sats between 88-92%, already started weaning down.  -will continue to follow      Chronic obstructive pulmonary disease (HCC)- (present on admission)  Assessment & Plan  Continue with symbicort scheduled bid  Continue with duonebs prn as inpatient.      Discussed patient condition and risk of morbidity and/or mortality with Hospitalist, RN and Patient.    The patient remains critically ill.  Care time = 50 minutes in directly providing and coordinating critical care and extensive data review.  No time overlap and excludes procedures.    Jose A Pérez MD FACP  Pulmonary/Critical Care

## 2022-03-28 NOTE — DISCHARGE PLANNING
CASE MANAGEMENT INITIAL ASSESSMENT    Admit Date:  3/23/2022       Diagnosis: Debility [R53.81]    Co-morbidities:   Patient Active Problem List    Diagnosis Date Noted   • Hypokalemia 03/24/2022   • Vitamin D deficiency 03/24/2022   • Pneumonia 03/24/2022   • Severe protein-calorie malnutrition (HCC) 03/23/2022   • Debility 03/23/2022   • COPD exacerbation, FTT/severe malnutrition (HCC) 03/20/2022   • Failure to thrive in adult 03/20/2022   • Acute hypoxemic respiratory failure (HCC) 03/20/2022   • Hard of hearing 03/20/2022   • Hyponatremia 03/20/2022   • Grief 09/29/2021   • Pulmonary hypertension (HCC) 10/06/2020   • Advanced care planning/counseling discussion 10/06/2020   • Stage 2 chronic kidney disease 12/10/2019   • Current smoker 02/19/2019   • History of influenza vaccine allergy 11/08/2018   • Chronic nasal congestion 11/08/2018   • Relationship problems 08/07/2018   • Decreased GFR 07/16/2018   • Chronic fatigue 07/16/2018   • Chronic neck pain 01/05/2018   • Essential hypertension 11/30/2017   • Atypical nevus 08/22/2017   • Dysphagia 08/22/2017   • AMD (age-related macular degeneration), bilateral 05/08/2017   • Seasonal allergic rhinitis due to pollen 03/16/2017   • Chronic pain of left knee 12/22/2016   • Chronic obstructive pulmonary disease (HCC) 11/03/2016   • Tobacco dependence 10/18/2016   • H/O knee surgery 08/28/2014   • Osteoporosis 07/31/2014   • Atherosclerosis of both carotid arteries 03/19/2014   • Nocturnal hypoxia 08/07/2013   • Hypothyroidism 08/07/2013   • Vertigo 02/15/2013   • PSVT (paroxysmal supraventricular tachycardia) ([facility]) 09/29/2011   • Dyslipidemia 09/29/2011   • Insomnia 09/23/2011     Prior Living Situation:  Housing / Facility: Mobile Home in [place] w/ ramp access; bath/shower combo w/ grab bars.   Lives with - Patient's Self Care Capacity: Spouse Jose Alejandro    Prior Level of Function:  Medication Management: Unable To Determine At This Time  Finances: Unable To Determine At  This Time  Home Management: Requires Assist  Shopping: Requires Assist  Prior Level Of Mobility: Independent With Device in Home (FWW)  Driving / Transportation: Spouse Provides Transportation (Does not drive 2/2 visual impairments)    Support Systems:  Primary:  Spouse Seun Trejo       Other support systems: unknown       Previous Services Utilized:   Equipment Owned: Front-Wheel Walker,Grab Bar(s) In Tub / Shower,Oxygen through [employer]   Prior Services: Intermittent Physical Support for ADL Per Family    Other Information:  Occupation (Pre-Hospital Vocational): Retired Due To Age  Insurance:  Medicare /[place]        Patient / Family Goal:  Patient / Family Goal: 1. Gain strength 2. Have home health at OR      Plan:  1. Continue to follow patient through hospitalization and provide discharge planning in collaboration with patient, family, physicians and ancillary services.     2. Utilize community resources to ensure a safe discharge.   Spouse has resources for in home caregiving; pt has not been receptive to in home care in the past; they have resources to pay privately for homemakers - have been given list.

## 2022-03-28 NOTE — DISCHARGE PLANNING
"CM  IDT held today.   Thoracentesis to be done today.     Tc to pt's spouse Seun Trejo # [phone]  He confirms he is 83 yrs old, has COPD also, and is on 02 @ NOC.   No there family in the area.  PT 's dtr lives in [facility] and Seun's son live in [place].   He confirms he is committed to have return home as pt has always told him she does not want to go to an \"nursing home\".    He confirms he has the list of caregivers, and I enocouraged him to call to make arrangements to have caregivers assist w/ pt's care.   Pt has used [facility] in the past.   Informed spouse projected dc date is 4/2 and discussed family training.   PT is currently on 10 L of 02.  Goal is for pt to be on 3-4 L at time of dc.       "

## 2022-03-28 NOTE — THERAPY
"Occupational Therapy  Daily Treatment     Patient Name: Antonia Stover  Age:  86 y.o., Sex:  female  Medical Record #: [id number]  Today's Date: 3/28/2022     Precautions  Precautions: Fall Risk,Other (See Comments)  Comments: High flow O2 5 L/min increase to 6 to 8 L with activity as needed, legally blind, 1200 mL fluid restriction    Subjective    \"I didn't feel right when I woke up this morning.\"  \"I feel droopy.\"      Objective       03/28/22 0831   Vitals   O2 (LPM) 5   O2 Delivery Device Nasal Cannula   Cognition    Level of Consciousness Alert   Functional Level of Assist   Eating Stand by Assist   Eating Description Increased time;Set-up of equipment or meal/tube feeding;Verbal cueing  (cues to encourage intake)   Sitting Upper Body Exercises   Upper Extremity Bike   (level 0 resistance)   Comments 8 min 20 sec total on UE bike, 3 min 40 sec passive, 4 min 40 sec active. 3 rest breaks due to fatigue. Cues for initating task, encouragment to cont task. Pt completed task and was instructed to self propell 20 ft to the therapy gym exit. Pt required cues and hand over hand assist for hand placement and sequencing. Cues for initaiting task and encouragment to propell self.   Interdisciplinary Plan of Care Collaboration   Patient Position at End of Therapy Seated;Self Releasing Lap Belt Applied;Call Light within Reach;Tray Table within Reach       Assessment    Pt able to participate in UE bike however displayed limited endurance and increased fatigue. Pt O2 increased to 6 L during tx session. Pt requires constant cues for initiating tasks and encouragement to cont activities. Patient w/ slowed processing and  difficulty sequencing w/c mobility task.     Strengths: Independent prior level of function,Pleasant and cooperative,Supportive family,Willingly participates in therapeutic activities  Barriers: Decreased endurance,Fatigue,Generalized weakness,Impaired activity tolerance,Impaired appetite/intake,Impaired " balance,Impaired functional cognition,Limited mobility (supplemental O2, fear of falling)    Plan    ADLs, functional mobility, standing tolerance/balance, pacing/energy conservation, strength/endurance building.    Occupational Therapy Goals (Active)       Problem: Bathing       Dates: Start: 03/24/22         Goal: STG-Within one week, patient will bathe with mod A using AE as needed.        Dates: Start: 03/24/22               Problem: Dressing       Dates: Start: 03/24/22         Goal: STG-Within one week, patient will dress LB with mod A using AE as needed.        Dates: Start: 03/24/22               Problem: Functional Transfers       Dates: Start: 03/24/22         Goal: STG-Within one week, patient will transfer to toilet with min A using AE/DME as needed.        Dates: Start: 03/24/22               Problem: OT Long Term Goals       Dates: Start: 03/24/22         Goal: LTG-By discharge, patient will complete basic self care tasks at supervision to mod I level using AE as needed.        Dates: Start: 03/24/22            Goal: LTG-By discharge, patient will perform bathroom transfers at supervision to mod I level using AE/DME as needed.        Dates: Start: 03/24/22               Problem: Toileting       Dates: Start: 03/24/22         Goal: STG-Within one week, patient will complete toileting tasks with mod A using AE as needed.        Dates: Start: 03/24/22

## 2022-03-28 NOTE — FLOWSHEET NOTE
03/28/22 0722   Events/Summary/Plan   Events/Summary/Plan 02 spot check, Symbicort given   Vital Signs   Pulse 86   Respiration 18   Pulse Oximetry 91 %   $ Pulse Oximetry (Spot Check) Yes   Respiratory Assessment   Level of Consciousness Alert   Chest Exam   Work Of Breathing / Effort Within Normal Limits   Oxygen   O2 (LPM) 5   O2 Delivery Device Silicone Nasal Cannula

## 2022-03-28 NOTE — PROGRESS NOTES
"Rehab Progress Note     Date of Service: 3/28/2022  Chief Complaint: Follow-up debility    Interval Events (Subjective)    Patient seen and examined today in the morning when he was noted increased oxygen intake to 10 L to maintain saturations above 88%.  Stat chest x-ray shows large left pleural effusion and or consolidation.  Labs continue to show a leukocytosis.  Pulmonologist has been consulted to attempt a bedside thoracentesis.  Patiently currently is denying any chest pain or shortness of breath.  Per therapy limited ability to tolerate this level of care.    ROS: No changes to bowel, bladder, pain, mood, or sleep.     Objective:  VITAL SIGNS: /80   Pulse 74   Temp 36.6 °C (97.9 °F) (Oral)   Resp 16   Ht 1.575 m (5' 2\")   Wt 35.2 kg (77 lb 11.2 oz)   SpO2 90%   BMI 14.21 kg/m²   Gen: Somnolent but arousable, no distress, severely cachectic  CV: Regular rate and rhythm  Resp: Decreased in left base, on 10 L facemask  Neuro: notable for very hard of hearing      Recent Results (from the past 72 hour(s))   Basic Metabolic Panel    Collection Time: 03/28/22  5:49 AM   Result Value Ref Range    Sodium 130 (L) 135 - 145 mmol/L    Potassium 4.8 3.6 - 5.5 mmol/L    Chloride 88 (L) 96 - 112 mmol/L    Co2 27 20 - 33 mmol/L    Glucose 66 65 - 99 mg/dL    Bun 17 8 - 22 mg/dL    Creatinine 0.70 0.50 - 1.40 mg/dL    Calcium 9.6 8.5 - 10.5 mg/dL    Anion Gap 15.0 7.0 - 16.0   MAGNESIUM    Collection Time: 03/28/22  5:49 AM   Result Value Ref Range    Magnesium 1.9 1.5 - 2.5 mg/dL   PHOSPHORUS    Collection Time: 03/28/22  5:49 AM   Result Value Ref Range    Phosphorus 3.5 2.5 - 4.5 mg/dL   CBC WITH DIFFERENTIAL    Collection Time: 03/28/22  5:49 AM   Result Value Ref Range    WBC 21.9 (H) 4.8 - 10.8 K/uL    RBC 4.68 4.20 - 5.40 M/uL    Hemoglobin 14.4 12.0 - 16.0 g/dL    Hematocrit 43.7 37.0 - 47.0 %    MCV 93.4 81.4 - 97.8 fL    MCH 30.8 27.0 - 33.0 pg    MCHC 33.0 (L) 33.6 - 35.0 g/dL    RDW 50.5 (H) 35.9 - " 50.0 fL    Platelet Count 299 164 - 446 K/uL    MPV 11.4 9.0 - 12.9 fL    Neutrophils-Polys 87.40 (H) 44.00 - 72.00 %    Lymphocytes 6.00 (L) 22.00 - 41.00 %    Monocytes 5.80 0.00 - 13.40 %    Eosinophils 0.10 0.00 - 6.90 %    Basophils 0.10 0.00 - 1.80 %    Immature Granulocytes 0.60 0.00 - 0.90 %    Nucleated RBC 0.00 /100 WBC    Neutrophils (Absolute) 19.16 (H) 2.00 - 7.15 K/uL    Lymphs (Absolute) 1.32 1.00 - 4.80 K/uL    Monos (Absolute) 1.27 (H) 0.00 - 0.85 K/uL    Eos (Absolute) 0.02 0.00 - 0.51 K/uL    Baso (Absolute) 0.03 0.00 - 0.12 K/uL    Immature Granulocytes (abs) 0.14 (H) 0.00 - 0.11 K/uL    NRBC (Absolute) 0.00 K/uL   ESTIMATED GFR    Collection Time: 03/28/22  5:49 AM   Result Value Ref Range    GFR (CKD-EPI) 85 >60 mL/min/1.73 m 2   PROCALCITONIN    Collection Time: 03/28/22 11:25 AM   Result Value Ref Range    Procalcitonin 0.49 (H) <0.25 ng/mL       Current Facility-Administered Medications   Medication Frequency   • mirtazapine (Remeron) orally disintegrating tab 15 mg QHS   • vitamin D2 (Ergocalciferol) (Drisdol) capsule 50,000 Units Q7 DAYS   • potassium chloride SA (Kdur) tablet 20 mEq DAILY   • DILTIAZem CD (CARDIZEM CD) capsule 180 mg Q DAY   • multivitamin (THERAGRAN) tablet 1 Tablet DAILY   • hydrOXYzine HCl (ATARAX) tablet 50 mg Q6HRS PRN   • melatonin tablet 3 mg HS PRN   • Respiratory Therapy Consult Continuous RT   • Pharmacy Consult Request ...Pain Management Review 1 Each PHARMACY TO DOSE   • hydrALAZINE (APRESOLINE) tablet 10 mg Q8HRS PRN   • acetaminophen (Tylenol) tablet 650 mg Q4HRS PRN   • lactulose 20 GM/30ML solution 30 mL QDAY PRN   • omeprazole (PRILOSEC) capsule 20 mg QAM AC   • artificial tears ophthalmic solution 1 Drop PRN   • benzocaine-menthol (Cepacol) lozenge 1 Lozenge Q2HRS PRN   • mag hydrox-al hydrox-simeth (MAALOX PLUS ES or MYLANTA DS) suspension 20 mL Q2HRS PRN   • ondansetron (ZOFRAN ODT) dispertab 4 mg 4X/DAY PRN    Or   • ondansetron (ZOFRAN) syringe/vial  injection 4 mg 4X/DAY PRN   • sodium chloride (OCEAN) 0.65 % nasal spray 2 Spray PRN   • clopidogrel (PLAVIX) tablet 75 mg DAILY   • ipratropium-albuterol (DUONEB) nebulizer solution Q2HRS PRN (RT)   • levothyroxine (SYNTHROID) tablet 75 mcg AM ES   • pravastatin (PRAVACHOL) tablet 40 mg Q EVENING   • senna-docusate (PERICOLACE or SENOKOT S) 8.6-50 MG per tablet 2 Tablet BID    And   • polyethylene glycol/lytes (MIRALAX) PACKET 1 Packet QDAY PRN    And   • magnesium hydroxide (MILK OF MAGNESIA) suspension 30 mL QDAY PRN    And   • bisacodyl (DULCOLAX) suppository 10 mg QDAY PRN   • budesonide-formoterol (SYMBICORT) 160-4.5 MCG/ACT inhaler 2 Puff BID (RT)   • enoxaparin (LOVENOX) inj 40 mg DAILY       Orders Placed This Encounter   Procedures   • Diet Order Diet: Regular (Crush medication and float in puree.); Fluid modifications: (optional): 1200 ml Fluid Restriction     Standing Status:   Standing     Number of Occurrences:   1     Order Specific Question:   Diet:     Answer:   Regular [1]     Comments:   Crush medication and float in puree.     Order Specific Question:   Fluid modifications: (optional)     Answer:   1200 ml Fluid Restriction [8]       Assessment:  Active Hospital Problems    Diagnosis    • Hypokalemia    • Vitamin D deficiency    • Pneumonia    • Debility    • Severe protein-calorie malnutrition (HCC)    • Hyponatremia    • Failure to thrive in adult    • Acute hypoxemic respiratory failure (HCC)    • Hard of hearing    • Essential hypertension    • AMD (age-related macular degeneration), bilateral    • Chronic obstructive pulmonary disease (HCC)    • Tobacco dependence    • Hypothyroidism      This patient is a 86 y.o. female admitted for acute inpatient rehabilitation with Debility after hospital stay for COPD exacerbation as well as pneumonia.    I led and attended the weekly conference today, and agree with the IDT conference documentation and plan of care as noted below.    Date of  conference: 3/28/2022    Goals and barriers: See IDT note.    Biggest barriers: hypoxia, failure to thrive, refuses therapies at times, poor appetite and intake, severely impaired endurance    Goals in next week: family training    CM/social support:  supportive    Anticipated DC date: 4/2    Home health: PT/OT/SLP/RN    Equip: TBD    Follow up: PCP        Medical Decision Making and Plan:    Debility  PT/OT, 1.5 hr each discipline, 5 days per week  No cognitive impairment so not being seen by speech therapy    COPD exacerbation  Status post oral prednisone  Symbicort  Appreciate hospitalist assistance     Community-acquired pneumonia  Completed Zithromax  Completed cefuroxime  Appreciate hospitalist assistance     Acute hypoxic respiratory failure  Increased oxygen demands today  X-ray with large left pleural effusion  Consulted pulmonologist for thoracentesis     Hypothyroidism  Levothyroxine     Hyponatremia, continues  Continue fluid restrictions  Likely from malnutrition  Appreciate hospitalist assistance    Hypokalemia  Supplementation    Hyperlipidemia  Statin    Severe protein calorie malnutrition  Failure to thrive  Poor appetite  Dietitian consult  Added supplements  Multivitamin  Started on Remeron 3/25     Hypertension  Diltiazem  Appreciate hospitalist assistance    Carotid artery atherosclerosis  Plavix  Statin    Vitamin D deficiency, 9  Continue high-dose supplementation     Leukocytosis, continues  Chest x-ray with possible left lower extremity consolidation  Negative urinalysis  Monitor need for antibiotics    Depression  Started on Remeron    GI prophylaxis  Omeprazole    Bowel program  Continue bowel medications  Last BM 3/26    Bladder program  Check PVRs -125, 65  Bladder scan for no voids  ICP for over 400 cc  Scheduled toileting    DVT prophylaxis  Lovenox    Total time:  40 minutes.  I spent greater than 50% of the time for patient care, counseling, and coordination on this date,  including patient face-to face time, unit/floor time with review of records/pertinent lab data and studies, as well as discussing diagnostic evaluation/work up, planned therapeutic interventions, and future disposition of care, as per the interval events/subjective and the assessment and plan as noted above.        Maggie Joel M.D.   Physical Medicine and Rehabilitation

## 2022-03-28 NOTE — FLOWSHEET NOTE
03/28/22 1114   Events/Summary/Plan   Events/Summary/Plan Pt returned to 6 lpm continuous on oxymask.  Per Dr. Joel: maintain SATS 88 and above   Vital Signs   Pulse Oximetry 90 %   $ Pulse Oximetry (Spot Check) Yes   Oxygen   O2 (LPM) 6   O2 Delivery Device Silicone Nasal Cannula

## 2022-03-28 NOTE — PROGRESS NOTES
[facility] Daily Progress Note    Date of Service  3/28/2022    Chief Complaint:  Hypertension  Leukocytosis  Electrolyte imbalances    Interval History:  Pt became more hypoxic today and discussed about having pleural effusion and that Pulm will perform a thoracentesis and fluid analysis.    Review of Systems  Review of Systems   Constitutional: Negative for chills and fever.   Respiratory: Negative for shortness of breath.    Cardiovascular: Negative for chest pain.   Gastrointestinal: Negative for abdominal pain, diarrhea, nausea and vomiting.   Psychiatric/Behavioral: The patient is not nervous/anxious.         Physical Exam  Temp:  [36.6 °C (97.8 °F)-36.6 °C (97.9 °F)] 36.6 °C (97.9 °F)  Pulse:  [83-94] 86  Resp:  [18] 18  BP: (126-136)/(78-86) 132/80  SpO2:  [91 %-94 %] 91 %    Physical Exam  Vitals and nursing note reviewed.   Constitutional:       Appearance: Normal appearance.   HENT:      Head: Atraumatic.   Eyes:      Conjunctiva/sclera: Conjunctivae normal.      Pupils: Pupils are equal, round, and reactive to light.   Cardiovascular:      Rate and Rhythm: Normal rate and regular rhythm.      Heart sounds: No murmur heard.  Pulmonary:      Effort: Pulmonary effort is normal.      Breath sounds: No stridor. No wheezing or rales.   Abdominal:      General: There is no distension.      Palpations: Abdomen is soft.      Tenderness: There is no abdominal tenderness.   Musculoskeletal:      Cervical back: Normal range of motion and neck supple.      Right lower leg: No edema.      Left lower leg: No edema.   Skin:     General: Skin is warm and dry.      Findings: No rash.   Neurological:      Mental Status: She is alert and oriented to person, place, and time.   Psychiatric:         Mood and Affect: Mood normal.         Behavior: Behavior normal.         Fluids    Intake/Output Summary (Last 24 hours) at 3/28/2022 1036  Last data filed at 3/28/2022 0900  Gross per 24 hour   Intake 360 ml   Output --    Net 360 ml       Laboratory  Recent Labs     03/28/22  0549   WBC 21.9*   RBC 4.68   HEMOGLOBIN 14.4   HEMATOCRIT 43.7   MCV 93.4   MCH 30.8   MCHC 33.0*   RDW 50.5*   PLATELETCT 299   MPV 11.4     Recent Labs     03/28/22  0549   SODIUM 130*   POTASSIUM 4.8   CHLORIDE 88*   CO2 27   GLUCOSE 66   BUN 17   CREATININE 0.70   CALCIUM 9.6                   Imaging    Assessment/Plan  Pleural effusion  Assessment & Plan  Pt became more hypoxic today -- now on a venti mask  Has a residual cough  No diarrhea  WBC's: 19.0 --> 15.2 --> 19.8 --> 19.2 (3/24) --> 21.9 (3/28)  Procalcitonin: 0.16 (3/20) --> 0.49 (3/28)  CXR (3/20): showed bibasilar opacities  CXR (3/28): small right pleural effusion and moderate left pleural effusion which has increased since prior exam; underlying atelectasis or consolidation  Pulmonary consulted for thoracentesis and fluid anaylsis  Monitor    Pneumonia- (present on admission)  Assessment & Plan  Diagnosed at [facility]  Has a residual cough  No diarrhea  No abd discomfort  Has been afebrile  WBC's: 19.0 --> 15.2 --> 19.8 --> 19.2 (3/24) --> 21.9 (3/28)  CXR (3/20): showed bibasilar opacities  CXR (3/28): small right pleural effusion and moderate left pleural effusion which has increased since prior exam; underlying atelectasis or consolidation  U/A (-)  BC (-)  Procal: 0.16 (3/20) --> 0.49 (3/28)  S/P Ceftin and Zithro (3/25)  F/U repeat U/A   Note: was on steroids at [facility] and just stopped 3/24  Monitor    Vitamin D deficiency- (present on admission)  Assessment & Plan  Vit D: 9  On supplements    Hypokalemia- (present on admission)  Assessment & Plan  Resolved  K+: 4.8 (3/28)  S/P KCL 40 meq x 1  On daily K+ supplements (since pt not eating well)  Monitor    Hyponatremia- (present on admission)  Assessment & Plan  Na: 129 --> 132 (3/24) --> 130 (3/28)  Likely 2nd to poor appetite and oral intake  Monitor for now    Essential hypertension- (present on admission)  Assessment & Plan  BP generally  ok  On Cardizem  mg daily --> 180 mg daily (3/25)  Note: home meds include Cardizem  mg daily  Cont to monitor    Chronic obstructive pulmonary disease (HCC)- (present on admission)  Assessment & Plan  Has hx  S/P exacerbation (from [facility])  S/P steroids and abx  On chronic O2 supplementation of 3 liters at home -- but pt only was using sometimes  Note: long time smoker but states she quit 5 weeks ago    Hypothyroidism- (present on admission)  Assessment & Plan  TSH: wnl (3/22)  On Synthroid

## 2022-03-28 NOTE — THERAPY
"Occupational Therapy  Daily Treatment     Patient Name: Antonia Stover  Age:  86 y.o., Sex:  female  Medical Record #: [id number]  Today's Date: 3/28/2022     Precautions  Precautions: (P) Fall Risk,Other (See Comments)  Comments: (P) High flow O2 5 L/min increase to 6 to 8 L with activity as needed, legally blind, 1200 mL fluid restriction         Subjective    \"I need to lay down\"     Objective       03/28/22 0931   Precautions   Precautions Fall Risk;Other (See Comments)   Comments High flow O2 5 L/min increase to 6 to 8 L with activity as needed, legally blind, 1200 mL fluid restriction   Vitals   O2 (LPM) 6  (increased to 8L during ADLs)   O2 Delivery Device Nasal Cannula   Vitals Comments pt with respiratory at end of session, O2 sats 84-87% on 6L wall O2, required increase to 10L with oxymask.   Cognition    Level of Consciousness Alert   Functional Level of Assist   Grooming Minimal Assist   Grooming Description Increased time;Set-up of equipment;Supervision for safety;Verbal cueing  (initial [facility] assist for oral care)   Bathing Maximal Assist   Bathing Description Increased time;Set-up of equipment;Supervision for safety;Verbal cueing  (sponge bathe)   Upper Body Dressing Moderate Assist   Upper Body Dressing Description Increased time;Set-up of equipment;Supervision for safety;Verbal cueing  (min A to don pullover shirt, mod A to don sweater)   Lower Body Dressing Total Assist   Lower Body Dressing Description Increased time;Set-up of equipment;Supervision for safety;Verbal cueing   Toileting Total Assist   Toileting Description Assist to pull pants up;Assist for hygiene;Assist for standing balance;Assist to pull pants down;Grab bar;Adaptive equipment  (commode over toilet)   Toilet Transfers Moderate Assist   Toilet Transfer Description Adaptive equipment;Grab bar;Increased time;Set-up of equipment;Supervision for safety;Verbal cueing  (w/c<>commode over toilet SPT via GB)   Tub / Shower Transfers " Moderate Assist   Tub Shower Transfer Description Grab bar;Shower bench;Increased time;Initial preparation for task;Set-up of equipment;Supervision for safety;Verbal cueing  (sponge bathe)   Interdisciplinary Plan of Care Collaboration   IDT Collaboration with  Nursing;Respiratory Therapist   Patient Position at End of Therapy In Bed;Bed Alarm On;Call Light within Reach;Tray Table within Reach;Other (Comments)   Collaboration Comments CLOF, POC, O2 sats, pt with respiratory at end of session   OT Total Time Spent   OT Individual Total Time Spent (Mins) 60   OT Charge Group   OT Self Care / ADL 4       Assessment    Pt con't to present with fatigue, low endurance and significant deconditioning. She required increase to 8L O2 during ADLs, and then required 10L on oxymask. Notified respiratory therapist of vitals, and RT with pt at end of session.   Strengths: Independent prior level of function,Pleasant and cooperative,Supportive family,Willingly participates in therapeutic activities  Barriers: Decreased endurance,Fatigue,Generalized weakness,Impaired activity tolerance,Impaired appetite/intake,Impaired balance,Impaired functional cognition,Limited mobility (supplemental O2, fear of falling)    Plan    Continue generalized strengthening, endurance building, balance building, ADL retraining.        Occupational Therapy Goals (Active)       Problem: Bathing       Dates: Start: 03/24/22         Goal: STG-Within one week, patient will bathe with mod A using AE as needed.        Dates: Start: 03/24/22               Problem: Dressing       Dates: Start: 03/24/22         Goal: STG-Within one week, patient will dress LB with mod A using AE as needed.        Dates: Start: 03/24/22               Problem: Functional Transfers       Dates: Start: 03/24/22         Goal: STG-Within one week, patient will transfer to toilet with min A using AE/DME as needed.        Dates: Start: 03/24/22               Problem: OT Long Term Goals        Dates: Start: 03/24/22         Goal: LTG-By discharge, patient will complete basic self care tasks at supervision to mod I level using AE as needed.        Dates: Start: 03/24/22            Goal: LTG-By discharge, patient will perform bathroom transfers at supervision to mod I level using AE/DME as needed.        Dates: Start: 03/24/22               Problem: Toileting       Dates: Start: 03/24/22         Goal: STG-Within one week, patient will complete toileting tasks with mod A using AE as needed.        Dates: Start: 03/24/22

## 2022-03-28 NOTE — CARE PLAN
Problem: Bathing  Goal: STG-Within one week, patient will bathe with mod A using AE as needed.   Outcome: Not Met  Note: Requires max A for sponge bath.      Problem: Dressing  Goal: STG-Within one week, patient will dress LB with mod A using AE as needed.   Outcome: Not Met  Note: Requires max to total A.      Problem: Toileting  Goal: STG-Within one week, patient will complete toileting tasks with mod A using AE as needed.   Outcome: Not Met  Note: Requires max to total A.      Problem: Functional Transfers  Goal: STG-Within one week, patient will transfer to toilet with min A using AE/DME as needed.   Outcome: Not Met  Note: Requires mod A.

## 2022-03-28 NOTE — CARE PLAN
Problem: Underweight  Goal: Patient to consume greater than or equal to 50% of meals  Description: Pt will consume ~50% of meals, or 25-50% of meals and supplements during admit.  Outcome: Not Progressing     See RD note.

## 2022-03-28 NOTE — THERAPY
Missed Therapy     Patient Name: Antonia Stover  Age:  86 y.o., Sex:  female  Medical Record #: [id number]  Today's Date: 3/28/2022    Discussed missed therapy with MD, and therapy team.        03/28/22 1301   Therapy Missed   Missed Therapy (Minutes) 30   Reason For Missed Therapy Medical - Patient on Hold from Therapy;Medical - Other (Please Comment)  (Per request from MD. Hold PT due to pt requiring 10L of O2 and needs thoracentesis)

## 2022-03-28 NOTE — DIETARY
"Nutrition Services: Update   Day 5 of admit.  Antonia Stover is a 86 y.o. female with admitting DX of Debility [R53.81]    Pt is currently on regular diet with 1200 mL fluid restriction. She also has an order for Boost [facility] supplements. PO intake is poor at <25% of most meals. No recorded PO > 50%. Overall, intake of Boost supplements has been poor also with most either 0 or < 25%. RD met with pt in her room. Homemade milk shake offered. She said that she can't eat ice cream. Smoothies made with yogurt offered. She agreed to this but was not sure that she could tolerate them. When asked what she likes to eat she said snack foods but was vague about which snack foods she likes (\"the usual\"). RD called pt's  and obtained preferences. Per , pt was drinking Chobani Smoothies at acute. This was added to trays TID instead of Boost supplements. Pt's  is going to bring in Cheetos. Per , pt likes Cheetos. Pt currently receiving small portions. This continues to be appropriate. Per , pt disliked larger portions PTA.      Wt 3/27/22: 35.2 kg (77 lb 11.2 oz) via stand up scale - Pt's weight is down by 3.3 kg since admit. Weights taken on different scales which may cause wt discrepancy. Poor PO may have also contributed to wt loss, though. Per , pt's UBW over the years has been between 100-110 lb.     Labs: 3/28: sodium=130 (L), phos=3.5, mag=1.9    Meds: Remeron (ordered on 3/25), MVI    Malnutrition Risk: [place] [facility] Severe malnutrition in context of chronic illness as evidenced by severe muscle loss at temples, clavicle, shoulder, and severe fat loss at orbitals and triceps.    Recommendations/Plan:  1. Discontinue Boost supplements.   2. Provide Chobani Smoothies TID with meals.  3. Continue with order for small portions.    4. Encourage intake of meals  5. Document intake of all meals as % taken in ADL's to provide interdisciplinary communication across " all shifts.   6. Monitor weight.  7. Nutrition rep will continue to see patient for ongoing meal and snack preferences.    RD following

## 2022-03-28 NOTE — ASSESSMENT & PLAN NOTE
-Acute on chronic respiratory failure, she is at home on 3 lt HOT  -Continue with oxygen supplementation to keep sats 88-92%, at beginning of thoracentesis sats were about 85-88% and improved to 98% at the end of the procedure. I discussed this with her  and patient before and after thoracentesis.  -Given new cxr findings an evaluation with US at the bedside took placed with moderate pleural effusion findings, s/p thoracentesis at bedside on 3/28  -Samples of pleural fluid were sent for chemistry, cytology and cultures. We will follow  -the fluid looked a bit cloudy, 500 cc were removed. A CXR follow up was ordered.  -Continue to hold antibiotics for now, although low threshold if develops a fever, leukocytosis or clinical picture indicates  -Discussed risk/pros/cons of the procedure with the patient,  and also post procedure   -Noticed lovenox prophylaxis and also plavix use, this risk was also discussed.  -continue tritration of oxygen to keep sats between 88-92%, already started weaning down.  -will continue to follow

## 2022-03-28 NOTE — THERAPY
Missed Therapy     Patient Name: Antonia Stover  Age:  86 y.o., Sex:  female  Medical Record #: [id number]  Today's Date: 3/28/2022    Discussed missed therapy with therapy supervisor and PT.       03/28/22 1331   Therapy Missed   Missed Therapy (Minutes) 60   Reason For Missed Therapy Medical - Patient on Hold from Therapy  (Hold due to low O2 sats, can not tolerate therapy)

## 2022-03-28 NOTE — CARE PLAN
"The patient is Watcher - Medium risk of patient condition declining or worsening    Shift Goals  Clinical Goals: S  Patient Goals: free of falls/ safety    Progress made toward(s) clinical / shift goals:      Problem: VTE Prevention  Goal: Patient will remain free from venous thromboembolism (VTE)  Outcome: Progressing  Note: Pt receives Lovenox SC injections for DVT prophylaxis        Patient is not progressing towards the following goals:      Problem: Fall Risk - Rehab  Goal: Patient will remain free from falls  Outcome: Not Met  Note: Susan Blount Fall risk Assessment Score: 13    Moderate fall risk Interventions  - Bed and strip alarm   - Yellow sign by the door   - Yellow wrist band \"Fall risk\"  - Room near to the nurse station  - Do not leave patient unattended in the bathroom  - Fall risk education provided     "

## 2022-03-28 NOTE — ASSESSMENT & PLAN NOTE
Pt became more hypoxic today -- now on a venti mask  Has a residual cough  No diarrhea  WBC's: 19.0 --> 15.2 --> 19.8 --> 19.2 (3/24) --> 21.9 (3/28)  Procalcitonin: 0.16 (3/20) --> 0.49 (3/28)  CXR (3/20): showed bibasilar opacities  CXR (3/28): small right pleural effusion and moderate left pleural effusion which has increased since prior exam; underlying atelectasis or consolidation  Pulmonary consulted for thoracentesis and fluid anaylsis  Monitor

## 2022-03-29 ENCOUNTER — APPOINTMENT (OUTPATIENT)
Dept: RADIOLOGY | Facility: MEDICAL CENTER | Age: 87
DRG: 190 | End: 2022-03-29
Attending: STUDENT IN AN ORGANIZED HEALTH CARE EDUCATION/TRAINING PROGRAM
Payer: MEDICARE

## 2022-03-29 PROBLEM — J96.21 ACUTE ON CHRONIC RESPIRATORY FAILURE WITH HYPOXIA (HCC): Status: ACTIVE | Noted: 2022-03-20

## 2022-03-29 LAB
APPEARANCE UR: CLEAR
BACTERIA #/AREA URNS HPF: NEGATIVE /HPF
BILIRUB UR QL STRIP.AUTO: NEGATIVE
COLOR UR: YELLOW
CYTOLOGY REG CYTOL: NORMAL
EPI CELLS #/AREA URNS HPF: ABNORMAL /HPF
FUNGUS SPEC FUNGUS STN: NORMAL
GLUCOSE UR STRIP.AUTO-MCNC: NEGATIVE MG/DL
GRAM STN SPEC: NORMAL
HYALINE CASTS #/AREA URNS LPF: ABNORMAL /LPF
KETONES UR STRIP.AUTO-MCNC: 40 MG/DL
LEUKOCYTE ESTERASE UR QL STRIP.AUTO: NEGATIVE
MAGNESIUM SERPL-MCNC: 1.8 MG/DL (ref 1.5–2.5)
MICRO URNS: ABNORMAL
NITRITE UR QL STRIP.AUTO: NEGATIVE
PATH REV: NORMAL
PATH REV: NORMAL
PH UR STRIP.AUTO: 5 [PH] (ref 5–8)
PHOSPHATE SERPL-MCNC: 3.9 MG/DL (ref 2.5–4.5)
PROCALCITONIN SERPL-MCNC: 0.52 NG/ML
PROT UR QL STRIP: 30 MG/DL
RBC # URNS HPF: ABNORMAL /HPF
RBC UR QL AUTO: NEGATIVE
SCCMEC + MECA PNL NOSE NAA+PROBE: NEGATIVE
SIGNIFICANT IND 70042: NORMAL
SIGNIFICANT IND 70042: NORMAL
SITE SITE: NORMAL
SITE SITE: NORMAL
SOURCE SOURCE: NORMAL
SOURCE SOURCE: NORMAL
SP GR UR STRIP.AUTO: 1.02
UROBILINOGEN UR STRIP.AUTO-MCNC: 0.2 MG/DL
WBC #/AREA URNS HPF: ABNORMAL /HPF

## 2022-03-29 PROCEDURE — 71250 CT THORAX DX C-: CPT | Mod: ME

## 2022-03-29 PROCEDURE — 87641 MR-STAPH DNA AMP PROBE: CPT

## 2022-03-29 PROCEDURE — 700105 HCHG RX REV CODE 258: Performed by: STUDENT IN AN ORGANIZED HEALTH CARE EDUCATION/TRAINING PROGRAM

## 2022-03-29 PROCEDURE — 700101 HCHG RX REV CODE 250: Performed by: STUDENT IN AN ORGANIZED HEALTH CARE EDUCATION/TRAINING PROGRAM

## 2022-03-29 PROCEDURE — 770000 HCHG ROOM/CARE - INTERMEDIATE ICU *

## 2022-03-29 PROCEDURE — 94760 N-INVAS EAR/PLS OXIMETRY 1: CPT

## 2022-03-29 PROCEDURE — 94640 AIRWAY INHALATION TREATMENT: CPT

## 2022-03-29 PROCEDURE — 99223 1ST HOSP IP/OBS HIGH 75: CPT | Performed by: STUDENT IN AN ORGANIZED HEALTH CARE EDUCATION/TRAINING PROGRAM

## 2022-03-29 PROCEDURE — 97166 OT EVAL MOD COMPLEX 45 MIN: CPT

## 2022-03-29 PROCEDURE — 97162 PT EVAL MOD COMPLEX 30 MIN: CPT

## 2022-03-29 PROCEDURE — A9270 NON-COVERED ITEM OR SERVICE: HCPCS | Performed by: STUDENT IN AN ORGANIZED HEALTH CARE EDUCATION/TRAINING PROGRAM

## 2022-03-29 PROCEDURE — 700102 HCHG RX REV CODE 250 W/ 637 OVERRIDE(OP): Performed by: STUDENT IN AN ORGANIZED HEALTH CARE EDUCATION/TRAINING PROGRAM

## 2022-03-29 PROCEDURE — 71045 X-RAY EXAM CHEST 1 VIEW: CPT

## 2022-03-29 PROCEDURE — 700111 HCHG RX REV CODE 636 W/ 250 OVERRIDE (IP): Performed by: STUDENT IN AN ORGANIZED HEALTH CARE EDUCATION/TRAINING PROGRAM

## 2022-03-29 PROCEDURE — 99233 SBSQ HOSP IP/OBS HIGH 50: CPT | Performed by: HOSPITALIST

## 2022-03-29 PROCEDURE — 99233 SBSQ HOSP IP/OBS HIGH 50: CPT | Performed by: INTERNAL MEDICINE

## 2022-03-29 RX ORDER — DILTIAZEM HYDROCHLORIDE 180 MG/1
180 CAPSULE, COATED, EXTENDED RELEASE ORAL
Status: DISCONTINUED | OUTPATIENT
Start: 2022-03-29 | End: 2022-04-01

## 2022-03-29 RX ORDER — IPRATROPIUM BROMIDE AND ALBUTEROL SULFATE 2.5; .5 MG/3ML; MG/3ML
3 SOLUTION RESPIRATORY (INHALATION)
Status: DISCONTINUED | OUTPATIENT
Start: 2022-03-29 | End: 2022-03-30

## 2022-03-29 RX ORDER — CLOPIDOGREL BISULFATE 75 MG/1
75 TABLET ORAL DAILY
Status: DISCONTINUED | OUTPATIENT
Start: 2022-03-29 | End: 2022-04-01

## 2022-03-29 RX ORDER — FUROSEMIDE 10 MG/ML
40 INJECTION INTRAMUSCULAR; INTRAVENOUS ONCE
Status: COMPLETED | OUTPATIENT
Start: 2022-03-29 | End: 2022-03-29

## 2022-03-29 RX ORDER — AMOXICILLIN 250 MG
2 CAPSULE ORAL 2 TIMES DAILY
Status: DISCONTINUED | OUTPATIENT
Start: 2022-03-29 | End: 2022-04-07 | Stop reason: HOSPADM

## 2022-03-29 RX ORDER — LINEZOLID 2 MG/ML
600 INJECTION, SOLUTION INTRAVENOUS EVERY 12 HOURS
Status: DISCONTINUED | OUTPATIENT
Start: 2022-03-29 | End: 2022-03-29

## 2022-03-29 RX ORDER — HEPARIN SODIUM 5000 [USP'U]/ML
5000 INJECTION, SOLUTION INTRAVENOUS; SUBCUTANEOUS EVERY 8 HOURS
Status: DISCONTINUED | OUTPATIENT
Start: 2022-03-29 | End: 2022-04-01

## 2022-03-29 RX ORDER — ONDANSETRON 2 MG/ML
4 INJECTION INTRAMUSCULAR; INTRAVENOUS EVERY 4 HOURS PRN
Status: DISCONTINUED | OUTPATIENT
Start: 2022-03-29 | End: 2022-04-07 | Stop reason: HOSPADM

## 2022-03-29 RX ORDER — PRAVASTATIN SODIUM 20 MG
40 TABLET ORAL EVERY EVENING
Status: DISCONTINUED | OUTPATIENT
Start: 2022-03-29 | End: 2022-04-01

## 2022-03-29 RX ORDER — LEVOTHYROXINE SODIUM 0.07 MG/1
75 TABLET ORAL
Status: DISCONTINUED | OUTPATIENT
Start: 2022-03-29 | End: 2022-04-02

## 2022-03-29 RX ORDER — METHYLPREDNISOLONE SODIUM SUCCINATE 40 MG/ML
40 INJECTION, POWDER, LYOPHILIZED, FOR SOLUTION INTRAMUSCULAR; INTRAVENOUS DAILY
Status: DISCONTINUED | OUTPATIENT
Start: 2022-03-29 | End: 2022-04-02

## 2022-03-29 RX ORDER — ACETAMINOPHEN 325 MG/1
650 TABLET ORAL EVERY 6 HOURS PRN
Status: DISCONTINUED | OUTPATIENT
Start: 2022-03-29 | End: 2022-04-01

## 2022-03-29 RX ORDER — ONDANSETRON 4 MG/1
4 TABLET, ORALLY DISINTEGRATING ORAL EVERY 4 HOURS PRN
Status: DISCONTINUED | OUTPATIENT
Start: 2022-03-29 | End: 2022-04-07 | Stop reason: HOSPADM

## 2022-03-29 RX ORDER — IPRATROPIUM BROMIDE AND ALBUTEROL SULFATE 2.5; .5 MG/3ML; MG/3ML
3 SOLUTION RESPIRATORY (INHALATION) EVERY 4 HOURS PRN
Status: DISCONTINUED | OUTPATIENT
Start: 2022-03-29 | End: 2022-03-29

## 2022-03-29 RX ORDER — OMEPRAZOLE 20 MG/1
20 CAPSULE, DELAYED RELEASE ORAL DAILY
Status: DISCONTINUED | OUTPATIENT
Start: 2022-03-29 | End: 2022-03-29

## 2022-03-29 RX ORDER — POLYETHYLENE GLYCOL 3350 17 G/17G
1 POWDER, FOR SOLUTION ORAL
Status: DISCONTINUED | OUTPATIENT
Start: 2022-03-29 | End: 2022-04-07 | Stop reason: HOSPADM

## 2022-03-29 RX ORDER — IPRATROPIUM BROMIDE AND ALBUTEROL SULFATE 2.5; .5 MG/3ML; MG/3ML
3 SOLUTION RESPIRATORY (INHALATION)
Status: DISCONTINUED | OUTPATIENT
Start: 2022-03-29 | End: 2022-03-31

## 2022-03-29 RX ORDER — BUDESONIDE AND FORMOTEROL FUMARATE DIHYDRATE 160; 4.5 UG/1; UG/1
2 AEROSOL RESPIRATORY (INHALATION)
Status: DISCONTINUED | OUTPATIENT
Start: 2022-03-29 | End: 2022-04-02

## 2022-03-29 RX ORDER — LABETALOL HYDROCHLORIDE 5 MG/ML
10 INJECTION, SOLUTION INTRAVENOUS EVERY 4 HOURS PRN
Status: DISCONTINUED | OUTPATIENT
Start: 2022-03-29 | End: 2022-04-01

## 2022-03-29 RX ORDER — BISACODYL 10 MG
10 SUPPOSITORY, RECTAL RECTAL
Status: DISCONTINUED | OUTPATIENT
Start: 2022-03-29 | End: 2022-04-07 | Stop reason: HOSPADM

## 2022-03-29 RX ADMIN — SENNOSIDES AND DOCUSATE SODIUM 2 TABLET: 50; 8.6 TABLET ORAL at 17:44

## 2022-03-29 RX ADMIN — IPRATROPIUM BROMIDE AND ALBUTEROL SULFATE 3 ML: 2.5; .5 SOLUTION RESPIRATORY (INHALATION) at 23:10

## 2022-03-29 RX ADMIN — SENNOSIDES AND DOCUSATE SODIUM 2 TABLET: 50; 8.6 TABLET ORAL at 06:33

## 2022-03-29 RX ADMIN — LEVOTHYROXINE SODIUM 75 MCG: 0.07 TABLET ORAL at 06:34

## 2022-03-29 RX ADMIN — DILTIAZEM HYDROCHLORIDE 180 MG: 180 CAPSULE, COATED, EXTENDED RELEASE ORAL at 06:33

## 2022-03-29 RX ADMIN — METHYLPREDNISOLONE SODIUM SUCCINATE 40 MG: 40 INJECTION, POWDER, FOR SOLUTION INTRAMUSCULAR; INTRAVENOUS at 06:33

## 2022-03-29 RX ADMIN — BUDESONIDE AND FORMOTEROL FUMARATE DIHYDRATE 2 PUFF: 160; 4.5 AEROSOL RESPIRATORY (INHALATION) at 09:23

## 2022-03-29 RX ADMIN — BUDESONIDE AND FORMOTEROL FUMARATE DIHYDRATE 2 PUFF: 160; 4.5 AEROSOL RESPIRATORY (INHALATION) at 20:03

## 2022-03-29 RX ADMIN — IPRATROPIUM BROMIDE AND ALBUTEROL SULFATE 3 ML: 2.5; .5 SOLUTION RESPIRATORY (INHALATION) at 06:30

## 2022-03-29 RX ADMIN — HEPARIN SODIUM 5000 UNITS: 5000 INJECTION, SOLUTION INTRAVENOUS; SUBCUTANEOUS at 02:04

## 2022-03-29 RX ADMIN — IPRATROPIUM BROMIDE AND ALBUTEROL SULFATE 3 ML: 2.5; .5 SOLUTION RESPIRATORY (INHALATION) at 14:34

## 2022-03-29 RX ADMIN — BUDESONIDE AND FORMOTEROL FUMARATE DIHYDRATE 2 PUFF: 160; 4.5 AEROSOL RESPIRATORY (INHALATION) at 02:23

## 2022-03-29 RX ADMIN — HEPARIN SODIUM 5000 UNITS: 5000 INJECTION, SOLUTION INTRAVENOUS; SUBCUTANEOUS at 09:22

## 2022-03-29 RX ADMIN — HEPARIN SODIUM 5000 UNITS: 5000 INJECTION, SOLUTION INTRAVENOUS; SUBCUTANEOUS at 17:45

## 2022-03-29 RX ADMIN — CEFEPIME 1 G: 1 INJECTION, POWDER, FOR SOLUTION INTRAMUSCULAR; INTRAVENOUS at 06:33

## 2022-03-29 RX ADMIN — IPRATROPIUM BROMIDE AND ALBUTEROL SULFATE 3 ML: 2.5; .5 SOLUTION RESPIRATORY (INHALATION) at 02:19

## 2022-03-29 RX ADMIN — IPRATROPIUM BROMIDE AND ALBUTEROL SULFATE 3 ML: 2.5; .5 SOLUTION RESPIRATORY (INHALATION) at 20:03

## 2022-03-29 RX ADMIN — LINEZOLID 600 MG: 600 INJECTION, SOLUTION INTRAVENOUS at 02:04

## 2022-03-29 RX ADMIN — IPRATROPIUM BROMIDE AND ALBUTEROL SULFATE 3 ML: 2.5; .5 SOLUTION RESPIRATORY (INHALATION) at 10:46

## 2022-03-29 RX ADMIN — CLOPIDOGREL BISULFATE 75 MG: 75 TABLET ORAL at 06:33

## 2022-03-29 RX ADMIN — FUROSEMIDE 40 MG: 10 INJECTION, SOLUTION INTRAMUSCULAR; INTRAVENOUS at 06:33

## 2022-03-29 RX ADMIN — PRAVASTATIN SODIUM 40 MG: 20 TABLET ORAL at 17:45

## 2022-03-29 RX ADMIN — OMEPRAZOLE 20 MG: 20 CAPSULE, DELAYED RELEASE ORAL at 06:33

## 2022-03-29 ASSESSMENT — ENCOUNTER SYMPTOMS
VOMITING: 0
SORE THROAT: 0
HEADACHES: 0
EYE DISCHARGE: 0
PALPITATIONS: 0
WHEEZING: 0
FOCAL WEAKNESS: 0
COUGH: 1
SPUTUM PRODUCTION: 0
SENSORY CHANGE: 0
FEVER: 0
EYE REDNESS: 0
DIARRHEA: 0
BRUISES/BLEEDS EASILY: 0
SPEECH CHANGE: 0
WEIGHT LOSS: 1
EYE PAIN: 0
SINUS PAIN: 0
ORTHOPNEA: 0
ABDOMINAL PAIN: 0
DIZZINESS: 0
HEMOPTYSIS: 0
NERVOUS/ANXIOUS: 0
MEMORY LOSS: 0
SPUTUM PRODUCTION: 1
SHORTNESS OF BREATH: 1
NAUSEA: 0
FALLS: 0
WEAKNESS: 1
CHILLS: 0
MYALGIAS: 0

## 2022-03-29 ASSESSMENT — COGNITIVE AND FUNCTIONAL STATUS - GENERAL
WALKING IN HOSPITAL ROOM: A LOT
DRESSING REGULAR LOWER BODY CLOTHING: A LOT
TURNING FROM BACK TO SIDE WHILE IN FLAT BAD: UNABLE
MOVING FROM LYING ON BACK TO SITTING ON SIDE OF FLAT BED: UNABLE
SUGGESTED CMS G CODE MODIFIER DAILY ACTIVITY: CK
CLIMB 3 TO 5 STEPS WITH RAILING: TOTAL
EATING MEALS: A LITTLE
SUGGESTED CMS G CODE MODIFIER MOBILITY: CM
MOBILITY SCORE: 8
PERSONAL GROOMING: A LITTLE
HELP NEEDED FOR BATHING: A LOT
MOVING TO AND FROM BED TO CHAIR: UNABLE
DRESSING REGULAR UPPER BODY CLOTHING: A LITTLE
DAILY ACTIVITIY SCORE: 15
STANDING UP FROM CHAIR USING ARMS: A LOT
TOILETING: A LOT

## 2022-03-29 ASSESSMENT — ACTIVITIES OF DAILY LIVING (ADL): TOILETING: INDEPENDENT

## 2022-03-29 ASSESSMENT — PAIN DESCRIPTION - PAIN TYPE
TYPE: ACUTE PAIN;CHRONIC PAIN
TYPE: ACUTE PAIN;CHRONIC PAIN

## 2022-03-29 ASSESSMENT — FIBROSIS 4 INDEX: FIB4 SCORE: 3.11

## 2022-03-29 ASSESSMENT — GAIT ASSESSMENTS: GAIT LEVEL OF ASSIST: UNABLE TO PARTICIPATE

## 2022-03-29 ASSESSMENT — LIFESTYLE VARIABLES: SUBSTANCE_ABUSE: 0

## 2022-03-29 NOTE — CARE PLAN
Problem: Bronchoconstriction  Goal: Improve in air movement and diminished wheezing  Description: Target End Date:  2 to 3 days    1.  Implement inhaled treatments  2.  Evaluate and manage medication effects  Outcome: Progressing  Flowsheets (Taken 3/29/2022 4484)  Bronchodilator Goals/Outcome:   Patient at Stable Baseline   Diminished Wheezing and Volume of Air Movement Increased  Bronchodilator Indications: History / Diagnosis

## 2022-03-29 NOTE — PROGRESS NOTES
Pt requiring increased Oxygen today from 6-10 L on oxymask. Thoracentesis ordered. Assisted Dr. Pérez with procedure. Pt was stable during procedure, but did have some anxiety. After procedure, pt expressed relief and is resting comfortably. Fluid samples sent to lab with security, and post procedure x-ray complete.

## 2022-03-29 NOTE — ED NOTES
Bedside report given to CICU RN. Pt transferring to T634/00 via gurney on cardiac monitor w/ RN and CIC Tech. Pt alert and oriented, intermittent confusion, [place]. On 15 L NRB. bbelongings w/i possession.

## 2022-03-29 NOTE — THERAPY
"Occupational Therapy   Initial Evaluation     Patient Name: Antonia Stover  Age:  86 y.o., Sex:  female  Medical Record #: [id number]  Today's Date: 3/29/2022     Precautions  Precautions: Fall Risk  Comments: Increased O2 needs    Assessment  Patient is 86 y.o. female with a diagnosis of SOB, requiring 15L of O2 to stay above 90.  Additional factors influencing patient status / progress: Patient had been at [facility] rehab when this occurred, levels remained low despite thoracentesis. Patient continues to require 10L of O2 today. She is limited by weakness, fear, and decreased balance. Rehab seems a bit intensive for this patient. She declares that it is her goal to get back to her functional baseline and be able to DC home. Will follow.       Plan    Recommend Occupational Therapy 3 times per week until therapy goals are met for the following treatments:  Adaptive Equipment, Cognitive Skill Development, Manual Therapy Techniques, Neuro Re-Education / Balance, Self Care/Activities of Daily Living, Therapeutic Activities, and Therapeutic Exercises.       Discharge Recommendations: Recommend post-acute placement for additional occupational therapy services prior to discharge home     Subjective    \"Breakfast... breakfast!?\"     Objective       03/29/22 1003   Prior Living Situation   Prior Services Other (Comments)   Housing / Facility Mobile Home   Bathroom Set up Bathtub / Shower Combination   Equipment Owned Ramp;Front-Wheel Walker;Oxygen   Lives with - Patient's Self Care Capacity Spouse   Comments Patient came to [facility] from rehab facility 2/2 low O2 levels. Previous to that she was living at home with her  and having  therapies.   Prior Level of ADL Function   Self Feeding Independent   Grooming / Hygiene Requires Assist   Bathing Requires Assist  (bed baths)   Dressing Independent   Toileting Independent   Prior Level of IADL Function   Medication Management Requires Assist   Laundry Requires Assist "   Kitchen Mobility Requires Assist   Finances Requires Assist   Home Management Requires Assist   Shopping Requires Assist   Prior Level Of Mobility Supervision With Device in Community   Vitals   O2 (LPM) 10   O2 Delivery Device Oxymask   Vitals Comments Switched to NC for mealtime   Cognition    Cognition / Consciousness X   Orientation Level Not Oriented to Time   Level of Consciousness Alert   Comments Pleasant and cooperative   Balance Assessment   Sitting Balance (Static) Fair   Sitting Balance (Dynamic) Fair -   Standing Balance (Static) Poor   Standing Balance (Dynamic) Poor -   Weight Shift Sitting Fair   Weight Shift Standing Poor   Comments Slow pivot to EOB to chair with x2 HHA   Bed Mobility    Supine to Sit Moderate Assist   ADL Assessment   Eating Minimal Assist  (assist from spouse for setup)   Grooming Seated;Minimal Assist  (set up assist)   Functional Mobility   Sit to Stand Moderate Assist   Bed, Chair, Wheelchair Transfer Maximal Assist   Transfer Method Stand Pivot   Mobility sup>sit, STS, pivot to chair   Comments Patient is very lightweight and easy to mobilize, but she needs quite a bit of help   Activity Tolerance   Sitting in Chair 10+ mins post   Sitting Edge of Bed 5 mins   Standing 3 mins   Short Term Goals   Short Term Goal # 1 Patient to demo ADL xfers supv   Short Term Goal # 2 Patient will complete LB dressing supv   Short Term Goal # 3 Patient will complete seated G/H supv

## 2022-03-29 NOTE — CARE PLAN
Problem: Nutritional:  Goal: Achieve adequate nutritional intake  Description: Patient will consume ~50% of meals w/ 100% chobani intake  Outcome: Not Progressing

## 2022-03-29 NOTE — ED NOTES
Attempted to titrate pt down on O2 w/ NC, pt did not tolerate well. Pt spo2 went down to lows 80s, pt placed back on 15L oxymask. Pt spo2 now 94%. ERP notified.

## 2022-03-29 NOTE — H&P
Hospital Medicine History & Physical Note    Date of Service  3/29/2022    Primary Care Physician  Hardik Merino M.D.    Consultants  None    Code Status  DNAR/DNI    Chief Complaint  Chief Complaint   Patient presents with   • Shortness of Breath     Pt requiring 15L NRB to maintain O2 saturation above 90%.        History of Presenting Illness  Antonia Stover is a 86 y.o. 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  Patient was at [facility] rehab after admission from March 20 to March 23 for pneumonia and COPD exacerbation for which she was treated with prednisone antibiotics and breathing treatments.  She has been participating in therapy and was noted on the morning of admission to become more hypoxic requiring Ventimask to maintain oxygen saturation.  She did have residual cough.  Labs at rehab showed leukocytosis, increasing procalcitonin, chest x-ray obtained on 3/28 showed small bilateral pleural effusions and moderate left pleural effusion increases prior, underlying atelectasis or consolidation noted, pulmonary was consulted for thoracentesis which was performed 500 cc cloudy fluid drained.  Patient initially improved in regards to oxygen saturation following thoracentesis unfortunately as the day progressed became more hypoxic requiring patient to be sent to [facility] for further evaluation.  No complaints of fevers or chills, no chest pain, no significantly increased cough, no nausea vomiting abdominal pain diarrhea or dysuria.    In the ED she is afebrile, normal pulse, she was tachypneic into the 20s, normal blood pressure, hypoxic into the 70s improved to the 90s with 15 L nonrebreather.  Chest x-ray shows bilateral elongated emphysematous lungs with bilateral pleural effusions, the first of which showed a small apical pneumothorax which is not observed on following  chest x-ray 2 hours later.  Lab work shows leukocytosis, sodium 130 chloride 88 normal renal function, normal liver function, lactic acid 1.5, procalcitonin 0.49, BNP 1035.  Initially ICU was consulted for possible admission however given DNR/I status and lack of critical care needs it was felt she would be appropriate for intermediate care unit given her high oxygen requirements (and at the time a small apical pneumothorax).    She was treated with ceftriaxone, azithromycin, DuoNeb nebulizers.  She had significant improvement in her respiratory status and able to speak in full sentences denying any respiratory distress endorsing baseline respiratory function.  Subsequently referred to hospitalist for admission to [facility].    I discussed the plan of care with patient, bedside RN and pharmacy.    Review of Systems  Review of Systems   Constitutional: Negative for chills and fever.   HENT: Negative for sinus pain and sore throat.    Eyes: Negative for pain and discharge.   Respiratory: Positive for cough and shortness of breath. Negative for sputum production.    Cardiovascular: Negative for chest pain and palpitations.   Gastrointestinal: Negative for abdominal pain, diarrhea, nausea and vomiting.   Genitourinary: Negative for dysuria and urgency.   Musculoskeletal: Negative for falls and myalgias.   Neurological: Negative for sensory change, speech change and focal weakness.   Psychiatric/Behavioral: Negative for substance abuse. The patient is not nervous/anxious.        Past Medical History   has a past medical history of Anemia, Arthritis, Carotid artery plaque (08/2018), COPD, Dizziness ( ), Dyslipidemia ( ), Fatigue (7/3/2014), Hypothyroidism, Insomnia ( ), Nocturnal hypoxia ( ), PSVT (paroxysmal supraventricular tachycardia) ([facility]) (02/2013), Sickle cell disease ([facility]), and Tobacco use.    Surgical History   has a past surgical history that includes hysterectomy, total abdominal; knee replacement, total (2005);  cataract extraction with iol; cataract extraction with iol; appendectomy; and abdominal hysterectomy total.     Family History  family history includes Heart Attack (age of onset: 70) in her brother.        Social History   reports that she has been smoking cigars. She has a 13.75 pack-year smoking history. She has never used smokeless tobacco. She reports that she does not drink alcohol and does not use drugs.    Allergies  Allergies   Allergen Reactions   • Hydrocodone Hives   • Iodine Anaphylaxis     RXN=>20 years ago  Heavy metal dyes and preservatives   • Nsaids Hives and Shortness of Breath   • Penicillins Anaphylaxis     RXN=>20 years ago. Tolerates cephalosporins    • Asa [Aspirin] Hives     Hives   • Codeine      Pt reports that she falls to the ground, she passes out   • Erythromycin      Pt reports that she falls to the ground, she passes out  Tolerates azithro   • Morphine      Pt reports that she falls to the ground, she passes out   • Sulfa Drugs      Pt reports that she falls to the ground, she passes out       Medications  Prior to Admission Medications   Prescriptions Last Dose Informant Patient Reported? Taking?   Probiotic Product (ALIGN PO) UNKN at UNKN Historical Yes No   Sig: Take 1 Capsule by mouth every morning.   Saccharomyces boulardii (PROBIOTIC) 250 MG Cap UNKN at UNKN Historical No No   Sig: Take 1 Capsule by mouth 2 times a day with meals.   acetaminophen (TYLENOL) 325 MG Tab UNKN at UNKN Historical No No   Sig: Take 2 Tablets by mouth every 6 hours as needed.   clopidogrel (PLAVIX) 75 MG Tab UNKN at UNKN Historical No No   Sig: Take 1 Tablet by mouth every day. Please call [phone] to schedule follow up for further refills   diltiazem (TIAZAC) 180 MG SR capsule UNKN at UNKN Historical No No   Sig: Take 1 Capsule by mouth every day. Please call [phone] to schedule follow up for further refills   heparin 5000 UNIT/ML Solution UNKN at UNKN Historical No No   Sig: Inject 1 mL  under the skin every 8 hours.   ipratropium-albuterol (DUONEB) 0.5-2.5 (3) MG/3ML nebulizer solution UNKN at UNKN Historical No No   Sig: Take 3 mL by nebulization every four hours as needed for Shortness of Breath.   levothyroxine (SYNTHROID) 75 MCG Tab UNKN at UNKN Historical No No   Sig: TAKE 1 TABLET BY MOUTH EVERY MORNING ON AN EMPTY STOMACH   polyethylene glycol/lytes (MIRALAX) 17 g Pack UNKN at UNKN Historical No No   Sig: Take 1 Packet by mouth 1 time a day as needed (if sennosides and docusate ineffective after 24 hours).   pravastatin (PRAVACHOL) 40 MG tablet UNKN at UNKN Historical No No   Sig: TAKE 1 TABLET BY MOUTH EVERY EVENING   senna-docusate (PERICOLACE OR SENOKOT S) 8.6-50 MG Tab UNKN at UNKN Historical No No   Sig: Take 2 Tablets by mouth 2 times a day.      Facility-Administered Medications: None       Physical Exam  Temp:  [36 °C (96.8 °F)-37.4 °C (99.4 °F)] 36 °C (96.8 °F)  Pulse:  [64-86] 68  Resp:  [16-24] 16  BP: (103-132)/(55-84) 124/59  SpO2:  [75 %-99 %] 97 %  Blood Pressure : 124/59   Temperature: 36 °C (96.8 °F)   Pulse: 68   Respiration: 16   Pulse Oximetry: 97 %       Physical Exam  Vitals and nursing note reviewed.   Constitutional:       General: She is not in acute distress.     Appearance: She is not toxic-appearing.      Comments: Frail/cachectic appearing 86-year-old female, sleeping but awakens easily and then alert and conversant, able to speak full sentences, nontoxic-appearing, no acute distress   HENT:      Head: Normocephalic and atraumatic.      Nose: Nose normal. No rhinorrhea.      Mouth/Throat:      Mouth: Mucous membranes are dry.      Pharynx: Oropharynx is clear.   Eyes:      General: No scleral icterus.     Extraocular Movements: Extraocular movements intact.      Conjunctiva/sclera: Conjunctivae normal.      Pupils: Pupils are equal, round, and reactive to light.   Cardiovascular:      Rate and Rhythm: Normal rate and regular rhythm.      Pulses: Normal pulses.       Heart sounds: Murmur (soft systolic) heard.   Pulmonary:      Breath sounds: Wheezing present. No rhonchi or rales.      Comments: Diminished breath sounds throughout, no obvious rhonchi or rales, occasional expiratory wheeze, low work of breathing  Abdominal:      General: Bowel sounds are normal.      Palpations: Abdomen is soft.      Tenderness: There is no abdominal tenderness. There is no guarding or rebound.   Musculoskeletal:         General: Normal range of motion.      Cervical back: Normal range of motion and neck supple. No rigidity or tenderness.      Right lower leg: No edema.      Left lower leg: No edema.   Skin:     General: Skin is warm and dry.      Capillary Refill: Capillary refill takes less than 2 seconds.      Findings: Lesion (small distal RLE lesion, doesn't appear cellulitic ) present. No rash.   Neurological:      General: No focal deficit present.      Mental Status: She is alert. Mental status is at baseline. She is disoriented.      Cranial Nerves: No cranial nerve deficit.      Sensory: No sensory deficit.      Motor: No weakness.      Coordination: Coordination normal.   Psychiatric:         Mood and Affect: Mood normal.         Behavior: Behavior normal.         Thought Content: Thought content normal.         Judgment: Judgment normal.         Laboratory:  Recent Labs     03/28/22  0549 03/28/22 2154   WBC 21.9* 18.1*   RBC 4.68 4.00*   HEMOGLOBIN 14.4 12.3   HEMATOCRIT 43.7 37.0   MCV 93.4 92.5   MCH 30.8 30.8   MCHC 33.0* 33.2*   RDW 50.5* 49.4   PLATELETCT 299 272   MPV 11.4 11.3     Recent Labs     03/28/22  0549 03/28/22 2154   SODIUM 130* 130*   POTASSIUM 4.8 4.9   CHLORIDE 88* 88*   CO2 27 25   GLUCOSE 66 107*   BUN 17 19   CREATININE 0.70 0.81   CALCIUM 9.6 9.2     Recent Labs     03/28/22  0549 03/28/22 2154   ALTSGPT  --  15   ASTSGOT  --  39   ALKPHOSPHAT  --  107*   TBILIRUBIN  --  0.4   GLUCOSE 66 107*         Recent Labs     03/28/22 2154   NTPROBNP 1035*          Recent Labs     03/28/22  2154   TROPONINT 25*       Imaging:  DX-CHEST-PORTABLE (1 VIEW)   Final Result         1.  Pulmonary edema and/or infiltrates, similar to prior study.   2.  Layering bilateral pleural effusions, similar to prior study.   3.  Cardiomegaly   4.  Atherosclerosis      DX-CHEST-PORTABLE (1 VIEW)   Final Result         1.  Pulmonary edema and/or infiltrates, similar to prior study.   2.  Layering bilateral pleural effusions, similar to prior study.   3.  Previously visualized left apical pneumothorax is not well appreciated on the current study.   4.  Cardiomegaly   5.  Atherosclerosis      EC-ECHOCARDIOGRAM COMPLETE W/O CONT    (Results Pending)   CT-CHEST (THORAX) W/O    (Results Pending)       X-Ray:  I have personally reviewed the images and compared with prior images. and My impression is: Pulmonary edema and/or infiltrates, layering bilateral pleural effusions, previously visualized left apical pneumothorax not appreciated  EKG: I have personally reviewed the images in bed with prior, my impression is sinus rhythm, prolonged QTC, no significant ST segment elevation or depressions    Assessment/Plan:  I anticipate this patient will require at least two midnights for appropriate medical management, necessitating inpatient admission.    * COPD exacerbation, FTT/severe malnutrition (HCC)- (present on admission)  Assessment & Plan  Maintain sat 88-92% for v/q matching  On 2 L chronically, was requiring 10 to 15 L nonrebreather in the ED, subsequently weaned down to 8 L oxygen mask in the IMCU, wean down as able.  RT consult, continuous pulse ox  Steroids: Solu-Medrol 40 mg IV daily  Nebs/rx: Scheduled and as needed  Elevated procalcitonin with recent hospitalization, will cover with cefepime and Zyvox for now.  MRSA nares        Pleural effusion- (present on admission)  Assessment & Plan  Bilateral pleural effusions, underwent left thoracentesis at [facility] on morning of presentation, 500 cc cloudy  fluid removed. Patient was quite anxious throughout the procedure per documentation.  Obtain CT chest.       Severe protein-calorie malnutrition (HCC)- (present on admission)  Assessment & Plan  BMI 13.1 on admission, supplements ordered, nutrition consult.  PT OT      Acute on chronic respiratory failure with hypoxia (HCC)- (present on admission)  Assessment & Plan  Multifactorial: COPD on 2L NC at baseline, possible pna, pulmonary htn, pulmonary edema vs infiltrates on CXR (elevated BNP + Procalcitonin), layering b/l pleural effusions.  Receiving steroids and breathing treatments for COPD exacerbation  Receiving antibiotics for possible healthcare associated pneumonia  TTE to evaluate cardiac function, prior TTE 2013 shows normal systolic function grade 1 diastolic dysfunction RVSP 40-45  Ordered for 1 dose of Lasix, assess urine output/response and determine continue dosing.    Failure to thrive in adult- (present on admission)  Assessment & Plan  BMI 13.1 on admission, supplements ordered, nutrition consult.  PT OT    Essential hypertension- (present on admission)  Assessment & Plan  On diltiazem as outpatient, continue    Hypothyroidism- (present on admission)  Assessment & Plan  Continue Synthroid    Dyslipidemia- (present on admission)  Assessment & Plan  Continue statin    PSVT (paroxysmal supraventricular tachycardia) (HCC)- (present on admission)  Assessment & Plan  Sinus rhythm with a rate of 69 admission EKG.  continue home diltiazem.      VTE prophylaxis: heparin ppx

## 2022-03-29 NOTE — ED NOTES
"Med rec completed historically per 03/20/2022    \"Med rec completed per patient and spouse at bedside.  Allergies reviewed. Patient unsure of specific reactions to most allergies listed.  No outpatient antibiotics in the last 30 days.  [facility]'s [facility]: [facility].\"  "

## 2022-03-29 NOTE — ED NOTES
Report from FELECIA Balbuena. Assumed care of pt. Pt resting in [facility]. On monitor. Pt denies pain or needs at the moment. Call light within reach. NAD noted.

## 2022-03-29 NOTE — THERAPY
Physical Therapy   Initial Evaluation     Patient Name: Antonia Stover  Age:  86 y.o., Sex:  female  Medical Record #: [id number]  Today's Date: 3/29/2022     Precautions: Fall Risk    Assessment  Patient is an 86 y.o. female admitted from [facility] with incr SOB. Pt had been admitted for COPD exacerbation earlier this month. Pt seen for PT evaluation this time. Presents with incr anxiousness regarding mobility and required mod A to pivot to chair. O2 sats > 90 on 7L oxymask. Pt will benefit from continued PT to progress mobility. Recommend placement vs possible progression to home at  level with spouse and HH with incr CG assist.     Plan  Recommend Physical Therapy 3 times per week until therapy goals are met for the following treatments:  Bed Mobility, Gait Training, Neuro Re-Education / Balance, Self Care/Home Evaluation, Therapeutic Activities, and Therapeutic Exercises  DC Equipment Recommendations: Unable to determine at this time  Discharge Recommendations: Recommend post-acute placement for additional physical therapy services prior to discharge home (vs incr CG assistance and home with spouse and HHPT)        03/29/22 0959   Prior Living Situation   Prior Services Intermittent Physical Support for ADL Per Family   Housing / Facility Mobile Home   Steps Into Home ramp   Steps In Home 0   Bathroom Set up Bathtub / Shower Combination   Equipment Owned Front-Wheel Walker;Ramp;Oxygen   Lives with -  Spouse   Comments spouse present and supportive. pt was admitted easlier this month with COPD exacerbation, now presents from [facility]. prior to last admit, spouse stated pt was requiring incr assist from him for transfers, had been previously indep. pt was working with HHPT and ambulating with therapist, had just begun ambulating with spouse. spouse reports he thinks rehab was going well.   Prior Level of Functional Mobility   Comments prior to last admit, pt was indep however had needed incr assist  recently leading up to admit. had not been able to ambulate at rehab.   Cognition    Speech/ Communication Hard of Hearing   Level of Consciousness Alert   Comments pleasant, cooperative. easily anxious and requires reassurance and cues for deep breathing.   Balance Assessment   Sitting Balance (Static) Fair   Sitting Balance (Dynamic) Fair -   Standing Balance (Static) Poor   Standing Balance (Dynamic) Poor -   Weight Shift Sitting Fair   Weight Shift Standing Poor   Comments standing with HHA   Gait Analysis   Gait Level Of Assist Unable to Participate   Weight Bearing Status no restrictions   Comments anxious and with decr foot clearance for pivoting to chair   Bed Mobility    Supine to Sit Moderate Assist   Scooting Moderate Assist   Functional Mobility   Sit to Stand Moderate Assist   Bed, Chair, WC Transfer Maximal Assist   Transfer Method Stand Pivot   Comments posterior lean, anxious   Short Term Goals    Short Term Goal # 1 pt will perform supine <> sit with SPV in 6 visits for improved independence   Short Term Goal # 2 pt will perform STS with SPV in 6 visits for improved independence   Short Term Goal # 3 pt will perform SPT with FWW and SPV in 6 visits for improved independence   Short Term Goal # 4 pt will ambulate 50ft with FWW and SPV in 6 visits for improved independence

## 2022-03-29 NOTE — PROGRESS NOTES
Thoracentesis completed by MD with assist from charge FELECIA Dickerosn. VSS and tolerated procedure. Denies pain at this time. CXR stat completed. O2 sat at 90% on 8L. Resp unlabored.

## 2022-03-29 NOTE — ED TRIAGE NOTES
"Chief Complaint   Patient presents with   • Shortness of Breath     Pt requiring 15L NRB to maintain O2 saturation above 90%.      Pt BIB [facility] from [facility]. Per EMS, pt had a thoracentesis today. After, rehab was unable to get patient's saturation above 90%, so EMS was called. Pt currently on 15L NRB. Pt denies chest pain.     Pulse 70   Temp 37.4 °C (99.4 °F) (Temporal)   Resp (!) 22   Ht 1.575 m (5' 2\")   Wt 35.2 kg (77 lb 11.2 oz)   SpO2 96%   BMI 14.21 kg/m²     "

## 2022-03-29 NOTE — PROGRESS NOTES
Pulmonary Progress Note    Date of admission  3/28/2022    Chief Complaint  86 y.o. female admitted 3/28/2022 with SOB and acute on chronic hypoxic respiratory failure    Hospital Course  This is a 86 y.o. female who was admited on 3/23/2022 to the hospital Rehab with a hx of COPD on 3 lt NC at home, has a hx of hypothyroidism, CKD, current tobacco use and admitted to rehab hospital after discharge from the hospital due to a CAP and subsequent COPD exacerbation.  Per notes, she has completed the antibiotics treatment but has noticed an increase in oxygen requirements for what CXR were ordered and found a worsening LEFT moderate size pleural effusion. Current requirements are about 10 lt and sats have been aroud 88%.   Consult came for consideration of a thoracentesis for diagnostic and therapeutic purposes. Noted code status, age and comorbidities.  A thoracentesis was performed on 3/28 with 500 cc of cloudy yellow fluid that was sent to the lab for cultures and chemistry.  Patient tolerated well the procedure a oxygenation was improved to 98%, unfortunately the CXR showed a small apical pneumothorax that was asymptomatic. A follow up CXR was ordered 4 hrs later but it seems patient desaturated sooner than that and required to be evaluated at the ED overnight.   She ended up admitted to the hospital.   CT chest was ordered and reported as below.    Interval Problem Update  Reviewed last 24 hour events:  Admitted to the hospital with increase O2 requirements, small apical iatrogenic pneumothorax resolved.  Bilateral pleural effusions and new liver lesions with suspected metastasis    Review of Systems  Review of Systems   Constitutional: Positive for weight loss. Negative for chills and fever.   HENT: Negative for congestion, nosebleeds and sore throat.    Eyes: Negative for discharge and redness.   Respiratory: Positive for cough and shortness of breath. Negative for wheezing.    Cardiovascular: Negative for chest  pain, palpitations and leg swelling.   Gastrointestinal: Negative for nausea and vomiting.   Genitourinary: Negative for dysuria.   Musculoskeletal: Negative for myalgias.   Skin: Negative for rash.   Neurological: Negative for dizziness and headaches.   Endo/Heme/Allergies: Does not bruise/bleed easily.   Psychiatric/Behavioral: Negative for memory loss.   All other systems reviewed and are negative.       Vital Signs for last 24 hours   Temp:  [36 °C (96.8 °F)-37.4 °C (99.4 °F)] 36.8 °C (98.2 °F)  Pulse:  [] 82  Resp:  [11-25] 18  BP: (106-141)/(55-84) 113/59  SpO2:  [83 %-99 %] 94 %    Hemodynamic parameters for last 24 hours       Respiratory Information for the last 24 hours       Physical Exam   Physical Exam  Vitals and nursing note reviewed.   Constitutional:       General: She is not in acute distress.     Appearance: She is not toxic-appearing.      Comments: caquectic and very frail appearance.   HENT:      Head: Normocephalic and atraumatic.      Comments: Bitemporal wasting     Nose: Nose normal.      Mouth/Throat:      Mouth: Mucous membranes are moist.   Eyes:      Extraocular Movements: Extraocular movements intact.   Cardiovascular:      Rate and Rhythm: Normal rate and regular rhythm.      Pulses: Normal pulses.      Heart sounds: Normal heart sounds. No murmur heard.    No gallop.   Pulmonary:      Effort: Pulmonary effort is normal. No respiratory distress.      Breath sounds: No stridor. No wheezing or rales.      Comments: Decrease vesicular sounds at both bases and R no sounds until mid fields.  Abdominal:      General: Bowel sounds are normal.   Musculoskeletal:         General: No swelling. Normal range of motion.      Cervical back: Normal range of motion. No rigidity.      Right lower leg: No edema.      Left lower leg: No edema.   Skin:     General: Skin is warm.      Capillary Refill: Capillary refill takes less than 2 seconds.      Findings: No rash.   Neurological:      General:  No focal deficit present.      Mental Status: She is alert and oriented to person, place, and time.   Psychiatric:         Mood and Affect: Mood normal.         Medications  Current Facility-Administered Medications   Medication Dose Route Frequency Provider Last Rate Last Admin   • clopidogrel (PLAVIX) tablet 75 mg  75 mg Oral DAILY Taurus Arambula M.D.   75 mg at 03/29/22 0633   • DILTIAZem CD (CARDIZEM CD) capsule 180 mg  180 mg Oral Q DAY Taurus Arambula M.D.   180 mg at 03/29/22 0633   • levothyroxine (SYNTHROID) tablet 75 mcg  75 mcg Oral AM ES Taurus Arambula M.D.   75 mcg at 03/29/22 0634   • pravastatin (PRAVACHOL) tablet 40 mg  40 mg Oral Q EVENING Taurus Arambula M.D.   40 mg at 03/29/22 1745   • budesonide-formoterol (SYMBICORT) 160-4.5 MCG/ACT inhaler 2 Puff  2 Puff Inhalation BID (RT) Taurus Arambula M.D.   2 Puff at 03/29/22 2003   • Respiratory Therapy Consult   Nebulization Continuous RT Taurus Arambula M.D.       • ipratropium-albuterol (DUONEB) nebulizer solution  3 mL Nebulization Q4HRS (RT) Taurus Arambula M.D.   3 mL at 03/29/22 2003   • ipratropium-albuterol (DUONEB) nebulizer solution  3 mL Nebulization Q2HRS PRN (RT) Taurus Arambula M.D.       • senna-docusate (PERICOLACE or SENOKOT S) 8.6-50 MG per tablet 2 Tablet  2 Tablet Oral BID Taurus Arambula M.D.   2 Tablet at 03/29/22 1744    And   • polyethylene glycol/lytes (MIRALAX) PACKET 1 Packet  1 Packet Oral QDAY PRN Taurus Arambula M.D.        And   • magnesium hydroxide (MILK OF MAGNESIA) suspension 30 mL  30 mL Oral QDAY PRN Taurus Arambula M.D.        And   • bisacodyl (DULCOLAX) suppository 10 mg  10 mg Rectal QDAY PRN Taurus Arambula M.D.       • heparin injection 5,000 Units  5,000 Units Subcutaneous Q8HRS Taurus Arambula M.D.   5,000 Units at 03/29/22 7135   • acetaminophen (Tylenol) tablet 650 mg  650 mg Oral Q6HRS PRN Taurus Arambula M.D.       • labetalol (NORMODYNE/TRANDATE) injection 10 mg  10 mg  Intravenous Q4HRS PRN Taurus Arambula M.D.       • ondansetron (ZOFRAN) syringe/vial injection 4 mg  4 mg Intravenous Q4HRS PRN Taurus Arambula M.D.       • ondansetron (ZOFRAN ODT) dispertab 4 mg  4 mg Oral Q4HRS PRN Taurus Arambula M.D.       • methylPREDNISolone (SOLU-MEDROL) 40 MG injection 40 mg  40 mg Intravenous DAILY Taurus Arambula M.D.   40 mg at 03/29/22 0633   • cefepime (Maxipime) 1 g in dextrose 5% 10.5 mL IV syringe  1 g Intravenous Q24HRS Taurus Arambula M.D.   1 g at 03/29/22 0633       Fluids    Intake/Output Summary (Last 24 hours) at 3/29/2022 2015  Last data filed at 3/29/2022 1700  Gross per 24 hour   Intake 420 ml   Output 800 ml   Net -380 ml       Laboratory          Recent Labs     03/28/22  0549 03/28/22  2154   SODIUM 130* 130*   POTASSIUM 4.8 4.9   CHLORIDE 88* 88*   CO2 27 25   BUN 17 19   CREATININE 0.70 0.81   MAGNESIUM 1.9 1.8   PHOSPHORUS 3.5 3.9   CALCIUM 9.6 9.2     Recent Labs     03/28/22  0549 03/28/22  2154   ALTSGPT  --  15   ASTSGOT  --  39   ALKPHOSPHAT  --  107*   TBILIRUBIN  --  0.4   GLUCOSE 66 107*     Recent Labs     03/28/22  0549 03/28/22  2154   WBC 21.9* 18.1*   NEUTSPOLYS 87.40* 86.90*   LYMPHOCYTES 6.00* 6.00*   MONOCYTES 5.80 6.20   EOSINOPHILS 0.10 0.10   BASOPHILS 0.10 0.20   ASTSGOT  --  39   ALTSGPT  --  15   ALKPHOSPHAT  --  107*   TBILIRUBIN  --  0.4     Recent Labs     03/28/22  0549 03/28/22 2154   RBC 4.68 4.00*   HEMOGLOBIN 14.4 12.3   HEMATOCRIT 43.7 37.0   PLATELETCT 299 272       Imaging  CT:    Reviewed    Assessment/Plan  Acute on chronic respiratory failure with hypoxia (HCC)- (present on admission)  Assessment & Plan  -Patient with underlying COPD who also presented with a CAP prior admission and subsequent pleural effusions that at this point seems to be bilateral  -At rehab, she was found to increase o2 requirement and a thoracentesis took place which temporarily increase the saturations but same day overnight, she decompensated  requiring more oxygen and evaluation at ED with subsequent admission.  -Thoracentesis chemistry met criteria for exudative by lights criteria both in LDH and protein, also albumin ratio serum 3.9- fluid 2.5 = 1.4, correlates also with exudative effusion. Differential diagnosis at the top are infectious vs malignancy.  -The cytology of the pleural fluid found atypical cells but final results are not up yet.   -I agree with continuation of antibiotics which seems is improving clinical picture but I am concern about underlying undiagnosed malignancy, specially when reviewing new CT chest found multiple liver lesions suspected for metastasis. Also noted moderate pleural effusion in left, pulmonary nodule and LAD.  -I started the conversation with patient and her  about this, the best would be to wait for final results of cultures to tailor antibiotic therapy and wait for final cytology/path report, if malignant cells are present would suggest to have a discussion with paliative care given how frail patient is.   -We will continue to follow.  -continue titration of oxygen to keep sats 88-92%, getting close to home dose  -continue with duonebs q 4 hr prn       VTE:  Heparin  Ulcer: PPI  Lines: None    I have performed a physical exam and reviewed and updated ROS and Plan today (3/29/2022). In review of yesterday's note (3/28/2022), there are no changes except as documented above.     Discussed patient condition and risk of morbidity and/or mortality with Hospitalist, RN and Patient  The patient remains critically ill.  Care time = 55 minutes in directly providing and coordinating critical care and extensive data review.  No time overlap and excludes procedures.

## 2022-03-29 NOTE — ED NOTES
Seun Trejo patients spouse was called and updated on ED arrival. He would like a call if anything changes or he will call in AM for update.

## 2022-03-29 NOTE — WOUND TEAM
[facility]  Inpatient Services  Initial Wound and Skin Care Evaluation    Admission Date: 3/28/2022     No order of IP CONSULT TO WOUND CARE is found.     HPI, PMH, SH: Reviewed    Past Surgical History:   Procedure Laterality Date   • KNEE REPLACEMENT, TOTAL  2005    Right   • ABDOMINAL HYSTERECTOMY TOTAL     • APPENDECTOMY     • CATARACT EXTRACTION WITH IOL      Bilateral   • CATARACT EXTRACTION WITH IOL      Bilateral   • HYSTERECTOMY, TOTAL ABDOMINAL       Social History     Tobacco Use   • Smoking status: Current Every Day Smoker     Packs/day: 0.25     Years: 55.00     Pack years: 13.75     Types: Cigars   • Smokeless tobacco: Never Used   • Tobacco comment: does not inhale, small filtered cigars, 4 cigs/ day    Substance Use Topics   • Alcohol use: No     Chief Complaint   Patient presents with   • Shortness of Breath     Pt requiring 15L NRB to maintain O2 saturation above 90%.      Diagnosis: COPD exacerbation (HCC) [J44.1]    Unit where seen by Wound Team: T634/00     WOUND CONSULT/FOLLOW UP RELATED TO:  Sacrococcygeal area     WOUND HISTORY:  Pt is an elderly woman with a complicated hospital course. Pt was initially at [facility] on 3/20/22 related to COPD exacerbation and pnuemonia. Pt was subsequently transferred to [facility] on 3/23/22. On 3/28/22 a sDTI was noted on pts sacrum and wound team was consulted. Pt unfortunately was transferred back to [facility] on 3/29/22 related to SOB.     WOUND ASSESSMENT/LDA  Wound Pressure Injury Sacrum Mid (Active)   Wound Image   03/29/22 0700   Site Assessment Purple;Clean;Dry;Intact    Periwound Assessment Dry;Clean;Intact    Margins Attached edges;Defined edges    Closure Adhesive bandage    Drainage Amount None    Treatments Cleansed;Site care;Offloading    Wound Cleansing Foam Cleanser/Washcloth    Periwound Protectant Barrier Paste    Dressing Cleansing/Solutions Not Applicable    Dressing Options Mepilex    Dressing Changed New     Dressing Status Clean;Dry;Intact    Dressing Change/Treatment Frequency Every 72 hrs, and As Needed    NEXT Dressing Change/Treatment Date 04/01/22    NEXT Weekly Photo (Inpatient Only) 04/05/22    Pressure Injury Stage DTPI    Wound Length (cm) 6.1 cm    Wound Width (cm) 3 cm    Wound Surface Area (cm^2) 18.3 cm^2    Shape Irregularly round    Wound Odor None    Exposed Structures MELISSA    Number of days:       Vascular:    PEGGY:   No results found.    Lab Values:    Lab Results   Component Value Date/Time    WBC 18.1 (H) 03/28/2022 09:54 PM    RBC 4.00 (L) 03/28/2022 09:54 PM    HEMOGLOBIN 12.3 03/28/2022 09:54 PM    HEMATOCRIT 37.0 03/28/2022 09:54 PM    CREACTPROT 0.11 07/19/2018 12:14 PM    SEDRATEWES 15 07/19/2018 12:14 PM      Culture Results show:  No results found for this or any previous visit (from the past 720 hour(s)).    Pain Level/Medicated:  Pain with turning       INTERVENTIONS BY WOUND TEAM:  Chart and images reviewed. Discussed with bedside RN. All areas of concern (based on picture review, LDA review and discussion with bedside RN) have been thoroughly assessed. Documentation of areas based on significant findings. This RN in to assess patient. Performed standard wound care which includes appropriate positioning, dressing removal and non-selective debridement. Pictures and measurements obtained weekly if/when required.  Preparation for Dressing removal: NA  Non-selectively Debrided with: NA  Sharp debridement: NA  [place]urka wound: NA, Prepped with NA  Primary Dressing: Mepilex  Secondary (Outer) Dressing: offloading with low airloss and turns    Interdisciplinary consultation: Patient, Bedside RN,     EVALUATION / RATIONALE FOR TREATMENT:  Most Recent Date:  3/29/22: Pt with POA sDTI likely related to decreased mobility and refusing to turn. Pt is however up frequently to bedside commode. Pt currently on hospital ICU bed and has pillows in place for turns as well as mepilex.     Goals: Steady decrease  in wound area and depth weekly.    WOUND TEAM PLAN OF CARE ([X] for frequency of wound follow up,):   Nursing to follow orders written for wound care. Contact wound team if area fails to progress, deteriorates or with any questions/concerns  Dressing changes by wound team:                   Follow up 3 times weekly:                NPWT change 3 times weekly:     Follow up 1-2 times weekly:      Follow up Bi-Monthly:                   Follow up as needed:   X  Other (explain):     NURSING PLAN OF CARE ORDERS (X):  Dressing changes: See Dressing Care orders:   Skin care: See Skin Care orders:   RN Prevention Protocol: X  Rectal tube care: See Rectal Tube Care orders:   Other orders:    RSKIN:   CURRENTLY IN PLACE (X), APPLIED THIS VISIT (A), ORDERED (O):   Q shift Rod:  X  Q shift pressure point assessments:  X    Surface/Positioning   Pressure redistribution mattress            Low Airloss        X, ICU Bed  Bariatric foam      Bariatric ANGELIA     Waffle cushion        Waffle Overlay     X     Reposition q 2 hours   X   TAPs Turning system     Z Emmett Pillow     Offloading/Redistribution   Sacral Mepilex (Silicone dressing)   X  Heel Mepilex (Silicone dressing)     X    Heel float boots (Prevalon boot)             Float Heels off Bed with Pillows           Respiratory   Silicone O2 tubing       X  Gray Foam Ear protectors   X  Cannula fixation Device (Tender )          High flow offloading Clip    Elastic head band offloading device      Anchorfast                                                         Trach with Optifoam split foam             Containment/Moisture Prevention Continent    Rectal tube or BMS    Purwick/Condom Cath        Ruiz Catheter    Barrier wipes           Barrier paste       Antifungal tx      Interdry        Mobilization       Up to chair    X    Ambulate    X  PT/OT      Nutrition       Dietician        Diabetes Education      PO   X  TF     TPN     NPO   # days     Other         Anticipated discharge plans:   LTACH:        SNF/Rehab:   X, plans for readmit to rehab               Home Health Care:           Outpatient Wound Center:            Self/Family Care:        Other:                  Vac Discharge Needs:   Not Applicable Pt not on a wound vac:     X  Regular Vac while inpatient, alternative dressing at DC:        Regular Vac in use and continued at DC:            Reg. Vac w/ Skin Sub/Biologic in use. Will need to be changed 2x wkly:      Veraflo Vac while inpatient, ok to transition to Regular Vac on Discharge:           Veraflo Vac while inpatient, will need to remain on Veraflo Vac upon discharge:

## 2022-03-29 NOTE — ED PROVIDER NOTES
ED Provider Note    Scribed for Tamir Landry by Romina Serna. 3/28/2022  9:39 PM    Primary care provider: Hardki Merino M.D.  Means of arrival: EMS  History obtained from: Patient  History limited by: None    CHIEF COMPLAINT  Chief Complaint   Patient presents with   • Shortness of Breath     Pt requiring 15L NRB to maintain O2 saturation above 90%.      HPI  Antonia Stover is a 86 y.o. female who presents to the Emergency Department via EMS for evaluation of hypoxia onset today. Per EMS, the patient is currently at [facility] for a history of pleural effusion. [facility] because they were unable to get the patient's saturation up; she is 90% on 15 L of oxygen. She denies chest pain, shortness of breath, or vomiting. No alleviating factors were reported. The patient had a thoracentesis today.     Quality:Aching  Duration: Today  Severity: Mild  Associated sx: Shortness of breath    REVIEW OF SYSTEMS  As above, all other systems reviewed and are negative.   Pertinent positives include hypoxia. Pertinent negatives include no chest pain, shortness of breath, or vomiting.  See HPI for further details.     PAST MEDICAL HISTORY   has a past medical history of Anemia, Arthritis, Carotid artery plaque (08/2018), COPD, Dizziness ( ), Dyslipidemia ( ), Fatigue (7/3/2014), Hypothyroidism, Insomnia ( ), Nocturnal hypoxia ( ), PSVT (paroxysmal supraventricular tachycardia) (HCC) (02/2013), Sickle cell disease (HCC), and Tobacco use.  SURGICAL HISTORY   has a past surgical history that includes hysterectomy, total abdominal; knee replacement, total (2005); cataract extraction with iol; cataract extraction with iol; appendectomy; and abdominal hysterectomy total.  SOCIAL HISTORY  Social History     Tobacco Use   • Smoking status: Current Every Day Smoker     Packs/day: 0.25     Years: 55.00     Pack years: 13.75     Types: Cigars   • Smokeless tobacco: Never Used   • Tobacco comment: does not  inhale, small filtered cigars, 4 cigs/ day    Vaping Use   • Vaping Use: Never used   Substance Use Topics   • Alcohol use: No   • Drug use: No      Social History     Substance and Sexual Activity   Drug Use No     FAMILY HISTORY  Family History   Problem Relation Age of Onset   • Heart Attack Brother 70     CURRENT MEDICATIONS    Current Outpatient Medications:   •  acetaminophen (TYLENOL) 325 MG Tab, Take 2 Tablets by mouth every 6 hours as needed., Disp: 30 Tablet, Rfl: 0  •  Saccharomyces boulardii (PROBIOTIC) 250 MG Cap, Take 1 Capsule by mouth 2 times a day with meals., Disp: 14 Capsule, Rfl: 0  •  heparin 5000 UNIT/ML Solution, Inject 1 mL under the skin every 8 hours., Disp: , Rfl: 0  •  ipratropium-albuterol (DUONEB) 0.5-2.5 (3) MG/3ML nebulizer solution, Take 3 mL by nebulization every four hours as needed for Shortness of Breath., Disp: , Rfl:   •  senna-docusate (PERICOLACE OR SENOKOT S) 8.6-50 MG Tab, Take 2 Tablets by mouth 2 times a day., Disp: 30 Tablet, Rfl: 0  •  polyethylene glycol/lytes (MIRALAX) 17 g Pack, Take 1 Packet by mouth 1 time a day as needed (if sennosides and docusate ineffective after 24 hours)., Disp: , Rfl: 3  •  clopidogrel (PLAVIX) 75 MG Tab, Take 1 Tablet by mouth every day. Please call [phone] to schedule follow up for further refills, Disp: 30 Tablet, Rfl: 1  •  diltiazem (TIAZAC) 180 MG SR capsule, Take 1 Capsule by mouth every day. Please call [phone] to schedule follow up for further refills, Disp: 30 Capsule, Rfl: 1  •  pravastatin (PRAVACHOL) 40 MG tablet, TAKE 1 TABLET BY MOUTH EVERY EVENING, Disp: 90 tablet, Rfl: 3  •  levothyroxine (SYNTHROID) 75 MCG Tab, TAKE 1 TABLET BY MOUTH EVERY MORNING ON AN EMPTY STOMACH, Disp: 90 tablet, Rfl: 3  •  Probiotic Product (ALIGN PO), Take 1 Capsule by mouth every morning., Disp: , Rfl:       ALLERGIES  Allergies   Allergen Reactions   • Hydrocodone Hives   • Iodine Anaphylaxis     RXN=>20 years ago  Heavy metal dyes and  preservatives   • Nsaids Hives and Shortness of Breath   • Penicillins Anaphylaxis     RXN=>20 years ago. Tolerates cephalosporins    • Asa [Aspirin] Hives     Hives   • Codeine      Pt reports that she falls to the ground, she passes out   • Erythromycin      Pt reports that she falls to the ground, she passes out  Tolerates azithro   • Morphine      Pt reports that she falls to the ground, she passes out   • Sulfa Drugs      Pt reports that she falls to the ground, she passes out       PHYSICAL EXAM    VITAL SIGNS:   Vitals:    03/28/22 2216 03/28/22 2231 03/28/22 2300 03/29/22 0000   BP: 118/63 114/63 110/65 124/84   Pulse: 71 70 74 76   Resp:   18 19   Temp:       TempSrc:       SpO2: 94% 96% 95% 91%   Weight:       Height:           Vitals: My interpretation: normotensive, not tachycardic, afebrile, not hypoxic    Reinterpretation of vitals: Unchanged    Cardiac Monitor Interpretation: The cardiac monitor revealed normal Sinus Rhythm as interpreted by me. The cardiac monitor was ordered secondary to the patient's history of respiratory distress and to monitor for dysrhythmia and/or tachycardia.    PE:   Constitutional: Frail, Cachectic, No acute distress, Non-toxic appearance.   HENT: Normocephalic, Atraumatic, Bilateral external ears normal, Oropharynx is clear mucous membranes are moist. No oral exudates or nasal discharge.   Eyes: Pupils are equal round and reactive, EOMI, Conjunctiva normal, No discharge.   Neck: Normal range of motion, No tenderness, Supple, No stridor. No meningismus.  Lymphatic: No lymphadenopathy noted.   Cardiovascular: Regular rate and rhythm without murmur rub or gallop.  Thorax & Lungs: Hypoxic. Tachypnic. Increased work of breathing. Moderate respiratory distress. No wheezes, rhonchi or rales. There is no chest wall tenderness.   Abdomen: Soft non-tender non-distended. There is no rebound or guarding. No organomegaly is appreciated. Bowel sounds are normal.  Skin: Normal without  rash.   Back: No CVA or spinal tenderness.   Extremities: Intact distal pulses, No edema, No tenderness, No cyanosis, No clubbing. Capillary refill is less than 2 seconds.  Musculoskeletal: Good range of motion in all major joints. No tenderness to palpation or major deformities noted.   Neurologic: Alert & oriented x 3, Normal motor function, Normal sensory function, No focal deficits noted. Reflexes are normal.  Psychiatric: Affect normal, Judgment normal, Mood normal. There is no suicidal ideation or patient reported hallucinations.     DIAGNOSTIC STUDIES / PROCEDURES    LABS     Results for orders placed or performed during the hospital encounter of 03/28/22   CBC WITH DIFFERENTIAL   Result Value Ref Range    WBC 18.1 (H) 4.8 - 10.8 K/uL    RBC 4.00 (L) 4.20 - 5.40 M/uL    Hemoglobin 12.3 12.0 - 16.0 g/dL    Hematocrit 37.0 37.0 - 47.0 %    MCV 92.5 81.4 - 97.8 fL    MCH 30.8 27.0 - 33.0 pg    MCHC 33.2 (L) 33.6 - 35.0 g/dL    RDW 49.4 35.9 - 50.0 fL    Platelet Count 272 164 - 446 K/uL    MPV 11.3 9.0 - 12.9 fL    Neutrophils-Polys 86.90 (H) 44.00 - 72.00 %    Lymphocytes 6.00 (L) 22.00 - 41.00 %    Monocytes 6.20 0.00 - 13.40 %    Eosinophils 0.10 0.00 - 6.90 %    Basophils 0.20 0.00 - 1.80 %    Immature Granulocytes 0.60 0.00 - 0.90 %    Nucleated RBC 0.00 /100 WBC    Neutrophils (Absolute) 15.74 (H) 2.00 - 7.15 K/uL    Lymphs (Absolute) 1.09 1.00 - 4.80 K/uL    Monos (Absolute) 1.12 (H) 0.00 - 0.85 K/uL    Eos (Absolute) 0.01 0.00 - 0.51 K/uL    Baso (Absolute) 0.03 0.00 - 0.12 K/uL    Immature Granulocytes (abs) 0.11 0.00 - 0.11 K/uL    NRBC (Absolute) 0.00 K/uL   COMP METABOLIC PANEL   Result Value Ref Range    Sodium 130 (L) 135 - 145 mmol/L    Potassium 4.9 3.6 - 5.5 mmol/L    Chloride 88 (L) 96 - 112 mmol/L    Co2 25 20 - 33 mmol/L    Anion Gap 17.0 (H) 7.0 - 16.0    Glucose 107 (H) 65 - 99 mg/dL    Bun 19 8 - 22 mg/dL    Creatinine 0.81 0.50 - 1.40 mg/dL    Calcium 9.2 8.5 - 10.5 mg/dL    AST(SGOT) 39  12 - 45 U/L    ALT(SGPT) 15 2 - 50 U/L    Alkaline Phosphatase 107 (H) 30 - 99 U/L    Total Bilirubin 0.4 0.1 - 1.5 mg/dL    Albumin 3.6 3.2 - 4.9 g/dL    Total Protein 6.4 6.0 - 8.2 g/dL    Globulin 2.8 1.9 - 3.5 g/dL    A-G Ratio 1.3 g/dL   TROPONIN   Result Value Ref Range    Troponin T 25 (H) 6 - 19 ng/L   proBrain Natriuretic Peptide, NT   Result Value Ref Range    NT-proBNP 1035 (H) 0 - 125 pg/mL   LACTIC ACID   Result Value Ref Range    Lactic Acid 1.5 0.5 - 2.0 mmol/L   ESTIMATED GFR   Result Value Ref Range    GFR (CKD-EPI) 71 >60 mL/min/1.73 m 2   VENOUS BLOOD GAS   Result Value Ref Range    Venous Bg Ph 7.40 7.31 - 7.45    Venous Bg Pco2 47.2 41.0 - 51.0 mmHg    Venous Bg Po2 35.9 25.0 - 40.0 mmHg    Venous Bg O2 Saturation 61.9 %    Venous Bg Hco3 28 24 - 28 mmol/L    Venous Bg Base Excess 3 mmol/L    Body Temp NA Centigrade   EKG (NOW)   Result Value Ref Range    Report       [facility] Emergency Dept.    Test Date:  2022  Pt Name:    KYRIE NELSON               Department: ER  MRN:        [id number]                      Room:       [facility]  Gender:     Female                       Technician: [id number]  :        [id number]                   Requested By:JANIS CALVERT  Order #:    [id number]                    Reading MD: Janis Calvert    Measurements  Intervals                                Axis  Rate:       69                           P:          94  WI:         133                          QRS:        100  QRSD:       79                           T:          61  QT:         628  QTc:        673    Interpretive Statements  Sinus rhythm  Anterior infarct, old  Prolonged QT interval  Compared to ECG 10/19/2021 03:47:43  Myocardial infarct finding now present  Prolonged QT interval now present  Right-axis deviation no longer present  Electronically Signed On 3- 21:50:58 PDT by Janis Calvert         All labs reviewed by me. Significant for leukocytosis of 18, no  anemia, hyponatremia of otherwise normal electrolytes, renal function normal, liver enzymes normal, bilirubin normal, troponin normal, slightly elevated BNP, lactic acid normal, VBG fairly unremarkable    RADIOLOGY  DX-CHEST-PORTABLE (1 VIEW)   Final Result         1.  Pulmonary edema and/or infiltrates, similar to prior study.   2.  Layering bilateral pleural effusions, similar to prior study.   3.  Previously visualized left apical pneumothorax is not well appreciated on the current study.   4.  Cardiomegaly   5.  Atherosclerosis        The radiologist's interpretation of all radiological studies have been reviewed by me.    COURSE & MEDICAL DECISION MAKING  Nursing notes, VS, PMSFHx, labs, imaging, EKG reviewed in chart.    Heart Score: Moderate    MDM: 9:39 PM Antonia Stover is a 86 y.o. female who presented with acute respiratory distress.  History of recurrent pleural effusions and thoracentesis.  Patient underwent thoracentesis earlier today and I was concerned initially for recurrent pneumothorax which the patient suffered from in the past.  Initial checks x-ray fairly unremarkable.  Bedside ultrasound M-mode shows lung slide bilaterally.  Patient noted to have a new leukocytosis of 18,000 concerning for possible pneumonia in the setting of acute respiratory failure.  She was transitioned to OxiMax 15 L and it did show some improvement, resting oxygen now 80% compared to mid 70s upon arrival.  She is medically stable and afebrile otherwise.  She was admitted to the intermediate intensive care unit for continued monitoring.  Intensivist felt that BiPAP was not appropriate as there is likely a small underlying pneumothorax still present and patient seems to be tolerating OxiMax well at this time and VBG is reassuring.  She is updated on plan for admission and is amenable    CRITICAL CARE TIME 36 minutes: Patient required my for undivided attention for acute respiratory distress, hypoxia despite maximum  oxygen provided, pneumonia with leukocytosis requiring inpatient antibiotics IV  There was a very real possibility of deterioration of the patient's condition.  This patient required the highest level of care.  I provided critical care services which included: review of the medical record, treatment orders, ordering and reviewing test results, frequent reevaluation of the patient's condition and response to treatment, as well as discussing the case with appropriate personnel and various consultants. The critical care time associated with the care of this patient is exclusive of any procedures or specific interventions.    FINAL IMPRESSION  1. Acute respiratory distress Acute   2. Hypoxia Acute   3. Pneumonia due to infectious organism, unspecified laterality, unspecified part of lung Acute   4. Leukocytosis, unspecified type Acute         Romina TOLLIVER (Alexia), am scribing for, and in the presence of, Tamir Landry.    Electronically signed by: Romina Serna (Alexia), 3/28/2022    Tamir TOLLIVER personally performed the services described in this documentation, as scribed by Romina Serna in my presence, and it is both accurate and complete.    The note accurately reflects work and decisions made by me.  Tamir Landry  3/29/2022  12:41 AM

## 2022-03-29 NOTE — DIETARY
"Nutrition services: Day 0 of admit.  Antonia Stover is a 86 y.o. female with admitting DX of COPD exacerbation.   Consult received for FTT and supplements in addition to noted low BMI (13.10).  Pt busy during time of RD visit, noted with severe muscle and fat loss.  Per chart review, pt has been seen by multiple RD(s) since 3/20.  Below information obtained via previous encounters:    1. 3/20: \"Pt states she does not eat much at home. She thinks she eats about 2 meals per day, but unable to clarify. Meal tray at bedside, but pt states she can't eat. She states she does not tolerate Boost or Ensure.\"  Tolerated chobani smoothies.  2. 3/25: \"RD met w/ pt at breakfast in dining room. Pt touched very little of food, none of the magic cup. Usual routine only drinks coffee in the morning. RD will trial Boost [facility] BID w/ meals in place of magic cups. Pt snacks throughout day; agreeable to chicken salad snacks. Enjoys fruit, plain pasta. Declines yogurt, cottage cheese, ice cream.\"  3. 3/28: \"PO intake is poor at <25% of most meals. No recorded PO > 50%. Overall, intake of Boost supplements has been poor also with most either 0 or < 25%. RD met with pt in her room. Homemade milk shake offered. She said that she can't eat ice cream. Smoothies made with yogurt offered. She agreed to this but was not sure that she could tolerate them. When asked what she likes to eat she said snack foods but was vague about which snack foods she likes (\"the usual\"). RD called pt's  and obtained preferences. Per , pt was drinking Chobani Smoothies at acute. This was added to trays TID instead of Boost supplements. Pt's  is going to bring in Cheetos. Per , pt likes Cheetos. Pt currently receiving small portions. This continues to be appropriate. Per , pt disliked larger portions PTA.\"    Assessment:  Height: 157.5 cm (5' 2\")  Weight: 32.5 kg (71 lb 10.4 oz) - via bed scale.   Body mass index is 13.1 " kg/m²., BMI classification: severely underweight.   Diet/Intake: Regular.  <25% thus far.     Evaluation:   4. Pt noted with pleural effusion, acute on chronic respiratory failure, essential HTN, hypothyroidism, and dyslipidemia.  [facility].  No GI distress noted on admit and noted she underwent a thoracentesis 3/28.  5. Wt has been low for some time, noted to have weighed 38.9 kg end of September 2021.  16% severe wt loss over the past 6 months.  Previous UBW per SO was 100 - 110 lbs.  Pt currently 71 lbs.   6. Pt was taking only ~25-50% of PO intake recent stay at [facility].  Intake recently at rehab was 0% and refusing some meals.  7. Per POLST on file, TF not within GOC.  IV fluids OK.  8. Current clinical picture and MD progress notes reviewed.  9. Labs: Sodium: 130, Glucose: 107.   10. Meds: Maxipime, Synthroid, Solu-medrol, Prilosec.  11. LBM: 3/29.    Malnutrition Risk: Agree with ongoing severe malnutrition in the context of chronic disease related malnutrition related to COPD and overall FTT as evidenced by severe wt loss of 16% in 6 months, severe muscle loss to temples, deltoid, clavicles, calves, and severe fat loss to orbitals and upper arm regions.     Recommendations/Plan:  1. Chobani smoothies TID.  Food preferences added to current [facility] inpatient stay. Will not add Boost as pt will not drink them/does not tolerate them per previous documentation.  2. Encourage intake of meals & chobani.  Continue to document % consumed under ADLs.  3. Consider an appetite stimulant like Remeron if within POC.   4. Monitor weight.  5. Nutrition rep will continue to see patient for ongoing meal and snack preferences.     RD follows however limited additional RD interventions unless GOC were to change.

## 2022-03-29 NOTE — DISCHARGE SUMMARY
Admission Date: 3/23/2022    Discharge Date: 3/28/2022    Attending Provider: Maggie Joel MD    Admission Diagnosis:   Active Hospital Problems    Diagnosis    • Pleural effusion    • Hypokalemia    • Vitamin D deficiency    • Pneumonia    • Debility    • Severe protein-calorie malnutrition (HCC)    • Hyponatremia    • Failure to thrive in adult    • Acute on chronic respiratory failure with hypoxia (HCC)    • Hard of hearing    • Essential hypertension    • AMD (age-related macular degeneration), bilateral    • Chronic obstructive pulmonary disease (HCC)    • Tobacco dependence    • Hypothyroidism        Discharge Diagnosis:  Active Hospital Problems    Diagnosis    • Pleural effusion    • Hypokalemia    • Vitamin D deficiency    • Pneumonia    • Debility    • Severe protein-calorie malnutrition (HCC)    • Hyponatremia    • Failure to thrive in adult    • Acute on chronic respiratory failure with hypoxia (HCC)    • Hard of hearing    • Essential hypertension    • AMD (age-related macular degeneration), bilateral    • Chronic obstructive pulmonary disease (HCC)    • Tobacco dependence    • Hypothyroidism        HPI per H&P:  The patient is a 86 y.o. female with a past medical history of COPD on home oxygen 3 L, hypothyroidism, hypertension, chronic kidney disease, current tobacco use; now admitted for acute inpatient rehabilitation with severe functional debility after an acute hospitalization for COPD exacerbation as well as pneumonia.       On admission the patient and medical record report she presented to the hospital on 3/20 with complaints of shortness of breath for the previous 2 days.  Chest x-ray with bibasilar opacities concerning for small pleural effusions, atelectasis, and/or pneumonia.  She had elevated white blood cell count of 19, hyponatremia with a sodium of 130, low chloride 88, elevated alk phos at 144, BNP of 679.  Patient was admitted for pneumonia and a COPD with acute exacerbation.  She  "was treated with steroids, oxygen, and inhalers.  She was seen and evaluated by pulmonology.     In addition patient noted to have severe protein calorie malnutrition, ferritin 5, low BMI of 15, for which she was started on supplements.     Patient currently complains of cough and shortness of breath.  She reports she is on oxygen at home but only uses it intermittently initially noticed likely is not a good idea.  Patient current reports she usually does not like to use inhalers as they are \"chemicals\".  She confirms she quit smoking about 5 weeks ago.  She is willing to try some Symbicort to see if it helps her breathe better.  She reports about a 20 pound weight loss recently.  She currently denies any pain though reports she is very uncomfortable in this bed.  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     Patient was evaluated by Rehab Medicine physician and Physical Therapy and Occupational Therapy and determined to be appropriate for acute inpatient rehab and was transferred to [facility] on 3/23/2022 11:50 AM.    Rehab Hospital Course by Problem List:    Debility  PT/OT, 1.5 hr each discipline, 5 days per week  No cognitive impairment so not being seen by speech therapy  Very poor endurance and strength, unable to tolerate this level of therapy, so do not recommend return to rehab     COPD exacerbation  Status post oral prednisone  Symbicort     Community-acquired pneumonia  Completed Zithromax  Completed cefuroxime     Acute hypoxic respiratory failure  Increased oxygen demands 3/28  X-ray with large left pleural effusion  Consulted pulmonologist for thoracentesis which occurred at bedside without any complications    Patient developed respiratory distress requiring up to 12 L and unable to keep saturations above 88%, and due to concern for pneumothorax she was transferred to the emergency department for evaluation and " treatment     Hypothyroidism  Levothyroxine     Hyponatremia, continues  Continue fluid restrictions  Likely from malnutrition     Hypokalemia  Supplementation     Hyperlipidemia  Statin     Severe protein calorie malnutrition  Failure to thrive  Poor appetite  Dietitian consult  Added supplements  Multivitamin  Started on Remeron 3/25     Hypertension  Diltiazem  Appreciate hospitalist assistance     Carotid artery atherosclerosis  Plavix  Statin     Vitamin D deficiency, 9  Continue high-dose supplementation     Leukocytosis, continues  Chest x-ray with possible left lower extremity consolidation  Negative urinalysis     Depression  Started on Remeron     GI prophylaxis  Omeprazole     Bowel program  Continue bowel medications  Last BM 3/28     Bladder program  Check PVRs -125, 65  Not retaining    DVT prophylaxis  Lovenox    Functional Status at Discharge  Eating:  Stand by Assist  Eating Description:  Increased time,Set-up of equipment or meal/tube feeding,Verbal cueing (cues to encourage intake)  Grooming:  Minimal Assist  Grooming Description:  Increased time,Set-up of equipment,Supervision for safety,Verbal cueing (initial [place] assist for oral care)  Bathing:  Maximal Assist  Bathing Description:  Increased time,Set-up of equipment,Supervision for safety,Verbal cueing (sponge bathe)  Upper Body Dressing:  Moderate Assist  Upper Body Dressing Description:  Increased time,Set-up of equipment,Supervision for safety,Verbal cueing (min A to don pullover shirt, mod A to don sweater)  Lower Body Dressing:  Total Assist  Lower Body Dressing Description:  Increased time,Set-up of equipment,Supervision for safety,Verbal cueing     Walk:  Refused  Distance Walked:     Number of Times Distance Was Traveled:     Assistive Device:     Gait Deviation:     Wheelchair:  Refused  Distance Propelled:  0   Wheelchair Description:     Stairs Unable to Participate  Stairs Description       Comprehension:  Supervision  Comprehension  Description:  Verbal cues (additional time)  Expression:  Supervision (additional time)  Expression Description:     Social Interaction:  Modified Independent  Social Interaction Description:     Problem Solving:  Moderate Assist  Problem Solving Description:     Memory:  Moderate Assist  Memory Description:          Maggie TOLLIVER M.D., personally performed a complete drug regimen review and no potential clinically significant medication issues were identified.     Discharge Medication:     Medication List      ASK your doctor about these medications      Instructions   acetaminophen 325 MG Tabs  Commonly known as: Tylenol   Take 2 Tablets by mouth every 6 hours as needed.  Dose: 650 mg     ALIGN PO   Take 1 Capsule by mouth every morning.  Dose: 1 Capsule     azithromycin 250 MG Tabs  Commonly known as: ZITHROMAX  Ask about: Should I take this medication?   Take 1 Tablet by mouth every day for 2 days.  Dose: 250 mg     cefUROXime 500 MG Tabs  Commonly known as: CEFTIN  Ask about: Should I take this medication?   Take 1 Tablet by mouth 2 times a day for 2 days.  Dose: 500 mg     clopidogrel 75 MG Tabs  Commonly known as: PLAVIX   Take 1 Tablet by mouth every day. Please call [phone] to schedule follow up for further refills  Dose: 75 mg     diltiazem 180 MG SR capsule  Commonly known as: TIAZAC   Take 1 Capsule by mouth every day. Please call [phone] to schedule follow up for further refills  Dose: 180 mg     heparin 5000 UNIT/ML Mariel   Doctor's comments: STOP when more ambulatory  Inject 1 mL under the skin every 8 hours.  Dose: 5,000 Units     ipratropium-albuterol 0.5-2.5 (3) MG/3ML nebulizer solution  Commonly known as: DUONEB   Take 3 mL by nebulization every four hours as needed for Shortness of Breath.  Dose: 3 mL     levothyroxine 75 MCG Tabs  Commonly known as: SYNTHROID   TAKE 1 TABLET BY MOUTH EVERY MORNING ON AN EMPTY STOMACH     polyethylene glycol/lytes 17 g Pack  Commonly known as:  MIRALAX   Take 1 Packet by mouth 1 time a day as needed (if sennosides and docusate ineffective after 24 hours).  Dose: 17 g     pravastatin 40 MG tablet  Commonly known as: PRAVACHOL   TAKE 1 TABLET BY MOUTH EVERY EVENING     predniSONE 20 MG Tabs  Commonly known as: DELTASONE  Ask about: Should I take this medication?   Take 2 Tablets by mouth every day for 1 day.  Dose: 40 mg     Probiotic 250 MG Caps   Take 1 Capsule by mouth 2 times a day with meals.  Dose: 1 Capsule     senna-docusate 8.6-50 MG Tabs  Commonly known as: PERICOLACE or SENOKOT S   Take 2 Tablets by mouth 2 times a day.  Dose: 2 Tablet              Disposition:  Patient to discharge to emergency department.    Condition on Discharge:  Guarded.    More than 35 minutes was spent on discharging this patient, including face-to-face time, prescription management, and the dictation of this note.    Maggie Joel M.D.    Date of Service: 3/28/2022

## 2022-03-29 NOTE — PROGRESS NOTES
Patient up to 12L O2 via oxymask with saturation at 86%. Breathing is moderately labored, head of bed elevated. Call made to on call physician Dr. Joel who was updated on patient's current presenting condition. Dr. Fregoso gave order to transfer patient to ER for further evaluation. ER paged and updated on incoming patient. [facility] called for transport. After [facility] picked up patient for transfer to ER, family listed on facesheet were called and notified of the incident and patient's transfer to [facility].

## 2022-03-29 NOTE — ASSESSMENT & PLAN NOTE
Multifactorial: COPD on 2L NC at baseline, pneumonia, pleural effusion, suspected malignancy  Transition to comfort care as of 4/1/2022  Continue comfort care measures

## 2022-03-29 NOTE — PROGRESS NOTES
This is an 86-year-old woman with a history of oxygen dependent COPD (2 LPM), hypertension and hypothyroidism.  She came into the emergency department this evening with shortness of breath.  Upon arrival she was afebrile with a heart rate of 71 blood pressure 118/63 respiratory rate 22 with oxygen saturations of 94% on 15 L/min.  Chest x-ray reveals bilateral elongated emphysematous lungs with bilateral pleural effusions.  The first chest x-ray showed a small left apical pneumothorax which is unidentifiable and a follow-up chest x-ray 2 hours later.  She does have a leukocytosis of 18,000.  Lactic acid of 1.5 procalcitonin of 0.49.  BNP 1035.  Her CODE STATUS is a DO NOT RESUSCITATE based on her recent hospitalization 3/20/2022 when she was admitted for a COPD exacerbation.    I met her in the emergency department.  She is sitting upright with a oxygen mask in place.  She is speaking in full sentences in no acute respiratory distress.  She states that her breathing feels better. Breath sounds are diminished through out. Heart sounds regular, abdomen is soft and non tender. Skin is pink warm and dry with poor skin turgor.     While in the emergency department she has been treated with ceftriaxone, azithromycin Atrovent-albuterol nebulizers.    I recommend a follow-up chest x-ray to evaluate the left apical pneumothorax that was previously identified.  I also recommend initiation of steroid and beta agonist therapy.  Lastly, since she was admitted to the hospital within the last 30 days antibiotic coverage should include nosocomial pathogens.    She is clinically appropriate for further evaluation and management in the intermediate care unit.  If she should have a clinical decline the critical care team is immediately available for reevaluation and transfer to the intensive care unit if clinically indicated.  I have discussed the case in detail with Dr. Kent the on-call hospitalist who will assume care.    Markel  MD Todd  Critical care medicine

## 2022-03-29 NOTE — PROCEDURES
Thoracentesis    Date/Time: 3/28/2022 6:33 PM  Performed by: Jose A Pérez M.D.  Authorized by: Jose A Pérez M.D.     Consent:     Consent obtained:  Verbal    Consent given by:  Patient, guardian and spouse    Risks, benefits, and alternatives were discussed: yes      Risks discussed:  Bleeding, incomplete drainage, infection, nerve damage, pain and pneumothorax    Alternatives discussed:  No treatment, delayed treatment and observation  Universal protocol:     Procedure explained and questions answered to patient or proxy's satisfaction: yes      Relevant documents present and verified: yes      Test results available: yes      Imaging studies available: yes      Site/side marked: yes      Immediately prior to procedure, a time out was called: yes      Patient identity confirmed:  Verbally with patient, provided demographic data and hospital-assigned identification number  Sedation:     Sedation type:  None  Anesthesia:     Anesthesia method:  Local infiltration    Local anesthetic:  Lidocaine 1% WITH epi  Procedure details:     Preparation: Patient was prepped and draped in usual sterile fashion      Patient position:  Sitting    Location: Left posterior axillary line.    Intercostal space:  6th    Puncture method:  Over-the-needle catheter    Ultrasound guidance: yes      Indwelling catheter placed: no      Catheter size: 5 Fr.    Number of attempts:  1    Drainage characteristics:  Cloudy  Post-procedure details:     Chest x-ray performed: yes      Chest x-ray findings:  Pleural effusion improved    Procedure completion:  Procedure terminated at patient's request  Comments:      A total of 500 cc were drained, patient was very anxious during the procedure. 10 cc of infiltration of lidocaine took place in the site of insertion.   Initial sats were 85% on 10 lt supplementation oxygen and this improved to 98% at the end of the procedure.  She was calm and without any pain post procedure. Overall  tolerated well the procedure.

## 2022-03-30 ENCOUNTER — APPOINTMENT (OUTPATIENT)
Dept: CARDIOLOGY | Facility: MEDICAL CENTER | Age: 87
DRG: 190 | End: 2022-03-30
Attending: STUDENT IN AN ORGANIZED HEALTH CARE EDUCATION/TRAINING PROGRAM
Payer: MEDICARE

## 2022-03-30 LAB
ANION GAP SERPL CALC-SCNC: 14 MMOL/L (ref 7–16)
BASOPHILS # BLD AUTO: 0.2 % (ref 0–1.8)
BASOPHILS # BLD: 0.03 K/UL (ref 0–0.12)
BUN SERPL-MCNC: 18 MG/DL (ref 8–22)
CALCIUM SERPL-MCNC: 9.2 MG/DL (ref 8.5–10.5)
CHLORIDE SERPL-SCNC: 87 MMOL/L (ref 96–112)
CO2 SERPL-SCNC: 29 MMOL/L (ref 20–33)
CREAT SERPL-MCNC: 0.75 MG/DL (ref 0.5–1.4)
EOSINOPHIL # BLD AUTO: 0 K/UL (ref 0–0.51)
EOSINOPHIL NFR BLD: 0 % (ref 0–6.9)
ERYTHROCYTE [DISTWIDTH] IN BLOOD BY AUTOMATED COUNT: 48.7 FL (ref 35.9–50)
GFR SERPLBLD CREATININE-BSD FMLA CKD-EPI: 78 ML/MIN/1.73 M 2
GLUCOSE SERPL-MCNC: 99 MG/DL (ref 65–99)
HCT VFR BLD AUTO: 35.4 % (ref 37–47)
HGB BLD-MCNC: 11.8 G/DL (ref 12–16)
IMM GRANULOCYTES # BLD AUTO: 0.12 K/UL (ref 0–0.11)
IMM GRANULOCYTES NFR BLD AUTO: 0.7 % (ref 0–0.9)
LV EJECT FRACT  99904: 70
LV EJECT FRACT MOD 2C 99903: 72.65
LV EJECT FRACT MOD 4C 99902: 69.63
LV EJECT FRACT MOD BP 99901: 71.62
LYMPHOCYTES # BLD AUTO: 0.67 K/UL (ref 1–4.8)
LYMPHOCYTES NFR BLD: 3.8 % (ref 22–41)
MCH RBC QN AUTO: 30.5 PG (ref 27–33)
MCHC RBC AUTO-ENTMCNC: 33.3 G/DL (ref 33.6–35)
MCV RBC AUTO: 91.5 FL (ref 81.4–97.8)
MONOCYTES # BLD AUTO: 1.31 K/UL (ref 0–0.85)
MONOCYTES NFR BLD AUTO: 7.4 % (ref 0–13.4)
NEUTROPHILS # BLD AUTO: 15.61 K/UL (ref 2–7.15)
NEUTROPHILS NFR BLD: 87.9 % (ref 44–72)
NRBC # BLD AUTO: 0 K/UL
NRBC BLD-RTO: 0 /100 WBC
PLATELET # BLD AUTO: 262 K/UL (ref 164–446)
PMV BLD AUTO: 11.6 FL (ref 9–12.9)
POTASSIUM SERPL-SCNC: 3.9 MMOL/L (ref 3.6–5.5)
PROCALCITONIN SERPL-MCNC: 0.44 NG/ML
RBC # BLD AUTO: 3.87 M/UL (ref 4.2–5.4)
SODIUM SERPL-SCNC: 130 MMOL/L (ref 135–145)
WBC # BLD AUTO: 17.7 K/UL (ref 4.8–10.8)

## 2022-03-30 PROCEDURE — 85025 COMPLETE CBC W/AUTO DIFF WBC: CPT

## 2022-03-30 PROCEDURE — 700111 HCHG RX REV CODE 636 W/ 250 OVERRIDE (IP): Performed by: STUDENT IN AN ORGANIZED HEALTH CARE EDUCATION/TRAINING PROGRAM

## 2022-03-30 PROCEDURE — 93306 TTE W/DOPPLER COMPLETE: CPT

## 2022-03-30 PROCEDURE — 94640 AIRWAY INHALATION TREATMENT: CPT

## 2022-03-30 PROCEDURE — 770001 HCHG ROOM/CARE - MED/SURG/GYN PRIV*

## 2022-03-30 PROCEDURE — 700101 HCHG RX REV CODE 250: Performed by: STUDENT IN AN ORGANIZED HEALTH CARE EDUCATION/TRAINING PROGRAM

## 2022-03-30 PROCEDURE — 93306 TTE W/DOPPLER COMPLETE: CPT | Mod: 26 | Performed by: INTERNAL MEDICINE

## 2022-03-30 PROCEDURE — 700105 HCHG RX REV CODE 258: Performed by: STUDENT IN AN ORGANIZED HEALTH CARE EDUCATION/TRAINING PROGRAM

## 2022-03-30 PROCEDURE — 99232 SBSQ HOSP IP/OBS MODERATE 35: CPT | Performed by: HOSPITALIST

## 2022-03-30 PROCEDURE — 80048 BASIC METABOLIC PNL TOTAL CA: CPT

## 2022-03-30 PROCEDURE — 700102 HCHG RX REV CODE 250 W/ 637 OVERRIDE(OP): Performed by: STUDENT IN AN ORGANIZED HEALTH CARE EDUCATION/TRAINING PROGRAM

## 2022-03-30 PROCEDURE — 99233 SBSQ HOSP IP/OBS HIGH 50: CPT | Mod: GC | Performed by: INTERNAL MEDICINE

## 2022-03-30 PROCEDURE — 84145 PROCALCITONIN (PCT): CPT

## 2022-03-30 PROCEDURE — 700101 HCHG RX REV CODE 250: Performed by: HOSPITALIST

## 2022-03-30 PROCEDURE — A9270 NON-COVERED ITEM OR SERVICE: HCPCS | Performed by: STUDENT IN AN ORGANIZED HEALTH CARE EDUCATION/TRAINING PROGRAM

## 2022-03-30 RX ORDER — IPRATROPIUM BROMIDE AND ALBUTEROL SULFATE 2.5; .5 MG/3ML; MG/3ML
3 SOLUTION RESPIRATORY (INHALATION)
Status: DISCONTINUED | OUTPATIENT
Start: 2022-03-30 | End: 2022-03-31

## 2022-03-30 RX ADMIN — DILTIAZEM HYDROCHLORIDE 180 MG: 180 CAPSULE, COATED, EXTENDED RELEASE ORAL at 06:00

## 2022-03-30 RX ADMIN — METHYLPREDNISOLONE SODIUM SUCCINATE 40 MG: 40 INJECTION, POWDER, FOR SOLUTION INTRAMUSCULAR; INTRAVENOUS at 05:21

## 2022-03-30 RX ADMIN — ACETAMINOPHEN 650 MG: 325 TABLET, FILM COATED ORAL at 17:36

## 2022-03-30 RX ADMIN — IPRATROPIUM BROMIDE AND ALBUTEROL SULFATE 3 ML: 2.5; .5 SOLUTION RESPIRATORY (INHALATION) at 07:14

## 2022-03-30 RX ADMIN — CLOPIDOGREL BISULFATE 75 MG: 75 TABLET ORAL at 05:21

## 2022-03-30 RX ADMIN — HEPARIN SODIUM 5000 UNITS: 5000 INJECTION, SOLUTION INTRAVENOUS; SUBCUTANEOUS at 17:33

## 2022-03-30 RX ADMIN — LEVOTHYROXINE SODIUM 75 MCG: 0.07 TABLET ORAL at 05:21

## 2022-03-30 RX ADMIN — SENNOSIDES AND DOCUSATE SODIUM 2 TABLET: 50; 8.6 TABLET ORAL at 05:20

## 2022-03-30 RX ADMIN — IPRATROPIUM BROMIDE AND ALBUTEROL SULFATE 3 ML: .5; 2.5 SOLUTION RESPIRATORY (INHALATION) at 19:01

## 2022-03-30 RX ADMIN — HEPARIN SODIUM 5000 UNITS: 5000 INJECTION, SOLUTION INTRAVENOUS; SUBCUTANEOUS at 11:20

## 2022-03-30 RX ADMIN — PRAVASTATIN SODIUM 40 MG: 20 TABLET ORAL at 17:33

## 2022-03-30 RX ADMIN — IPRATROPIUM BROMIDE AND ALBUTEROL SULFATE 3 ML: .5; 2.5 SOLUTION RESPIRATORY (INHALATION) at 13:53

## 2022-03-30 RX ADMIN — HEPARIN SODIUM 5000 UNITS: 5000 INJECTION, SOLUTION INTRAVENOUS; SUBCUTANEOUS at 03:09

## 2022-03-30 RX ADMIN — CEFEPIME 1 G: 1 INJECTION, POWDER, FOR SOLUTION INTRAMUSCULAR; INTRAVENOUS at 05:21

## 2022-03-30 RX ADMIN — IPRATROPIUM BROMIDE AND ALBUTEROL SULFATE 3 ML: 2.5; .5 SOLUTION RESPIRATORY (INHALATION) at 03:05

## 2022-03-30 RX ADMIN — ONDANSETRON 4 MG: 4 TABLET, ORALLY DISINTEGRATING ORAL at 17:38

## 2022-03-30 ASSESSMENT — LIFESTYLE VARIABLES
DOES PATIENT WANT TO STOP DRINKING: NO
AVERAGE NUMBER OF DAYS PER WEEK YOU HAVE A DRINK CONTAINING ALCOHOL: 0
TOTAL SCORE: 0
ALCOHOL_USE: NO
HAVE PEOPLE ANNOYED YOU BY CRITICIZING YOUR DRINKING: NO
HOW MANY TIMES IN THE PAST YEAR HAVE YOU HAD 5 OR MORE DRINKS IN A DAY: 0
EVER FELT BAD OR GUILTY ABOUT YOUR DRINKING: NO
TOTAL SCORE: 0
ON A TYPICAL DAY WHEN YOU DRINK ALCOHOL HOW MANY DRINKS DO YOU HAVE: 0
EVER HAD A DRINK FIRST THING IN THE MORNING TO STEADY YOUR NERVES TO GET RID OF A HANGOVER: NO
HAVE YOU EVER FELT YOU SHOULD CUT DOWN ON YOUR DRINKING: NO
TOTAL SCORE: 0
CONSUMPTION TOTAL: NEGATIVE

## 2022-03-30 ASSESSMENT — PATIENT HEALTH QUESTIONNAIRE - PHQ9
1. LITTLE INTEREST OR PLEASURE IN DOING THINGS: NOT AT ALL
3. TROUBLE FALLING OR STAYING ASLEEP OR SLEEPING TOO MUCH: NOT AT ALL
3. TROUBLE FALLING OR STAYING ASLEEP OR SLEEPING TOO MUCH: NOT AT ALL
7. TROUBLE CONCENTRATING ON THINGS, SUCH AS READING THE NEWSPAPER OR WATCHING TELEVISION: NOT AT ALL
1. LITTLE INTEREST OR PLEASURE IN DOING THINGS: NOT AT ALL
SUM OF ALL RESPONSES TO PHQ9 QUESTIONS 1 AND 2: 0
SUM OF ALL RESPONSES TO PHQ QUESTIONS 1-9: 0
2. FEELING DOWN, DEPRESSED, IRRITABLE, OR HOPELESS: NOT AT ALL
8. MOVING OR SPEAKING SO SLOWLY THAT OTHER PEOPLE COULD HAVE NOTICED. OR THE OPPOSITE, BEING SO FIGETY OR RESTLESS THAT YOU HAVE BEEN MOVING AROUND A LOT MORE THAN USUAL: NOT AT ALL
6. FEELING BAD ABOUT YOURSELF - OR THAT YOU ARE A FAILURE OR HAVE LET YOURSELF OR YOUR FAMILY DOWN: NOT AL ALL
9. THOUGHTS THAT YOU WOULD BE BETTER OFF DEAD, OR OF HURTING YOURSELF: NOT AT ALL
9. THOUGHTS THAT YOU WOULD BE BETTER OFF DEAD, OR OF HURTING YOURSELF: NOT AT ALL
5. POOR APPETITE OR OVEREATING: NOT AT ALL
4. FEELING TIRED OR HAVING LITTLE ENERGY: NOT AT ALL
1. LITTLE INTEREST OR PLEASURE IN DOING THINGS: NOT AT ALL
8. MOVING OR SPEAKING SO SLOWLY THAT OTHER PEOPLE COULD HAVE NOTICED. OR THE OPPOSITE, BEING SO FIGETY OR RESTLESS THAT YOU HAVE BEEN MOVING AROUND A LOT MORE THAN USUAL: NOT AT ALL
2. FEELING DOWN, DEPRESSED, IRRITABLE, OR HOPELESS: NOT AT ALL
SUM OF ALL RESPONSES TO PHQ QUESTIONS 1-9: 0
4. FEELING TIRED OR HAVING LITTLE ENERGY: NOT AT ALL
7. TROUBLE CONCENTRATING ON THINGS, SUCH AS READING THE NEWSPAPER OR WATCHING TELEVISION: NOT AT ALL
SUM OF ALL RESPONSES TO PHQ9 QUESTIONS 1 AND 2: 0
6. FEELING BAD ABOUT YOURSELF - OR THAT YOU ARE A FAILURE OR HAVE LET YOURSELF OR YOUR FAMILY DOWN: NOT AL ALL
5. POOR APPETITE OR OVEREATING: NOT AT ALL
2. FEELING DOWN, DEPRESSED, IRRITABLE, OR HOPELESS: NOT AT ALL
SUM OF ALL RESPONSES TO PHQ9 QUESTIONS 1 AND 2: 0

## 2022-03-30 ASSESSMENT — ENCOUNTER SYMPTOMS
DIZZINESS: 0
FALLS: 0
NAUSEA: 0
SPUTUM PRODUCTION: 1
CHILLS: 0
NAUSEA: 1
BACK PAIN: 0
HEMOPTYSIS: 0
VOMITING: 0
WEAKNESS: 1
PALPITATIONS: 0
ORTHOPNEA: 0
ABDOMINAL PAIN: 0
SHORTNESS OF BREATH: 1
COUGH: 1
WEIGHT LOSS: 1
FEVER: 0

## 2022-03-30 ASSESSMENT — PAIN DESCRIPTION - PAIN TYPE
TYPE: ACUTE PAIN

## 2022-03-30 ASSESSMENT — FIBROSIS 4 INDEX
FIB4 SCORE: 3.23
FIB4 SCORE: 3.23

## 2022-03-30 NOTE — CARE PLAN
Problem: Bronchoconstriction  Goal: Improve in air movement and diminished wheezing  Description: Target End Date:  2 to 3 days    1.  Implement inhaled treatments  2.  Evaluate and manage medication effects  Outcome: Progressing  Flowsheets (Taken 3/29/2022 7220)  Bronchodilator Goals/Outcome:   Patient at Stable Baseline   Diminished Wheezing and Volume of Air Movement Increased  Bronchodilator Indications: History / Diagnosis

## 2022-03-30 NOTE — CARE PLAN
Problem: Bathing  Goal: STG-Within one week, patient will bathe with mod A using AE as needed.   Outcome: Discharged - Not Met  Note: Pt d/c to acute hospital on 3/28.     Problem: Dressing  Goal: STG-Within one week, patient will dress LB with mod A using AE as needed.   Outcome: Discharged - Not Met     Problem: Toileting  Goal: STG-Within one week, patient will complete toileting tasks with mod A using AE as needed.   Outcome: Discharged - Not Met     Problem: Functional Transfers  Goal: STG-Within one week, patient will transfer to toilet with min A using AE/DME as needed.   Outcome: Discharged - Not Met     Problem: OT Long Term Goals  Goal: LTG-By discharge, patient will complete basic self care tasks at supervision to mod I level using AE as needed.   Outcome: Discharged - Not Met  Goal: LTG-By discharge, patient will perform bathroom transfers at supervision to mod I level using AE/DME as needed.   Outcome: Discharged - Not Met

## 2022-03-30 NOTE — PROGRESS NOTES
[facility] Medicine Daily Progress Note    Date of Service  3/29/2022    Chief Complaint  Antonia Stover is a 86 y.o. female admitted 3/28/2022 with shortness of breath    Hospital Course  Ms Stover has past medical history that includes COPD with chronic respiratory failure, hypertension, hypothyroidism, failure to thrive.  [facility].  Prior to transfer to the acute hospital she had had thoracentesis with 500 mL of cloudy fluid drained.  Patient had severe respiratory failure in the emergency room requiring nonrebreather.  She has been to the [facility] and treated for COPD exacerbation and pneumonia.      Interval Problem Update  Patient seen and examined,  at the bedside  Limited spontaneous conversation  Patient says that she is feeling better, she is breathing more easily, she has some mild cough, she asks about the results of her thoracentesis and CT scan, we discussed ongoing treatment for COPD and pneumonia    I have personally seen and examined the patient at bedside. I discussed the plan of care with patient, family and pulmonary.    Consultants/Specialty  pulmonary    Code Status  DNAR/DNI    Disposition  Patient is not medically cleared for discharge.   Anticipate discharge to to skilled nursing facility.  I have placed the appropriate orders for post-discharge needs.    Review of Systems  Review of Systems   Constitutional: Positive for malaise/fatigue and weight loss. Negative for chills.   Respiratory: Positive for cough, sputum production and shortness of breath. Negative for hemoptysis.    Cardiovascular: Negative for chest pain, palpitations and orthopnea.   Gastrointestinal: Negative for nausea and vomiting.   Skin: Negative for itching and rash.   Neurological: Positive for weakness. Negative for dizziness.   All other systems reviewed and are negative.       Physical Exam  Temp:  [36 °C (96.8 °F)-37.4 °C (99.4 °F)]  36.8 °C (98.2 °F)  Pulse:  [] 82  Resp:  [11-25] 18  BP: (106-141)/(55-84) 113/59  SpO2:  [83 %-99 %] 94 %    Physical Exam  Constitutional:       General: She is not in acute distress.     Appearance: She is ill-appearing.      Comments: Cachectic appearing   HENT:      Head: Normocephalic and atraumatic.      Right Ear: External ear normal.      Left Ear: External ear normal.      Nose: Nose normal.      Mouth/Throat:      Mouth: Mucous membranes are moist.      Pharynx: Oropharynx is clear.   Eyes:      Extraocular Movements: Extraocular movements intact.      Conjunctiva/sclera: Conjunctivae normal.      Pupils: Pupils are equal, round, and reactive to light.   Cardiovascular:      Rate and Rhythm: Normal rate and regular rhythm.      Pulses: Normal pulses.   Pulmonary:      Effort: Pulmonary effort is normal. No respiratory distress.      Breath sounds: Normal breath sounds.      Comments: Crackles at the bases, no wheezing  Abdominal:      General: Abdomen is flat. Bowel sounds are normal. There is no distension.      Palpations: Abdomen is soft.   Musculoskeletal:         General: Normal range of motion.      Cervical back: Normal range of motion and neck supple.   Skin:     General: Skin is warm and dry.      Capillary Refill: Capillary refill takes less than 2 seconds.      Coloration: Skin is not jaundiced.   Neurological:      General: No focal deficit present.      Mental Status: She is alert and oriented to person, place, and time.      Cranial Nerves: No cranial nerve deficit.      Gait: Gait normal.   Psychiatric:         Mood and Affect: Mood normal.         Behavior: Behavior normal.         Fluids    Intake/Output Summary (Last 24 hours) at 3/29/2022 1924  Last data filed at 3/29/2022 1700  Gross per 24 hour   Intake 420 ml   Output 800 ml   Net -380 ml       Laboratory  Recent Labs     03/28/22  0549 03/28/22  2154   WBC 21.9* 18.1*   RBC 4.68 4.00*   HEMOGLOBIN 14.4 12.3   HEMATOCRIT 43.7  37.0   MCV 93.4 92.5   MCH 30.8 30.8   MCHC 33.0* 33.2*   RDW 50.5* 49.4   PLATELETCT 299 272   MPV 11.4 11.3     Recent Labs     03/28/22  0549 03/28/22  2154   SODIUM 130* 130*   POTASSIUM 4.8 4.9   CHLORIDE 88* 88*   CO2 27 25   GLUCOSE 66 107*   BUN 17 19   CREATININE 0.70 0.81   CALCIUM 9.6 9.2                   Imaging  CT-CHEST (THORAX) W/O   Final Result      1.  Moderate right and small-to-moderate left pleural effusions. Compressive atelectasis and/or consolidation in the lower lobes, greater on the left. Correlate clinically for infection.   2.  Emphysematous changes.   3.  Multiple hypodense lesions within the liver which are new from prior study and consistent with metastatic disease.   4.  Indeterminate 4 mm left upper lobe pulmonary nodule. Attention on follow-up.   5.  Borderline enlarged mediastinal lymph nodes which are slightly more prominent than on prior.      Fleischner Society pulmonary nodule recommendations:   Low Risk: No routine follow-up      High Risk: Optional CT at 12 months      Comments: Use most suspicious nodule as guide to management. Follow-up intervals may vary according to size and risk.      Low Risk - Minimal or absent history of smoking and of other known risk factors.      High Risk - History of smoking or of other known risk factors.      Note: These recommendations do not apply to lung cancer screening, patients with immunosuppression, or patients with known primary cancer.      Fleischner Society 2017 Guidelines for Management of Incidentally Detected Pulmonary Nodules in Adults         DX-CHEST-PORTABLE (1 VIEW)   Final Result         1.  Pulmonary edema and/or infiltrates, similar to prior study.   2.  Layering bilateral pleural effusions, similar to prior study.   3.  Cardiomegaly   4.  Atherosclerosis      DX-CHEST-PORTABLE (1 VIEW)   Final Result         1.  Pulmonary edema and/or infiltrates, similar to prior study.   2.  Layering bilateral pleural effusions,  similar to prior study.   3.  Previously visualized left apical pneumothorax is not well appreciated on the current study.   4.  Cardiomegaly   5.  Atherosclerosis      EC-ECHOCARDIOGRAM COMPLETE W/O CONT    (Results Pending)        Assessment/Plan  * COPD exacerbation, FTT/severe malnutrition (HCC)- (present on admission)  Assessment & Plan  Improved oxygenation status, she is maintaining saturations on simple mask  Continue Solu-Medrol  Pulmonary consult      Pleural effusion- (present on admission)  Assessment & Plan  Bilateral pleural effusions, underwent left thoracentesis on morning of presentation, 500 cc cloudy fluid removed  CT scan of the chest  Continue antibiotics      Severe protein-calorie malnutrition (HCC)- (present on admission)  Assessment & Plan  BMI 13.1 on admission, supplements ordered, nutrition consult.  PT OT      Acute on chronic respiratory failure with hypoxia (HCC)- (present on admission)  Assessment & Plan  Multifactorial: COPD on 2L NC at baseline, pneumonia, pleural effusion  CT scan of the chest pending  Pulmonary consulted  Continue antibiotics    Failure to thrive in adult- (present on admission)  Assessment & Plan  BMI 13.1 on admission, supplements ordered, nutrition consult.  PT OT  Palliative consult    Essential hypertension- (present on admission)  Assessment & Plan  Diltiazem    Hypothyroidism- (present on admission)  Assessment & Plan  Synthroid    Dyslipidemia- (present on admission)  Assessment & Plan  Pravastatin    PSVT (paroxysmal supraventricular tachycardia) ([facility])- (present on admission)  Assessment & Plan  Sinus rhythm with a rate of 69 admission EKG.  continue home diltiazem.       VTE prophylaxis: heparin ppx

## 2022-03-30 NOTE — DOCUMENTATION QUERY
[facility]                                                                       Query Response Note      PATIENT:               KYRIE NELSON  ACCT #:                  [id number]  MRN:                     [id number]  :                      1935  ADMIT DATE:       3/28/2022 9:36 PM  DISCH DATE:          RESPONDING  PROVIDER #:        [id number]           QUERY TEXT:    Documentation in the medical record indicates that this patient has been identified as having and/or is being treated for a deep tissue pressure injury of the sacrum.    Based on your medical judgment, can you please confirm the location/sites and the present on admission status of the deep tissue injury?    NOTE:  If an appropriate response is not listed below, please respond with a new note.      The patient's Clinical Indicators include:  A Deep Tissue Pressure Injury of the mid-Sacrum is documented in the Wound Team Consult on 3/29 and is noted to be purple with attached edges, without drainage, and measuring 6.1cm x 3cm.     Treatments include: Barrier Paste, Mepilex, Wound Team Consult, and RD Consult.    Risk factors include: dx Severe Malnutrition, dx Adult FTT, hx HTN + CKD, and Advanced Age.    Thank you,  Alejandro Spencer RN, BSN  Clinical   Connect via [employer]  Options provided:   -- DTI of the Sacrum POA is ruled in   -- Other explanation, Please specify   -- Unable to determine      Query created by: Alejandro Spencer on 3/30/2022 9:39 AM    RESPONSE TEXT:    DTI of the Sacrum POA is ruled in          Electronically signed by:  ALISTAIR HICKEY MD 3/30/2022 9:47 AM

## 2022-03-30 NOTE — HOSPITAL COURSE
Ms Stover has past medical history that includes COPD with chronic respiratory failure, hypertension, hypothyroidism, failure to thrive.  [facility].  Prior to transfer to the acute hospital she had had thoracentesis with 500 mL of cloudy fluid drained.  Patient had severe respiratory failure in the emergency room requiring nonrebreather.  She has been to the [facility] and treated for COPD exacerbation and pneumonia.

## 2022-03-30 NOTE — PROGRESS NOTES
Pulmonary Progress Note    Date of admission  3/28/2022    Chief Complaint  86 y.o. female admitted 3/28/2022 with SOB and acute on chronic hypoxic respiratory failure    Hospital Course  This is a 86 y.o. female who was admited on 3/23/2022 to the hospital Rehab with a hx of COPD on 3 lt NC at home, has a hx of hypothyroidism, CKD, current tobacco use and admitted to rehab hospital after discharge from the hospital due to a CAP and subsequent COPD exacerbation.  Per notes, she has completed the antibiotics treatment but has noticed an increase in oxygen requirements for what CXR were ordered and found a worsening LEFT moderate size pleural effusion. Current requirements are about 10 lt and sats have been aroud 88%.   Consult came for consideration of a thoracentesis for diagnostic and therapeutic purposes. Noted code status, age and comorbidities.  A thoracentesis was performed on 3/28 with 500 cc of cloudy yellow fluid that was sent to the lab for cultures and chemistry.  Patient tolerated well the procedure a oxygenation was improved to 98%, unfortunately the CXR showed a small apical pneumothorax that was asymptomatic. A follow up CXR was ordered 4 hrs later but it seems patient desaturated sooner than that and required to be evaluated at the ED overnight.   She ended up admitted to the hospital.   CT chest was ordered and reported as below.    Interval Problem Update  Reviewed last 24 hour events:  No acute events overnight. Laying in bed. Wanting to go home.  at bedside.      Review of Systems  Review of Systems   Constitutional: Negative for chills and fever.   Respiratory: Positive for cough and shortness of breath.    Cardiovascular: Negative for chest pain and palpitations.   Gastrointestinal: Negative for nausea and vomiting.   Musculoskeletal: Negative for back pain and falls.        Vital Signs for last 24 hours   Temp:  [36.1 °C (96.9 °F)-36.8 °C (98.2 °F)] 36.7 °C (98 °F)  Pulse:  []  70  Resp:  [11-24] 24  BP: (113-145)/(59-73) 115/72  SpO2:  [83 %-96 %] 92 %    Physical Exam   Physical Exam  Vitals and nursing note reviewed.   Constitutional:       General: She is not in acute distress.     Appearance: She is not toxic-appearing.      Comments: caquectic and very frail appearance.   HENT:      Head: Normocephalic and atraumatic.      Comments: Bitemporal wasting     Nose: Nose normal.      Mouth/Throat:      Mouth: Mucous membranes are moist.   Eyes:      Extraocular Movements: Extraocular movements intact.   Cardiovascular:      Rate and Rhythm: Normal rate and regular rhythm.      Pulses: Normal pulses.      Heart sounds: Normal heart sounds. No murmur heard.    No gallop.   Pulmonary:      Effort: Pulmonary effort is normal. No respiratory distress.      Breath sounds: No stridor. No wheezing or rales.      Comments: Significantly diminished lung sounds bilaterally  Abdominal:      General: Bowel sounds are normal.   Musculoskeletal:         General: No swelling. Normal range of motion.      Cervical back: Normal range of motion. No rigidity.      Right lower leg: No edema.      Left lower leg: No edema.   Skin:     General: Skin is warm.      Capillary Refill: Capillary refill takes less than 2 seconds.      Findings: No rash.   Neurological:      General: No focal deficit present.      Mental Status: She is alert and oriented to person, place, and time.   Psychiatric:         Mood and Affect: Mood normal.         Medications  Current Facility-Administered Medications   Medication Dose Route Frequency Provider Last Rate Last Admin   • ipratropium-albuterol (DUONEB) nebulizer solution  3 mL Nebulization 4X/DAY (RT) Umang Jeff M.D.       • clopidogrel (PLAVIX) tablet 75 mg  75 mg Oral DAILY Taurus Arambula M.D.   75 mg at 03/30/22 0521   • DILTIAZem CD (CARDIZEM CD) capsule 180 mg  180 mg Oral Q DAY Taurus Arambula M.D.   180 mg at 03/30/22 0600   • levothyroxine (SYNTHROID) tablet 75  mcg  75 mcg Oral AM ES Taurus Arambula M.D.   75 mcg at 03/30/22 0521   • pravastatin (PRAVACHOL) tablet 40 mg  40 mg Oral Q EVENING Taurus Arambula M.D.   40 mg at 03/29/22 1745   • budesonide-formoterol (SYMBICORT) 160-4.5 MCG/ACT inhaler 2 Puff  2 Puff Inhalation BID (RT) Taurus Arambula M.D.   2 Puff at 03/29/22 2003   • Respiratory Therapy Consult   Nebulization Continuous RT Taurus Arambula M.D.       • ipratropium-albuterol (DUONEB) nebulizer solution  3 mL Nebulization Q2HRS PRN (RT) Taurus Arambula M.D.       • senna-docusate (PERICOLACE or SENOKOT S) 8.6-50 MG per tablet 2 Tablet  2 Tablet Oral BID Taurus Arambula M.D.   2 Tablet at 03/30/22 0520    And   • polyethylene glycol/lytes (MIRALAX) PACKET 1 Packet  1 Packet Oral QDAY PRN Taurus Arambula M.D.        And   • magnesium hydroxide (MILK OF MAGNESIA) suspension 30 mL  30 mL Oral QDAY PRN Taurus Arambula M.D.        And   • bisacodyl (DULCOLAX) suppository 10 mg  10 mg Rectal QDAY PRN Taurus Arambula M.D.       • heparin injection 5,000 Units  5,000 Units Subcutaneous Q8HRS Taurus Arambula M.D.   5,000 Units at 03/30/22 0309   • acetaminophen (Tylenol) tablet 650 mg  650 mg Oral Q6HRS PRN Taurus Arambula M.D.       • labetalol (NORMODYNE/TRANDATE) injection 10 mg  10 mg Intravenous Q4HRS PRN Taurus Arambula M.D.       • ondansetron (ZOFRAN) syringe/vial injection 4 mg  4 mg Intravenous Q4HRS PRN Taurus Arambula M.D.       • ondansetron (ZOFRAN ODT) dispertab 4 mg  4 mg Oral Q4HRS PRN Taurus Arambula M.D.       • methylPREDNISolone (SOLU-MEDROL) 40 MG injection 40 mg  40 mg Intravenous DAILY Taurus Arambula M.D.   40 mg at 03/30/22 0521   • cefepime (Maxipime) 1 g in dextrose 5% 10.5 mL IV syringe  1 g Intravenous Q24HRS Taurus Arambula M.D.   1 g at 03/30/22 0521       Fluids    Intake/Output Summary (Last 24 hours) at 3/30/2022 1328  Last data filed at 3/30/2022 0600  Gross per 24 hour   Intake --   Output 910 ml   Net  -910 ml       Laboratory          Recent Labs     03/28/22  0549 03/28/22 2154 03/30/22  0300   SODIUM 130* 130* 130*   POTASSIUM 4.8 4.9 3.9   CHLORIDE 88* 88* 87*   CO2 27 25 29   BUN 17 19 18   CREATININE 0.70 0.81 0.75   MAGNESIUM 1.9 1.8  --    PHOSPHORUS 3.5 3.9  --    CALCIUM 9.6 9.2 9.2     Recent Labs     03/28/22  0549 03/28/22 2154 03/30/22  0300   ALTSGPT  --  15  --    ASTSGOT  --  39  --    ALKPHOSPHAT  --  107*  --    TBILIRUBIN  --  0.4  --    GLUCOSE 66 107* 99     Recent Labs     03/28/22  0549 03/28/22 2154 03/30/22  0300   WBC 21.9* 18.1* 17.7*   NEUTSPOLYS 87.40* 86.90* 87.90*   LYMPHOCYTES 6.00* 6.00* 3.80*   MONOCYTES 5.80 6.20 7.40   EOSINOPHILS 0.10 0.10 0.00   BASOPHILS 0.10 0.20 0.20   ASTSGOT  --  39  --    ALTSGPT  --  15  --    ALKPHOSPHAT  --  107*  --    TBILIRUBIN  --  0.4  --      Recent Labs     03/28/22 0549 03/28/22 2154 03/30/22  0300   RBC 4.68 4.00* 3.87*   HEMOGLOBIN 14.4 12.3 11.8*   HEMATOCRIT 43.7 37.0 35.4*   PLATELETCT 299 272 262       Imaging  CT:    Reviewed    Assessment/Plan  Acute on chronic respiratory failure with hypoxia (HCC)- (present on admission)  Assessment & Plan  -Patient w/ COPD admitted w/ bilateral pleural effusions, exudative by Lights criteria, highly concerning for infectious vs malignant etiology w/ CT chest findings of multiple liver lesions w/ concomitant pulmonary nodule and LAD.  -At this point awaiting pleural fluid cytology/path report  -Continue abx, Cefepime  -If malignant cells are present on pathology,  would be interested in comfort care measures  -continue titration of oxygen to keep sats 88-92%, getting close to home dose  -continue with duonebs q 4 hr prn

## 2022-03-30 NOTE — PROGRESS NOTES
12 hour chart check    Have a great shift!      MS    SR 70-90s  oPVC  rPAC  .12/.12/.36 BBB    Strips not uploading

## 2022-03-30 NOTE — CARE PLAN
Respiratory Therapy Update    Interdisciplinary Plan of Care-Goals (Indications)  Bronchodilator Indications: History / Diagnosis (03/29/22 0435)     SVN Performed: Yes (03/29/22 1437)    Cough: Productive;Strong (03/29/22 1600)     O2 (LPM): 6 (03/29/22 1437)     Breath Sounds  RUL Breath Sounds: Expiratory Wheezes (03/29/22 1600)  RML Breath Sounds: Diminished (03/29/22 1600)  RLL Breath Sounds: Diminished (03/29/22 1600)  DIONNE Breath Sounds: Expiratory Wheezes (03/29/22 1600)  LLL Breath Sounds: Diminished (03/29/22 1600)

## 2022-03-30 NOTE — ASSESSMENT & PLAN NOTE
-Patient with underlying COPD who also presented with a CAP prior admission and subsequent pleural effusions that at this point seems to be bilateral  -At rehab, she was found to increase o2 requirement and a thoracentesis took place which temporarily increase the saturations but same day overnight, she decompensated requiring more oxygen and evaluation at ED with subsequent admission.  -Thoracentesis chemistry met criteria for exudative by lights criteria both in LDH and protein, also albumin ratio serum 3.9- fluid 2.5 = 1.4, correlates also with exudative effusion. Differential diagnosis at the top are infectious vs malignancy.  -The cytology of the pleural fluid found atypical cells but final results are not up yet.   -I agree with continuation of antibiotics which seems is improving clinical picture but I am concern about underlying undiagnosed malignancy, specially when reviewing new CT chest found multiple liver lesions suspected for metastasis. Also noted moderate pleural effusion in left, pulmonary nodule and LAD.  -I started the conversation with patient and her  about this, the best would be to wait for final results of cultures to tailor antibiotic therapy and wait for final cytology/path report, if malignant cells are present would suggest to have a discussion with paliative care given how frail patient is.   -We will continue to follow.  -continue titration of oxygen to keep sats 88-92%, getting close to home dose  -continue with duonebs q 4 hr prn

## 2022-03-30 NOTE — RESPIRATORY CARE
"COPD EDUCATION by COPD CLINICAL EDUCATOR  3/30/2022  at  2:19 PM by Annalise Oneill, RRT     Patient interviewed by COPD education team.  Patient unable to participate in full program.  A short intervention has been conducted.  A comprehensive packet including information about COPD, types of treatments to manage their disease and safe home Oxygen usage was provided and reviewed with patient at the bedside.      COPD Screen  COPD Risk Screening  Do you have a history of COPD?: Yes    COPD Assessment  COPD Clinical Specialists ONLY  COPD Education Initiated: Yes--Short Intervention  DME Company: preferred  DME Equipment Type: oxygen and concentrator  Physician Name: Kelly Merino  Pulmonologist Name: Still has list from previous admission  Referrals Initiated: Yes  Pulmonary Rehab: Declined  Smoking Cessation: No  Palliative Care: Yes (md ordered this admission)  Hospice:  (TBD)  Home Health Care:  (TBD)  [facility] Outreach: N/A  [facility] Group: N/A  [facility]: Yes (ref sent at [place])  Private In-Home Care Agency: N/A  Is this a COPD exacerbation patient?: No    PFT Results    No results found for: PFT    Meds to Beds  Would the patient like to opt in for Bedside Medication Delivery at Discharge?: No     MY COPD ACTION PLAN     It is recommended that patients and physicians /healthcare providers complete this action plan together. This plan should be discussed at each physician visit and updated as needed.    The green, yellow and red zones show groups of symptoms of COPD. This list of symptoms is not comprehensive, and you may experience other symptoms. In the \"Actions\" column, your healthcare provider has recommended actions for you to take based on your symptoms.    Patient Name: Antonia Stover   YOB: 1935   Last Updated on: 3/30/2022  2:19 PM   Green Zone:  I am doing well today Actions   •  Usual activitiy and exercise level •  Take daily medications   •  Usual amounts of " "cough and phlegm/mucus •  Use oxygen as prescribed   •  Sleep well at night •  Continue regular exercise/diet plan   •  Appetite is good •  At all times avoid cigarette smoke, inhaled irritants     Daily Medications (these medications are taken every day):                Yellow Zone:  I am having a bad day or a COPD flare Actions   •  More breathless than usual •  Continue daily medications   •  I have less energy for my daily activities •  Use quick relief inhaler as ordered   •  Increased or thicker phlegm/mucus •  Use oxygen as prescribed   •  Using quick relief inhaler/nebulizer more often •  Get plenty of rest   •  Swelling of ankles more than usual •  Use pursed lip breathing   •  More coughing than usual •  At all times avoid cigarette smoke, inhaled irritants   •  I feel like I have a \"chest cold\"   •  Poor sleep and my symptoms woke me up   •  My appetite is not good   •  My medicine is not helping    •  Call provider immediately if symptoms don’t improve     Continue daily medications, add rescue medications:   Albuterol/Ipratropium (Combivent, Duoneb) 3mL via nebulizer Every 4 hours PRN       Medications to be used during a flare up, (as Discussed with Provider):           Additional Information:  Rinse nebulizer after each use and air dry     Red Zone:  I need urgent medical care Actions   •  Severe shortness of breath even at rest •  Call 911 or seek medical care immediately   •  Not able to do any activity because of breathing    •  Fever or shaking chills    •  Feeling confused or very drowsy     •  Chest pains    •  Coughing up blood              "

## 2022-03-30 NOTE — DISCHARGE PLANNING
Care Transition Team Discharge Planning    Anticipated Discharge Disposition: when medically stable, assess for d/c needs     Action: Lsw spoke to MD about d/c needs. It appears pt may qualify for hospice, but Md needs to discuss w/ pt/familiy first.    LSw noted later palliative had met w/ spouse and discussed above. Spouse requesting some time to think through everything and wait for results which may indicate cancer dx.      Barriers to Discharge: medical clearance    Plan: Lsw will continue to follow, and assist w/ d/c planning.

## 2022-03-30 NOTE — CARE PLAN
The patient is Watcher - Medium risk of patient condition declining or worsening    Shift Goals  Clinical Goals: mobility, wean O2  Patient Goals: rest      Problem: Knowledge Deficit - COPD  Goal: Patient/significant other demonstrates understanding of disease process, utilization of the Action Plan, medications and discharge instruction  Outcome: Not Progressing   Pt confused, unable to communicate needs effectively or understand teaching. WCTM.      Problem: Impaired Gas Exchange  Goal: Patient will demonstrate improved ventilation and adequate oxygenation and participate in treatment regimen within the level of ability/situation.  Outcome: Progressing     Problem: Skin Integrity  Goal: Skin integrity is maintained or improved  Outcome: Progressing     Problem: Fall Risk  Goal: Patient will remain free from falls  Outcome: Progressing

## 2022-03-30 NOTE — CONSULTS
Reason for PC Consult: Advance Care Planning    Consulted by: Dr. Jeff    Assessment:  General: Antonia Stover is an 86 year-old female pt with a PMHx of COPD on 3L at baseline, hypothyroidism, CKD, tobacco use. She presented to the ED from [facility] on 3/28 with hypoxia. She was recently admitted for PNA and COPD exacerbation (3/20-3/23).  She is s/p thoracentesis with 500cc of cloudy fluid removed. Per pulmonology note, there is concern of underlying malignancy, pathology is pending. CT also revealed multiple lesions within the liver concerning for metastatic disease.    Social: Antonia has been  to her , Seun, since 2007. She is a retired OR and ER RN. Prior to admission, per Seun, she mostly stayed in bed aside to go to the restroom. To do this, she was able to ambulate with a walker. She did require quite a bit of help with her ADLs and Seun states that her functionality has declined further over the past few months and he was no longer able to help her with showering and other ADLs.  Pt also has a daughter in [place] who is coming to visit on Friday.    Consults: pulmonary    Dyspnea: Yes  Last BM: 03/29/22  Pain: Unable to determine  Depression: Unable to determine  Dementia: Unable to Determine    Spiritual:  Is Orthodox or spirituality important for coping with this illness? No  Has a  or spiritual provider visit been requested? No    Palliative Performance Scale: 30%    Advance Directive: None  DPOA: No  POLST: Yes    Code Status: DNR/DNI    Outcome:  0830: Discussed case with MD, updates appreciated. POLST on file reflecting DNR, selective treatment reviewed.     1300: PC RN met with patient's  at bedside. Introduced self and role of palliative care. Seun requested that we talk in the conference room. Conference room obtained and Seun updated RN on the social and medical history. Seun states that patient's cognition has been declining over the past two years and  "her health also seemed to be declining. He states \"she hasn't been real good for quite a while.\" Seun also reports increasing O2 requirements and decreasing appetite. He states \"she flat out won't eat.\" Per Seun, she was deemed to be legally blind a couple of years ago and this seemed to have contributed to her decline.    Pt does not have an advance directive and states that she only ever told Seun that she \"doesn't want to go to an old-folks home.\" Seun does reveal that after becoming legally blind Antonia was no longer able to do the things that she derived leighton from such as going the the casino and doing cross word puzzles. She does still enjoy working and sitting in her garden.     Assessed Seun's understanding of current medical status, overall health picture, and options for future care. Demonstrates a good understanding of the current clinical picture. He understands that she has baseline COPD and underwent a recent thoracentesis. He was also informed that Bambi may have cancer. In regards to cancer treatment he states \"she would never make it through all of that anyway.\"     Seun states that if pt were to be diagnosed with cancer he would want to take her home and focus on her comfort. PC RN introduced the philosophy of hospice at this time. Educated that hospice is a group of caregivers who provide end of life care, focused on remaining quality of life rather than quantity of life. Discussed that the focus switches from disease modifying treatment to management of symptoms such as dyspnea, anxiety, nausea, pain, etc. I counseled that they provide EOL care needs and are available 24/7, though they do not provide 24/7 direct care. Seun does express some concern about his ability to safely provide 24/7 care for her. Discussed the options of private care givers versus group home placement. Seun would like to consider this further.     Seun expressed that hospice would be in line with his goals for Pat. " "PC RN inquired if Seun would like a hospice referral placed at this time. Seun would like to wait until after the thoracentesis results prior to proceeding with a hospice referral. He does state \"I guess I might still need that anyways though.\" Validated that even if thoracentesis does not reveal malignancy a hospice referral would likely still be appropriate. Again discussed the difference in the focus of care from disease modifying treatment to comfort at the end of life. Seun would like to consider this further in the coming days.    Active listening, reflection, reminiscing, validation & normalization, and empathic support utilized throughout this encounter.  All questions answered and contact information provided.     Recommendations: I do not recommend an ethics or hospice consult at this time because GOC discussion is still ongoing.    Updated: ICU RN and MD    Plan: Will continue to monitor clinical picture and support pt/family.     Thank you for allowing Palliative Care to participate in this patient's care. Please feel free to call [phone] with any questions or concerns.  "

## 2022-03-31 LAB
ANION GAP SERPL CALC-SCNC: 13 MMOL/L (ref 7–16)
BACTERIA FLD AEROBE CULT: NORMAL
BASOPHILS # BLD AUTO: 0.1 % (ref 0–1.8)
BASOPHILS # BLD: 0.03 K/UL (ref 0–0.12)
BUN SERPL-MCNC: 17 MG/DL (ref 8–22)
CALCIUM SERPL-MCNC: 9.5 MG/DL (ref 8.5–10.5)
CHLORIDE SERPL-SCNC: 88 MMOL/L (ref 96–112)
CO2 SERPL-SCNC: 31 MMOL/L (ref 20–33)
CREAT SERPL-MCNC: 0.66 MG/DL (ref 0.5–1.4)
EOSINOPHIL # BLD AUTO: 0 K/UL (ref 0–0.51)
EOSINOPHIL NFR BLD: 0 % (ref 0–6.9)
ERYTHROCYTE [DISTWIDTH] IN BLOOD BY AUTOMATED COUNT: 48.6 FL (ref 35.9–50)
GFR SERPLBLD CREATININE-BSD FMLA CKD-EPI: 86 ML/MIN/1.73 M 2
GLUCOSE SERPL-MCNC: 82 MG/DL (ref 65–99)
GRAM STN SPEC: NORMAL
HCT VFR BLD AUTO: 35.6 % (ref 37–47)
HGB BLD-MCNC: 11.9 G/DL (ref 12–16)
IMM GRANULOCYTES # BLD AUTO: 0.14 K/UL (ref 0–0.11)
IMM GRANULOCYTES NFR BLD AUTO: 0.7 % (ref 0–0.9)
LYMPHOCYTES # BLD AUTO: 0.96 K/UL (ref 1–4.8)
LYMPHOCYTES NFR BLD: 4.5 % (ref 22–41)
MCH RBC QN AUTO: 30.7 PG (ref 27–33)
MCHC RBC AUTO-ENTMCNC: 33.4 G/DL (ref 33.6–35)
MCV RBC AUTO: 91.8 FL (ref 81.4–97.8)
MONOCYTES # BLD AUTO: 1.54 K/UL (ref 0–0.85)
MONOCYTES NFR BLD AUTO: 7.2 % (ref 0–13.4)
NEUTROPHILS # BLD AUTO: 18.71 K/UL (ref 2–7.15)
NEUTROPHILS NFR BLD: 87.5 % (ref 44–72)
NRBC # BLD AUTO: 0 K/UL
NRBC BLD-RTO: 0 /100 WBC
PLATELET # BLD AUTO: 275 K/UL (ref 164–446)
PMV BLD AUTO: 11.5 FL (ref 9–12.9)
POTASSIUM SERPL-SCNC: 4.2 MMOL/L (ref 3.6–5.5)
RBC # BLD AUTO: 3.88 M/UL (ref 4.2–5.4)
SIGNIFICANT IND 70042: NORMAL
SITE SITE: NORMAL
SODIUM SERPL-SCNC: 132 MMOL/L (ref 135–145)
SOURCE SOURCE: NORMAL
WBC # BLD AUTO: 21.4 K/UL (ref 4.8–10.8)

## 2022-03-31 PROCEDURE — A9270 NON-COVERED ITEM OR SERVICE: HCPCS | Performed by: STUDENT IN AN ORGANIZED HEALTH CARE EDUCATION/TRAINING PROGRAM

## 2022-03-31 PROCEDURE — 99233 SBSQ HOSP IP/OBS HIGH 50: CPT | Mod: GC | Performed by: INTERNAL MEDICINE

## 2022-03-31 PROCEDURE — 85025 COMPLETE CBC W/AUTO DIFF WBC: CPT

## 2022-03-31 PROCEDURE — 80048 BASIC METABOLIC PNL TOTAL CA: CPT

## 2022-03-31 PROCEDURE — 99232 SBSQ HOSP IP/OBS MODERATE 35: CPT | Performed by: HOSPITALIST

## 2022-03-31 PROCEDURE — 770001 HCHG ROOM/CARE - MED/SURG/GYN PRIV*

## 2022-03-31 PROCEDURE — 700105 HCHG RX REV CODE 258: Performed by: STUDENT IN AN ORGANIZED HEALTH CARE EDUCATION/TRAINING PROGRAM

## 2022-03-31 PROCEDURE — 700111 HCHG RX REV CODE 636 W/ 250 OVERRIDE (IP): Performed by: STUDENT IN AN ORGANIZED HEALTH CARE EDUCATION/TRAINING PROGRAM

## 2022-03-31 PROCEDURE — 700102 HCHG RX REV CODE 250 W/ 637 OVERRIDE(OP): Performed by: STUDENT IN AN ORGANIZED HEALTH CARE EDUCATION/TRAINING PROGRAM

## 2022-03-31 RX ORDER — IPRATROPIUM BROMIDE AND ALBUTEROL SULFATE 2.5; .5 MG/3ML; MG/3ML
3 SOLUTION RESPIRATORY (INHALATION)
Status: DISCONTINUED | OUTPATIENT
Start: 2022-03-31 | End: 2022-04-02

## 2022-03-31 RX ADMIN — CEFEPIME 1 G: 1 INJECTION, POWDER, FOR SOLUTION INTRAMUSCULAR; INTRAVENOUS at 05:12

## 2022-03-31 RX ADMIN — DILTIAZEM HYDROCHLORIDE 180 MG: 180 CAPSULE, COATED, EXTENDED RELEASE ORAL at 05:12

## 2022-03-31 RX ADMIN — CLOPIDOGREL BISULFATE 75 MG: 75 TABLET ORAL at 05:12

## 2022-03-31 RX ADMIN — BUDESONIDE AND FORMOTEROL FUMARATE DIHYDRATE 2 PUFF: 160; 4.5 AEROSOL RESPIRATORY (INHALATION) at 20:35

## 2022-03-31 RX ADMIN — METHYLPREDNISOLONE SODIUM SUCCINATE 40 MG: 40 INJECTION, POWDER, FOR SOLUTION INTRAMUSCULAR; INTRAVENOUS at 05:12

## 2022-03-31 RX ADMIN — LEVOTHYROXINE SODIUM 75 MCG: 0.07 TABLET ORAL at 05:12

## 2022-03-31 ASSESSMENT — ENCOUNTER SYMPTOMS
FEVER: 0
HEMOPTYSIS: 0
DIZZINESS: 0
SPUTUM PRODUCTION: 1
BACK PAIN: 0
PALPITATIONS: 0
ABDOMINAL PAIN: 0
FALLS: 0
NAUSEA: 0
NAUSEA: 1
COUGH: 1
CHILLS: 0
SHORTNESS OF BREATH: 1
VOMITING: 0
MEMORY LOSS: 1
ORTHOPNEA: 0
WEAKNESS: 1
WEIGHT LOSS: 1

## 2022-03-31 ASSESSMENT — PAIN DESCRIPTION - PAIN TYPE
TYPE: ACUTE PAIN

## 2022-03-31 NOTE — CARE PLAN
The patient is Watcher - Medium risk of patient condition declining or worsening    Shift Goals  Clinical Goals: stable VS, improve breathing and decrease O2  Patient Goals: rest, go home  Family Goals: n/a    Problem: Ineffective Airway Clearance  Goal: Patient will maintain patent airway with clear/clearing breath sounds  Outcome: Progressing     Problem: Skin Integrity  Goal: Skin integrity is maintained or improved  Outcome: Progressing     Problem: Fall Risk  Goal: Patient will remain free from falls  Outcome: Progressing

## 2022-03-31 NOTE — PROGRESS NOTES
Pt transferred to [place] [id number] via wheelchair with CCT Michela.  All personal belongings and medication (Symbicort and spacer) sent with pt.   accompanying to new room.

## 2022-03-31 NOTE — PROGRESS NOTES
Pulmonary Progress Note    Date of admission  3/28/2022    Chief Complaint  86 y.o. female admitted 3/28/2022 with SOB and acute on chronic hypoxic respiratory failure    ID: [id number] admitted 3/23/2022 w/ failure to thrive and acute hypoxic respiratory failure w/ worsening LEFT moderate size pleural effusion, s/p thoracentesis w/ 500 cc of cloudy yellow exudative fluid. Clinical picture highly concerning for underlying malignancy w/ atypical cells detected in pleural fluid cytology and new liver lesions on imaging.     Interval Problem Update  Reviewed last 24 hour events:  No acute events overnight. Resting in bed. Fluid cytology w/ atypical cells. Discussed w/ patient and , would like time to make a decision about goals of care moving forward.     Review of Systems  Review of Systems   Constitutional: Negative for chills and fever.   Respiratory: Positive for cough and shortness of breath.    Cardiovascular: Negative for chest pain and palpitations.   Gastrointestinal: Negative for nausea and vomiting.   Musculoskeletal: Negative for back pain and falls.        Vital Signs for last 24 hours   Temp:  [36 °C (96.8 °F)-36.8 °C (98.2 °F)] 36 °C (96.8 °F)  Pulse:  [70-95] 79  Resp:  [9-24] 18  BP: (117-146)/(68-90) 117/75  SpO2:  [84 %-98 %] 96 %    Physical Exam   Physical Exam  Vitals and nursing note reviewed.   Constitutional:       General: She is not in acute distress.     Appearance: She is not toxic-appearing.      Comments: cachectic and very frail appearance.   HENT:      Head: Normocephalic and atraumatic.      Comments: Bitemporal wasting     Nose: Nose normal.      Mouth/Throat:      Mouth: Mucous membranes are moist.   Eyes:      Extraocular Movements: Extraocular movements intact.   Cardiovascular:      Rate and Rhythm: Normal rate and regular rhythm.      Pulses: Normal pulses.      Heart sounds: Normal heart sounds. No murmur heard.    No gallop.   Pulmonary:      Effort: Pulmonary effort is normal.  No respiratory distress.      Breath sounds: No stridor. No wheezing or rales.      Comments: Significantly diminished lung sounds bilaterally  Abdominal:      General: Bowel sounds are normal.   Musculoskeletal:         General: No swelling. Normal range of motion.      Cervical back: Normal range of motion. No rigidity.      Right lower leg: No edema.      Left lower leg: No edema.   Skin:     General: Skin is warm.      Capillary Refill: Capillary refill takes less than 2 seconds.      Findings: No rash.   Neurological:      General: No focal deficit present.      Mental Status: She is alert and oriented to person, place, and time.   Psychiatric:         Mood and Affect: Mood normal.         Medications  Current Facility-Administered Medications   Medication Dose Route Frequency Provider Last Rate Last Admin   • [START ON 4/1/2022] cefTRIAXone (Rocephin) syringe 1 g  1 g Intravenous Q24HRS Umang Jeff M.D.       • ipratropium-albuterol (DUONEB) nebulizer solution  3 mL Nebulization Q4H PRN (RT) Umang Jeff M.D.       • clopidogrel (PLAVIX) tablet 75 mg  75 mg Oral DAILY Taurus Arambula M.D.   75 mg at 03/31/22 0512   • DILTIAZem CD (CARDIZEM CD) capsule 180 mg  180 mg Oral Q DAY Taurus Arambula M.D.   180 mg at 03/31/22 0512   • levothyroxine (SYNTHROID) tablet 75 mcg  75 mcg Oral AM ES Taurus Arambula M.D.   75 mcg at 03/31/22 0512   • pravastatin (PRAVACHOL) tablet 40 mg  40 mg Oral Q EVENING Taurus Arambula M.D.   40 mg at 03/30/22 1733   • budesonide-formoterol (SYMBICORT) 160-4.5 MCG/ACT inhaler 2 Puff  2 Puff Inhalation BID (RT) Taurus Arambula M.D.   2 Puff at 03/29/22 2003   • Respiratory Therapy Consult   Nebulization Continuous RT Taurus Arambula M.D.       • senna-docusate (PERICOLACE or SENOKOT S) 8.6-50 MG per tablet 2 Tablet  2 Tablet Oral BID Taurus Arambula M.D.   2 Tablet at 03/30/22 0520    And   • polyethylene glycol/lytes (MIRALAX) PACKET 1 Packet  1 Packet Oral QDAY PRN  Taurus Arambula M.D.        And   • magnesium hydroxide (MILK OF MAGNESIA) suspension 30 mL  30 mL Oral QDAY PRN Taurus Arambula M.D.        And   • bisacodyl (DULCOLAX) suppository 10 mg  10 mg Rectal QDAY PRN Taurus Arambula M.D.       • heparin injection 5,000 Units  5,000 Units Subcutaneous Q8HRS Taurus Arambula M.D.   5,000 Units at 03/30/22 1733   • acetaminophen (Tylenol) tablet 650 mg  650 mg Oral Q6HRS PRN Taurus Arambula M.D.   650 mg at 03/30/22 1736   • labetalol (NORMODYNE/TRANDATE) injection 10 mg  10 mg Intravenous Q4HRS PRN Taurus Arambula M.D.       • ondansetron (ZOFRAN) syringe/vial injection 4 mg  4 mg Intravenous Q4HRS PRN Taurus Arambula M.D.       • ondansetron (ZOFRAN ODT) dispertab 4 mg  4 mg Oral Q4HRS PRN Taurus Arambula M.D.   4 mg at 03/30/22 1738   • methylPREDNISolone (SOLU-MEDROL) 40 MG injection 40 mg  40 mg Intravenous DAILY Taurus Arambula M.D.   40 mg at 03/31/22 0512       Fluids    Intake/Output Summary (Last 24 hours) at 3/31/2022 1342  Last data filed at 3/31/2022 0600  Gross per 24 hour   Intake 450 ml   Output --   Net 450 ml       Laboratory          Recent Labs     03/28/22 2154 03/30/22 0300 03/31/22  0345   SODIUM 130* 130* 132*   POTASSIUM 4.9 3.9 4.2   CHLORIDE 88* 87* 88*   CO2 25 29 31   BUN 19 18 17   CREATININE 0.81 0.75 0.66   MAGNESIUM 1.8  --   --    PHOSPHORUS 3.9  --   --    CALCIUM 9.2 9.2 9.5     Recent Labs     03/28/22 2154 03/30/22 0300 03/31/22  0345   ALTSGPT 15  --   --    ASTSGOT 39  --   --    ALKPHOSPHAT 107*  --   --    TBILIRUBIN 0.4  --   --    GLUCOSE 107* 99 82     Recent Labs     03/28/22 2154 03/30/22  0300 03/31/22  0345   WBC 18.1* 17.7* 21.4*   NEUTSPOLYS 86.90* 87.90* 87.50*   LYMPHOCYTES 6.00* 3.80* 4.50*   MONOCYTES 6.20 7.40 7.20   EOSINOPHILS 0.10 0.00 0.00   BASOPHILS 0.20 0.20 0.10   ASTSGOT 39  --   --    ALTSGPT 15  --   --    ALKPHOSPHAT 107*  --   --    TBILIRUBIN 0.4  --   --      Recent Labs      03/28/22  2154 03/30/22  0300 03/31/22  0345   RBC 4.00* 3.87* 3.88*   HEMOGLOBIN 12.3 11.8* 11.9*   HEMATOCRIT 37.0 35.4* 35.6*   PLATELETCT 272 262 275       Imaging  CT:    Reviewed    Assessment/Plan  Acute on chronic respiratory failure with hypoxia (HCC)- (present on admission)  Assessment & Plan  Patient w/ COPD admitted w/ bilateral pleural effusions, exudative by Lights criteria, highly concerning for infectious vs malignant etiology w/ CT chest findings of multiple liver lesions w/ concomitant pulmonary nodule and LAD.  -Pleural fluid cytology w/ atypical cells, highly concerning for cancer  -Hold off thoracentesis; high risk of significant patient discomfort and pain; high oxygen requirements at this time however, patient is breathing comfortably  -Follow up patient goals of care in 24hrs as they requested  -Continue abx, Cefepime  -continue titration of oxygen to keep sats 88-92%, getting close to home dose  -continue with duonebs q 4 hr prn

## 2022-03-31 NOTE — PROGRESS NOTES
Pt and her  at bedside are refusing 2 RN skin check, d/t pain an inconvenience. Patient is soon to transfer to comfort care/hospice soon.

## 2022-03-31 NOTE — DOCUMENTATION QUERY
DOCUMENTATION QUERY    PROVIDERS: Please select “Cosign w/ note”to reply to query.    To better represent the severity of illness of your patient, please review the following information and exercise your independent professional judgment in responding to this query.     The documentation states that the patient is being treated for CKD during the hospital stay. Please specify the stage of the CKD.      • Chronic Kidney Disease Stage I      • Chronic Kidney Disease Stage II     • Chronic Kidney Disease Stage III    • Chronic Kidney Disease Stage IV    • Chronic Kidney Disease Stage V     • End Stage Renal Disease  • Other explanation of clinical findings  • Unable to determine           The medical record reflects the following:   Clinical Findings         • Stage 2 chronic kidney disease 12/10/2019     Component Ref Range & Units 03:45 1 d ago 3 d ago 3 d ago 7 d ago 9 d ago 10 d ago     Creatinine 0.50 - 1.40 mg/dL 0.66  0.75  0.81  0.70  0.56  0.74  0.69         Treatment 1200 ml fluid restriction   Risk Factors Hypertension, hyponatremia, hypokalemia, severe malnutrition   Location within medical record  discharge planning 03/28, labs     Thank You,   [email or web address]

## 2022-03-31 NOTE — PROGRESS NOTES
[facility] Medicine Daily Progress Note    Date of Service  3/30/2022    Chief Complaint  Antonia Stover is a 86 y.o. female admitted 3/28/2022 with shortness of breath    Hospital Course  Ms Stover has past medical history that includes COPD with chronic respiratory failure, hypertension, hypothyroidism, failure to thrive.  [facility].  Prior to transfer to the acute hospital she had had thoracentesis with 500 mL of cloudy fluid drained.  Patient had severe respiratory failure in the emergency room requiring nonrebreather.  She has been to the [facility] and treated for COPD exacerbation and pneumonia.      Interval Problem Update  Patient is more talkative today, she says that her breathing is better  She asked about the results of thoracentesis, we discussed that pathology is pending  She describes lack of appetite and unintentional weight loss, mild nausea, no vomiting or abdominal pain    I have personally seen and examined the patient at bedside. I discussed the plan of care with patient, family and pulmonary.    Consultants/Specialty  pulmonary    Code Status  DNAR/DNI    Disposition  Patient is not medically cleared for discharge.   Anticipate discharge to to skilled nursing facility.  I have placed the appropriate orders for post-discharge needs.    Review of Systems  Review of Systems   Constitutional: Positive for malaise/fatigue and weight loss. Negative for chills.   Respiratory: Positive for cough, sputum production and shortness of breath. Negative for hemoptysis.    Cardiovascular: Negative for chest pain, palpitations and orthopnea.   Gastrointestinal: Positive for nausea. Negative for abdominal pain.   Skin: Negative for itching and rash.   Neurological: Positive for weakness. Negative for dizziness.   All other systems reviewed and are negative.       Physical Exam  Temp:  [36.1 °C (96.9 °F)-36.8 °C (98.2 °F)] 36.7 °C (98.1  °F)  Pulse:  [] 86  Resp:  [9-24] 20  BP: (115-146)/(57-85) 146/85  SpO2:  [77 %-98 %] 94 %    Physical Exam  Constitutional:       General: She is not in acute distress.     Appearance: She is ill-appearing. She is not toxic-appearing.      Comments: Cachectic appearing   HENT:      Head: Normocephalic and atraumatic.      Right Ear: External ear normal.      Left Ear: External ear normal.      Nose: Nose normal.      Mouth/Throat:      Mouth: Mucous membranes are moist.      Pharynx: Oropharynx is clear.   Eyes:      Extraocular Movements: Extraocular movements intact.      Conjunctiva/sclera: Conjunctivae normal.      Pupils: Pupils are equal, round, and reactive to light.   Cardiovascular:      Rate and Rhythm: Normal rate and regular rhythm.      Pulses: Normal pulses.   Pulmonary:      Effort: Pulmonary effort is normal. No respiratory distress.      Breath sounds: Normal breath sounds. No wheezing.      Comments: Crackles at the bases, no wheezing  Abdominal:      General: Abdomen is flat. Bowel sounds are normal. There is no distension.      Palpations: Abdomen is soft.   Musculoskeletal:         General: Normal range of motion.      Cervical back: Normal range of motion and neck supple.   Skin:     General: Skin is warm and dry.      Capillary Refill: Capillary refill takes less than 2 seconds.      Coloration: Skin is not jaundiced.   Neurological:      General: No focal deficit present.      Mental Status: She is alert and oriented to person, place, and time.      Cranial Nerves: No cranial nerve deficit.   Psychiatric:         Mood and Affect: Mood normal.         Behavior: Behavior normal.         Fluids    Intake/Output Summary (Last 24 hours) at 3/30/2022 1746  Last data filed at 3/30/2022 1600  Gross per 24 hour   Intake 250 ml   Output 610 ml   Net -360 ml       Laboratory  Recent Labs     03/28/22  0549 03/28/22  2154 03/30/22  0300   WBC 21.9* 18.1* 17.7*   RBC 4.68 4.00* 3.87*   HEMOGLOBIN  14.4 12.3 11.8*   HEMATOCRIT 43.7 37.0 35.4*   MCV 93.4 92.5 91.5   MCH 30.8 30.8 30.5   MCHC 33.0* 33.2* 33.3*   RDW 50.5* 49.4 48.7   PLATELETCT 299 272 262   MPV 11.4 11.3 11.6     Recent Labs     03/28/22  0549 03/28/22  2154 03/30/22  0300   SODIUM 130* 130* 130*   POTASSIUM 4.8 4.9 3.9   CHLORIDE 88* 88* 87*   CO2 27 25 29   GLUCOSE 66 107* 99   BUN 17 19 18   CREATININE 0.70 0.81 0.75   CALCIUM 9.6 9.2 9.2                   Imaging  EC-ECHOCARDIOGRAM COMPLETE W/O CONT   Final Result      CT-CHEST (THORAX) W/O   Final Result      1.  Moderate right and small-to-moderate left pleural effusions. Compressive atelectasis and/or consolidation in the lower lobes, greater on the left. Correlate clinically for infection.   2.  Emphysematous changes.   3.  Multiple hypodense lesions within the liver which are new from prior study and consistent with metastatic disease.   4.  Indeterminate 4 mm left upper lobe pulmonary nodule. Attention on follow-up.   5.  Borderline enlarged mediastinal lymph nodes which are slightly more prominent than on prior.      Fleischner Society pulmonary nodule recommendations:   Low Risk: No routine follow-up      High Risk: Optional CT at 12 months      Comments: Use most suspicious nodule as guide to management. Follow-up intervals may vary according to size and risk.      Low Risk - Minimal or absent history of smoking and of other known risk factors.      High Risk - History of smoking or of other known risk factors.      Note: These recommendations do not apply to lung cancer screening, patients with immunosuppression, or patients with known primary cancer.      Fleischner Society 2017 Guidelines for Management of Incidentally Detected Pulmonary Nodules in Adults         DX-CHEST-PORTABLE (1 VIEW)   Final Result         1.  Pulmonary edema and/or infiltrates, similar to prior study.   2.  Layering bilateral pleural effusions, similar to prior study.   3.  Cardiomegaly   4.   Atherosclerosis      DX-CHEST-PORTABLE (1 VIEW)   Final Result         1.  Pulmonary edema and/or infiltrates, similar to prior study.   2.  Layering bilateral pleural effusions, similar to prior study.   3.  Previously visualized left apical pneumothorax is not well appreciated on the current study.   4.  Cardiomegaly   5.  Atherosclerosis           Assessment/Plan  * COPD exacerbation, FTT/severe malnutrition (HCC)- (present on admission)  Assessment & Plan  Improved oxygenation status, she is maintaining saturations on simple mask  Continue Solu-Medrol  Supplemental oxygen      Pleural effusion- (present on admission)  Assessment & Plan  Bilateral pleural effusions, underwent left thoracentesis on morning of presentation, 500 cc cloudy fluid removed  Continue antibiotics  Await  final cytology results    Severe protein-calorie malnutrition (HCC)- (present on admission)  Assessment & Plan  BMI 13.1 on admission, supplements ordered, nutrition consult.  PT OT      Acute on chronic respiratory failure with hypoxia (HCC)- (present on admission)  Assessment & Plan  Multifactorial: COPD on 2L NC at baseline, pneumonia, pleural effusion  Continue antibiotics  Improved with thoracentesis/treatment    CT scan of the chest 3/29/2022, results reviewed:  1.  Moderate right and small-to-moderate left pleural effusions. Compressive atelectasis and/or consolidation in the lower lobes, greater on the left. Correlate clinically for infection.  2.  Emphysematous changes.  3.  Multiple hypodense lesions within the liver which are new from prior study and consistent with metastatic disease.  4.  Indeterminate 4 mm left upper lobe pulmonary nodule. Attention on follow-up.  5.  Borderline enlarged mediastinal lymph nodes which are slightly more prominent than on prior.       Failure to thrive in adult- (present on admission)  Assessment & Plan  BMI 13.1 on admission, supplements ordered, nutrition consult.  PT/OT  Appreciate  palliative care consult  Patient and  wish wait for pathology results before making further decisions about goals of care    Essential hypertension- (present on admission)  Assessment & Plan  Diltiazem    Hypothyroidism- (present on admission)  Assessment & Plan  Synthroid    Dyslipidemia- (present on admission)  Assessment & Plan  Pravastatin    PSVT (paroxysmal supraventricular tachycardia) (HCC)- (present on admission)  Assessment & Plan  Sinus rhythm with a rate of 69 admission EKG.  continue home diltiazem.       VTE prophylaxis: heparin ppx

## 2022-03-31 NOTE — PROGRESS NOTES
[facility] Medicine Daily Progress Note    Date of Service  3/31/2022    Chief Complaint  Antonia Stover is a 86 y.o. female admitted 3/28/2022 with shortness of breath    Hospital Course  Ms Stover has past medical history that includes COPD with chronic respiratory failure, hypertension, hypothyroidism, failure to thrive.  [facility].  Prior to transfer to the acute hospital she had had thoracentesis with 500 mL of cloudy fluid drained.  Patient had severe respiratory failure in the emergency room requiring nonrebreather.  She has been to the [facility] and treated for COPD exacerbation and pneumonia.      Interval Problem Update  Some confusion overnight, and patient is oriented this morning  Breathing feels better than admission, she feels very weak, does not feel like eating, does not complain of specific pain but feels overall uncomfortable  Met with the patient and her , we discussed results of CT scan and current information about cytology from thoracentesis, I made recommendations for hospice based upon the patient's overall poor functional status and suspected metastatic cancer.  They were open to this option would like to know final pathology results and would like to discuss with her daughter who is flying in tomorrow.    I have personally seen and examined the patient at bedside. I discussed the plan of care with patient, family and pulmonary.    Consultants/Specialty  pulmonary    Code Status  DNAR/DNI    Disposition  Patient is not medically cleared for discharge.   Anticipate discharge to to skilled nursing facility.  I have placed the appropriate orders for post-discharge needs.    Review of Systems  Review of Systems   Constitutional: Positive for malaise/fatigue and weight loss. Negative for chills.   Respiratory: Positive for cough and sputum production. Negative for hemoptysis.    Cardiovascular: Negative for chest  pain, palpitations and orthopnea.   Gastrointestinal: Positive for nausea. Negative for abdominal pain.   Skin: Negative for itching and rash.   Neurological: Positive for weakness. Negative for dizziness.   Psychiatric/Behavioral: Positive for memory loss.   All other systems reviewed and are negative.       Physical Exam  Temp:  [36.1 °C (97 °F)-36.8 °C (98.2 °F)] 36.1 °C (97 °F)  Pulse:  [70-95] 77  Resp:  [9-24] 22  BP: (132-146)/(68-90) 134/72  SpO2:  [84 %-98 %] 97 %    Physical Exam  Constitutional:       General: She is not in acute distress.     Appearance: She is ill-appearing and toxic-appearing.      Comments: Cachectic appearing   HENT:      Head: Normocephalic and atraumatic.      Right Ear: External ear normal.      Left Ear: External ear normal.      Nose: Nose normal.      Mouth/Throat:      Mouth: Mucous membranes are moist.      Pharynx: Oropharynx is clear.   Eyes:      Extraocular Movements: Extraocular movements intact.      Conjunctiva/sclera: Conjunctivae normal.      Pupils: Pupils are equal, round, and reactive to light.   Cardiovascular:      Rate and Rhythm: Normal rate and regular rhythm.      Pulses: Normal pulses.   Pulmonary:      Effort: Pulmonary effort is normal. No respiratory distress.      Breath sounds: No wheezing.      Comments: Crackles at the bases, no wheezing  Abdominal:      General: Abdomen is flat. Bowel sounds are normal. There is no distension.      Palpations: Abdomen is soft.      Tenderness: There is no abdominal tenderness. There is no guarding or rebound.   Musculoskeletal:         General: Normal range of motion.      Cervical back: Normal range of motion and neck supple.   Skin:     General: Skin is warm and dry.      Capillary Refill: Capillary refill takes less than 2 seconds.      Coloration: Skin is not jaundiced.   Neurological:      General: No focal deficit present.      Mental Status: She is alert and oriented to person, place, and time.      Cranial  Nerves: No cranial nerve deficit.      Motor: Weakness present.   Psychiatric:         Mood and Affect: Mood normal.         Behavior: Behavior normal.         Fluids    Intake/Output Summary (Last 24 hours) at 3/31/2022 1226  Last data filed at 3/31/2022 0600  Gross per 24 hour   Intake 450 ml   Output --   Net 450 ml       Laboratory  Recent Labs     03/28/22 2154 03/30/22 0300 03/31/22  0345   WBC 18.1* 17.7* 21.4*   RBC 4.00* 3.87* 3.88*   HEMOGLOBIN 12.3 11.8* 11.9*   HEMATOCRIT 37.0 35.4* 35.6*   MCV 92.5 91.5 91.8   MCH 30.8 30.5 30.7   MCHC 33.2* 33.3* 33.4*   RDW 49.4 48.7 48.6   PLATELETCT 272 262 275   MPV 11.3 11.6 11.5     Recent Labs     03/28/22 2154 03/30/22 0300 03/31/22  0345   SODIUM 130* 130* 132*   POTASSIUM 4.9 3.9 4.2   CHLORIDE 88* 87* 88*   CO2 25 29 31   GLUCOSE 107* 99 82   BUN 19 18 17   CREATININE 0.81 0.75 0.66   CALCIUM 9.2 9.2 9.5                   Imaging  EC-ECHOCARDIOGRAM COMPLETE W/O CONT   Final Result      CT-CHEST (THORAX) W/O   Final Result      1.  Moderate right and small-to-moderate left pleural effusions. Compressive atelectasis and/or consolidation in the lower lobes, greater on the left. Correlate clinically for infection.   2.  Emphysematous changes.   3.  Multiple hypodense lesions within the liver which are new from prior study and consistent with metastatic disease.   4.  Indeterminate 4 mm left upper lobe pulmonary nodule. Attention on follow-up.   5.  Borderline enlarged mediastinal lymph nodes which are slightly more prominent than on prior.      Fleischner Society pulmonary nodule recommendations:   Low Risk: No routine follow-up      High Risk: Optional CT at 12 months      Comments: Use most suspicious nodule as guide to management. Follow-up intervals may vary according to size and risk.      Low Risk - Minimal or absent history of smoking and of other known risk factors.      High Risk - History of smoking or of other known risk factors.      Note: These  recommendations do not apply to lung cancer screening, patients with immunosuppression, or patients with known primary cancer.      Fleischner Society 2017 Guidelines for Management of Incidentally Detected Pulmonary Nodules in Adults         DX-CHEST-PORTABLE (1 VIEW)   Final Result         1.  Pulmonary edema and/or infiltrates, similar to prior study.   2.  Layering bilateral pleural effusions, similar to prior study.   3.  Cardiomegaly   4.  Atherosclerosis      DX-CHEST-PORTABLE (1 VIEW)   Final Result         1.  Pulmonary edema and/or infiltrates, similar to prior study.   2.  Layering bilateral pleural effusions, similar to prior study.   3.  Previously visualized left apical pneumothorax is not well appreciated on the current study.   4.  Cardiomegaly   5.  Atherosclerosis           Assessment/Plan  * COPD exacerbation, FTT/severe malnutrition (HCC)- (present on admission)  Assessment & Plan  Improved oxygenation status, she is maintaining saturations on simple mask  Continue Solu-Medrol  Supplemental oxygen      Pleural effusion- (present on admission)  Assessment & Plan  Bilateral pleural effusions, underwent left thoracentesis on morning of presentation, 500 cc cloudy fluid removed  Continue antibiotics  Await  final cytology results, it is noted that she has atypical cells     Severe protein-calorie malnutrition (HCC)- (present on admission)  Assessment & Plan  BMI 13.1 on admission, supplements ordered, nutrition consult.  PT OT      Acute on chronic respiratory failure with hypoxia (HCC)- (present on admission)  Assessment & Plan  Multifactorial: COPD on 2L NC at baseline, pneumonia, pleural effusion  Continue antibiotics  Somewhat improved with thoracentesis and treatment  Ongoing goals of care discussion    Failure to thrive in adult- (present on admission)  Assessment & Plan  BMI 13.1 on admission, supplements ordered, nutrition consult.  I discussed my concerns for metastatic cancer with the  patient and her , and the patient's overall poor functional status, made recommendations for hospice care  Patient's daughter is flying in from out of town tomorrow, we will arrange a family meeting with myself and palliative care    Essential hypertension- (present on admission)  Assessment & Plan  Diltiazem    Hypothyroidism- (present on admission)  Assessment & Plan  Synthroid    Dyslipidemia- (present on admission)  Assessment & Plan  Pravastatin    PSVT (paroxysmal supraventricular tachycardia) (HCC)- (present on admission)  Assessment & Plan  Sinus rhythm with a rate of 69 admission EKG.  continue home diltiazem.       VTE prophylaxis: heparin ppx

## 2022-03-31 NOTE — PROGRESS NOTES
Dr. Jeff at bedside and discussed plan of care moving forward with  and patient regarding hospice care after CT results reviewed.  Pt and  agree to review this again tomorrow when daughter arrives from [place].  Pt lucid during conversation and answering appropriately.  Pt continues to have poor po intake despite encouragement from staff and .  Maintaining 02 saturations with oxymask in place, desaturates to 70's without.

## 2022-03-31 NOTE — PROGRESS NOTES
Report called to FELECIA Haider for patient transfer to [place] [id number].  Pt and family aware of transfer out if intermediate care unit and agree.

## 2022-03-31 NOTE — PROGRESS NOTES
12 hour chart check    Have a great shift!      MS    SR/ST 80-110s up to 130 when EOB  oPAC  oPVC

## 2022-03-31 NOTE — PROGRESS NOTES
Bedside report received and patient care assumed. Pt is resting in bed, A&O to self, with no complaints of pain, and is on 10L Oxymask.  All fall precautions are in place, belongings at bedside table.  Pt was updated on POC, no questions or concerns. Pt educated on use of call light for assistance. Will continue to monitor.

## 2022-04-01 ENCOUNTER — HOSPICE ADMISSION (OUTPATIENT)
Dept: HOSPICE | Facility: HOSPICE | Age: 87
End: 2022-04-01
Payer: MEDICARE

## 2022-04-01 ENCOUNTER — HOME CARE VISIT (OUTPATIENT)
Dept: HOSPICE | Facility: HOSPICE | Age: 87
End: 2022-04-01
Payer: MEDICARE

## 2022-04-01 LAB
ANION GAP SERPL CALC-SCNC: 12 MMOL/L (ref 7–16)
BASOPHILS # BLD AUTO: 0.1 % (ref 0–1.8)
BASOPHILS # BLD: 0.02 K/UL (ref 0–0.12)
BUN SERPL-MCNC: 21 MG/DL (ref 8–22)
CALCIUM SERPL-MCNC: 9.3 MG/DL (ref 8.5–10.5)
CHLORIDE SERPL-SCNC: 91 MMOL/L (ref 96–112)
CO2 SERPL-SCNC: 29 MMOL/L (ref 20–33)
CREAT SERPL-MCNC: 0.71 MG/DL (ref 0.5–1.4)
EOSINOPHIL # BLD AUTO: 0.01 K/UL (ref 0–0.51)
EOSINOPHIL NFR BLD: 0 % (ref 0–6.9)
ERYTHROCYTE [DISTWIDTH] IN BLOOD BY AUTOMATED COUNT: 50.2 FL (ref 35.9–50)
GFR SERPLBLD CREATININE-BSD FMLA CKD-EPI: 83 ML/MIN/1.73 M 2
GLUCOSE SERPL-MCNC: 83 MG/DL (ref 65–99)
HCT VFR BLD AUTO: 36.3 % (ref 37–47)
HGB BLD-MCNC: 11.8 G/DL (ref 12–16)
IMM GRANULOCYTES # BLD AUTO: 0.18 K/UL (ref 0–0.11)
IMM GRANULOCYTES NFR BLD AUTO: 0.9 % (ref 0–0.9)
LYMPHOCYTES # BLD AUTO: 1 K/UL (ref 1–4.8)
LYMPHOCYTES NFR BLD: 4.9 % (ref 22–41)
MCH RBC QN AUTO: 30.3 PG (ref 27–33)
MCHC RBC AUTO-ENTMCNC: 32.5 G/DL (ref 33.6–35)
MCV RBC AUTO: 93.1 FL (ref 81.4–97.8)
MONOCYTES # BLD AUTO: 1.12 K/UL (ref 0–0.85)
MONOCYTES NFR BLD AUTO: 5.5 % (ref 0–13.4)
NEUTROPHILS # BLD AUTO: 18.06 K/UL (ref 2–7.15)
NEUTROPHILS NFR BLD: 88.6 % (ref 44–72)
NRBC # BLD AUTO: 0 K/UL
NRBC BLD-RTO: 0 /100 WBC
PLATELET # BLD AUTO: 257 K/UL (ref 164–446)
PMV BLD AUTO: 11.5 FL (ref 9–12.9)
POTASSIUM SERPL-SCNC: 4.1 MMOL/L (ref 3.6–5.5)
RBC # BLD AUTO: 3.9 M/UL (ref 4.2–5.4)
SODIUM SERPL-SCNC: 132 MMOL/L (ref 135–145)
WBC # BLD AUTO: 20.4 K/UL (ref 4.8–10.8)

## 2022-04-01 PROCEDURE — 99233 SBSQ HOSP IP/OBS HIGH 50: CPT | Mod: GC | Performed by: INTERNAL MEDICINE

## 2022-04-01 PROCEDURE — 99233 SBSQ HOSP IP/OBS HIGH 50: CPT | Performed by: HOSPITALIST

## 2022-04-01 PROCEDURE — 770001 HCHG ROOM/CARE - MED/SURG/GYN PRIV*

## 2022-04-01 PROCEDURE — 80048 BASIC METABOLIC PNL TOTAL CA: CPT

## 2022-04-01 PROCEDURE — 85025 COMPLETE CBC W/AUTO DIFF WBC: CPT

## 2022-04-01 PROCEDURE — A9270 NON-COVERED ITEM OR SERVICE: HCPCS | Performed by: STUDENT IN AN ORGANIZED HEALTH CARE EDUCATION/TRAINING PROGRAM

## 2022-04-01 PROCEDURE — 700111 HCHG RX REV CODE 636 W/ 250 OVERRIDE (IP): Performed by: HOSPITALIST

## 2022-04-01 PROCEDURE — 700101 HCHG RX REV CODE 250: Performed by: HOSPITALIST

## 2022-04-01 PROCEDURE — 700111 HCHG RX REV CODE 636 W/ 250 OVERRIDE (IP): Performed by: STUDENT IN AN ORGANIZED HEALTH CARE EDUCATION/TRAINING PROGRAM

## 2022-04-01 PROCEDURE — 700102 HCHG RX REV CODE 250 W/ 637 OVERRIDE(OP): Performed by: STUDENT IN AN ORGANIZED HEALTH CARE EDUCATION/TRAINING PROGRAM

## 2022-04-01 PROCEDURE — 36415 COLL VENOUS BLD VENIPUNCTURE: CPT

## 2022-04-01 RX ORDER — MORPHINE SULFATE 10 MG/5ML
2.5 SOLUTION ORAL
Status: DISCONTINUED | OUTPATIENT
Start: 2022-04-01 | End: 2022-04-07 | Stop reason: HOSPADM

## 2022-04-01 RX ORDER — ACETAMINOPHEN 650 MG/1
650 SUPPOSITORY RECTAL EVERY 4 HOURS PRN
Status: DISCONTINUED | OUTPATIENT
Start: 2022-04-01 | End: 2022-04-07 | Stop reason: HOSPADM

## 2022-04-01 RX ORDER — ACETAMINOPHEN 325 MG/1
650 TABLET ORAL EVERY 4 HOURS PRN
Status: DISCONTINUED | OUTPATIENT
Start: 2022-04-01 | End: 2022-04-07 | Stop reason: HOSPADM

## 2022-04-01 RX ORDER — MORPHINE SULFATE 4 MG/ML
2 INJECTION INTRAVENOUS
Status: DISCONTINUED | OUTPATIENT
Start: 2022-04-01 | End: 2022-04-02

## 2022-04-01 RX ORDER — ATROPINE SULFATE 10 MG/ML
2 SOLUTION/ DROPS OPHTHALMIC EVERY 4 HOURS PRN
Status: DISCONTINUED | OUTPATIENT
Start: 2022-04-01 | End: 2022-04-07 | Stop reason: HOSPADM

## 2022-04-01 RX ADMIN — CLOPIDOGREL BISULFATE 75 MG: 75 TABLET ORAL at 05:45

## 2022-04-01 RX ADMIN — SENNOSIDES AND DOCUSATE SODIUM 2 TABLET: 50; 8.6 TABLET ORAL at 05:45

## 2022-04-01 RX ADMIN — ACETAMINOPHEN 650 MG: 325 TABLET, FILM COATED ORAL at 13:25

## 2022-04-01 RX ADMIN — MORPHINE SULFATE 2 MG: 4 INJECTION INTRAVENOUS at 16:05

## 2022-04-01 RX ADMIN — CEFTRIAXONE SODIUM 1 G: 1 INJECTION, POWDER, FOR SOLUTION INTRAMUSCULAR; INTRAVENOUS at 05:45

## 2022-04-01 RX ADMIN — METHYLPREDNISOLONE SODIUM SUCCINATE 40 MG: 40 INJECTION, POWDER, FOR SOLUTION INTRAMUSCULAR; INTRAVENOUS at 05:45

## 2022-04-01 RX ADMIN — LEVOTHYROXINE SODIUM 75 MCG: 0.07 TABLET ORAL at 05:45

## 2022-04-01 ASSESSMENT — ENCOUNTER SYMPTOMS
BACK PAIN: 1
FALLS: 0
CHILLS: 0
ORTHOPNEA: 0
WEAKNESS: 1
ABDOMINAL PAIN: 0
VOMITING: 0
NAUSEA: 1
WEIGHT LOSS: 1
SHORTNESS OF BREATH: 0
COUGH: 0
MEMORY LOSS: 1
HEMOPTYSIS: 0
COUGH: 1
NAUSEA: 0
SPUTUM PRODUCTION: 1
FEVER: 0
PALPITATIONS: 0
DIZZINESS: 0

## 2022-04-01 ASSESSMENT — PAIN SCALES - PAIN ASSESSMENT IN ADVANCED DEMENTIA (PAINAD)
BREATHING: NORMAL
BODYLANGUAGE: RELAXED
TOTALSCORE: 0
CONSOLABILITY: NO NEED TO CONSOLE
FACIALEXPRESSION: SMILING OR INEXPRESSIVE

## 2022-04-01 ASSESSMENT — PAIN SCALES - WONG BAKER: WONGBAKER_NUMERICALRESPONSE: DOESN'T HURT AT ALL

## 2022-04-01 ASSESSMENT — PAIN DESCRIPTION - PAIN TYPE
TYPE: ACUTE PAIN
TYPE: CHRONIC PAIN

## 2022-04-01 NOTE — PROGRESS NOTES
Hospital Medicine Daily Progress Note    Date of Service  4/1/2022    Chief Complaint  Antonia Stover is a 86 y.o. female admitted 3/28/2022 with shortness of breath    Hospital Course  Ms Stover has past medical history that includes COPD with chronic respiratory failure, hypertension, hypothyroidism, failure to thrive.  [facility].  Prior to transfer to the acute hospital she had had thoracentesis with 500 mL of cloudy fluid drained.  Patient had severe respiratory failure in the emergency room requiring nonrebreather.  She has been to the [facility] and treated for COPD exacerbation and pneumonia.      Interval Problem Update  On family meeting, patient,  Lopezp, daughter Eun, and granddaughter at bedside  Discussed my concerns for poor prognosis  Antonia does have some confusion although she consistently expresses desire for comfort, scalp and lower are in agreement that comfort measures are appropriate    Total visit time = 40 minutes; more than 50% spent counseling/coordinating care including discussion of goals of care and comfort measures as described above    I have personally seen and examined the patient at bedside. I discussed the plan of care with patient, family and pulmonary.    Consultants/Specialty  pulmonary    Code Status  Comfort Care/DNR    Disposition  Patient is not medically cleared for discharge.   Anticipate discharge to to skilled nursing facility.  I have placed the appropriate orders for post-discharge needs.    Review of Systems  Review of Systems   Constitutional: Positive for malaise/fatigue and weight loss. Negative for chills.   Respiratory: Positive for cough and sputum production. Negative for hemoptysis.    Cardiovascular: Negative for chest pain, palpitations and orthopnea.   Gastrointestinal: Positive for nausea. Negative for abdominal pain.   Musculoskeletal: Positive for back pain and joint pain.    Skin: Negative for itching and rash.   Neurological: Positive for weakness. Negative for dizziness.   Psychiatric/Behavioral: Positive for memory loss.   All other systems reviewed and are negative.       Physical Exam  Temp:  [36.2 °C (97.1 °F)-36.3 °C (97.3 °F)] 36.2 °C (97.1 °F)  Pulse:  [75-88] 85  Resp:  [15-19] 15  BP: (103-119)/(56-71) 119/69  SpO2:  [95 %-96 %] 96 %    Physical Exam  Constitutional:       General: She is not in acute distress.     Appearance: She is ill-appearing and toxic-appearing.      Comments: Cachectic appearing   HENT:      Head: Normocephalic and atraumatic.      Right Ear: External ear normal.      Left Ear: External ear normal.      Mouth/Throat:      Mouth: Mucous membranes are moist.      Pharynx: Oropharynx is clear.   Eyes:      Extraocular Movements: Extraocular movements intact.      Conjunctiva/sclera: Conjunctivae normal.      Pupils: Pupils are equal, round, and reactive to light.   Cardiovascular:      Rate and Rhythm: Normal rate and regular rhythm.      Pulses: Normal pulses.   Pulmonary:      Effort: Pulmonary effort is normal. No respiratory distress.      Breath sounds: Rales present. No wheezing.      Comments: Crackles at the bases, no wheezing  Abdominal:      General: Abdomen is flat. Bowel sounds are normal. There is no distension.      Palpations: Abdomen is soft.      Tenderness: There is no abdominal tenderness. There is no guarding or rebound.   Musculoskeletal:         General: Normal range of motion.      Cervical back: Normal range of motion and neck supple.   Skin:     General: Skin is warm and dry.      Capillary Refill: Capillary refill takes less than 2 seconds.      Coloration: Skin is not jaundiced.   Neurological:      General: No focal deficit present.      Mental Status: She is alert and oriented to person, place, and time.      Cranial Nerves: No cranial nerve deficit.      Motor: Weakness present.   Psychiatric:         Mood and Affect: Mood  normal.         Behavior: Behavior normal.         Fluids    Intake/Output Summary (Last 24 hours) at 4/1/2022 1654  Last data filed at 4/1/2022 1030  Gross per 24 hour   Intake 60 ml   Output --   Net 60 ml       Laboratory  Recent Labs     03/30/22 0300 03/31/22 0345 04/01/22  0420   WBC 17.7* 21.4* 20.4*   RBC 3.87* 3.88* 3.90*   HEMOGLOBIN 11.8* 11.9* 11.8*   HEMATOCRIT 35.4* 35.6* 36.3*   MCV 91.5 91.8 93.1   MCH 30.5 30.7 30.3   MCHC 33.3* 33.4* 32.5*   RDW 48.7 48.6 50.2*   PLATELETCT 262 275 257   MPV 11.6 11.5 11.5     Recent Labs     03/30/22 0300 03/31/22 0345 04/01/22  0420   SODIUM 130* 132* 132*   POTASSIUM 3.9 4.2 4.1   CHLORIDE 87* 88* 91*   CO2 29 31 29   GLUCOSE 99 82 83   BUN 18 17 21   CREATININE 0.75 0.66 0.71   CALCIUM 9.2 9.5 9.3                   Imaging  EC-ECHOCARDIOGRAM COMPLETE W/O CONT   Final Result      CT-CHEST (THORAX) W/O   Final Result      1.  Moderate right and small-to-moderate left pleural effusions. Compressive atelectasis and/or consolidation in the lower lobes, greater on the left. Correlate clinically for infection.   2.  Emphysematous changes.   3.  Multiple hypodense lesions within the liver which are new from prior study and consistent with metastatic disease.   4.  Indeterminate 4 mm left upper lobe pulmonary nodule. Attention on follow-up.   5.  Borderline enlarged mediastinal lymph nodes which are slightly more prominent than on prior.      Fleischner Society pulmonary nodule recommendations:   Low Risk: No routine follow-up      High Risk: Optional CT at 12 months      Comments: Use most suspicious nodule as guide to management. Follow-up intervals may vary according to size and risk.      Low Risk - Minimal or absent history of smoking and of other known risk factors.      High Risk - History of smoking or of other known risk factors.      Note: These recommendations do not apply to lung cancer screening, patients with immunosuppression, or patients with known  primary cancer.      Fleischner Society 2017 Guidelines for Management of Incidentally Detected Pulmonary Nodules in Adults         DX-CHEST-PORTABLE (1 VIEW)   Final Result         1.  Pulmonary edema and/or infiltrates, similar to prior study.   2.  Layering bilateral pleural effusions, similar to prior study.   3.  Cardiomegaly   4.  Atherosclerosis      DX-CHEST-PORTABLE (1 VIEW)   Final Result         1.  Pulmonary edema and/or infiltrates, similar to prior study.   2.  Layering bilateral pleural effusions, similar to prior study.   3.  Previously visualized left apical pneumothorax is not well appreciated on the current study.   4.  Cardiomegaly   5.  Atherosclerosis           Assessment/Plan  * Acute on chronic respiratory failure with hypoxia (HCC)- (present on admission)  Assessment & Plan  Multifactorial: COPD on 2L NC at baseline, pneumonia, pleural effusion, suspected malignancy  Transitioned to comfort care after family meeting today      Pleural effusion- (present on admission)  Assessment & Plan  Concern for malignant effusion, atypical cells are identified  Final pathology is pending    Severe protein-calorie malnutrition (HCC)- (present on admission)  Assessment & Plan  BMI 13.1 on admission    Failure to thrive in adult- (present on admission)  Assessment & Plan  BMI 13.1 on admission, supplements ordered, nutrition consult.  Family meeting today with patient's , daughter, and granddaughter present  Comfort focused care    COPD exacerbation, FTT/severe malnutrition (HCC)- (present on admission)  Assessment & Plan  Comfort care      Essential hypertension- (present on admission)  Assessment & Plan  Diltiazem    Hypothyroidism- (present on admission)  Assessment & Plan  Synthroid    Dyslipidemia- (present on admission)  Assessment & Plan  Stop statin given goals of care       VTE prophylaxis: heparin ppx

## 2022-04-01 NOTE — PROGRESS NOTES
Pulmonary Progress Note    Date of admission  3/28/2022    Chief Complaint  86 y.o. female admitted 3/28/2022 with SOB and acute on chronic hypoxic respiratory failure    ID: [id number] admitted 3/23/2022 w/ failure to thrive and acute hypoxic respiratory failure w/ worsening LEFT moderate size pleural effusion, s/p thoracentesis w/ 500 cc of cloudy yellow exudative fluid. Clinical picture highly concerning for underlying malignancy w/ atypical cells detected in pleural fluid cytology and new liver lesions on imaging.     Interval Problem Update  Reviewed last 24 hour events:  Patient resting in bed. Complains of back pain. Otherwise no respiratory complaints. Oxygen requirements coming down. Awaiting daughter for definitive care plain.     Review of Systems  Review of Systems   Constitutional: Negative for chills and fever.   Respiratory: Negative for cough and shortness of breath.    Cardiovascular: Negative for chest pain and palpitations.   Gastrointestinal: Negative for nausea and vomiting.   Musculoskeletal: Positive for back pain. Negative for falls.        Vital Signs for last 24 hours   Temp:  [36.2 °C (97.1 °F)-36.4 °C (97.5 °F)] 36.2 °C (97.1 °F)  Pulse:  [75-88] 85  Resp:  [15-19] 15  BP: (103-119)/(56-76) 119/69  SpO2:  [95 %-96 %] 96 %    Physical Exam   Physical Exam  Vitals and nursing note reviewed.   Constitutional:       General: She is not in acute distress.     Appearance: She is not toxic-appearing.      Comments: cachectic and very frail appearance.   HENT:      Head: Normocephalic and atraumatic.      Comments: Bitemporal wasting     Nose: Nose normal.      Mouth/Throat:      Mouth: Mucous membranes are moist.   Eyes:      Extraocular Movements: Extraocular movements intact.   Cardiovascular:      Rate and Rhythm: Normal rate and regular rhythm.      Pulses: Normal pulses.      Heart sounds: Normal heart sounds. No murmur heard.    No gallop.   Pulmonary:      Effort: Pulmonary effort is normal. No  respiratory distress.      Breath sounds: No stridor. No wheezing or rales.      Comments: Significantly diminished lung sounds bilaterally  Abdominal:      General: Bowel sounds are normal.   Musculoskeletal:         General: No swelling. Normal range of motion.      Cervical back: Normal range of motion. No rigidity.      Right lower leg: No edema.      Left lower leg: No edema.   Skin:     General: Skin is warm.      Capillary Refill: Capillary refill takes less than 2 seconds.      Findings: No rash.   Neurological:      General: No focal deficit present.      Mental Status: She is alert and oriented to person, place, and time.   Psychiatric:         Mood and Affect: Mood normal.         Medications  Current Facility-Administered Medications   Medication Dose Route Frequency Provider Last Rate Last Admin   • enoxaparin (LOVENOX) inj 40 mg  40 mg Subcutaneous DAILY Abe Jain M.D.       • ipratropium-albuterol (DUONEB) nebulizer solution  3 mL Nebulization Q4H PRN (RT) Umang Jeff M.D.       • clopidogrel (PLAVIX) tablet 75 mg  75 mg Oral DAILY Taurus Arambula M.D.   75 mg at 04/01/22 0545   • [MAR Hold] DILTIAZem CD (CARDIZEM CD) capsule 180 mg  180 mg Oral Q DAY Abe Jain M.D.   180 mg at 03/31/22 0512   • levothyroxine (SYNTHROID) tablet 75 mcg  75 mcg Oral AM ES Taurus Arambula M.D.   75 mcg at 04/01/22 0545   • pravastatin (PRAVACHOL) tablet 40 mg  40 mg Oral Q EVENING Taurus Arambula M.D.   40 mg at 03/30/22 1733   • budesonide-formoterol (SYMBICORT) 160-4.5 MCG/ACT inhaler 2 Puff  2 Puff Inhalation BID (RT) Taurus Arambula M.D.   2 Puff at 03/31/22 2035   • Respiratory Therapy Consult   Nebulization Continuous RT Taurus Arambula M.D.       • senna-docusate (PERICOLACE or SENOKOT S) 8.6-50 MG per tablet 2 Tablet  2 Tablet Oral BID Taurus Arambula M.D.   2 Tablet at 04/01/22 0545    And   • polyethylene glycol/lytes (MIRALAX) PACKET 1 Packet  1 Packet Oral QDAY  PRN Taurus Arambula M.D.        And   • magnesium hydroxide (MILK OF MAGNESIA) suspension 30 mL  30 mL Oral QDAY PRN Taurus Arambula M.D.        And   • bisacodyl (DULCOLAX) suppository 10 mg  10 mg Rectal QDAY PRN Taurus Arambula M.D.       • acetaminophen (Tylenol) tablet 650 mg  650 mg Oral Q6HRS PRN Taurus Arambula M.D.   650 mg at 04/01/22 1325   • labetalol (NORMODYNE/TRANDATE) injection 10 mg  10 mg Intravenous Q4HRS PRN Taurus Arambula M.D.       • ondansetron (ZOFRAN) syringe/vial injection 4 mg  4 mg Intravenous Q4HRS PRN Taurus Arambula M.D.       • ondansetron (ZOFRAN ODT) dispertab 4 mg  4 mg Oral Q4HRS PRN Taurus Arambula M.D.   4 mg at 03/30/22 1738   • methylPREDNISolone (SOLU-MEDROL) 40 MG injection 40 mg  40 mg Intravenous DAILY Taurus Arambula M.D.   40 mg at 04/01/22 0545       Fluids    Intake/Output Summary (Last 24 hours) at 4/1/2022 1513  Last data filed at 4/1/2022 1030  Gross per 24 hour   Intake 60 ml   Output --   Net 60 ml       Laboratory          Recent Labs     03/30/22  0300 03/31/22  0345 04/01/22  0420   SODIUM 130* 132* 132*   POTASSIUM 3.9 4.2 4.1   CHLORIDE 87* 88* 91*   CO2 29 31 29   BUN 18 17 21   CREATININE 0.75 0.66 0.71   CALCIUM 9.2 9.5 9.3     Recent Labs     03/30/22  0300 03/31/22  0345 04/01/22  0420   GLUCOSE 99 82 83     Recent Labs     03/30/22  0300 03/31/22  0345 04/01/22  0420   WBC 17.7* 21.4* 20.4*   NEUTSPOLYS 87.90* 87.50* 88.60*   LYMPHOCYTES 3.80* 4.50* 4.90*   MONOCYTES 7.40 7.20 5.50   EOSINOPHILS 0.00 0.00 0.00   BASOPHILS 0.20 0.10 0.10     Recent Labs     03/30/22  0300 03/31/22  0345 04/01/22  0420   RBC 3.87* 3.88* 3.90*   HEMOGLOBIN 11.8* 11.9* 11.8*   HEMATOCRIT 35.4* 35.6* 36.3*   PLATELETCT 262 275 257       Imaging  CT:    Reviewed    Assessment/Plan  Acute on chronic respiratory failure with hypoxia (HCC)- (present on admission)  Assessment & Plan  Patient w/ COPD admitted w/ bilateral pleural effusions, exudative by Lights  criteria, highly concerning for malignant etiology   Pleural fluid cytology w/ atypical cells  - Discontinue Solumedrol tomorrow  - Given patient's ECOG score 4 and failure to thrive presentation, we believe comfort care measures would be appropriate  - Pulmonary medicine signing off, please feel free to contact us if the an indwelling palliative pleural catheter is necessary while inpatient  -Primary team to follow up goals of care discuss w/ daughter  -Continue abx  -continue titration of oxygen to keep sats 88-92%  -continue with duonebs q 4 hr prn

## 2022-04-01 NOTE — CARE PLAN
The patient is Stable - Low risk of patient condition declining or worsening    Shift Goals  Clinical Goals: comfort and safety  Patient Goals: rest  Family Goals: n/a    Progress made toward(s) clinical / shift goals:    Problem: Fall Risk  Goal: Patient will remain free from falls  Outcome: Progressing     Problem: Pain - Standard  Goal: Alleviation of pain or a reduction in pain to the patient’s comfort goal  Outcome: Progressing

## 2022-04-01 NOTE — CARE PLAN
The patient is Stable - Low risk of patient condition declining or worsening    Shift Goals  Clinical Goals: comfort, pain relief  Patient Goals: rest  Family Goals: n/a    Progress made toward(s) clinical / shift goals:  yes      Problem: Knowledge Deficit - Standard  Goal: Patient and family/care givers will demonstrate understanding of plan of care, disease process/condition, diagnostic tests and medications  Outcome: Progressing     Problem: Knowledge Deficit - COPD  Goal: Patient/significant other demonstrates understanding of disease process, utilization of the Action Plan, medications and discharge instruction  Outcome: Progressing     Problem: Risk for Infection - COPD  Goal: Patient will remain free from signs and symptoms of infection  Outcome: Progressing     Problem: Nutrition - Advanced  Goal: Patient will display progressive weight gain toward goal have adequate food and fluid intake  Outcome: Progressing     Problem: Ineffective Airway Clearance  Goal: Patient will maintain patent airway with clear/clearing breath sounds  Outcome: Progressing     Problem: Impaired Gas Exchange  Goal: Patient will demonstrate improved ventilation and adequate oxygenation and participate in treatment regimen within the level of ability/situation.  Outcome: Progressing     Problem: Risk for Aspiration  Goal: Patient's risk for aspiration will be absent or decrease  Outcome: Progressing     Problem: Self Care  Goal: Patient will have the ability to perform ADLs independently or with assistance (bathe, groom, dress, toilet and feed)  Outcome: Progressing     Problem: Skin Integrity  Goal: Skin integrity is maintained or improved  Outcome: Progressing     Problem: Fall Risk  Goal: Patient will remain free from falls  Outcome: Progressing     Problem: Pain - Standard  Goal: Alleviation of pain or a reduction in pain to the patient’s comfort goal  Outcome: Progressing       Patient is not progressing towards the following  goals:

## 2022-04-01 NOTE — PALLIATIVE CARE
Palliative Care follow-up  Met with pt's family daughter Eun and pt's granddaughter as well has her  at the bedside. Eun and her daughter flew in from [place] and are staying locally and will be here until Monday afternoon. Dr. Jeff was also present and spoke with pt and family about the clinical picture and recommended comfort care along with out pt hospice care. CC was explained and pt and family agreed that is what would be most beneficial in her current condition. Eun has not seen her in a few yrs due to Covid and was alarmed at how skinny she was was. Eun asked about prognosis and Dr. Jeff stated that it could be days to weeks. Explained that sometimes when pt's are more comfortable they are able to let go. Eun wanted to plan a time when they could all meet tomorrow around 9 am to talk more about hospice and options for d/c. She felt that Seun would not be able to take care of her on his own and that would be too stressful. Expressed that during the meeting we can talk about choices for hospice care and then what some alternative options are if pt is able to move with hospice to ou-tpt setting.       Updated: RN andMD    Plan: Met with family at 9 am, Orders to be placed for Comfort care.    Thank you for allowing Palliative Care to support this patient and family. Contact [phone] for additional assistance, change in patient status, or with any questions/concerns.

## 2022-04-01 NOTE — PROGRESS NOTES
Received bedside report from night shift RN.   Assumed care of patient at change of shift.   Assessment complete and POC discussed.   STATUS: Patient is A&Ox1, VSS, on 5L.   PAIN: Patient denies pain, no apparent signs of distress or discomfort.   Patient is sitting up in bed for breakfast.   Bed alarm set, call light in reach.  Bed is in lowest/locked position.   Call light and belongings are within reach.  Dr. Jeff to be notified when family is at bedside to discuss plan of care.   No further needs at this time.  Will continue to monitor.

## 2022-04-02 ENCOUNTER — HOME CARE VISIT (OUTPATIENT)
Dept: HOSPICE | Facility: HOSPICE | Age: 87
End: 2022-04-02
Payer: MEDICARE

## 2022-04-02 LAB
BACTERIA BLD CULT: NORMAL
SIGNIFICANT IND 70042: NORMAL
SITE SITE: NORMAL
SOURCE SOURCE: NORMAL

## 2022-04-02 PROCEDURE — A9270 NON-COVERED ITEM OR SERVICE: HCPCS | Performed by: STUDENT IN AN ORGANIZED HEALTH CARE EDUCATION/TRAINING PROGRAM

## 2022-04-02 PROCEDURE — 99232 SBSQ HOSP IP/OBS MODERATE 35: CPT | Performed by: STUDENT IN AN ORGANIZED HEALTH CARE EDUCATION/TRAINING PROGRAM

## 2022-04-02 PROCEDURE — 770001 HCHG ROOM/CARE - MED/SURG/GYN PRIV*

## 2022-04-02 PROCEDURE — 86480 TB TEST CELL IMMUN MEASURE: CPT

## 2022-04-02 PROCEDURE — 700102 HCHG RX REV CODE 250 W/ 637 OVERRIDE(OP): Performed by: STUDENT IN AN ORGANIZED HEALTH CARE EDUCATION/TRAINING PROGRAM

## 2022-04-02 PROCEDURE — 700101 HCHG RX REV CODE 250: Performed by: HOSPITALIST

## 2022-04-02 PROCEDURE — 36415 COLL VENOUS BLD VENIPUNCTURE: CPT

## 2022-04-02 PROCEDURE — 700111 HCHG RX REV CODE 636 W/ 250 OVERRIDE (IP): Performed by: NURSE PRACTITIONER

## 2022-04-02 RX ORDER — PREDNISONE 20 MG/1
40 TABLET ORAL DAILY
Status: DISCONTINUED | OUTPATIENT
Start: 2022-04-02 | End: 2022-04-02

## 2022-04-02 RX ADMIN — MORPHINE SULFATE 2.5 MG: 10 SOLUTION ORAL at 12:06

## 2022-04-02 RX ADMIN — SENNOSIDES AND DOCUSATE SODIUM 2 TABLET: 50; 8.6 TABLET ORAL at 05:27

## 2022-04-02 RX ADMIN — MORPHINE SULFATE 2.5 MG: 10 SOLUTION ORAL at 13:18

## 2022-04-02 RX ADMIN — LEVOTHYROXINE SODIUM 75 MCG: 0.07 TABLET ORAL at 05:27

## 2022-04-02 RX ADMIN — PREDNISONE 40 MG: 20 TABLET ORAL at 05:27

## 2022-04-02 RX ADMIN — MORPHINE SULFATE 2.5 MG: 10 SOLUTION ORAL at 16:14

## 2022-04-02 ASSESSMENT — ENCOUNTER SYMPTOMS
WEIGHT LOSS: 1
PALPITATIONS: 0
SPUTUM PRODUCTION: 1
WEAKNESS: 1
DIZZINESS: 0
ORTHOPNEA: 0
NAUSEA: 1
ABDOMINAL PAIN: 0
COUGH: 1
MEMORY LOSS: 1
BACK PAIN: 1
HEMOPTYSIS: 0
CHILLS: 0

## 2022-04-02 ASSESSMENT — PAIN DESCRIPTION - PAIN TYPE
TYPE: ACUTE PAIN
TYPE: CHRONIC PAIN
TYPE: CHRONIC PAIN
TYPE: ACUTE PAIN
TYPE: ACUTE PAIN
TYPE: CHRONIC PAIN
TYPE: ACUTE PAIN
TYPE: CHRONIC PAIN

## 2022-04-02 NOTE — DISCHARGE PLANNING
Medical Social Work  PC from [place], [facility], calling to ay they have accepted the patient.    Volt to Dr Austin requesting a Quantiferon Gold test

## 2022-04-02 NOTE — PROGRESS NOTES
Assumed care of patient at change of shift. Patient A/Ox1-2, drowsy but arousable to voice and able to verbalize needs. Denies pain or any discomfort at this time. Q2 turns provided and incontinence care provided as needed. Sacral mepilex changed per orders. Pt tolerates turns well. 5L NC maintained. Loss of IV access, hospitalist aware. Call bell in reach. Bed alarm on and audible. Comfort cares maintained. Will continue with plan of care and notify MD of any changes.

## 2022-04-02 NOTE — DISCHARGE PLANNING
Received Choice form at 1268  Agency/Facility Name: [facility]  Referral sent per Choice form @ [id number]

## 2022-04-02 NOTE — CARE PLAN
Problem: Skin Integrity  Goal: Skin integrity is maintained or improved  Outcome: Progressing     Problem: Fall Risk  Goal: Patient will remain free from falls  Outcome: Progressing     Problem: Pain - Standard  Goal: Alleviation of pain or a reduction in pain to the patient’s comfort goal  Outcome: Progressing     The patient is Watcher - Medium risk of patient condition declining or worsening    Shift Goals  Clinical Goals: comfor maintained  Patient Goals: rest  Family Goals: n/a    Progress made toward(s) clinical / shift goals:  yes    Patient is not progressing towards the following goals:

## 2022-04-02 NOTE — PROGRESS NOTES
[facility] Medicine Daily Progress Note    Date of Service  4/2/2022    Chief Complaint  Antonia Stover is a 86 y.o. female admitted 3/28/2022 with shortness of breath    Hospital Course  Ms Stover has past medical history that includes COPD with chronic respiratory failure, hypertension, hypothyroidism, failure to thrive.  [facility].  Prior to transfer to the acute hospital she had had thoracentesis with 500 mL of cloudy fluid drained.  Patient had severe respiratory failure in the emergency room requiring nonrebreather.  She has been to the [facility] and treated for COPD exacerbation and pneumonia.    Interval Problem Update  Patient was evaluated bedside  Reporting back pain  Transition to comfort care as of 4/1/2022  Continue comfort care measures  Hospice referral placed  Pending placement    I have personally seen and examined the patient at bedside. I discussed the plan of care with patient, family and pulmonary.    Consultants/Specialty  pulmonary    Code Status  Comfort Care/DNR    Disposition  Patient is medically cleared for discharge.   Anticipate discharge to to hospice.  I have placed the appropriate orders for post-discharge needs.    Review of Systems  Review of Systems   Constitutional: Positive for malaise/fatigue and weight loss. Negative for chills.   Respiratory: Positive for cough and sputum production. Negative for hemoptysis.    Cardiovascular: Negative for chest pain, palpitations and orthopnea.   Gastrointestinal: Positive for nausea. Negative for abdominal pain.   Musculoskeletal: Positive for back pain and joint pain.   Neurological: Positive for weakness. Negative for dizziness.   Psychiatric/Behavioral: Positive for memory loss.   All other systems reviewed and are negative.       Physical Exam  Temp:  [36.2 °C (97.2 °F)-36.7 °C (98.1 °F)] 36.7 °C (98.1 °F)  Pulse:  [87-88] 87  Resp:  [16-17] 17  BP:  (125-131)/(72-75) 131/72  SpO2:  [95 %-97 %] 95 %    Physical Exam  Constitutional:       General: She is not in acute distress.     Appearance: She is ill-appearing and toxic-appearing.      Comments: Cachectic appearing   HENT:      Head: Normocephalic and atraumatic.      Right Ear: External ear normal.      Left Ear: External ear normal.      Mouth/Throat:      Mouth: Mucous membranes are moist.      Pharynx: Oropharynx is clear.   Eyes:      Extraocular Movements: Extraocular movements intact.      Conjunctiva/sclera: Conjunctivae normal.      Pupils: Pupils are equal, round, and reactive to light.   Cardiovascular:      Rate and Rhythm: Normal rate and regular rhythm.      Pulses: Normal pulses.   Pulmonary:      Effort: Pulmonary effort is normal. No respiratory distress.      Breath sounds: Rales present. No wheezing.      Comments: Crackles at the bases, no wheezing  Abdominal:      General: Abdomen is flat. Bowel sounds are normal. There is no distension.      Palpations: Abdomen is soft.      Tenderness: There is no abdominal tenderness. There is no guarding or rebound.   Musculoskeletal:         General: Normal range of motion.      Cervical back: Normal range of motion and neck supple.   Skin:     General: Skin is warm and dry.      Capillary Refill: Capillary refill takes less than 2 seconds.      Coloration: Skin is not jaundiced.   Neurological:      General: No focal deficit present.      Mental Status: She is alert and oriented to person, place, and time.      Cranial Nerves: No cranial nerve deficit.      Motor: Weakness present.   Psychiatric:         Mood and Affect: Mood normal.         Behavior: Behavior normal.         Fluids    Intake/Output Summary (Last 24 hours) at 4/2/2022 1204  Last data filed at 4/2/2022 0913  Gross per 24 hour   Intake 250 ml   Output --   Net 250 ml       Laboratory  Recent Labs     03/31/22  0345 04/01/22  0420   WBC 21.4* 20.4*   RBC 3.88* 3.90*   HEMOGLOBIN 11.9*  11.8*   HEMATOCRIT 35.6* 36.3*   MCV 91.8 93.1   MCH 30.7 30.3   MCHC 33.4* 32.5*   RDW 48.6 50.2*   PLATELETCT 275 257   MPV 11.5 11.5     Recent Labs     03/31/22  0345 04/01/22  0420   SODIUM 132* 132*   POTASSIUM 4.2 4.1   CHLORIDE 88* 91*   CO2 31 29   GLUCOSE 82 83   BUN 17 21   CREATININE 0.66 0.71   CALCIUM 9.5 9.3                   Imaging  EC-ECHOCARDIOGRAM COMPLETE W/O CONT   Final Result      CT-CHEST (THORAX) W/O   Final Result      1.  Moderate right and small-to-moderate left pleural effusions. Compressive atelectasis and/or consolidation in the lower lobes, greater on the left. Correlate clinically for infection.   2.  Emphysematous changes.   3.  Multiple hypodense lesions within the liver which are new from prior study and consistent with metastatic disease.   4.  Indeterminate 4 mm left upper lobe pulmonary nodule. Attention on follow-up.   5.  Borderline enlarged mediastinal lymph nodes which are slightly more prominent than on prior.      Fleischner Society pulmonary nodule recommendations:   Low Risk: No routine follow-up      High Risk: Optional CT at 12 months      Comments: Use most suspicious nodule as guide to management. Follow-up intervals may vary according to size and risk.      Low Risk - Minimal or absent history of smoking and of other known risk factors.      High Risk - History of smoking or of other known risk factors.      Note: These recommendations do not apply to lung cancer screening, patients with immunosuppression, or patients with known primary cancer.      Fleischner Society 2017 Guidelines for Management of Incidentally Detected Pulmonary Nodules in Adults         DX-CHEST-PORTABLE (1 VIEW)   Final Result         1.  Pulmonary edema and/or infiltrates, similar to prior study.   2.  Layering bilateral pleural effusions, similar to prior study.   3.  Cardiomegaly   4.  Atherosclerosis      DX-CHEST-PORTABLE (1 VIEW)   Final Result         1.  Pulmonary edema and/or  infiltrates, similar to prior study.   2.  Layering bilateral pleural effusions, similar to prior study.   3.  Previously visualized left apical pneumothorax is not well appreciated on the current study.   4.  Cardiomegaly   5.  Atherosclerosis           Assessment/Plan  * Acute on chronic respiratory failure with hypoxia (HCC)- (present on admission)  Assessment & Plan  Multifactorial: COPD on 2L NC at baseline, pneumonia, pleural effusion, suspected malignancy  Transition to comfort care as of 4/1/2022  Continue comfort care measures    Pleural effusion- (present on admission)  Assessment & Plan  Transition to comfort care as of 4/1/2022  Continue comfort care measures    Severe protein-calorie malnutrition (HCC)- (present on admission)  Assessment & Plan  Transition to comfort care as of 4/1/2022  Continue comfort care measures    Failure to thrive in adult- (present on admission)  Assessment & Plan  Transition to comfort care as of 4/1/2022  Continue comfort care measures    COPD exacerbation, FTT/severe malnutrition (HCC)- (present on admission)  Assessment & Plan  Comfort care    Essential hypertension- (present on admission)  Assessment & Plan  Transition to comfort care as of 4/1/2022  Continue comfort care measures    Hypothyroidism- (present on admission)  Assessment & Plan  Transition to comfort care as of 4/1/2022  Continue comfort care measures    Dyslipidemia- (present on admission)  Assessment & Plan  Stop statin given goals of care       VTE prophylaxis: heparin ppx

## 2022-04-02 NOTE — PROGRESS NOTES
approached RN stating patient was in pain. Upon speaking with patient and family, patient's daughter is concerned regarding use of morphine as patient has a listed allergy. Daughter requesting to speak physician. Dr. Jain contacted and in room to speak with family. At this time, patient's pain relieved by turning and repositioning.

## 2022-04-02 NOTE — CARE PLAN
The patient is Stable - Low risk of patient condition declining or worsening    Shift Goals  Clinical Goals: comfort  Patient Goals: rest, comfort  Family Goals: comfort    Progress made toward(s) clinical / shift goals:  Patient rarely reports pain. Educated patient and family on pain medication options.     Patient is not progressing towards the following goals: NA      Problem: Skin Integrity  Goal: Skin integrity is maintained or improved  Outcome: Progressing   Patient assisted with turning every 2 hours    Problem: Pain - Standard  Goal: Alleviation of pain or a reduction in pain to the patient’s comfort goal  Outcome: Progressing

## 2022-04-02 NOTE — PALLIATIVE CARE
Palliative Care follow-up  Spoke with family about Comfort care and s/s of end of life and pt's transition. Also addressed hospice and the benefit and philosophy. Her  selected [facility]. He expressed that they would not be able to take care of her in their home setting. Educated family about GH's and along with hospice care. Eun and Bao agreed to speak with a hospice liaison. Bereavement tray recommended for family. Bao also expressed that she has selected  home [facility]. Provided Difficult times packet along with bereavement.      Updated: RN and MD    Plan: choice sent for [facility] order to be placed for Hospice care.     Thank you for allowing Palliative Care to support this patient and family. Contact [phone] for additional assistance, change in patient status, or with any questions/concerns.

## 2022-04-03 LAB
BACTERIA BLD CULT: NORMAL
SIGNIFICANT IND 70042: NORMAL
SITE SITE: NORMAL
SOURCE SOURCE: NORMAL

## 2022-04-03 PROCEDURE — 700101 HCHG RX REV CODE 250: Performed by: HOSPITALIST

## 2022-04-03 PROCEDURE — 770001 HCHG ROOM/CARE - MED/SURG/GYN PRIV*

## 2022-04-03 PROCEDURE — 99232 SBSQ HOSP IP/OBS MODERATE 35: CPT | Performed by: STUDENT IN AN ORGANIZED HEALTH CARE EDUCATION/TRAINING PROGRAM

## 2022-04-03 RX ADMIN — MORPHINE SULFATE 2.5 MG: 10 SOLUTION ORAL at 13:36

## 2022-04-03 RX ADMIN — MORPHINE SULFATE 2.5 MG: 10 SOLUTION ORAL at 07:26

## 2022-04-03 RX ADMIN — MORPHINE SULFATE 2.5 MG: 10 SOLUTION ORAL at 21:24

## 2022-04-03 ASSESSMENT — PAIN SCALES - PAIN ASSESSMENT IN ADVANCED DEMENTIA (PAINAD)
FACIALEXPRESSION: SMILING OR INEXPRESSIVE
CONSOLABILITY: NO NEED TO CONSOLE
TOTALSCORE: 0
CONSOLABILITY: NO NEED TO CONSOLE
BREATHING: NORMAL
BODYLANGUAGE: RELAXED
TOTALSCORE: 1
FACIALEXPRESSION: SAD, FRIGHTENED, FROWN
BREATHING: NORMAL
BODYLANGUAGE: RELAXED

## 2022-04-03 ASSESSMENT — PAIN DESCRIPTION - PAIN TYPE
TYPE: ACUTE PAIN;CHRONIC PAIN

## 2022-04-03 ASSESSMENT — PAIN SCALES - WONG BAKER
WONGBAKER_NUMERICALRESPONSE: DOESN'T HURT AT ALL
WONGBAKER_NUMERICALRESPONSE: HURTS JUST A LITTLE BIT

## 2022-04-03 NOTE — PROGRESS NOTES
Received bedside report from night shift RN.   Assumed care of patient at change of shift.   Assessment complete and POC discussed.   STATUS: Patient is A&Ox2, VSS, on 5L.   PAIN: Patient has minimal pain to lower back, no apparent signs of distress or discomfort.   Patient is sitting up in bed for breakfast.   Bed alarm set, call light in reach.  Bed is in lowest/locked position.   Call light and belongings are within reach.   No further needs at this time.  Will continue to monitor.

## 2022-04-03 NOTE — CARE PLAN
The patient is Stable - Low risk of patient condition declining or worsening    Shift Goals  Clinical Goals: pain management, comfort  Patient Goals: rest  Family Goals: comfort    Progress made toward(s) clinical / shift goals:  yes        Problem: Knowledge Deficit - Standard  Goal: Patient and family/care givers will demonstrate understanding of plan of care, disease process/condition, diagnostic tests and medications  Outcome: Progressing     Problem: Knowledge Deficit - COPD  Goal: Patient/significant other demonstrates understanding of disease process, utilization of the Action Plan, medications and discharge instruction  Outcome: Progressing     Problem: Risk for Infection - COPD  Goal: Patient will remain free from signs and symptoms of infection  Outcome: Progressing     Problem: Nutrition - Advanced  Goal: Patient will display progressive weight gain toward goal have adequate food and fluid intake  Outcome: Progressing     Problem: Ineffective Airway Clearance  Goal: Patient will maintain patent airway with clear/clearing breath sounds  Outcome: Progressing     Problem: Impaired Gas Exchange  Goal: Patient will demonstrate improved ventilation and adequate oxygenation and participate in treatment regimen within the level of ability/situation.  Outcome: Progressing     Problem: Risk for Aspiration  Goal: Patient's risk for aspiration will be absent or decrease  Outcome: Progressing     Problem: Self Care  Goal: Patient will have the ability to perform ADLs independently or with assistance (bathe, groom, dress, toilet and feed)  Outcome: Progressing     Problem: Skin Integrity  Goal: Skin integrity is maintained or improved  Outcome: Progressing     Problem: Fall Risk  Goal: Patient will remain free from falls  Outcome: Progressing     Problem: Pain - Standard  Goal: Alleviation of pain or a reduction in pain to the patient’s comfort goal  Outcome: Progressing       Patient is not progressing towards the  following goals:

## 2022-04-03 NOTE — CARE PLAN
Problem: Skin Integrity  Goal: Skin integrity is maintained or improved  Outcome: Progressing     Problem: Fall Risk  Goal: Patient will remain free from falls  Outcome: Progressing     Problem: Pain - Standard  Goal: Alleviation of pain or a reduction in pain to the patient’s comfort goal  Outcome: Progressing     The patient is Watcher - Medium risk of patient condition declining or worsening    Shift Goals  Clinical Goals: comfort  Patient Goals: rest, comfort  Family Goals: comfort    Progress made toward(s) clinical / shift goals:  yes    Patient is not progressing towards the following goals:

## 2022-04-03 NOTE — PROGRESS NOTES
Hospital Medicine Daily Progress Note    Date of Service  4/3/2022    Chief Complaint  Antonia Stover is a 86 y.o. female admitted 3/28/2022 with shortness of breath    Hospital Course  Ms Stover has past medical history that includes COPD with chronic respiratory failure, hypertension, hypothyroidism, failure to thrive.  [facility]  Prior to transfer to the acute hospital she had had thoracentesis with 500 mL of cloudy fluid drained.  Patient had severe respiratory failure in the emergency room requiring nonrebreather.  She has been to the [facility] and treated for COPD exacerbation and pneumonia.    Interval Problem Update  Patient was evaluated bedside  Resting comfortably  Transition to comfort care as of 4/1/2022  Continue comfort care measures  Hospice referral placed  Pending placement    I have personally seen and examined the patient at bedside. I discussed the plan of care with patient, family and pulmonary.    Consultants/Specialty  pulmonary    Code Status  Comfort Care/DNR    Disposition  Patient is medically cleared for discharge.   Anticipate discharge to to hospice.  I have placed the appropriate orders for post-discharge needs.    Review of Systems  Comfort care as of 4/1/2022    Physical Exam  Comfort care as of 4/1/2022    Fluids    Intake/Output Summary (Last 24 hours) at 4/3/2022 1020  Last data filed at 4/2/2022 1300  Gross per 24 hour   Intake 50 ml   Output --   Net 50 ml       Laboratory  Recent Labs     04/01/22  0420   WBC 20.4*   RBC 3.90*   HEMOGLOBIN 11.8*   HEMATOCRIT 36.3*   MCV 93.1   MCH 30.3   MCHC 32.5*   RDW 50.2*   PLATELETCT 257   MPV 11.5     Recent Labs     04/01/22  0420   SODIUM 132*   POTASSIUM 4.1   CHLORIDE 91*   CO2 29   GLUCOSE 83   BUN 21   CREATININE 0.71   CALCIUM 9.3                   Imaging  EC-ECHOCARDIOGRAM COMPLETE W/O CONT   Final Result      CT-CHEST (THORAX) W/O   Final Result      1.   Moderate right and small-to-moderate left pleural effusions. Compressive atelectasis and/or consolidation in the lower lobes, greater on the left. Correlate clinically for infection.   2.  Emphysematous changes.   3.  Multiple hypodense lesions within the liver which are new from prior study and consistent with metastatic disease.   4.  Indeterminate 4 mm left upper lobe pulmonary nodule. Attention on follow-up.   5.  Borderline enlarged mediastinal lymph nodes which are slightly more prominent than on prior.      Fleischner Society pulmonary nodule recommendations:   Low Risk: No routine follow-up      High Risk: Optional CT at 12 months      Comments: Use most suspicious nodule as guide to management. Follow-up intervals may vary according to size and risk.      Low Risk - Minimal or absent history of smoking and of other known risk factors.      High Risk - History of smoking or of other known risk factors.      Note: These recommendations do not apply to lung cancer screening, patients with immunosuppression, or patients with known primary cancer.      Fleischner Society 2017 Guidelines for Management of Incidentally Detected Pulmonary Nodules in Adults         DX-CHEST-PORTABLE (1 VIEW)   Final Result         1.  Pulmonary edema and/or infiltrates, similar to prior study.   2.  Layering bilateral pleural effusions, similar to prior study.   3.  Cardiomegaly   4.  Atherosclerosis      DX-CHEST-PORTABLE (1 VIEW)   Final Result         1.  Pulmonary edema and/or infiltrates, similar to prior study.   2.  Layering bilateral pleural effusions, similar to prior study.   3.  Previously visualized left apical pneumothorax is not well appreciated on the current study.   4.  Cardiomegaly   5.  Atherosclerosis           Assessment/Plan  * Acute on chronic respiratory failure with hypoxia (HCC)- (present on admission)  Assessment & Plan  Multifactorial: COPD on 2L NC at baseline, pneumonia, pleural effusion, suspected  malignancy  Transition to comfort care as of 4/1/2022  Continue comfort care measures    Pleural effusion- (present on admission)  Assessment & Plan  Transition to comfort care as of 4/1/2022  Continue comfort care measures    Severe protein-calorie malnutrition (HCC)- (present on admission)  Assessment & Plan  Transition to comfort care as of 4/1/2022  Continue comfort care measures    Failure to thrive in adult- (present on admission)  Assessment & Plan  Transition to comfort care as of 4/1/2022  Continue comfort care measures    COPD exacerbation, FTT/severe malnutrition (HCC)- (present on admission)  Assessment & Plan  Comfort care    Essential hypertension- (present on admission)  Assessment & Plan  Transition to comfort care as of 4/1/2022  Continue comfort care measures    Hypothyroidism- (present on admission)  Assessment & Plan  Transition to comfort care as of 4/1/2022  Continue comfort care measures    Dyslipidemia- (present on admission)  Assessment & Plan  Stop statin given goals of care       VTE prophylaxis: heparin ppx

## 2022-04-03 NOTE — PROGRESS NOTES
Assumed care of patient at change of shift. Patient resting comfortably in bed at this time. Q2 turns provided as well as incontinence care as needed. Patient alert to person and place, able to verbalize needs when asked. Pt able to verbalize that she is comfortable and wants to rest. Hourly rounding provided. No acute events overnight. Will continue with plan of care and notify MD of any changes.

## 2022-04-04 LAB
GAMMA INTERFERON BACKGROUND BLD IA-ACNC: 0.05 IU/ML
M TB IFN-G BLD-IMP: NEGATIVE
M TB IFN-G CD4+ BCKGRND COR BLD-ACNC: -0.01 IU/ML
MITOGEN IGNF BCKGRD COR BLD-ACNC: 8.61 IU/ML
QFT TB2 - NIL TBQ2: -0.01 IU/ML

## 2022-04-04 PROCEDURE — 770001 HCHG ROOM/CARE - MED/SURG/GYN PRIV*

## 2022-04-04 PROCEDURE — A9270 NON-COVERED ITEM OR SERVICE: HCPCS | Performed by: STUDENT IN AN ORGANIZED HEALTH CARE EDUCATION/TRAINING PROGRAM

## 2022-04-04 PROCEDURE — 99232 SBSQ HOSP IP/OBS MODERATE 35: CPT | Performed by: STUDENT IN AN ORGANIZED HEALTH CARE EDUCATION/TRAINING PROGRAM

## 2022-04-04 PROCEDURE — 700102 HCHG RX REV CODE 250 W/ 637 OVERRIDE(OP): Performed by: STUDENT IN AN ORGANIZED HEALTH CARE EDUCATION/TRAINING PROGRAM

## 2022-04-04 PROCEDURE — 700101 HCHG RX REV CODE 250: Performed by: HOSPITALIST

## 2022-04-04 RX ADMIN — SENNOSIDES AND DOCUSATE SODIUM 2 TABLET: 50; 8.6 TABLET ORAL at 05:46

## 2022-04-04 RX ADMIN — MORPHINE SULFATE 2.5 MG: 10 SOLUTION ORAL at 03:25

## 2022-04-04 ASSESSMENT — PAIN DESCRIPTION - PAIN TYPE: TYPE: CHRONIC PAIN;ACUTE PAIN

## 2022-04-04 ASSESSMENT — PAIN SCALES - PAIN ASSESSMENT IN ADVANCED DEMENTIA (PAINAD)
FACIALEXPRESSION: FACIAL GRIMACING
BODYLANGUAGE: TENSE, DISTRESSED PACING, FIDGETING
TOTALSCORE: 3
BREATHING: NORMAL
CONSOLABILITY: NO NEED TO CONSOLE

## 2022-04-04 NOTE — PROGRESS NOTES
Hospital Medicine Daily Progress Note    Date of Service  4/4/2022    Chief Complaint  Antonia Stover is a 86 y.o. female admitted 3/28/2022 with shortness of breath    Hospital Course  86-year-old female with a past medical history of COPD with chronic respiratory failure, hypertension, hypothyroidism, failure to thrive.  Patient presented to the emergency room from [facility] rehab with respiratory failure.  Prior to transfer to the acute hospital. She had had thoracentesis with 500 mL of cloudy fluid drained at rehab facility.  Patient had severe respiratory failure in the emergency room requiring nonrebreather and was admitted [facility] and treated for COPD exacerbation and pneumonia along with bilateral pleural effusions.  Pleural fluid chemistry showed an exudate and atypical cells were seen in the cytology, concerning for malignancy, stains are pending and per discussion with pathology, the final results might take 1-2 weeks.  Family meeting was held on 4/1/2022.  After discussing hospital course, cytology and poor prognosis, decision was made to initiate comfort care which were initiated as of 4/1/2022.  Hospice referrals were sent and patient.....    Interval Problem Update  Patient was evaluated bedside  Resting comfortably  Transition to comfort care as of 4/1/2022  Continue comfort care measures  Hospice referral placed  Pending placement    I have personally seen and examined the patient at bedside. I discussed the plan of care with patient, family and pulmonary.    Consultants/Specialty  pulmonary    Code Status  Comfort Care/DNR    Disposition  Patient is medically cleared for discharge.   Anticipate discharge to to hospice.  I have placed the appropriate orders for post-discharge needs.    Review of Systems  Comfort care as of 4/1/2022    Physical Exam  Comfort care as of 4/1/2022    Fluids    Intake/Output Summary (Last 24 hours) at 4/4/2022 0705  Last data filed at 4/3/2022 0801  Gross per 24 hour    Intake 50 ml   Output --   Net 50 ml       Laboratory                        Imaging  EC-ECHOCARDIOGRAM COMPLETE W/O CONT   Final Result      CT-CHEST (THORAX) W/O   Final Result      1.  Moderate right and small-to-moderate left pleural effusions. Compressive atelectasis and/or consolidation in the lower lobes, greater on the left. Correlate clinically for infection.   2.  Emphysematous changes.   3.  Multiple hypodense lesions within the liver which are new from prior study and consistent with metastatic disease.   4.  Indeterminate 4 mm left upper lobe pulmonary nodule. Attention on follow-up.   5.  Borderline enlarged mediastinal lymph nodes which are slightly more prominent than on prior.      Fleischner Society pulmonary nodule recommendations:   Low Risk: No routine follow-up      High Risk: Optional CT at 12 months      Comments: Use most suspicious nodule as guide to management. Follow-up intervals may vary according to size and risk.      Low Risk - Minimal or absent history of smoking and of other known risk factors.      High Risk - History of smoking or of other known risk factors.      Note: These recommendations do not apply to lung cancer screening, patients with immunosuppression, or patients with known primary cancer.      Fleischner Society 2017 Guidelines for Management of Incidentally Detected Pulmonary Nodules in Adults         DX-CHEST-PORTABLE (1 VIEW)   Final Result         1.  Pulmonary edema and/or infiltrates, similar to prior study.   2.  Layering bilateral pleural effusions, similar to prior study.   3.  Cardiomegaly   4.  Atherosclerosis      DX-CHEST-PORTABLE (1 VIEW)   Final Result         1.  Pulmonary edema and/or infiltrates, similar to prior study.   2.  Layering bilateral pleural effusions, similar to prior study.   3.  Previously visualized left apical pneumothorax is not well appreciated on the current study.   4.  Cardiomegaly   5.  Atherosclerosis            Assessment/Plan  * Acute on chronic respiratory failure with hypoxia (HCC)- (present on admission)  Assessment & Plan  Multifactorial: COPD on 2L NC at baseline, pneumonia, pleural effusion, suspected malignancy  Transition to comfort care as of 4/1/2022  Continue comfort care measures    Pleural effusion- (present on admission)  Assessment & Plan  Transition to comfort care as of 4/1/2022  Continue comfort care measures    Severe protein-calorie malnutrition (HCC)- (present on admission)  Assessment & Plan  Transition to comfort care as of 4/1/2022  Continue comfort care measures    Failure to thrive in adult- (present on admission)  Assessment & Plan  Transition to comfort care as of 4/1/2022  Continue comfort care measures    COPD exacerbation, FTT/severe malnutrition (HCC)- (present on admission)  Assessment & Plan  Comfort care    Essential hypertension- (present on admission)  Assessment & Plan  Transition to comfort care as of 4/1/2022  Continue comfort care measures    Hypothyroidism- (present on admission)  Assessment & Plan  Transition to comfort care as of 4/1/2022  Continue comfort care measures    Dyslipidemia- (present on admission)  Assessment & Plan  Stop statin given goals of care       VTE prophylaxis: heparin ppx

## 2022-04-04 NOTE — DISCHARGE PLANNING
Medical Social Work  PC from  [facility] called to say she will be speaking to the family today about placement.  Will call SW back after speaking to them.

## 2022-04-04 NOTE — CARE PLAN
The patient is Watcher - Medium risk of patient condition declining or worsening    Shift Goals  Clinical Goals: comfort  Patient Goals: Rest  Family Goals: MELISSA    Progress made toward(s) clinical / shift goals:  Pt is comfort care. Ensure that pt is resting comfortably in bed. Administer medication as needed. Provide incontinence care as needed. Position pt for comfort    Patient is not progressing towards the following goals:      Problem: Knowledge Deficit - Standard  Goal: Patient and family/care givers will demonstrate understanding of plan of care, disease process/condition, diagnostic tests and medications  Outcome: Not Progressing- unable to assess pt understanding     Problem: Knowledge Deficit - COPD  Goal: Patient/significant other demonstrates understanding of disease process, utilization of the Action Plan, medications and discharge instruction  Outcome: Not Progressing- unable to assess pt understanding

## 2022-04-04 NOTE — CARE PLAN
Problem: Nutritional:  Goal: Achieve adequate nutritional intake  Description: Patient will consume ~50% of meals w/ 100% chobani intake  Outcome: Not Met     Patient is comfort care.

## 2022-04-04 NOTE — CARE PLAN
The patient is Watcher - Medium risk of patient condition declining or worsening    Shift Goals  Clinical Goals: Pain Management  Patient Goals: Rest  Family Goals: MELISSA    Progress made toward(s) clinical / shift goals:  Pain adequately managed with PRN PO Morphine     Patient is not progressing towards the following goals:      Problem: Nutrition - Advanced  Goal: Patient will display progressive weight gain toward goal have adequate food and fluid intake  Outcome: Not Progressing

## 2022-04-04 NOTE — PROGRESS NOTES
Received report from NOC RN and assumed care of pt. Pt comfort care. Pt asleep, resting on 2L O2. Pt does not appear to be in pain. Pt supine at the moment, will provide turn assistance for comfort. No other needs at this time. Bed locked in lowest position, call light within reach.

## 2022-04-05 PROCEDURE — 99231 SBSQ HOSP IP/OBS SF/LOW 25: CPT | Performed by: STUDENT IN AN ORGANIZED HEALTH CARE EDUCATION/TRAINING PROGRAM

## 2022-04-05 PROCEDURE — 770001 HCHG ROOM/CARE - MED/SURG/GYN PRIV*

## 2022-04-05 PROCEDURE — 700101 HCHG RX REV CODE 250: Performed by: HOSPITALIST

## 2022-04-05 RX ADMIN — MORPHINE SULFATE 2.5 MG: 10 SOLUTION ORAL at 13:29

## 2022-04-05 ASSESSMENT — PAIN DESCRIPTION - PAIN TYPE
TYPE: ACUTE PAIN
TYPE: ACUTE PAIN

## 2022-04-05 NOTE — PALLIATIVE CARE
Palliative Care follow-up  PC RN met with patient's , Seun, at the bedside. Questions regarding comfort focused care and hospice answered. Empathetic support provided.     POLST completed at this encounter with Seun choosing DNR, comfort focused care, and no artificial nutrition. Signed by patient's spouse and placed on hard chart for physician signature. This will be scanned into Epic after completion and the original will be sent with the patient on discharge.    Updated: MD and RN    Plan: Completion of POLST    Thank you for allowing Palliative Care to support this patient and family. Contact [phone] for additional assistance, change in patient status, or with any questions/concerns.

## 2022-04-05 NOTE — CARE PLAN
The patient is Stable - Low risk of patient condition declining or worsening    Shift Goals  Clinical Goals: pt will maintain comfortable  Patient Goals: Rest  Family Goals: MELISSA    Progress made toward(s) clinical / shift goals:  Assess pt frequently. Reposition pt for comfort. Provide incontinence care as needed.     Patient is not progressing towards the following goals:

## 2022-04-05 NOTE — PALLIATIVE CARE
"Palliative Care follow-up  POLST signed by physician. Original left at the bedside to be sent home with the pt on discharge. A copy will be scanned into Epic. To view this, please hover over code status.    Seun at bedside, support provided. Pt resting, opens eyes to voice and smiled at PC RN and Seun. PC RN inquired if pt is in any pain. Antonia states \"yes\", PC RN inquired where pain was located and she states \"my back.\" Pt states that she would like pain medication, declined repositioning at this time. RN notified and plans to medicate per EMR.    Updated: MD     Plan: d/c with hospice to group home when able.    Thank you for allowing Palliative Care to support this patient and family. Contact [phone] for additional assistance, change in patient status, or with any questions/concerns.   "

## 2022-04-05 NOTE — RESPIRATORY CARE
COPD EDUCATION by COPD CLINICAL EDUCATOR  4/5/2022 at 12:07 PM by Ginger Schmitt RRT     Patient reviewed by COPD education team. Patient has been transitioned to comfort care, and therefore does not qualify for the COPD program.

## 2022-04-05 NOTE — PROGRESS NOTES
Hospital Medicine Daily Progress Note    Date of Service  4/5/2022    Chief Complaint  Antonia Stover is a 86 y.o. female admitted 3/28/2022 with shortness of breath    Hospital Course  86-year-old female with a past medical history of COPD with chronic respiratory failure, hypertension, hypothyroidism, failure to thrive.  Patient presented to the emergency room from [facility] rehab with respiratory failure.  Prior to transfer to the acute hospital. She had had thoracentesis with 500 mL of cloudy fluid drained at rehab facility.  Patient had severe respiratory failure in the emergency room requiring nonrebreather and was admitted [facility] and treated for COPD exacerbation and pneumonia along with bilateral pleural effusions.  Pleural fluid chemistry showed an exudate and atypical cells were seen in the cytology, concerning for malignancy, stains are pending and per discussion with pathology, the final results might take 1-2 weeks.  Family meeting was held on 4/1/2022.  After discussing hospital course, cytology and poor prognosis, decision was made to initiate comfort care which were initiated as of 4/1/2022.  Hospice referrals were sent and patient.....    Interval Problem Update  Patient was evaluated bedside  Resting comfortably  Transition to comfort care as of 4/1/2022  Continue comfort care measures  Hospice referral placed  Pending placement    4/5/2022:  Continue present medical management no acute events overnight continue to await placement.    I have personally seen and examined the patient at bedside. I discussed the plan of care with patient, family and pulmonary.    Consultants/Specialty  pulmonary    Code Status  Comfort Care/DNR    Disposition  Patient is medically cleared for discharge.   Anticipate discharge to to hospice.  I have placed the appropriate orders for post-discharge needs.    Review of Systems  Comfort care as of 4/1/2022    Physical Exam  Comfort care as of  4/1/2022    Fluids    Intake/Output Summary (Last 24 hours) at 4/5/2022 1354  Last data filed at 4/5/2022 1000  Gross per 24 hour   Intake 0 ml   Output --   Net 0 ml       Laboratory                        Imaging  EC-ECHOCARDIOGRAM COMPLETE W/O CONT   Final Result      CT-CHEST (THORAX) W/O   Final Result      1.  Moderate right and small-to-moderate left pleural effusions. Compressive atelectasis and/or consolidation in the lower lobes, greater on the left. Correlate clinically for infection.   2.  Emphysematous changes.   3.  Multiple hypodense lesions within the liver which are new from prior study and consistent with metastatic disease.   4.  Indeterminate 4 mm left upper lobe pulmonary nodule. Attention on follow-up.   5.  Borderline enlarged mediastinal lymph nodes which are slightly more prominent than on prior.      Fleischner Society pulmonary nodule recommendations:   Low Risk: No routine follow-up      High Risk: Optional CT at 12 months      Comments: Use most suspicious nodule as guide to management. Follow-up intervals may vary according to size and risk.      Low Risk - Minimal or absent history of smoking and of other known risk factors.      High Risk - History of smoking or of other known risk factors.      Note: These recommendations do not apply to lung cancer screening, patients with immunosuppression, or patients with known primary cancer.      Fleischner Society 2017 Guidelines for Management of Incidentally Detected Pulmonary Nodules in Adults         DX-CHEST-PORTABLE (1 VIEW)   Final Result         1.  Pulmonary edema and/or infiltrates, similar to prior study.   2.  Layering bilateral pleural effusions, similar to prior study.   3.  Cardiomegaly   4.  Atherosclerosis      DX-CHEST-PORTABLE (1 VIEW)   Final Result         1.  Pulmonary edema and/or infiltrates, similar to prior study.   2.  Layering bilateral pleural effusions, similar to prior study.   3.  Previously visualized left  apical pneumothorax is not well appreciated on the current study.   4.  Cardiomegaly   5.  Atherosclerosis           Assessment/Plan  * Acute on chronic respiratory failure with hypoxia (HCC)- (present on admission)  Assessment & Plan  Multifactorial: COPD on 2L NC at baseline, pneumonia, pleural effusion, suspected malignancy  Transition to comfort care as of 4/1/2022  Continue comfort care measures    Pleural effusion- (present on admission)  Assessment & Plan  Transition to comfort care as of 4/1/2022  Continue comfort care measures    Severe protein-calorie malnutrition (HCC)- (present on admission)  Assessment & Plan  Transition to comfort care as of 4/1/2022  Continue comfort care measures    Failure to thrive in adult- (present on admission)  Assessment & Plan  Transition to comfort care as of 4/1/2022  Continue comfort care measures    COPD exacerbation, FTT/severe malnutrition (HCC)- (present on admission)  Assessment & Plan  Comfort care    Essential hypertension- (present on admission)  Assessment & Plan  Transition to comfort care as of 4/1/2022  Continue comfort care measures    Hypothyroidism- (present on admission)  Assessment & Plan  Transition to comfort care as of 4/1/2022  Continue comfort care measures    Dyslipidemia- (present on admission)  Assessment & Plan  Stop statin given goals of care       VTE prophylaxis: heparin ppx

## 2022-04-05 NOTE — PROGRESS NOTES
Opens eyes to name being called. Comfort care. Turned to left side to pressure relief- waffle cushion inflated and added moon boots. Sacral mepilex in place- cachetic .

## 2022-04-05 NOTE — PROGRESS NOTES
Received report from NOC RN and assumed care of pt. Pt is comfort care. Pt responds to name when spoken to. Pt is resting in bed on 2L O2 via nasal cannula. Pt does not appear to be in pain. Incontinence care provided. Pt repositioned for comfort. Pt requesting sip of water, no other needs at this time. Bed locked in lowest position, call light within reach.

## 2022-04-05 NOTE — CARE PLAN
The patient is Stable - Low risk of patient condition declining or worsening    Shift Goals  Clinical Goals: maintain comfort and monitor for signs of discomfort  Patient Goals: Rest  Family Goals: MELISSA    Progress made toward(s) clinical / shift goals:  turning to offload pressure, moon boots placed and inflated waffle cushion

## 2022-04-05 NOTE — DISCHARGE PLANNING
Anticipated Discharge Disposition: Group Home    Action: PC from [facility], [facility]; has spoken to spouse and he can not take care of patient. [facility]ILLY stated Marc Mendez will be out tomorrow to assess patient for acceptance.  SW asked about family paying for cost of care or helping out.  BILLY stated the spouse stated he can not.     Barriers to Discharge: placement    Plan: follow up with Marc Mendez

## 2022-04-05 NOTE — DISCHARGE PLANNING
Medical Social Work  PC to Hellen at [facility] [phone], will be here tomorrow at noon to assess patient.  SW asked if patient is accepted when she would like a COVID test, SW stated 48 hours would be fine.  Possible discharge to group home on Thursday 4/7/22.    Volt to Dr Cole with an update

## 2022-04-06 LAB
FLUAV RNA SPEC QL NAA+PROBE: NEGATIVE
FLUBV RNA SPEC QL NAA+PROBE: NEGATIVE
SARS-COV-2 RNA RESP QL NAA+PROBE: NOTDETECTED
SPECIMEN SOURCE: NORMAL

## 2022-04-06 PROCEDURE — C9803 HOPD COVID-19 SPEC COLLECT: HCPCS | Performed by: STUDENT IN AN ORGANIZED HEALTH CARE EDUCATION/TRAINING PROGRAM

## 2022-04-06 PROCEDURE — 0240U HCHG SARS-COV-2 COVID-19 NFCT DS RESP RNA 3 TRGT MIC: CPT

## 2022-04-06 PROCEDURE — 770001 HCHG ROOM/CARE - MED/SURG/GYN PRIV*

## 2022-04-06 PROCEDURE — 99231 SBSQ HOSP IP/OBS SF/LOW 25: CPT | Performed by: STUDENT IN AN ORGANIZED HEALTH CARE EDUCATION/TRAINING PROGRAM

## 2022-04-06 PROCEDURE — 700101 HCHG RX REV CODE 250: Performed by: HOSPITALIST

## 2022-04-06 RX ADMIN — MORPHINE SULFATE 2.5 MG: 10 SOLUTION ORAL at 08:45

## 2022-04-06 RX ADMIN — MORPHINE SULFATE 2.5 MG: 10 SOLUTION ORAL at 11:20

## 2022-04-06 RX ADMIN — MORPHINE SULFATE 2.5 MG: 10 SOLUTION ORAL at 13:02

## 2022-04-06 ASSESSMENT — PAIN DESCRIPTION - PAIN TYPE
TYPE: ACUTE PAIN
TYPE: ACUTE PAIN

## 2022-04-06 NOTE — DISCHARGE PLANNING
Medical Social Work  PC from [facility], stated she did see the patient this morning, and are accepting her.   Will forward [facility]ITH[facility] intake packet that needs to be completed.  Patient can be accepted tomorrow.      PC to [facility]ILL[facility] [phone]; NITHYA provided an update.  BILLY will contact the group home, get needed DME.  Transport will tentatively be set for 2:00 tomorrow.    Volt to Dr Cole with an update

## 2022-04-06 NOTE — DISCHARGE PLANNING
Medical Social Work  PC to Hellen shaver [facility] [phone]; message left to call SW about acceptance

## 2022-04-06 NOTE — DISCHARGE PLANNING
Agency/Facility Name: [facility]  Spoke To: BILLY  Outcome: AJ will meet the pt's  around 1000 here to sign paperwork.  DME is being delivered first thing in the morning.

## 2022-04-06 NOTE — PROGRESS NOTES
Hospital Medicine Daily Progress Note    Date of Service  4/6/2022    Chief Complaint  Antonia Stover is a 86 y.o. female admitted 3/28/2022 with shortness of breath    Hospital Course  86-year-old female with a past medical history of COPD with chronic respiratory failure, hypertension, hypothyroidism, failure to thrive.  Patient presented to the emergency room from [facility] rehab with respiratory failure.  Prior to transfer to the acute hospital. She had had thoracentesis with 500 mL of cloudy fluid drained at rehab facility.  Patient had severe respiratory failure in the emergency room requiring nonrebreather and was admitted [facility] and treated for COPD exacerbation and pneumonia along with bilateral pleural effusions.  Pleural fluid chemistry showed an exudate and atypical cells were seen in the cytology, concerning for malignancy, stains are pending and per discussion with pathology, the final results might take 1-2 weeks.  Family meeting was held on 4/1/2022.  After discussing hospital course, cytology and poor prognosis, decision was made to initiate comfort care which were initiated as of 4/1/2022.  Hospice referrals were sent and patient.....    Interval Problem Update  Patient was evaluated bedside  Resting comfortably  Transition to comfort care as of 4/1/2022  Continue comfort care measures  Hospice referral placed  Pending placement    4/5/2022:  Continue present medical management no acute events overnight continue to await placement.      4/6/2022:  Continue present medical management patient has been accepted to [facility].  For follow-up with [facility] hospice anticipate discharge potentially on 4/7/2022.  I have personally seen and examined the patient at bedside. I discussed the plan of care with patient, family and pulmonary.    Consultants/Specialty  pulmonary    Code Status  Comfort Care/DNR    Disposition  Patient is medically cleared for discharge.   Anticipate discharge to to  hospice.  I have placed the appropriate orders for post-discharge needs.    Review of Systems  Comfort care as of 4/1/2022    Physical Exam  Comfort care as of 4/1/2022    Fluids    Intake/Output Summary (Last 24 hours) at 4/6/2022 1345  Last data filed at 4/6/2022 0830  Gross per 24 hour   Intake 110 ml   Output --   Net 110 ml       Laboratory                        Imaging  EC-ECHOCARDIOGRAM COMPLETE W/O CONT   Final Result      CT-CHEST (THORAX) W/O   Final Result      1.  Moderate right and small-to-moderate left pleural effusions. Compressive atelectasis and/or consolidation in the lower lobes, greater on the left. Correlate clinically for infection.   2.  Emphysematous changes.   3.  Multiple hypodense lesions within the liver which are new from prior study and consistent with metastatic disease.   4.  Indeterminate 4 mm left upper lobe pulmonary nodule. Attention on follow-up.   5.  Borderline enlarged mediastinal lymph nodes which are slightly more prominent than on prior.      Fleischner Society pulmonary nodule recommendations:   Low Risk: No routine follow-up      High Risk: Optional CT at 12 months      Comments: Use most suspicious nodule as guide to management. Follow-up intervals may vary according to size and risk.      Low Risk - Minimal or absent history of smoking and of other known risk factors.      High Risk - History of smoking or of other known risk factors.      Note: These recommendations do not apply to lung cancer screening, patients with immunosuppression, or patients with known primary cancer.      Fleischner Society 2017 Guidelines for Management of Incidentally Detected Pulmonary Nodules in Adults         DX-CHEST-PORTABLE (1 VIEW)   Final Result         1.  Pulmonary edema and/or infiltrates, similar to prior study.   2.  Layering bilateral pleural effusions, similar to prior study.   3.  Cardiomegaly   4.  Atherosclerosis      DX-CHEST-PORTABLE (1 VIEW)   Final Result         1.   Pulmonary edema and/or infiltrates, similar to prior study.   2.  Layering bilateral pleural effusions, similar to prior study.   3.  Previously visualized left apical pneumothorax is not well appreciated on the current study.   4.  Cardiomegaly   5.  Atherosclerosis           Assessment/Plan  * Acute on chronic respiratory failure with hypoxia (HCC)- (present on admission)  Assessment & Plan  Multifactorial: COPD on 2L NC at baseline, pneumonia, pleural effusion, suspected malignancy  Transition to comfort care as of 4/1/2022  Continue comfort care measures    Pleural effusion- (present on admission)  Assessment & Plan  Transition to comfort care as of 4/1/2022  Continue comfort care measures    Severe protein-calorie malnutrition (HCC)- (present on admission)  Assessment & Plan  Transition to comfort care as of 4/1/2022  Continue comfort care measures    Failure to thrive in adult- (present on admission)  Assessment & Plan  Transition to comfort care as of 4/1/2022  Continue comfort care measures    COPD exacerbation, FTT/severe malnutrition (HCC)- (present on admission)  Assessment & Plan  Comfort care    Essential hypertension- (present on admission)  Assessment & Plan  Transition to comfort care as of 4/1/2022  Continue comfort care measures    Hypothyroidism- (present on admission)  Assessment & Plan  Transition to comfort care as of 4/1/2022  Continue comfort care measures    Dyslipidemia- (present on admission)  Assessment & Plan  Stop statin given goals of care       VTE prophylaxis: heparin ppx

## 2022-04-06 NOTE — CARE PLAN
The patient is Stable - Low risk of patient condition declining or worsening    Shift Goals  Clinical Goals: remain comfortable on comfort care measures  Patient Goals: Rest  Family Goals: MELISSA    Progress made toward(s) clinical / shift goals:  denied pain discomfort when directly asked; turning in progress

## 2022-04-06 NOTE — PROGRESS NOTES
Received report from NOC RN and assumed care of pt. Pt is comfort care. Pt fidgeting with oxygen tubing when this RN entered pt's room, O2 removed per pt request. Pt reports back pain, medicated per MAR. Pt repositioned for comfort. No other needs at this time. Bed locked in lowest position, call light within reach.

## 2022-04-06 NOTE — CARE PLAN
The patient is Stable - Low risk of patient condition declining or worsening    Shift Goals  Clinical Goals: pt will remain comfortable  Patient Goals: Rest  Family Goals: MELISSA    Progress made toward(s) clinical / shift goals: Assess pt frequently. Administer medication per MAR. Provide incontinence care as needed. Reposition pt for comfort.     Patient is not progressing towards the following goals:      Problem: Knowledge Deficit - Standard  Goal: Patient and family/care givers will demonstrate understanding of plan of care, disease process/condition, diagnostic tests and medications  Outcome: Not Progressing- unable to assess pt understanding

## 2022-04-06 NOTE — DISCHARGE PLANNING
Agency/Facility Name: [facility]  Spoke To: BILLY  Outcome: [facility].  BILLY requested that GABRIELE Evans get the GH paperwork over to [facility].  Contact is Chana, phone number [phone].      GABRIELE Evans notified.

## 2022-04-06 NOTE — PROGRESS NOTES
Able to drink a little sprite but refused any type of offered food/snacks. Denied pain or back discomfort. Cont plan of care, comfort measures in place

## 2022-04-07 VITALS
WEIGHT: 74.3 LBS | DIASTOLIC BLOOD PRESSURE: 79 MMHG | HEART RATE: 104 BPM | HEIGHT: 62 IN | TEMPERATURE: 97 F | BODY MASS INDEX: 13.67 KG/M2 | SYSTOLIC BLOOD PRESSURE: 113 MMHG | RESPIRATION RATE: 24 BRPM | OXYGEN SATURATION: 93 %

## 2022-04-07 PROCEDURE — 99239 HOSP IP/OBS DSCHRG MGMT >30: CPT | Performed by: STUDENT IN AN ORGANIZED HEALTH CARE EDUCATION/TRAINING PROGRAM

## 2022-04-07 PROCEDURE — 700101 HCHG RX REV CODE 250: Performed by: HOSPITALIST

## 2022-04-07 RX ADMIN — MORPHINE SULFATE 2.5 MG: 10 SOLUTION ORAL at 09:47

## 2022-04-07 RX ADMIN — MORPHINE SULFATE 2.5 MG: 10 SOLUTION ORAL at 06:10

## 2022-04-07 RX ADMIN — MORPHINE SULFATE 2.5 MG: 10 SOLUTION ORAL at 00:48

## 2022-04-07 RX ADMIN — MORPHINE SULFATE 2.5 MG: 10 SOLUTION ORAL at 02:53

## 2022-04-07 RX ADMIN — MORPHINE SULFATE 2.5 MG: 10 SOLUTION ORAL at 04:06

## 2022-04-07 ASSESSMENT — PAIN DESCRIPTION - PAIN TYPE
TYPE: ACUTE PAIN

## 2022-04-07 NOTE — PROGRESS NOTES
"Assumed care of pt after receiving report from night shift RN. Pt is A&Ox 1 - to self only. Very fatigue and soft spoken, alert however non responsive to certain questions.When asked about pain pt stated \"yes\", when asked where pt stated \"back\" - medicated per MAR. Frame alarm on. Bed is locked and in lowest position, call light within reach, fall precautions in place. All needs met at this time.   "

## 2022-04-07 NOTE — DISCHARGE INSTRUCTIONS
Discharge Instructions  Discharged to group home by medical transportation with self. Discharged via [facility] escort: Yes.  Special equipment needed: Not Applicable    Be sure to schedule a follow-up appointment with your primary care doctor or any specialists as instructed.     Discharge Plan:   Diet Plan: Discussed  Activity Level: Discussed  Confirmed Follow up Appointment: No (Comments)  Confirmed Symptoms Management: Discussed  Medication Reconciliation Updated: Yes  Influenza Vaccine Indication: Not indicated: Previously immunized this influenza season and > 8 years of age    I understand that a diet low in cholesterol, fat, and sodium is recommended for good health. Unless I have been given specific instructions below for another diet, I accept this instruction as my diet prescription.   Other diet:     Special Instructions: None    · Is patient discharged on Warfarin / Coumadin?   No     Depression / Suicide Risk    As you are discharged from this [facility] facility, it is important to learn how to keep safe from harming yourself.    Recognize the warning signs:  · Abrupt changes in personality, positive or negative- including increase in energy   · Giving away possessions  · Change in eating patterns- significant weight changes-  positive or negative  · Change in sleeping patterns- unable to sleep or sleeping all the time   · Unwillingness or inability to communicate  · Depression  · Unusual sadness, discouragement and loneliness  · Talk of wanting to die  · Neglect of personal appearance   · Rebelliousness- reckless behavior  · Withdrawal from people/activities they love  · Confusion- inability to concentrate     If you or a loved one observes any of these behaviors or has concerns about self-harm, here's what you can do:  · Talk about it- your feelings and reasons for harming yourself  · Remove any means that you might use to hurt yourself (examples: pills, rope, extension cords,  firearm)  · Get professional help from the community (Mental Health, Substance Abuse, psychological counseling)  · Do not be alone:Call your Safe Contact- someone whom you trust who will be there for you.  · Call your local CRISIS HOTLINE 621-3671 or 864-398-9700  · Call your local Children's Mobile Crisis Response Team [place] (034) 115-3986 or www."[employer]  · Call the toll free National Suicide Prevention Hotlines   · National Suicide Prevention Lifeline 439-850-UAJU (7407)  · National Hope Line Network 800-SUICIDE (792-0105)

## 2022-04-07 NOTE — CARE PLAN
Problem: Skin Integrity  Goal: Skin integrity is maintained or improved  Outcome: Progressing     Problem: Fall Risk  Goal: Patient will remain free from falls  Outcome: Progressing     Problem: Pain - Standard  Goal: Alleviation of pain or a reduction in pain to the patient’s comfort goal  Outcome: Progressing     The patient is Stable - Low risk of patient condition declining or worsening    Shift Goals  Clinical Goals: Pt will remain comfortable  Patient Goals: Rest  Family Goals: MELISSA    Progress made toward(s) clinical / shift goals:  yes    Patient is not progressing towards the following goals:

## 2022-04-07 NOTE — CARE PLAN
The patient is Watcher - Medium risk of patient condition declining or worsening    Shift Goals  Clinical Goals: Comfort care  Patient Goals: Rest  Family Goals: MELISSA    Progress made toward(s) clinical / shift goals:     Problem: Pain - Standard  Goal: Alleviation of pain or a reduction in pain to the patient’s comfort goal  4/7/2022 1114 by Vianey E Rangel-Reyes, R.N.  Outcome: Progressing  4/7/2022 1113 by Vianey E Rangel-Reyes, R.N.  Outcome: Progressing     Problem: Skin Integrity  Goal: Skin integrity is maintained or improved  4/7/2022 1114 by Vianey E Rangel-Reyes, R.N.  Outcome: Progressing  4/7/2022 1113 by Vianey E Rangel-Reyes, R.N.  Outcome: Progressing       Patient is not progressing towards the following goals:

## 2022-04-07 NOTE — PROGRESS NOTES
Assumed care of patient at change of shift. Comfort care maintained. Patient alert to self only, confused in conversation. Able to verbalize needs and state when she is having back pain. PRN morphine administered for pain per orders. Q2 turns provided for comfort. Pt slightly more restless in bed overnight. Bed alarm on and audible. Call bell in reach. Continue with plan of care will notify MD of any changes.

## 2022-04-07 NOTE — DISCHARGE PLANNING
Medical Social Work  [facility] has arranged for transport at 2:00 today by PRADIP wahl.  Spouse is aware and okay with discharge to group home.  Has been given [facility].

## 2022-04-07 NOTE — DISCHARGE SUMMARY
Discharge Summary    CHIEF COMPLAINT ON ADMISSION  Chief Complaint   Patient presents with   • Shortness of Breath     Pt requiring 15L NRB to maintain O2 saturation above 90%.        Reason for Admission  ems     Admission Date  3/28/2022    CODE STATUS  Comfort Care/DNR    HPI & HOSPITAL COURSE  This is a 86 y.o. female here with shortness of breath  Ms Stover has past medical history that includes COPD with chronic respiratory failure, hypertension, hypothyroidism, failure to thrive.  [facility].  Prior to transfer to the acute hospital she had had thoracentesis with 500 mL of cloudy fluid drained.  Patient had severe respiratory failure in the emergency room requiring nonrebreather.  She has been to the [facility] and treated for COPD exacerbation and pneumonia.    Patient was transition to comfort measures only during hospitalization patient was evaluated by [facility] was accepted by their service.  Patient will be transitioned to [facility] in care of [facility] hosp[facility].  Medications will be provided by [facility]  Therefore, she is discharged in good and stable condition to hospice.    The patient met 2-midnight criteria for an inpatient stay at the time of discharge.    Discharge Date  4/7/2022   FOLLOW UP ITEMS POST DISCHARGE  Participate hospice    DISCHARGE DIAGNOSES  Principal Problem:    Acute on chronic respiratory failure with hypoxia (HCC) POA: Yes  Active Problems:    PSVT (paroxysmal supraventricular tachycardia) (HCC) POA: Yes      Overview: February 2013: Echocardiogram with normal LV size, LVEF 65-70%. 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 Normal RA, RV and LA. Mild-moderate MR.       Mild TR. RVSP 40-45 mmHg.      IMO load March 2020    Dyslipidemia POA: Yes    Hypothyroidism POA: Yes    Essential hypertension POA: Yes    COPD exacerbation, FTT/severe malnutrition (HCC) POA: Yes    Failure to thrive in adult  POA: Yes    Severe protein-calorie malnutrition (HCC) POA: Yes    Pleural effusion POA: Yes  Resolved Problems:    * No resolved hospital problems. *      FOLLOW UP    [facility]  [address]  [place] [place] [phone]          MEDICATIONS ON DISCHARGE     Medication List      STOP taking these medications    acetaminophen 325 MG Tabs  Commonly known as: Tylenol     ALIGN PO     clopidogrel 75 MG Tabs  Commonly known as: PLAVIX     diltiazem 180 MG SR capsule  Commonly known as: TIAZAC     heparin 5000 UNIT/ML Soln     ipratropium-albuterol 0.5-2.5 (3) MG/3ML nebulizer solution  Commonly known as: DUONEB     levothyroxine 75 MCG Tabs  Commonly known as: SYNTHROID     polyethylene glycol/lytes 17 g Pack  Commonly known as: MIRALAX     pravastatin 40 MG tablet  Commonly known as: PRAVACHOL     Probiotic 250 MG Caps     senna-docusate 8.6-50 MG Tabs  Commonly known as: PERICOLACE or SENOKOT S            Allergies  Allergies   Allergen Reactions   • Hydrocodone Hives   • Iodine Anaphylaxis     RXN=>20 years ago  Heavy metal dyes and preservatives   • Nsaids Hives and Shortness of Breath   • Penicillins Anaphylaxis     RXN=>20 years ago. Tolerates cephalosporins    • Asa [Aspirin] Hives     Hives   • Chocolate    • Codeine      Pt reports that she falls to the ground, she passes out   • Erythromycin      Pt reports that she falls to the ground, she passes out  Tolerates azithro   • Morphine      Pt reports that she falls to the ground, she passes out   • Sulfa Drugs      Pt reports that she falls to the ground, she passes out       DIET  Orders Placed This Encounter   Procedures   • Diet Order Diet: Regular     Standing Status:   Standing     Number of Occurrences:   1     Order Specific Question:   Diet:     Answer:   Regular [1]       ACTIVITY  As tolerated.  Weight bearing as tolerated    CONSULTATIONS  Palliative care  Hospice    PROCEDURES  None    LABORATORY  Lab Results    Component Value Date    SODIUM 132 (L) 04/01/2022    POTASSIUM 4.1 04/01/2022    CHLORIDE 91 (L) 04/01/2022    CO2 29 04/01/2022    GLUCOSE 83 04/01/2022    BUN 21 04/01/2022    CREATININE 0.71 04/01/2022    CREATININE 1.3 01/19/2009        Lab Results   Component Value Date    WBC 20.4 (H) 04/01/2022    HEMOGLOBIN 11.8 (L) 04/01/2022    HEMATOCRIT 36.3 (L) 04/01/2022    PLATELETCT 257 04/01/2022        Total time of the discharge process exceeds 35 minutes.

## 2022-04-07 NOTE — PROGRESS NOTES
Pt off unit via gurney with [facility] transportation to . POLST, facesheet and copy f discharge paperwork sent with pt.

## 2022-04-26 LAB
FUNGUS SPEC CULT: NORMAL
FUNGUS SPEC FUNGUS STN: NORMAL
SIGNIFICANT IND 70042: NORMAL
SITE SITE: NORMAL
SOURCE SOURCE: NORMAL

## 2023-01-09 NOTE — TELEPHONE ENCOUNTER
Received request via: Pharmacy    Was the patient seen in the last year in this department? Yes    Does the patient have an active prescription (recently filled or refills available) for medication(s) requested? No   Closure 2 Information: This tab is for additional flaps and grafts, including complex repair and grafts and complex repair and flaps. You can also specify a different location for the additional defect, if the location is the same you do not need to select a new one. We will insert the automated text for the repair you select below just as we do for solitary flaps and grafts. Please note that at this time if you select a location with a different insurance zone you will need to override the ICD10 and CPT if appropriate.

## 2023-01-09 NOTE — ASSESSMENT & PLAN NOTE
BP generally ok  On Cardizem  mg daily --> 180 mg daily (3/25)  Note: home meds include Cardizem  mg daily  Cont to monitor   Pt needs a refill on his adipex. Pt was scheduled for 1/12/23 and dr is out.

## 2023-06-23 NOTE — RESPIRATORY CARE
COPD EDUCATION by COPD CLINICAL EDUCATOR  9/16/2020 at 10:45 AM by Karoline Basurto RRT     Patient reviewed by COPD education team. Patient was told she has a mild history or diagnosis of COPD. Patient has not had a PFT and does not want one or will she go get one. Patient is a smoker, smoking cessation education refused.    23-Jun-2023 17:20

## 2024-08-31 NOTE — PREADMISSION SCREENING NOTE
[facility]  Outpatient Visit    Patient Name: Rivera Javier  MRN:[id number]  :1945    PCP:  Kade Spencer MD  [phone]    Referring Provider: Kade Spencer MD  [address] [place] [address]  And Dr. Ricardo Kim    Cardiologist: Dr. Ricardo Kim    History Obtained From:  patient, electronic medical record, review of prior notes    Chief Complaint: No chief complaint on file.      HPI:      Rivera Javier is a 78 year old male who presents in consultation for symptomatic persistent atrial fibrillation.     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    He was initially diagnosed with AF and saw EP in-hospital 23 for symptomatic AF (dyspnea, palpitations, irregular pulse) despite controlled ventricular rates.  He had reduced LVEF 45% on TTE during that admission.  He underwent outpatient MOSHE/DCCV 7/3/23 and was started on dronedarone 23 by EP.  He has subsequently been seeing a cardiologist for AF.  Dronedarone was discontinued 2024 due to no further recurrences of AF.  He has been taking carvedilol, ASA, and Eliquis.  He called cardiology office 24 due to feeling like he is back in AF with symptoms of dizziness, despite controlled ventricular rates on home monitor.  He was referred to EP for discussion of AF ablation.    *** Ask about symptoms.  Discuss options: AAD vs AF ablation (see A&P)    *** denies any chest pain or discomfort either exertional or at rest. *** denies any palpitations, dizziness, lightheadedness, or other presyncopal symptoms, orthopnea, PND, LE swelling, exertional LE pain or other symptoms of claudication. Since the last visit, *** has not had any major medical procedures or hospitalizations. *** has unlimited exercise tolerance and is able to climb up *** flights of stairs    Pre-Admission Screening Form    Patient Information:   Name: Antonia Stover     MRN: [id number]       : 1935      Age: 86 y.o.   Gender: female      Race: White [7]       Marital Status:  [2]  Family Contact: Seun Trejo        Relationship: Spouse [17]  Home Phone: [phone]           Cell Phone:   Advanced Directives: Yes  Code Status:  DNAR/DNI  Current Attending Provider: Willy Burns M.D.  Referring Physician: Dr. Burns  Physiatrist Consult: Dr. Joel   Referral Date: 22  Primary Payor Source:  MEDICARE  Secondary Payor Source:  [facility]    Medical Information:   Date of Admission to Acute Care Setting:3/20/2022  Room Number: S171/02  Rehabilitation Diagnosis: 0016 - Debility (Non-Cardiac, Non-Pulmonary)  Immunization History   Administered Date(s) Administered   • INFLUENZA TIV (IM) 2012   • Influenza (IM) Preservative Free - HISTORICAL DATA 10/27/2011   • Influenza Vaccine Adult HD 2016, 10/26/2017, 2018, 10/29/2019, 10/15/2020   • Influenza Vaccine Quad Inj (Preserved) 2015   • Johana SARS-CoV-2 Vaccine 06/10/2021   • Pneumococcal Conjugate Vaccine (Prevnar/PCV-13) 2015   • Pneumococcal polysaccharide vaccine (PPSV-23) 2005   • ZZZInfluenza Vaccine Pediatric Preserv Free 10/27/2011     Allergies   Allergen Reactions   • Hydrocodone Hives   • Iodine Anaphylaxis     RXN=>20 years ago  Heavy metal dyes and preservatives   • Nsaids Hives and Shortness of Breath   • Penicillins Anaphylaxis     RXN=>20 years ago   • Asa [Aspirin] Hives     Hives   • Codeine      Pt reports that she falls to the ground, she passes out   • Erythromycin      Pt reports that she falls to the ground, she passes out   • Morphine      Pt reports that she falls to the ground, she passes out   • Sulfa Drugs      Pt reports that she falls to the ground, she passes out     Past Medical History:   Diagnosis Date   • Anemia    • Arthritis    • Carotid artery plaque  "08/2018    Carotid US with <50% stenosis bilaterally.   • COPD    • Dizziness     • Dyslipidemia     • Fatigue 7/3/2014   • Hypothyroidism    • Insomnia     • Nocturnal hypoxia      Uses oxygen QHS.   • PSVT (paroxysmal supraventricular tachycardia) ([facility]) 02/2013    Echocardiogram with normal LV size, LVEF 65-70%.   • Sickle cell disease (HCC)    • Tobacco use      Past Surgical History:   Procedure Laterality Date   • KNEE REPLACEMENT, TOTAL  2005    Right   • ABDOMINAL HYSTERECTOMY TOTAL     • APPENDECTOMY     • CATARACT EXTRACTION WITH IOL      Bilateral   • CATARACT EXTRACTION WITH IOL      Bilateral   • HYSTERECTOMY, TOTAL ABDOMINAL         History Leading to Admission, Conditions that Caused the Need for Rehab (CMS):     Dr. Demetrius Torres H&P:  Chief Complaint   Patient presents with   • Shortness of Breath   • COPD         History of Presenting Illness  Antonia Stover is a 86 y.o. female with a past medical history of hypertension, hypothyroidism, COPD who presented 3/20/2022 with shortness of breath and increased cough.     The patient history was taken by both the patient and the patient's  who is at bedside.  The patient is very hard of hearing and does not wear hearing aids.  As per the patient she has been having shortness of breath and increased cough for the past couple of days.  Due to the fact that they needed to go up on her oxygen at baseline at home they decided to come to the ER for further assessment and evaluation.  In the ER the patient was found to have a white blood cell count of 19, a sodium level of 130, creatinine of 0.75.     The patient had a x-ray which reported: \"Bibasilar opacities likely represent a combination of small pleural effusions with adjacent atelectasis. Pneumonia cannot be excluded.\"  The patient will be admitted for COPD exacerbation and has also been started on antibiotics, pending procalcitonin, pending Covid testing.  The patient is very frail and " without shortness of breath.    ***    Social history: ***  Family history: ***    Past medical history:    Past Medical History:   Diagnosis Date   • CAD (coronary artery disease)    • History of BPH     WITH URINARY RETENTION   • HTN (hypertension)    • Hyperlipidemia    • Prostate CA  (CMD)        Past Surgical History:    Past Surgical History:   Procedure Laterality Date   • Cardiac cath - [facility]      S/P PTCA-CATH LAD 06/2011   • Colonoscopy      2017   • Eye surgery      Cataract   • Incision and drainage      PILONIDAL CYST   • Incision procedure      EXCISION LESION OF TONSIL AND ADENOID       Family History:   Family History   Problem Relation Age of Onset   • Heart disease Mother    • Cancer, Throat Father        Allergies: ALLERGIES:  No Known Allergies    Medications Including OTC:  Current Outpatient Medications   Medication Sig Dispense Refill   • losartan (COZAAR) 100 MG tablet Take 1 tablet by mouth daily. Courtesy refill: 08/28/2024 90 tablet 0   • tamsulosin (FLOMAX) 0.4 MG Cap Take 1 capsule by mouth daily after a meal. 90 capsule 0   • rosuvastatin (CRESTOR) 10 MG tablet Take 1 tablet by mouth at bedtime. 90 tablet 0   • apixaBAN (Eliquis) 5 MG Tab Take 1 tablet by mouth every 12 hours. 180 tablet 0   • allopurinol (ZYLOPRIM) 300 MG tablet Take 1 tablet by mouth daily. 90 tablet 0   • amLODIPine (NORVASC) 5 MG tablet Take 1 tablet by mouth daily. 90 tablet 3   • carvedilol (COREG) 25 MG tablet Take 1 tablet by mouth in the morning and 1 tablet in the evening. Take with meals. 180 tablet 3   • Aspirin 81 MG Cap Take 81 mg by mouth every other day. (Patient not taking: Reported on 5/7/2024)     • Multiple Vitamin (MULTIVITAMIN ADULT PO) Take 1 tablet by mouth daily.     • zoster vaccine recomb adjuvanted (SHINGRIX) 50 MCG/0.5ML injection Inject 0.5 mLs into the muscle 1 time. Repeat dose in 2 to 6 months (unless 1 dose already given), for a total of 2 doses. (Patient not taking: Reported on  6/6/2024) 1 each 0   • zoster vaccine recomb adjuvanted (SHINGRIX) 50 MCG/0.5ML injection Inject 0.5 mLs into the muscle 1 time. Repeat dose in 2 to 6 months (unless 1 dose already given), for a total of 2 doses. (Patient not taking: Reported on 6/6/2024) 1 each 0     No current facility-administered medications for this visit.       Social History:   Alcohol and Tobacco History:     Tobacco Use: Quit          Packs/Day:       Years:          reports no history of drug use.    Review of systems:     Pertinent positives: ***  Pertinent negatives: ***  All other systems reviewed are negative except as in HPI.    Physical exam:     Vitals:  There were no vitals filed for this visit.    There were no vitals taken for this visit.    Constitutional: no apparent distress, alert***  Cardiovascular: RRR***, +S1 and S2, no murmurs, no lower extremity edema ***, 2+ radial pulses symmetric bilaterally  Respiratory: normal work of breathing  Musculoskeletal/extremities: no clubbing or cyanosis, no peripheral edema***  Skin: warm, well-perfused*** incision well-healed***    Data:     CBC:   Lab Results   Component Value Date    WBC 6.4 06/22/2023    WBC 5.7 06/15/2020    RBC 4.20 (L) 06/22/2023    RBC 4.27 (L) 06/15/2020    HGB 13.8 06/22/2023    HGB 13.9 06/15/2020    HCT 40.8 06/22/2023    HCT 43.0 06/15/2020    MCV 97.1 06/22/2023    .7 (H) 06/15/2020     06/22/2023     06/15/2020     CMP: No results for input(s): \"SODIUM\", \"CHLORIDE\", \"BUN\", \"GFRA\", \"BCRAT\", \"POTASSIUM\", \"C02\", \"GLUCOSE\", \"CREATININE\", \"GFRNA\", \"CALCIUM\", \"BNP\" in the last 8765 hours.    Invalid input(s): \"AGAP\", \"FST1\"  HgBA1c:   Lab Results   Component Value Date    HGBA1C 5.6 06/29/2023    HGBA1C 5.4 06/15/2020     FLP:   Cholesterol (mg/dL)   Date Value   06/29/2023 85     HDL (mg/dL)   Date Value   06/29/2023 36 (L)     Cholesterol/ HDL Ratio (no units)   Date Value   06/29/2023 2.4     Triglycerides (mg/dL)   Date Value   06/29/2023  cachectic we have ordered nutrition evaluation as well as PT/OT.   Assessment/Plan:  I anticipate this patient will require at least two midnights for appropriate medical management, necessitating inpatient admission.     * COPD exacerbation (HCC)- (present on admission)  Assessment & Plan  We will start the patient on COPD exacerbation order set including duo nebs and p.o. steroids.  We have also started patient on azithromycin and ceftriaxone.  Monitor, make changes accordingly.     Acute hypoxemic respiratory failure (HCC)  Assessment & Plan  Most likely due to COPD exacerbation, however there is also concern for pneumonia.  We have started the patient on ceftriaxone and azithromycin, as well as duo nebs and steroids.  Monitor, supplemental oxygen as needed.     We are also pending Covid, influenza testing.     Failure to thrive in adult  Assessment & Plan  The patient's  mentions that the patient has been losing weight for the past year, she is very cachectic.  We have ordered nutrition consult, we appreciate further conditions.  PT/OT.     Hyponatremia  Assessment & Plan  Mild  Monitor, repeat BMP     Hard of hearing  Assessment & Plan  The patient is hard of hearing, as per the patient's  she does not wear hearing aids and she has not seen a doctor as an outpatient.  The patient will require further outpatient follow-up     Advanced care planning/counseling discussion- (present on admission)  Assessment & Plan  I had a conversation with both the patient and the patient's  for at least 20 minutes regarding CODE STATUS.  They are adamant that the patient would like to be DNR/DNI.  The patient and the patient's  understand, and they mentioned that they had signed a form in the past and it is at their house.     Essential hypertension- (present on admission)  Assessment & Plan  Patient's blood pressure on the lower side, will hold home medications for now.  Monitor, make changes  116     LDL (mg/dL)   Date Value   06/29/2023 26     TSH:    Lab Results   Component Value Date    TSH 2.693 06/20/2023    TSH 1.756 05/07/2019       ECG's: Personally viewed and interpreted  6/6/24: NSR (not junctional as reported)  7/6/23: SR with PVCs  6/20/23: AF with RVR  7/28/21: NSR    Ambulatory Monitors:    CXR:    TTE: 6/21/23  1. Left ventricle: The cavity size is normal. Wall thickness is normal.     Systolic function is reduced. The ejection fraction was measured by visual     estimation. The ejection fraction is 45%.  2. Aortic valve: Velocity is within the normal range. There is no stenosis.  3. Mitral valve: There is mild regurgitation.  4. Right ventricle: The cavity size is normal. Wall thickness is normal.     Systolic function is normal.  5. Tricuspid valve: There is mild regurgitation.    Regadenoson MPI: 6/22/23  1) Normal Myocardial Perfusion study, with no evidence for ischemia or old   infarction.   2) Gated SPECT showed an calculated left ventricular EF of  51 %   3)  ECG dictated in a separate report   Summary:  1. Stress ECG conclusions: The stress ECG is nondiagnostic. The specificity of     this test is limited by resting ECG abnormalities.  2. Baseline ECG: Atrial fibrillation. Nonspecific ST and T wave changes.    Coronary Angiogram:    Device checks: N/A    Assessment / Plan:     Assessment:   Rivera Javier is a 78 year old male who presents in consultation for symptomatic persistent atrial fibrillation.    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    Symptomatic nonvalvular persistent atrial fibrillation Stage 3B persistent atrial fibrillation  Diagnosed 6/21/23, presented to hospital with dyspnea, palpitations, new dx AF  [id number]: 4-5 (?CHF, HTN, age1, CAD)  6/21/23 TTE: LVEF 45% EDINSON = 27 mL/m2, LV wall thickness 0.9cm.  7/3/23  accordingly.     Hypothyroidism- (present on admission)  Assessment & Plan  Resume home medication.     Dyslipidemia- (present on admission)  Assessment & Plan  We will resume home medication, however we will order lipid panel to see the patient will actually warrant continued statin therapy.        VTE prophylaxis: enoxaparin ppx    Dr Valdez (Pulmonary) recommendations:  Problem List  Principal Problem:    COPD exacerbation (HCC) POA: Yes  Active Problems:    Dyslipidemia POA: Yes    Hypothyroidism POA: Yes    Essential hypertension POA: Yes    Advanced care planning/counseling discussion POA: Yes    Failure to thrive in adult POA: Unknown    Acute hypoxemic respiratory failure (HCC) POA: Unknown    Hard of hearing POA: Unknown    Hyponatremia POA: Unknown  Resolved Problems:    * No resolved hospital problems. *        Assessment and Plan  * COPD exacerbation (HCC)- (present on admission)  Assessment & Plan  Likely COPD exacerbation, due to not being on maintenance treatment at home.  -5 day course of prednisone 40mg  -5 days azithromycin  -Start LAMA I.e spiriva once not on scheduled duonebs  -Patient declines follow-up and does not want PFT's  -Procal negative and without any focal consolidations, defer any additional abx to primary  -Wean oxygen goal sats 89-92%    Willy Burns M.D.   Physician   Hospital Medicine   Progress Notes       Signed   Date of Service:  3/22/2022  8:12 AM                         []Hide copied text    []Juan Carlos for details    Hospital Medicine Daily Progress Note     Date of Service  3/22/2022     Chief Complaint  Antonia Stover is a 86 y.o. female admitted 3/20/2022 with SOB     Hospital Course  An 86-year-old woman with h/o HTN, hypothyroidism, COPD presented with shortness of breath and increased cough for couple days.  The patient is very hard of hearing and does not wear hearing aids. They needed to go up on her oxygen at baseline at home.  In the ER the patient was found  "to have a white blood cell count of 19, a sodium level of 130, creatinine of 0.75.  CXR showed bibasilar opacities likely represent a combination of small pleural effusions with adjacent atelectasis. Pneumonia cannot be excluded.  The patient admitted for COPD exacerbation and started on antibiotics, pending procalcitonin, pending Covid testing.         Interval Problem Update  Patient reports SOB, cough, feeling cold, freezing. Denies fever, chest pain, nausea, diarrhea.   Afebrile, hemodynamically stable. On 4 L NC oxygen (baseline 3 L NC at home).  WBC 15.2  Na 127. fluid restriction 1.2L.  Blood cultures from 3/20 negative.\"     3/22. Sodium levels improving.  /family at bedside.  She has had [facility] before.      I have personally seen and examined the patient at bedside. I discussed the plan of care with patient, bedside RN, charge RN,  and pharmacy.     Consultants/Specialty  None     Code Status  DNAR/DNI     Disposition  Patient is not medically cleared for discharge.   Anticipate discharge to TBD. SNF/rehab; if not accepted, HHC with O2 eval  I have placed the appropriate orders for post-discharge needs.     Review of Systems  Review of Systems   Constitutional: Positive for chills and malaise/fatigue. Negative for fever and weight loss.   HENT: Positive for hearing loss.    Eyes: Negative.    Respiratory: Positive for cough and shortness of breath. Negative for sputum production.    Cardiovascular: Negative for chest pain and leg swelling.   Gastrointestinal: Negative for abdominal pain, nausea and vomiting.   Genitourinary: Negative for dysuria.   Musculoskeletal: Negative for myalgias.   Skin: Negative for rash.   Neurological: Positive for weakness.         Physical Exam  Temp:  [36.2 °C (97.2 °F)-36.3 °C (97.4 °F)] 36.2 °C (97.2 °F)  Pulse:  [68-82] 82  Resp:  [16-18] 18  BP: (118-134)/(73-86) 134/73  SpO2:  [91 %-98 %] 98 %     Physical Exam  Vitals and nursing note reviewed. " MOSHE LVEF 60%.  Anticoagulation:Advised secondary to high thromboembolic risk,  Oral anticoagulation High risk medication management): apixaban 5mg BID  High risk medication use, apixaban.  CBC and BMP every 6 months. Appropriate dosing. Denies issues with bleeding.  Current antiarrhythmic therapy (AAD): none  Other meds: carvedilol  Prior AADs: Dronedarone (stopped 1/2024, no further recurrence of AF)  Prior procedures:  MOSHE/DCCV (7/3/23)  ***    We discussed the pathophysiology and natural course of atrial fibrillation in detail in addition to risks of thromboembolism and tachycardia-mediated cardiomyopathy. We also discussed treatment options, including a rate control strategy, as well as a rhythm control strategy with medications +/- cardioversion, as well as potentially catheter ablation (with 70% success rate at 1 year with the first ablation and 85% with a repeat ablation).  Catheter ablation can delay progression to persistent or permanent atrial fibrillation as well as decrease risk of heart failure, stroke, cognitive impairment/dementia and possible mortality irregardless of symptoms in atrial fibrillation. At this point I have the following recommendations:    1. Continue with current therapies since rates are controlled  2. Antiarrhythmic therapy - options include tikosyn or amiodarone  3. Atrial fibrillation ablation    We discussed a rhythm control strategy in detail, including the indications for, success rates associated with, and potential adverse effects of antiarrhythmic therapy and ablation.    At this time, *** chooses ***    *** A thorough discussion of the options, rationale for proceeding with ablation, and risks including but not limited to neurovascular injury, tamponade, pulmonary vein stenosis, esophageal injury, myocardial infarction (heart attack), blood clotting, pulmonary embolism, emergent need for surgery, kidney and liver injury, pneumothorax and hemothorax, stroke, need for    Constitutional:       Appearance: She is cachectic. She is ill-appearing.   HENT:      Head: Normocephalic and atraumatic.      Ears:      Comments: Hard of hearing     Nose: Nose normal.      Mouth/Throat:      Mouth: Mucous membranes are moist.      Pharynx: Oropharynx is clear.   Eyes:      Conjunctiva/sclera: Conjunctivae normal.      Pupils: Pupils are equal, round, and reactive to light.   Cardiovascular:      Rate and Rhythm: Normal rate and regular rhythm.   Pulmonary:      Effort: Pulmonary effort is normal. No respiratory distress.      Breath sounds: No wheezing or rales.   Abdominal:      General: Abdomen is flat. Bowel sounds are normal. There is no distension.      Tenderness: There is no abdominal tenderness. There is no guarding.   Musculoskeletal:         General: Normal range of motion.      Cervical back: Normal range of motion.   Skin:     General: Skin is warm.   Neurological:      General: No focal deficit present.      Mental Status: She is alert and oriented to person, place, and time.      Motor: Weakness present.          Fluids     Intake/Output Summary (Last 24 hours) at 3/22/2022 0812  Last data filed at 3/22/2022 0603      Gross per 24 hour   Intake 125 ml   Output --   Net 125 ml         Laboratory        Recent Labs     03/20/22  0640 03/21/22  0240 03/22/22  0030   WBC 19.0* 15.2* 19.8*   RBC 4.33 4.04* 4.11*   HEMOGLOBIN 13.3 12.5 12.6   HEMATOCRIT 40.2 37.6 37.8   MCV 92.8 93.1 92.0   MCH 30.7 30.9 30.7   MCHC 33.1* 33.2* 33.3*   RDW 48.7 48.0 47.9   PLATELETCT 352 325 344   MPV 11.2 11.0 11.4            Recent Labs     03/20/22  0640 03/21/22  0240 03/22/22  0030   SODIUM 130* 127* 129*   POTASSIUM 3.8 3.6 3.6   CHLORIDE 88* 87* 89*   CO2 28 28 28   GLUCOSE 105* 104* 107*   BUN 12 11 16   CREATININE 0.75 0.69 0.74   CALCIUM 8.8 8.8 8.8                  Recent Labs     03/21/22  0240   TRIGLYCERIDE 60   HDL 81   LDL 66         Imaging  DX-CHEST-PORTABLE (1 VIEW)   Final Result  pacemaker and death have been discussed with patient. ***All questions were answered and he understands and wishes to proceed and gives informed written consent.    Plan:  No orders of the defined types were placed in this encounter.      Follow up in *** months.     Thank you for allowing me to participate in the care of Rivera Javier. Please don't hesitate to contact me with any questions.     Teresa Caceres MD  [facility]  *** Bill new (or follow-up? Saw EP 2023) pt level 4, or 5 if admission for drug load***  .   "      Bibasilar opacities likely represent a combination of small pleural effusions with adjacent atelectasis. Pneumonia cannot be excluded.             Assessment/Plan  * COPD exacerbation, FTT/severe malnutrition (HCC)- (present on admission)  Assessment & Plan  We will start the patient on COPD exacerbation order set including duo nebs and p.o. steroids.  We have also started patient on azithromycin and ceftriaxone.  Monitor, make changes accordingly.\"     Stable.  She passed her exercise oximetry study on 3/21 and does NOT need O2.  On a 5d course of prednisone.  Follow up with Pulmonology     Hyponatremia  Assessment & Plan  Mild  Monitor, repeat BMP\"     Order TSH     Hard of hearing  Assessment & Plan  The patient is hard of hearing, as per the patient's  she does not wear hearing aids and she has not seen a doctor as an outpatient.  The patient will require further outpatient follow-up\"     ENT with Audiology outpatient follow up     Acute hypoxemic respiratory failure (HCC)  Assessment & Plan  Most likely due to COPD exacerbation, however there is also concern for pneumonia.  We have started the patient on ceftriaxone and azithromycin, as well as duo nebs and steroids.  Monitor, supplemental oxygen as needed.     We are also pending Covid, influenza testing.\"     CoVID NEGATIVE x 1  Flu negative  RSV negative  Try to wean her off O2.         Failure to thrive in adult  Assessment & Plan  The patient's  mentions that the patient has been losing weight for the past year, she is very cachectic.  We have ordered nutrition consult, we appreciate further conditions.  PT/OT.     Advanced care planning/counseling discussion- (present on admission)  Assessment & Plan  I had a conversation with both the patient and the patient's  for at least 20 minutes regarding CODE STATUS.  They are adamant that the patient would like to be DNR/DNI.  The patient and the patient's  understand, and they " "mentioned that they had signed a form in the past and it is at their house.     Essential hypertension- (present on admission)  Assessment & Plan  Patient's blood pressure on the lower side, will hold home medications for now.  Monitor, make changes accordingly.\"         Vitals:     03/22/22 0739   BP: 134/73   Pulse: 82   Resp: 18   Temp: 36.2 °C (97.2 °F)   SpO2: 98%      Stable.     Hypothyroidism- (present on admission)  Assessment & Plan  Resume home medication.     Dyslipidemia- (present on admission)  Assessment & Plan  We will resume home medication, however we will order lipid panel to see the patient will actually warrant continued statin therapy.        VTE prophylaxis: heparin ppx          Co-morbidities: See PMH  Potential Risk - Complications: Cognitive Impairment, Contractures, Deep Vein Thrombosis, Incontinence, Malnutrition, Pain, Perceptual Impairment, Pneumonia, Pressure Ulcer and Urinary Tract Infection  Level of Risk: High    Ongoing Medical Management Needed (Medical/Nursing Needs):   Patient Active Problem List    Diagnosis Date Noted   • COPD exacerbation, FTT/severe malnutrition (HCC) 03/20/2022   • Failure to thrive in adult 03/20/2022   • Acute hypoxemic respiratory failure (HCC) 03/20/2022   • Hard of hearing 03/20/2022   • Hyponatremia 03/20/2022   • Grief 09/29/2021   • Pulmonary hypertension (HCC) 10/06/2020   • Advanced care planning/counseling discussion 10/06/2020   • Stage 2 chronic kidney disease 12/10/2019   • Current smoker 02/19/2019   • History of influenza vaccine allergy 11/08/2018   • Chronic nasal congestion 11/08/2018   • Relationship problems 08/07/2018   • Decreased GFR 07/16/2018   • Chronic fatigue 07/16/2018   • Chronic neck pain 01/05/2018   • Essential hypertension 11/30/2017   • Atypical nevus 08/22/2017   • Dysphagia 08/22/2017   • AMD (age-related macular degeneration), bilateral 05/08/2017   • Seasonal allergic rhinitis due to pollen 03/16/2017   • Chronic pain " "of left knee 12/22/2016   • Chronic obstructive pulmonary disease (HCC) 11/03/2016   • Tobacco dependence 10/18/2016   • H/O knee surgery 08/28/2014   • Osteoporosis 07/31/2014   • Atherosclerosis of both carotid arteries 03/19/2014   • Nocturnal hypoxia 08/07/2013   • Hypothyroidism 08/07/2013   • Vertigo 02/15/2013   • PSVT (paroxysmal supraventricular tachycardia) (HCC) 09/29/2011   • Dyslipidemia 09/29/2011   • Insomnia 09/23/2011     A & O .    Current Vital Signs:   Temperature: 36.5 °C (97.7 °F) Pulse: 81 Respiration: 18 Blood Pressure : 135/74  Weight: 36.3 kg (80 lb) Height: 157.5 cm (5' 2\")  Pulse Oximetry: 90 % O2 (LPM): 5      Completed Laboratory Reports:  Recent Labs     03/21/22  0240 03/22/22  0030   WBC 15.2* 19.8*   HEMOGLOBIN 12.5 12.6   HEMATOCRIT 37.6 37.8   PLATELETCT 325 344   SODIUM 127* 129*   POTASSIUM 3.6 3.6   BUN 11 16   CREATININE 0.69 0.74   ALBUMIN 3.5 3.5   GLUCOSE 104* 107*     Additional Labs: Not Applicable    Prior Living Situation:   Housing / Facility: Mobile Home  Steps Into Home:  (ramp)  Steps In Home: 0  Lives with - Patient's Self Care Capacity: Spouse (\"Skip\")  Equipment Owned: Front-Wheel Walker,Oxygen,Ramp    Prior Level of Function / Living Situation:   Physical Therapy: Prior Services: Intermittent Physical Support for ADL Per Family,Skilled Home Health Services  Housing / Facility: Mobile Home  Steps Into Home:  (ramp)  Steps In Home: 0  Bathroom Set up: Bathtub / Shower Combination  Equipment Owned: Front-Wheel Walker,Oxygen,Ramp  Lives with - Patient's Self Care Capacity: Spouse (\"Skip\")  Bed Mobility: Independent  Transfer Status: Independent  Ambulation: Independent  Distance Ambulation (Feet):  (household)  Assistive Devices Used: Front-Wheel Walker  Current Level of Function:   Gait Level Of Assist: Minimal Assist  Assistive Device: Front Wheel Walker  Distance (Feet): 3  Deviation: Bradykinetic,Decreased Base Of Support,Step To  Weight Bearing Status: no " restrictions  Skilled Intervention: Verbal Cuing,Sequencing,Postural Facilitation  Supine to Sit: Minimal Assist  Sit to Supine:  (pt left up in chair)  Scooting: Supervised  Rolling: Supervised  Skilled Intervention: Verbal Cuing,Sequencing  Comments: improved initiation  Sit to Stand: Minimal Assist  Bed, Chair, Wheelchair Transfer: Minimal Assist  Transfer Method: Stand Step  Skilled Intervention: Verbal Cuing,Tactile Cuing,Sequencing,Postural Facilitation  Sitting in Chair: > 5 min post  Sitting Edge of Bed: 15 min  Standin min total  Occupational Therapy:   Self Feeding: Independent (poor PO intake)  Grooming / Hygiene: Requires Assist  Bathing: Requires Assist  Dressing: Independent  Toileting: Independent  Medication Management: Requires Assist  Laundry: Requires Assist  Kitchen Mobility: Requires Assist  Finances: Requires Assist  Home Management: Requires Assist  Shopping: Requires Assist  Prior Level Of Mobility: Supervision With Device in Community  Prior Services: Intermittent Physical Support for ADL Per Family,Skilled Home Health Services  Housing / Facility: Mobile Home  Occupation (Pre-Hospital Vocational): Retired Due To Age  Current Level of Function:   Eating: Supervision (needs encouragement to improve PO intake)  Bathing: Moderate Assist  Upper Body Dressing: Moderate Assist  Lower Body Dressing: Maximal Assist  Toileting: Maximal Assist  Speech Language Pathology:      Rehabilitation Prognosis/Potential: Good  Estimated Length of Stay: 7-10 days    Nursing:      Continent    Scope/Intensity of Services Recommended:  Physical Therapy: 1.5 hr / day  5 days / week. Therapeutic Interventions Required: Maximize Endurance, Mobility, Strength and Safety  Occupational Therapy: 1.5 hr / day 5 days / week. Therapeutic Interventions Required: Maximize Self Care, ADLs, IADLs and Energy Conservation  Rehabilitation Nursin/. Therapeutic Interventions Required: Monitor Pain, Skin, Vital Signs,  Intake and Output, Labs, Safety and Family Training  Rehabilitation Physician: 3 - 5 days / week. Therapeutic Interventions Required: Medical Management  Respiratory Care: Pulmonary Toileting. Therapeutic Interventions Required: Pulmonary Toileting and O2 Weaning  Dietician: Nutritional Assessment/Education. Therapeutic Interventions Required: .    She requires 24-hour rehabilitation nursing to manage nutrition and fluid intake, pulmonary hygiene, pain control, safety, medication management and patient/family goals. In addition, rehabilitation nursing will reiterate and reinforce therapy skills and equipment use, including ADLs, as well as provide education to the patient and family. Antonia Stoevr is willing to participate in and is able to tolerate the proposed plan of care.    Rehabilitation Goals and Plan (Expected frequency & duration of treatment in the IRF):   Return to the Community, Modified Independent Level of Care and Family Able to Provide 24/7 Assistance  Anticipated Date of Rehabilitation Admission: 03-23-22  Patient/Family oriented IRF level of care/facility/plan: Yes  Patient/Family willing to participate in IRF care/facility/plan: Yes  Patient able to tolerate IRF level of care proposed: Yes  Patient has potential to benefit IRF level of care proposed: Yes  Comments: Not Applicable    Special Needs or Precautions - Medical Necessity:  Safety Concerns/Precautions:  Fall Risk / High Risk for Falls, Balance and Bed / Chair Alarm  Pain Management  IV Site: Peripheral  Requires Oxygen  Current Medications:    Current Facility-Administered Medications Ordered in Epic   Medication Dose Route Frequency Provider Last Rate Last Admin   • azithromycin (ZITHROMAX) tablet 250 mg  250 mg Oral DAILY Markel Valdez M.D.   250 mg at 03/23/22 0632   • Respiratory Therapy Consult   Nebulization Continuous RT Seun Torres M.D.       • ipratropium-albuterol (DUONEB) nebulizer solution  3 mL  Nebulization Q2HRS PRN (RT) Seun Torres M.D.       • predniSONE (DELTASONE) tablet 40 mg  40 mg Oral DAILY Seun Torres M.D.   40 mg at 03/23/22 0631   • senna-docusate (PERICOLACE or SENOKOT S) 8.6-50 MG per tablet 2 Tablet  2 Tablet Oral BID Seun Torres M.D.   2 Tablet at 03/23/22 0633    And   • polyethylene glycol/lytes (MIRALAX) PACKET 1 Packet  1 Packet Oral QDAY PRN Seun Torres M.D.        And   • magnesium hydroxide (MILK OF MAGNESIA) suspension 30 mL  30 mL Oral QDAY PRN Seun Torres M.D.        And   • bisacodyl (DULCOLAX) suppository 10 mg  10 mg Rectal QDAY PRN Seun Torres M.D.       • heparin injection 5,000 Units  5,000 Units Subcutaneous Q8HRS Seun Torres M.D.   5,000 Units at 03/23/22 0632   • acetaminophen (Tylenol) tablet 650 mg  650 mg Oral Q6HRS PRN Seun Torres M.D.       • cefTRIAXone (Rocephin) syringe 1 g  1 g Intravenous Q24HRS Seun Torres M.D.   1 g at 03/23/22 0632   • clopidogrel (PLAVIX) tablet 75 mg  75 mg Oral DAILY Seun Torres M.D.   75 mg at 03/23/22 0631   • levothyroxine (SYNTHROID) tablet 75 mcg  75 mcg Oral AM ES Seun Torres M.D.   75 mcg at 03/23/22 0631   • pravastatin (PRAVACHOL) tablet 40 mg  40 mg Oral Q EVENING Seun Torres M.D.   40 mg at 03/22/22 1727     Current Outpatient Medications Ordered in Epic   Medication Sig Dispense Refill   • acetaminophen (TYLENOL) 325 MG Tab Take 2 Tablets by mouth every 6 hours as needed. 30 Tablet 0   • azithromycin (ZITHROMAX) 250 MG Tab Take 1 Tablet by mouth every day for 2 days. 2 Tablet 0   • cefUROXime (CEFTIN) 500 MG Tab Take 1 Tablet by mouth 2 times a day for 2 days. 4 Tablet 0   • Saccharomyces boulardii (PROBIOTIC) 250 MG Cap Take 1 Capsule by mouth 2 times a day with meals. 14 Capsule 0   • heparin 5000 UNIT/ML Solution Inject 1 mL under the skin every 8  hours.  0   • ipratropium-albuterol (DUONEB) 0.5-2.5 (3) MG/3ML nebulizer solution Take 3 mL by nebulization every four hours as needed for Shortness of Breath.     • predniSONE (DELTASONE) 20 MG Tab Take 2 Tablets by mouth every day for 1 day. 2 Tablet 0   • senna-docusate (PERICOLACE OR SENOKOT S) 8.6-50 MG Tab Take 2 Tablets by mouth 2 times a day. 30 Tablet 0   • polyethylene glycol/lytes (MIRALAX) 17 g Pack Take 1 Packet by mouth 1 time a day as needed (if sennosides and docusate ineffective after 24 hours).  3     Diet:   DIET ORDERS (From admission to next 24h)     Start     Ordered    03/23/22 0922  Supplements  ONCE        Question:  Which Supplement  Answer:  Per RD    03/23/22 0921 03/21/22 0823  Diet Order Diet: Regular; Fluid modifications: (optional): 1200 ml Fluid Restriction  ALL MEALS        Question Answer Comment   Diet: Regular    Fluid modifications: (optional) 1200 ml Fluid Restriction        03/21/22 0822                Anticipated Discharge Destination / Patient/Family Goal:  Destination: Home with Assistance Support System: Spouse  Anticipated home health services: OT and PT  Previously used HH service/ provider: Not Applicable  Anticipated DME Needs: Oxygen, Walker and Life Line  Outpatient Services: OT and PT  Alternative resources to address additional identified needs:   No future appointments.  Pre-Screen Completed: 3/23/2022 9:34 AM Ryan Valentine L.P.N.

## 2025-01-06 NOTE — ED NOTES
Pt to R1 via gurney with EMS. Pt is A&Ox4 and awake in bed. Pt placed on cardiac monitor, pulse ox, and automatic BP. Saturating above 90% on 15L NRB, respiratory rate 22-24, SR in the 70s. Dr. Landry at bedside with bedside ultrasound.    normal mood with appropriate affect

## 2025-02-03 NOTE — TELEPHONE ENCOUNTER
Was the patient seen in the last year in this department? Yes    Does patient have an active prescription for medications requested? No     Received Request Via: Pharmacy   5 (moderate pain)